# Patient Record
Sex: FEMALE | Race: WHITE | NOT HISPANIC OR LATINO | Employment: FULL TIME | URBAN - METROPOLITAN AREA
[De-identification: names, ages, dates, MRNs, and addresses within clinical notes are randomized per-mention and may not be internally consistent; named-entity substitution may affect disease eponyms.]

---

## 2017-10-04 ENCOUNTER — HOSPITAL ENCOUNTER (EMERGENCY)
Facility: CLINIC | Age: 42
Discharge: PSYCHIATRIC OR SUBSTANCE USE FACILITY | End: 2017-10-04
Attending: EMERGENCY MEDICINE | Admitting: EMERGENCY MEDICINE
Payer: COMMERCIAL

## 2017-10-04 ENCOUNTER — HOSPITAL ENCOUNTER (INPATIENT)
Facility: CLINIC | Age: 42
LOS: 10 days | Discharge: HOME | End: 2017-10-14
Attending: PSYCHIATRY & NEUROLOGY | Admitting: PSYCHIATRY & NEUROLOGY
Payer: COMMERCIAL

## 2017-10-04 VITALS
DIASTOLIC BLOOD PRESSURE: 85 MMHG | OXYGEN SATURATION: 95 % | TEMPERATURE: 98.6 F | HEIGHT: 66 IN | SYSTOLIC BLOOD PRESSURE: 125 MMHG | HEART RATE: 68 BPM | RESPIRATION RATE: 12 BRPM

## 2017-10-04 DIAGNOSIS — F51.05 INSOMNIA DUE TO OTHER MENTAL DISORDER: ICD-10-CM

## 2017-10-04 DIAGNOSIS — F33.2 MAJOR DEPRESSIVE DISORDER, RECURRENT SEVERE WITHOUT PSYCHOTIC FEATURES (H): ICD-10-CM

## 2017-10-04 DIAGNOSIS — F41.1 GAD (GENERALIZED ANXIETY DISORDER): ICD-10-CM

## 2017-10-04 DIAGNOSIS — R45.851 SUICIDAL IDEATION: ICD-10-CM

## 2017-10-04 DIAGNOSIS — F99 INSOMNIA DUE TO OTHER MENTAL DISORDER: ICD-10-CM

## 2017-10-04 DIAGNOSIS — G44.209 ACUTE NON INTRACTABLE TENSION-TYPE HEADACHE: ICD-10-CM

## 2017-10-04 DIAGNOSIS — Z72.0 TOBACCO USE: ICD-10-CM

## 2017-10-04 DIAGNOSIS — F31.9 BIPOLAR I DISORDER (H): Primary | ICD-10-CM

## 2017-10-04 PROCEDURE — 25000132 ZZH RX MED GY IP 250 OP 250 PS 637: Performed by: EMERGENCY MEDICINE

## 2017-10-04 PROCEDURE — 99285 EMERGENCY DEPT VISIT HI MDM: CPT

## 2017-10-04 PROCEDURE — 25000132 ZZH RX MED GY IP 250 OP 250 PS 637: Performed by: PSYCHIATRY & NEUROLOGY

## 2017-10-04 PROCEDURE — 12400007 ZZH R&B MH INTERMEDIATE UMMC

## 2017-10-04 RX ORDER — LURASIDONE HYDROCHLORIDE 20 MG/1
20 TABLET, FILM COATED ORAL
Status: ON HOLD | COMMUNITY
Start: 2014-12-01 | End: 2017-10-13

## 2017-10-04 RX ORDER — NALOXONE HYDROCHLORIDE 0.4 MG/ML
.1-.4 INJECTION, SOLUTION INTRAMUSCULAR; INTRAVENOUS; SUBCUTANEOUS
Status: DISCONTINUED | OUTPATIENT
Start: 2017-10-04 | End: 2017-10-14 | Stop reason: HOSPADM

## 2017-10-04 RX ORDER — NICOTINE 21 MG/24HR
1 PATCH, TRANSDERMAL 24 HOURS TRANSDERMAL DAILY
Status: DISCONTINUED | OUTPATIENT
Start: 2017-10-05 | End: 2017-10-14 | Stop reason: HOSPADM

## 2017-10-04 RX ORDER — HYDROXYZINE HYDROCHLORIDE 25 MG/1
25-50 TABLET, FILM COATED ORAL EVERY 4 HOURS PRN
Status: DISCONTINUED | OUTPATIENT
Start: 2017-10-04 | End: 2017-10-14 | Stop reason: HOSPADM

## 2017-10-04 RX ORDER — LORATADINE 10 MG/1
10 TABLET ORAL DAILY
Status: DISCONTINUED | OUTPATIENT
Start: 2017-10-05 | End: 2017-10-14 | Stop reason: HOSPADM

## 2017-10-04 RX ORDER — ALUMINA, MAGNESIA, AND SIMETHICONE 2400; 2400; 240 MG/30ML; MG/30ML; MG/30ML
30 SUSPENSION ORAL EVERY 4 HOURS PRN
Status: DISCONTINUED | OUTPATIENT
Start: 2017-10-04 | End: 2017-10-14 | Stop reason: HOSPADM

## 2017-10-04 RX ORDER — OLANZAPINE 10 MG/2ML
10 INJECTION, POWDER, FOR SOLUTION INTRAMUSCULAR
Status: DISCONTINUED | OUTPATIENT
Start: 2017-10-04 | End: 2017-10-14 | Stop reason: HOSPADM

## 2017-10-04 RX ORDER — QUETIAPINE FUMARATE 100 MG/1
100 TABLET, FILM COATED ORAL AT BEDTIME
Status: DISCONTINUED | OUTPATIENT
Start: 2017-10-04 | End: 2017-10-14 | Stop reason: HOSPADM

## 2017-10-04 RX ORDER — BISACODYL 10 MG
10 SUPPOSITORY, RECTAL RECTAL DAILY PRN
Status: DISCONTINUED | OUTPATIENT
Start: 2017-10-04 | End: 2017-10-14 | Stop reason: HOSPADM

## 2017-10-04 RX ORDER — OLANZAPINE 10 MG/1
10 TABLET ORAL
Status: DISCONTINUED | OUTPATIENT
Start: 2017-10-04 | End: 2017-10-14 | Stop reason: HOSPADM

## 2017-10-04 RX ORDER — ESCITALOPRAM OXALATE 5 MG/1
10 TABLET ORAL EVERY MORNING
Status: DISCONTINUED | OUTPATIENT
Start: 2017-10-05 | End: 2017-10-05

## 2017-10-04 RX ORDER — LORAZEPAM 1 MG/1
1 TABLET ORAL 3 TIMES DAILY
Status: DISCONTINUED | OUTPATIENT
Start: 2017-10-05 | End: 2017-10-12

## 2017-10-04 RX ORDER — HYDROCODONE BITARTRATE AND ACETAMINOPHEN 5; 325 MG/1; MG/1
2 TABLET ORAL ONCE
Status: COMPLETED | OUTPATIENT
Start: 2017-10-04 | End: 2017-10-04

## 2017-10-04 RX ORDER — TRAZODONE HYDROCHLORIDE 50 MG/1
50 TABLET, FILM COATED ORAL
Status: DISCONTINUED | OUTPATIENT
Start: 2017-10-04 | End: 2017-10-14 | Stop reason: HOSPADM

## 2017-10-04 RX ORDER — LURASIDONE HYDROCHLORIDE 20 MG/1
20 TABLET, FILM COATED ORAL EVERY MORNING
Status: DISCONTINUED | OUTPATIENT
Start: 2017-10-05 | End: 2017-10-05

## 2017-10-04 RX ORDER — NICOTINE 21 MG/24HR
1 PATCH, TRANSDERMAL 24 HOURS TRANSDERMAL DAILY
Status: DISCONTINUED | OUTPATIENT
Start: 2017-10-04 | End: 2017-10-04 | Stop reason: HOSPADM

## 2017-10-04 RX ORDER — HYDROCODONE BITARTRATE AND ACETAMINOPHEN 5; 325 MG/1; MG/1
1-2 TABLET ORAL EVERY 4 HOURS PRN
Status: DISCONTINUED | OUTPATIENT
Start: 2017-10-04 | End: 2017-10-14 | Stop reason: HOSPADM

## 2017-10-04 RX ORDER — ACETAMINOPHEN 325 MG/1
650 TABLET ORAL EVERY 4 HOURS PRN
Status: DISCONTINUED | OUTPATIENT
Start: 2017-10-04 | End: 2017-10-13

## 2017-10-04 RX ORDER — ZOLPIDEM TARTRATE 5 MG/1
10 TABLET ORAL AT BEDTIME
Status: DISCONTINUED | OUTPATIENT
Start: 2017-10-04 | End: 2017-10-14 | Stop reason: HOSPADM

## 2017-10-04 RX ORDER — IBUPROFEN 600 MG/1
600 TABLET, FILM COATED ORAL EVERY 6 HOURS PRN
Status: DISCONTINUED | OUTPATIENT
Start: 2017-10-04 | End: 2017-10-14 | Stop reason: HOSPADM

## 2017-10-04 RX ORDER — LORATADINE 10 MG/1
10 TABLET ORAL DAILY
COMMUNITY
End: 2019-06-16

## 2017-10-04 RX ADMIN — IBUPROFEN 600 MG: 600 TABLET ORAL at 23:03

## 2017-10-04 RX ADMIN — ZOLPIDEM TARTRATE 10 MG: 5 TABLET, FILM COATED ORAL at 23:03

## 2017-10-04 RX ADMIN — HYDROXYZINE HYDROCHLORIDE 50 MG: 25 TABLET ORAL at 23:03

## 2017-10-04 RX ADMIN — NICOTINE 1 PATCH: 21 PATCH, EXTENDED RELEASE TRANSDERMAL at 18:38

## 2017-10-04 RX ADMIN — QUETIAPINE 100 MG: 100 TABLET, FILM COATED ORAL at 23:03

## 2017-10-04 RX ADMIN — HYDROCODONE BITARTRATE AND ACETAMINOPHEN 2 TABLET: 5; 325 TABLET ORAL at 20:20

## 2017-10-04 NOTE — IP AVS SNAPSHOT
[address]    [address]    [place] [address]    Phone:  [phone]                                       After Visit Summary   10/4/2017    Mindi Eastman    MRN: [id number]           After Visit Summary Signature Page     I have received my discharge instructions, and my questions have been answered. I have discussed any challenges I see with this plan with the nurse or doctor.    ..........................................................................................................................................  Patient/Patient Representative Signature      ..........................................................................................................................................  Patient Representative Print Name and Relationship to Patient    ..................................................               ................................................  Date                                            Time    ..........................................................................................................................................  Reviewed by Signature/Title    ...................................................              ..............................................  Date                                                            Time

## 2017-10-04 NOTE — ED PROVIDER NOTES
"  History     Chief Complaint:  Suicidal Ideation     HPI   Mindi Eastman is a 41 year old female who presents with suicidal thoughts. Patient has been feeling more depressed and suicidal recently. No apparent trigger except for seroquel being decreased in dose to 50 mg from 100 mg though did increase it back to 100 mg and no improvement. Having suicidal thoughts. Did put knives in bed a few nights ago but was not harmed. Did punch table over the weekend in angry/stress and sustained fracture in right hand. Has been taking medications. No drugs/ETOH use. Feels that people would be better off without her. No ingestion or overdose.    Allergies:  Darvocet    Medications:    azithromycin (ZITHROMAX) 250 MG tablet  Albuterol Sulfate (PROVENTIL HFA) 108 (90 BASE) MCG/ACT AERS  guaiFENesin-codeine (ROBITUSSIN AC) 100-10 MG/5ML SOLN  Citalopram Hydrobromide (CELEXA PO)  Amphetamine-Dextroamphetamine (ADDERALL PO)  QUEtiapine Fumarate (SEROQUEL PO)  VITAMIN D, CHOLECALCIFEROL, PO  hydrocodone-acetaminophen 5-325 MG per tablet  ondansetron (ZOFRAN) 8 MG tablet  HYDROCODONE-ACETAMINOPHEN  MG OR TABS  METHYLPRED 40 40 MG/ML IJ SUSP  KENALOG 40 MG/ML IJ SUSP  ZOLOFT OR  LORAZEPAM OR  LYRICA 50 MG OR CAPS    Past Medical History:    Anxiety  Depression  Obstructive sleep apnea      Past Surgical History:    Appendectomy  Cholecystectomy      Family History:    History reviewed. No pertinent family history.     Social History:  Tobacco use:    Former smoker, quit 2010  Alcohol use:    Positive   PCP: Erica Coelho     Review of Systems  10 point review of systems was obtained and negative other than mentioned above.    Physical Exam     Patient Vitals for the past 24 hrs:   BP Temp Temp src Pulse Heart Rate Resp SpO2 Height   10/04/17 2123 125/85 - - 68 - - - -   10/04/17 2112 - - - - 68 - 95 % -   10/04/17 2111 125/85 - - - - - - -   10/04/17 1710 142/84 98.6  F (37  C) Oral - 66 12 98 % 1.676 m (5' 6\")    "     Physical Exam  General: Resting comfortably on the gurney  Eyes:  The pupils are equal and round    Conjunctivae and sclerae are normal  ENT:    Moist mucous membranes  Neck:  Normal range of motion  CV:  Regular rate and rhythm    Skin warm and well perfused   Resp:  Lungs are clear    Non-labored    No rales    No wheezing  MS:  Normal muscular tone    In cast on right arm/hand  Skin:  No rash or acute skin lesions noted  Neuro:   Awake, alert.      Speech is normal and fluent.    Face is symmetric.     Moves all extremities  Psych:  Tearful. Appropriate interactions.    Emergency Department Course     Interventions:  1838 Nicoderm CQ 1 patch transdermal     Emergency Department Course:  Past medical records, nursing notes, and vitals reviewed.  I performed an exam of the patient and obtained history, as documented above.    I rechecked the patient. Findings and plan explained to the Patient and significant other  1915: Patient accepted at [facility] by Dr. Wright    Findings and plan explained to the Patient. Patient will be transferred to [facility] via EMS. Discussed the case with Dr. Wright, who will admit the patient to a monitored bed for further monitoring, evaluation, and treatment.     Impression & Plan      Medical Decision Making:  Mindi Eastman is a 41 year old female who presented to the ED suicidal ideation. Vital signs wnl. Worsening thoughts, punched table, has suicidal plans/thought. Meets criteria for psych admission. Denies ingestion. No ETOH or drug use. Patient wanting to be admitted to psychiatry. No other medical condition causing symptoms at this time. Bed available at [facility] and transferred there for admission.    Diagnosis:    ICD-10-CM    1. Suicidal ideation R45.851       Disposition:   Transfer      10/4/2017   Sanjana Be MD Goertz, Maria Kristine, MD  10/05/17 0221

## 2017-10-04 NOTE — IP AVS SNAPSHOT
MRN:[id number]                      After Visit Summary   10/4/2017    Mindi Eastman    MRN: [id number]           Thank you!     Thank you for choosing [facility] for your care. Our goal is always to provide you with excellent care.        Patient Information     Date Of Birth          1975        Designated Caregiver       Most Recent Value    Caregiver    Will someone help with your care after discharge? no      About your hospital stay     You were admitted on:  October 4, 2017 You last received care in the:  [facility] 32NR    You were discharged on:  October 14, 2017       Who to Call     For medical emergencies, please call 911.  For non-urgent questions about your medical care, please call your primary care provider or clinic, [phone]          Attending Provider     Provider Specialty    Alvarez Solares MD Psychiatry    Dongre, Enrique Chow MD Psychiatry       Primary Care Provider Office Phone # Fax #    Erica Coelho PA-C [phone]      Your next 10 appointments already scheduled     Oct 16, 2017  7:30 AM CDT   Electroconvulsive Therapy with Alvarez Solares MD   [facility])    [address] [place] [address]   [phone]            Oct 24, 2017 10:15 AM CDT   Evaluation with Mami Li   [facility] ([facility])    [address] [address]   [phone]              Future tests that were ordered for you     Electroconvulsive therapy: Begin Outpatient       Series of up to 12 treatments. Begin Date: 10/16/2017  Treating Psychiatrist providing ECT: Dr. Alvarez Solares   Notified on:  10/12/2017            Behavior Outpatient Eval       Outpatient provider to assess and treat.                  Further instructions from your care team        Behavioral Discharge Planning and  Instructions      Summary:  You were admitted on 10/4/2017 due to Suicidal Ideations.  You were treated by Dr Enrique Wright MD and discharged on 10/14/2017 from [facility] Home      Principal Diagnosis:   Bipolar Disorder, current episode depressive   Generalized Anxiety Disorder     ECT  You received ECT during your hospitalization and therefore do NOT drive for at least 7 days after your last treatment.  Do not drive after ECT until you have been cleared by your doctor.     You will be receiving outpatient maintenance ECT. You will need someone to drive you to these appointments and back home. You will need someone with you for 6 hours following treatment. Do not drive after ECT until you have been cleared by your doctor.     ECT appointment Date/Time: Monday at 10/16 at 7:30 am  Address:   [place] [address] [address] [facility], [address]  Phone: [phone]    Health Care Follow-up Appointments:   Therapy Appointment Date/Time: Tuesday 10/17 at 3 pm    Therapist: Feli Medrano    Psychiatry Appointment Date/Time: Monday 11/6 at 1 pm   Psychiatrist: Brooklynn Almanza, MSN, RN  [facility] [place], [place] [address]  Phone: [phone]  Fax: [phone]    You have been referred to the partial hospitalization program at [facility].   Intake appointment date.time: Tuesday 10/24 at 10:15 am (If you are still in ECT please call and reschedule this)  [facility] Partial Hospitalization Program  Located in [facility] Floor [address] ; [address] [place] [address]   Phone:[phone]    Attend all scheduled appointments with your outpatient providers. Call at least 24 hours in advance if you need to reschedule an appointment to ensure continued access to your outpatient providers.   During movement therapy you discovered that Yoga Nidra was helpful. You also used a 'resource  "movement or posture' to call upon the relaxed state more quickly or efficiently.   Major Treatments, Procedures and Findings:  You were provided with: a psychiatric assessment, assessed for medical stability, medication evaluation and/or management, group therapy, milieu management and ECT    Symptoms to Report: feeling more aggressive, increased confusion, losing more sleep, mood getting worse or thoughts of suicide    Early warning signs can include: increased depression or anxiety sleep disturbances increased thoughts or behaviors of suicide or self-harm  increased unusual thinking, such as paranoia or hearing voices    Safety and Wellness:  Take all medicines as directed.  Make no changes unless your doctor suggests them.      Follow treatment recommendations.  Refrain from alcohol and non-prescribed drugs.  If there is a concern for safety, call 911.    Resources:   [facility] Crisis: 923.825.8164    National [place] on Mental Illness (www.[place].petra.org): 879.132.5490 or 446-713-7217.  Suicide Awareness Voices of Education (SAVE) (www.save.org): 826-687-HQUO (3744)  National Suicide Prevention Line (www.mentalhealthmn.org): 250-348-OXUP (3038)  Mental Health Consumer/Survivor Network of [place] (www.mhcsn.net): 773.615.2425 or 874-538-7215  Mental Health Association of [place] (www.mentalhealth.org): 377.915.7383 or 685-139-0490  Self- Management and Recovery Training., SMART-- Toll free: 935.259.8190  www.[employer].Up My Game  Text 4 Life: txt \"LIFE\" to 55342 for immediate support and crisis intervention  Crisis text line: Text \"START\" to 256-620. Free, confidential, 24/7.    The treatment team has appreciated the opportunity to work with you.     If you have any questions or concerns our unit number is [phone]  You may be receiving a follow-up phone call within the next three days from a representative from behavioral health.    You have identified the best phone number to reach you as " "[phone]        Pending Results     No orders found from 10/2/2017 to 10/5/2017.            Admission Information     Date & Time Department Dept. Phone    10/4/2017 UR 32NR [phone]      Your Vitals Were     Blood Pressure Pulse Temperature Respirations Height Weight    118/57 70 98.1  F (36.7  C) (Oral) 16 1.676 m (5' 6\") 86.7 kg (191 lb 1.6 oz)    Last Period Pulse Oximetry BMI (Body Mass Index)             2017 98% 30.84 kg/m2         [facility] Information     [employer] lets you send messages to your doctor, view your test results, renew your prescriptions, schedule appointments and more. To sign up, go to www.[facility]/[employer] . Click on \"Log in\" on the left side of the screen, which will take you to the Welcome page. Then click on \"Sign up Now\" on the right side of the page.     You will be asked to enter the access code listed below, as well as some personal information. Please follow the directions to create your username and password.     Your access code is: [email or web address]  Expires: 2018  9:10 PM     Your access code will  in 90 days. If you need help or a new code, please call your [place] clinic or [phone].        Care EveryWhere ID     This is your Care EveryWhere ID. This could be used by other organizations to access your [place] medical records  [id number]        Equal Access to Services     [facility] AH: Hadii tonia nelson hadasho Soakinali, waaxda luqadaha, qaybta kaalmada adeegyada, yamileth diehl . So [facility] [phone].    ATENCIÓN: Si habla español, tiene a martínez disposición servicios gratuitos de asistencia lingüística. Llame al [phone].    We comply with applicable federal civil rights laws and [place] laws. We do not discriminate on the basis of race, color, national origin, age, disability, sex, sexual orientation, or gender identity.               Review of your medicines      START taking        Dose / Directions    acetaminophen 325 MG " tablet   Commonly known as:  TYLENOL   Used for:  Acute non intractable tension-type headache        Dose:  975 mg   Take 3 tablets (975 mg) by mouth every 6 hours as needed for mild pain or other (Give before coming down for ECT)   Quantity:  100 tablet   Refills:  1       hydrOXYzine 25 MG tablet   Commonly known as:  ATARAX   Used for:  SHANICE (generalized anxiety disorder)        Dose:  50 mg   Take 2 tablets (50 mg) by mouth 3 times daily as needed for anxiety   Quantity:  90 tablet   Refills:  1       ibuprofen 600 MG tablet   Commonly known as:  ADVIL/MOTRIN   Used for:  Acute non intractable tension-type headache        Dose:  600 mg   Take 1 tablet (600 mg) by mouth every 6 hours as needed for moderate pain   Quantity:  120 tablet   Refills:  1       nicotine 21 MG/24HR 24 hr patch   Commonly known as:  NICODERM CQ   Used for:  Tobacco use        Dose:  1 patch   Place 1 patch onto the skin daily   Quantity:  30 patch   Refills:  1         CONTINUE these medicines which may have CHANGED, or have new prescriptions. If we are uncertain of the size of tablets/capsules you have at home, strength may be listed as something that might have changed.        Dose / Directions    escitalopram 20 MG tablet   Commonly known as:  LEXAPRO   This may have changed:    - medication strength  - how much to take  - when to take this   Used for:  Major depressive disorder, recurrent severe without psychotic features (H)        Dose:  20 mg   Take 1 tablet (20 mg) by mouth daily   Quantity:  30 tablet   Refills:  1       LORazepam 1 MG tablet   Commonly known as:  ATIVAN   This may have changed:    - medication strength  - when to take this   Used for:  SHANICE (generalized anxiety disorder)        Dose:  1 mg   Take 1 tablet (1 mg) by mouth 2 times daily   Quantity:  60 tablet   Refills:  0       lurasidone 40 MG Tabs tablet   Commonly known as:  LATUDA   This may have changed:    - medication strength  - how much to take  - when to  take this   Used for:  Bipolar I disorder (H), Major depressive disorder, recurrent severe without psychotic features (H)        Dose:  40 mg   Take 1 tablet (40 mg) by mouth At Bedtime   Quantity:  30 tablet   Refills:  1       QUEtiapine 100 MG tablet   Commonly known as:  SEROquel   This may have changed:  medication strength   Used for:  Bipolar I disorder (H), Major depressive disorder, recurrent severe without psychotic features (H)        Dose:  100 mg   Take 1 tablet (100 mg) by mouth At Bedtime   Quantity:  30 tablet   Refills:  1       zolpidem 10 MG tablet   Commonly known as:  AMBIEN   This may have changed:  medication strength   Used for:  Insomnia due to other mental disorder        Dose:  10 mg   Take 1 tablet (10 mg) by mouth At Bedtime   Quantity:  30 tablet   Refills:  0         CONTINUE these medicines which have NOT CHANGED        Dose / Directions    ADDERALL XR PO        Dose:  60 mg   Take 60 mg by mouth daily   Refills:  0       HYDROcodone-acetaminophen 5-325 MG per tablet   Commonly known as:  NORCO        Dose:  1-2 tablet   Take 1-2 tablets by mouth every 4 hours as needed for pain.   Quantity:  30 tablet   Refills:  0       loratadine 10 MG tablet   Commonly known as:  CLARITIN        Dose:  10 mg   Take 10 mg by mouth daily   Refills:  0            Where to get your medicines      These medications were sent to [facility]/pharmacy #[id number] - [place], [place] - [address]  [address] [facility] [place] [address]     Phone:  [phone]     acetaminophen 325 MG tablet    escitalopram 20 MG tablet    hydrOXYzine 25 MG tablet    ibuprofen 600 MG tablet    lurasidone 40 MG Tabs tablet    nicotine 21 MG/24HR 24 hr patch    QUEtiapine 100 MG tablet         Some of these will need a paper prescription and others can be bought over the counter. Ask your nurse if you have questions.     Bring a paper prescription for each of these medications     zolpidem 10 MG tablet                Protect others  around you: Learn how to safely use, store and throw away your medicines at www.disposemymeds.org.             Medication List: This is a list of all your medications and when to take them. Check marks below indicate your daily home schedule. Keep this list as a reference.      Medications           Morning Afternoon Evening Bedtime As Needed    acetaminophen 325 MG tablet   Commonly known as:  TYLENOL   Take 3 tablets (975 mg) by mouth every 6 hours as needed for mild pain or other (Give before coming down for ECT)   Last time this was given:  975 mg on 10/13/2017  9:02 AM                            Acetaminophen from all sources not to exceed 4 g (4000mg) per day       ADDERALL XR PO   Take 60 mg by mouth daily                                   escitalopram 20 MG tablet   Commonly known as:  LEXAPRO   Take 1 tablet (20 mg) by mouth daily   Last time this was given:  20 mg on 10/14/2017  8:33 AM                                   HYDROcodone-acetaminophen 5-325 MG per tablet   Commonly known as:  NORCO   Take 1-2 tablets by mouth every 4 hours as needed for pain.   Last time this was given:  2 tablets on 10/14/2017  8:37 AM                            Acetaminophen from all sources not to exceed 4 g (4000mg) per day       hydrOXYzine 25 MG tablet   Commonly known as:  ATARAX   Take 2 tablets (50 mg) by mouth 3 times daily as needed for anxiety   Last time this was given:  50 mg on 10/12/2017  6:12 PM                            Up to 3 times per day       ibuprofen 600 MG tablet   Commonly known as:  ADVIL/MOTRIN   Take 1 tablet (600 mg) by mouth every 6 hours as needed for moderate pain   Last time this was given:  600 mg on 10/12/2017  8:22 PM                            Up to every 6 hours       loratadine 10 MG tablet   Commonly known as:  CLARITIN   Take 10 mg by mouth daily   Last time this was given:  10 mg on 10/14/2017  8:33 AM                                LORazepam 1 MG tablet   Commonly known as:  ATIVAN    Take 1 tablet (1 mg) by mouth 2 times daily   Last time this was given:  1 mg on 10/14/2017  8:33 AM                                      lurasidone 40 MG Tabs tablet   Commonly known as:  LATUDA   Take 1 tablet (40 mg) by mouth At Bedtime   Last time this was given:  40 mg on 10/13/2017  9:11 PM                                   nicotine 21 MG/24HR 24 hr patch   Commonly known as:  NICODERM CQ   Place 1 patch onto the skin daily   Last time this was given:  1 patch on 10/14/2017  8:33 AM            Remove nicotine patch if you begin smoking                       QUEtiapine 100 MG tablet   Commonly known as:  SEROquel   Take 1 tablet (100 mg) by mouth At Bedtime   Last time this was given:  100 mg on 10/13/2017  9:11 PM                                   zolpidem 10 MG tablet   Commonly known as:  AMBIEN   Take 1 tablet (10 mg) by mouth At Bedtime   Last time this was given:  10 mg on 10/13/2017  9:12 PM                                             More Information        Succinylcholine Chloride Solution for injection  What is this medicine?  Succinylcholine (KETAN seh nil KOH liza) is a skeletal muscle relaxant. It is used to relax muscles during surgery or while on a breathing machine.  This medicine may be used for other purposes; ask your health care provider or pharmacist if you have questions.  What should I tell my health care provider before I take this medicine?  They need to know if you have any of these conditions:    glaucoma    high levels of calcium, magnesium, potassium in the blood    history of nerve or muscle disease    large areas of burned or damaged skin    low levels of calcium in the blood    low levels of potassium in the blood    lung or breathing disease, like asthma    pseudocholinesterase deficiency    severely injured    an unusual or allergic reaction to succinylcholine, other medicines, foods, dyes, or preservatives    pregnant or trying to get pregnant    breast-feeding  How should I  use this medicine?  This medicine is for injection into a vein or muscle or for infusion into a vein. It is given by a health care professional in a hospital or clinic setting.  Talk to your pediatrician regarding the use of this medicine in children. While this drug may be prescribed for selected conditions, precautions do apply.  Overdosage: If you think you have taken too much of this medicine contact a poison control center or emergency room at once.  NOTE: This medicine is only for you. Do not share this medicine with others.  What if I miss a dose?  This does not apply.  What may interact with this medicine?  This medicine may interact with the following medications:    birth control pills    certain medicines for blood pressure, heart disease, irregular heart beat    certain medicines for infection like amikacin, capreomycin, clindamycin, gentamicin, lincomycin, polymyxin B, tobramycin, vancomycin    certain medicines for stomach problems like metoclopramide    general anesthetics    magnesium    narcotic medicines for pain    phenelzine    some medicines for cancer  This list may not describe all possible interactions. Give your health care provider a list of all the medicines, herbs, non-prescription drugs, or dietary supplements you use. Also tell them if you smoke, drink alcohol, or use illegal drugs. Some items may interact with your medicine.  What should I watch for while using this medicine?  Your condition will be monitored carefully while you are receiving this medicine.  What side effects may I notice from receiving this medicine?  Side effects that you should report to your doctor or health care professional as soon as possible:    allergic reactions like skin rash, itching or hives, swelling of the face, lips, or tongue    signs and symptoms of a dangerous change in heartbeat or heart rhythm like chest pain; dizziness; fast or irregular heartbeat; palpitations; feeling faint or lightheaded,  falls; breathing problems    signs and symptoms of increased potassium like muscle weakness; chest pain; or fast, irregular heartbeat    signs and symptoms of muscle injury like dark urine; trouble passing urine or change in the amount of urine; unusually weak or tired; muscle pain or side or back pain  Side effects that usually do not require medical attention (report these to your doctor or health care professional if they continue or are bothersome):    excessive saliva    muscle cramps    muscle pain  This list may not describe all possible side effects. Call your doctor for medical advice about side effects. You may report side effects to FDA at 5-086-FDA-5225.  Where should I keep my medicine?  This drug is given in a hospital or clinic and will not be stored at home.  NOTE: This sheet is a summary. It may not cover all possible information. If you have questions about this medicine, talk to your doctor, pharmacist, or health care provider.  NOTE:This sheet is a summary. It may not cover all possible information. If you have questions about this medicine, talk to your doctor, pharmacist, or health care provider. Copyright  2016 Gold Standard

## 2017-10-05 PROBLEM — F31.30 BIPOLAR I DISORDER, MOST RECENT EPISODE DEPRESSED (H): Status: ACTIVE | Noted: 2017-10-05

## 2017-10-05 LAB
ALBUMIN SERPL-MCNC: 3.2 G/DL (ref 3.4–5)
ALP SERPL-CCNC: 68 U/L (ref 40–150)
ALT SERPL W P-5'-P-CCNC: 28 U/L (ref 0–50)
ANION GAP SERPL CALCULATED.3IONS-SCNC: 7 MMOL/L (ref 3–14)
AST SERPL W P-5'-P-CCNC: 31 U/L (ref 0–45)
BASOPHILS # BLD AUTO: 0 10E9/L (ref 0–0.2)
BASOPHILS NFR BLD AUTO: 0.6 %
BILIRUB SERPL-MCNC: 0.4 MG/DL (ref 0.2–1.3)
BUN SERPL-MCNC: 8 MG/DL (ref 7–30)
CALCIUM SERPL-MCNC: 8 MG/DL (ref 8.5–10.1)
CHLORIDE SERPL-SCNC: 108 MMOL/L (ref 94–109)
CHOLEST SERPL-MCNC: 172 MG/DL
CO2 SERPL-SCNC: 28 MMOL/L (ref 20–32)
CREAT SERPL-MCNC: 0.78 MG/DL (ref 0.52–1.04)
DIFFERENTIAL METHOD BLD: NORMAL
EOSINOPHIL # BLD AUTO: 0.4 10E9/L (ref 0–0.7)
EOSINOPHIL NFR BLD AUTO: 5.8 %
ERYTHROCYTE [DISTWIDTH] IN BLOOD BY AUTOMATED COUNT: 12.9 % (ref 10–15)
GFR SERPL CREATININE-BSD FRML MDRD: 82 ML/MIN/1.7M2
GLUCOSE SERPL-MCNC: 90 MG/DL (ref 70–99)
HCG UR QL: NEGATIVE
HCT VFR BLD AUTO: 35.8 % (ref 35–47)
HDLC SERPL-MCNC: 70 MG/DL
HGB BLD-MCNC: 11.7 G/DL (ref 11.7–15.7)
IMM GRANULOCYTES # BLD: 0 10E9/L (ref 0–0.4)
IMM GRANULOCYTES NFR BLD: 0.2 %
LDLC SERPL CALC-MCNC: 85 MG/DL
LYMPHOCYTES # BLD AUTO: 2.1 10E9/L (ref 0.8–5.3)
LYMPHOCYTES NFR BLD AUTO: 33.9 %
MCH RBC QN AUTO: 29.8 PG (ref 26.5–33)
MCHC RBC AUTO-ENTMCNC: 32.7 G/DL (ref 31.5–36.5)
MCV RBC AUTO: 91 FL (ref 78–100)
MONOCYTES # BLD AUTO: 0.6 10E9/L (ref 0–1.3)
MONOCYTES NFR BLD AUTO: 9.3 %
NEUTROPHILS # BLD AUTO: 3.1 10E9/L (ref 1.6–8.3)
NEUTROPHILS NFR BLD AUTO: 50.2 %
NONHDLC SERPL-MCNC: 102 MG/DL
NRBC # BLD AUTO: 0 10*3/UL
NRBC BLD AUTO-RTO: 0 /100
PLATELET # BLD AUTO: 356 10E9/L (ref 150–450)
POTASSIUM SERPL-SCNC: 4.2 MMOL/L (ref 3.4–5.3)
PROT SERPL-MCNC: 6.2 G/DL (ref 6.8–8.8)
RBC # BLD AUTO: 3.92 10E12/L (ref 3.8–5.2)
SODIUM SERPL-SCNC: 143 MMOL/L (ref 133–144)
TRIGL SERPL-MCNC: 85 MG/DL
TSH SERPL DL<=0.005 MIU/L-ACNC: 1.81 MU/L (ref 0.4–4)
WBC # BLD AUTO: 6.3 10E9/L (ref 4–11)

## 2017-10-05 PROCEDURE — 81025 URINE PREGNANCY TEST: CPT | Performed by: PSYCHIATRY & NEUROLOGY

## 2017-10-05 PROCEDURE — 80053 COMPREHEN METABOLIC PANEL: CPT | Performed by: PSYCHIATRY & NEUROLOGY

## 2017-10-05 PROCEDURE — 25000132 ZZH RX MED GY IP 250 OP 250 PS 637: Performed by: PSYCHIATRY & NEUROLOGY

## 2017-10-05 PROCEDURE — 99207 ZZC CDG-CODE INCORRECT PER BILLING BASED ON TIME: CPT | Performed by: PSYCHIATRY & NEUROLOGY

## 2017-10-05 PROCEDURE — 85025 COMPLETE CBC W/AUTO DIFF WBC: CPT | Performed by: PSYCHIATRY & NEUROLOGY

## 2017-10-05 PROCEDURE — 12400007 ZZH R&B MH INTERMEDIATE UMMC

## 2017-10-05 PROCEDURE — 36415 COLL VENOUS BLD VENIPUNCTURE: CPT | Performed by: PSYCHIATRY & NEUROLOGY

## 2017-10-05 PROCEDURE — 84443 ASSAY THYROID STIM HORMONE: CPT | Performed by: PSYCHIATRY & NEUROLOGY

## 2017-10-05 PROCEDURE — 80061 LIPID PANEL: CPT | Performed by: PSYCHIATRY & NEUROLOGY

## 2017-10-05 PROCEDURE — 99222 1ST HOSP IP/OBS MODERATE 55: CPT | Mod: AI | Performed by: PSYCHIATRY & NEUROLOGY

## 2017-10-05 PROCEDURE — 97150 GROUP THERAPEUTIC PROCEDURES: CPT | Mod: GO

## 2017-10-05 RX ORDER — LURASIDONE HYDROCHLORIDE 40 MG/1
40 TABLET, FILM COATED ORAL AT BEDTIME
Status: DISCONTINUED | OUTPATIENT
Start: 2017-10-06 | End: 2017-10-14 | Stop reason: HOSPADM

## 2017-10-05 RX ORDER — LURASIDONE HYDROCHLORIDE 20 MG/1
20 TABLET, FILM COATED ORAL AT BEDTIME
Status: COMPLETED | OUTPATIENT
Start: 2017-10-05 | End: 2017-10-05

## 2017-10-05 RX ORDER — ESCITALOPRAM OXALATE 20 MG/1
20 TABLET ORAL DAILY
Status: DISCONTINUED | OUTPATIENT
Start: 2017-10-06 | End: 2017-10-14 | Stop reason: HOSPADM

## 2017-10-05 RX ORDER — ESCITALOPRAM OXALATE 5 MG/1
10 TABLET ORAL DAILY
Status: COMPLETED | OUTPATIENT
Start: 2017-10-05 | End: 2017-10-05

## 2017-10-05 RX ADMIN — LORATADINE 10 MG: 10 TABLET ORAL at 08:04

## 2017-10-05 RX ADMIN — QUETIAPINE 100 MG: 100 TABLET, FILM COATED ORAL at 20:44

## 2017-10-05 RX ADMIN — HYDROCODONE BITARTRATE AND ACETAMINOPHEN 2 TABLET: 5; 325 TABLET ORAL at 16:23

## 2017-10-05 RX ADMIN — HYDROXYZINE HYDROCHLORIDE 50 MG: 25 TABLET ORAL at 21:32

## 2017-10-05 RX ADMIN — HYDROCODONE BITARTRATE AND ACETAMINOPHEN 2 TABLET: 5; 325 TABLET ORAL at 08:05

## 2017-10-05 RX ADMIN — LORAZEPAM 1 MG: 1 TABLET ORAL at 14:05

## 2017-10-05 RX ADMIN — ZOLPIDEM TARTRATE 10 MG: 5 TABLET, FILM COATED ORAL at 20:43

## 2017-10-05 RX ADMIN — LORAZEPAM 1 MG: 1 TABLET ORAL at 08:04

## 2017-10-05 RX ADMIN — LURASIDONE HYDROCHLORIDE 20 MG: 20 TABLET, FILM COATED ORAL at 08:04

## 2017-10-05 RX ADMIN — NICOTINE 1 PATCH: 21 PATCH, EXTENDED RELEASE TRANSDERMAL at 08:05

## 2017-10-05 RX ADMIN — ESCITALOPRAM 10 MG: 5 TABLET, FILM COATED ORAL at 08:04

## 2017-10-05 RX ADMIN — HYDROCODONE BITARTRATE AND ACETAMINOPHEN 2 TABLET: 5; 325 TABLET ORAL at 21:34

## 2017-10-05 RX ADMIN — HYDROCODONE BITARTRATE AND ACETAMINOPHEN 2 TABLET: 5; 325 TABLET ORAL at 12:25

## 2017-10-05 RX ADMIN — LURASIDONE HYDROCHLORIDE 20 MG: 20 TABLET, FILM COATED ORAL at 20:44

## 2017-10-05 RX ADMIN — LORAZEPAM 1 MG: 1 TABLET ORAL at 20:43

## 2017-10-05 RX ADMIN — ESCITALOPRAM 10 MG: 5 TABLET, FILM COATED ORAL at 12:26

## 2017-10-05 ASSESSMENT — ACTIVITIES OF DAILY LIVING (ADL)
ORAL_HYGIENE: INDEPENDENT
GROOMING: INDEPENDENT
ORAL_HYGIENE: INDEPENDENT
DRESS: INDEPENDENT
LAUNDRY: WITH SUPERVISION
DRESS: STREET CLOTHES
GROOMING: INDEPENDENT
ORAL_HYGIENE: INDEPENDENT
GROOMING: INDEPENDENT
DRESS: INDEPENDENT

## 2017-10-05 NOTE — PROGRESS NOTES
10/04/17 2223   Patient Belongings   Did you bring any home meds/supplements to the hospital?  No   Patient Belongings wallet;shoes;cell phone/electronics;clothing;jewelry;watch   Disposition of Belongings Pt Locker /Security    Belongings Search Yes   Clothing Search Yes   Second Staff Kim ROMERO (RN)     In Pt Locker: 1 pair of black boots, socks, pair of pants, belt with keys on belt loop, necklace, 3 markers, chopstick, watch, cell phone, 2 T-shirts, wallet(misc card, social security card, 's license)     With Pt: Pair of glasses.    On 10/5/17, family brought a grey hoodie and a pair of blue     Sent to security: $43.94 cash, capital one platinum master card (1254), [employer]  Visa debit (1554), Capital one master card (5844), [employer] Money Card( 7056).    A               Admission:  I am responsible for any personal items that are not sent to the safe or pharmacy.  [facility] is not responsible for loss, theft or damage of any property in my possession.    Signature:  _________________________________ Date: _______  Time: _____                                              Staff Signature:  ____________________________ Date: ________  Time: _____      2nd Staff person, if patient is unable/unwilling to sign:    Signature: ________________________________ Date: ________  Time: _____     Discharge:  [facility] has returned all of my personal belongings:    Signature: _________________________________ Date: ________  Time: _____                                          Staff Signature:  ____________________________ Date: ________  Time: _____

## 2017-10-05 NOTE — PLAN OF CARE
Problem: General Plan of Care (Inpatient Behavioral)  Goal: Individualization/Patient Specific Goal (IP Behavioral)  The patient and/or their representative will achieve their patient-specific goals related to the plan of care.    The patient-specific goals include: Will report decreased depression and absence of suicidal ideation.  Outcome: No Change  Illness Management Recovery model: Objectives     Patient will identify reason(s) for hospitalization from their perspective.  Patient will identify a minimum of three goals for discharge.  Patient will identify a minimum of three triggers that may increase their symptoms.  Patient will identify a minimum of three coping skills they can do to stay well.  Patient will identify their support system to demonstrate readiness for discharge.

## 2017-10-05 NOTE — PROGRESS NOTES
Pt visible & somewhat withdrawn in lounge. She attended groups, is minimally social, & reports she feels safe here but has occasional fleeting thoughts of self harm which she would not act on. Pt has a therapist out side that she likes.Pt says she is working on healthy ways to express frustration. Pt added that she was just so frustrated & overwhelmed, that she hit the picnic table & broke her hand. She has also had minor a med adjustments that she hopes will be helpful. She is pleasant & cooperative in her interactions.     10/05/17 1300   Behavioral Health   Hallucinations denies / not responding to hallucinations   Thinking intact   Orientation person: oriented   Memory baseline memory   Insight insight appropriate to situation   Judgement impaired   Eye Contact at examiner   Affect blunted, flat   Mood depressed;anxious   Physical Appearance/Attire attire appropriate to age and situation   Suicidality thoughts only  (says she'l remain safe)   Self Injury thoughts only  (safe here)   Activity other (see comment)  (visible in milieu, attends groups , selectively social)   Speech clear;coherent   Psychomotor / Gait balanced;steady   Coping/Psychosocial   Verbalized Emotional State acceptance   Plan Of Care Reviewed With patient   Occupational Therapy   Type of Intervention structured groups   Response Quiet but attentive   Hours 1.5   Psycho Education   Type of Intervention 1:1 intervention   Response participates with encouragement   Hours 0.5   Treatment Detail check in   Activities of Daily Living   Hygiene/Grooming independent   Oral Hygiene independent   Dress street clothes   Laundry with supervision   Room Organization independent   Activity   Activity Assistance Provided independent   Behavioral Health Interventions   Depression maintain safety precautions;maintain safe secure environment   Social and Therapeutic Interventions (Depression) encourage participation in therapeutic groups and milieu activities

## 2017-10-05 NOTE — PHARMACY-ADMISSION MEDICATION HISTORY
Admission medication history interview status for the 10/4/2017  admission is complete. See EPIC admission navigator for prior to admission medications     Medication history source reliability:Good    Actions taken by pharmacist (provider contacted, etc):None     Additional medication history information not noted on PTA med list :None    Medication reconciliation/reorder completed by provider prior to medication history? No    Time spent in this activity: 15 minutes    Prior to Admission medications    Medication Sig Last Dose Taking? Auth Provider   Amphetamine-Dextroamphetamine (ADDERALL XR PO) Take 60 mg by mouth daily 10/4/2017 at Unknown time Yes Unknown, Entered By History   LORAZEPAM PO Take 1 mg by mouth 3 times daily 10/4/2017 at x2 Yes Unknown, Entered By History   Zolpidem Tartrate (AMBIEN PO) Take 10 mg by mouth At Bedtime 10/3/2017 at Unknown time Yes Unknown, Entered By History   loratadine (CLARITIN) 10 MG tablet Take 10 mg by mouth daily 10/4/2017 at Unknown time Yes Unknown, Entered By History   Citalopram Hydrobromide (CELEXA PO) Take 10 mg by mouth daily  10/4/2017 at Unknown time Yes Reported, Patient   QUEtiapine Fumarate (SEROQUEL PO) Take 100 mg by mouth At Bedtime  10/3/2017 at Unknown time Yes Reported, Patient   hydrocodone-acetaminophen 5-325 MG per tablet Take 1-2 tablets by mouth every 4 hours as needed for pain. prn Yes Gerald Yin MD Tiffany M. Reinitz, GenoD

## 2017-10-05 NOTE — PLAN OF CARE
"Problem: Depressive Symptoms  Goal: Social and Therapeutic (Depression)  Signs and symptoms of listed problems will be absent or manageable.      INITIAL OT ATTENDANCE: Attended 1.50 of 2.0 possible O.T. groups. Flat affect, no socialization with peers, isolated into task. Patient was given and completed a written Self Assessment. OT staff reviewed with patient and explained the value of having them involved in their treatment plan and provided options to meet their current needs/self identified goals.Please see Progress Note section of paper chart for form. Identified reason for admission as \"Bipolar\". Priority goals include: identify express feelings, manage anger. Work skills on task good despite right hand in cast, able to do detail. Was called from group. Initially seen by OT on this date. More observation needed to complete initial evaluation at this time.        "

## 2017-10-05 NOTE — H&P
"[facility], [place]   Psychiatric History & Physical  Admission date: 10/4/2017        Chief Complaint:   \"I was getting really depressed and needed to come in the hospital.\"        HPI:     Mindi is a 41 year old female with history of Bipolar Disorder who was admitted on a 72 hour hold for depressive moods, suicidal ideation with recent attempt. Her only prior hospitalization being 10 years ago at [facility], she's started to have a reemergence of symptoms within the last 3 months. She's had worsening depressive moods, loss of prior interests, low energy, fatigue, apathy, fractured sleep, and suicidal ideation including attempts. Her attempt includes placing knifes between her arms and chest (blades facing upwards), and on her chest while she sleeps, which the idea that she will inadvertently stab/impale herself during sleep. She is tearful, and mentions that her depression feels \"heavy,\" and has affected her performance at work ( she works as a manager at [employer]), and had last gone to work yesterday. She has ruminative thoughts that it would be better if she wasn't around, and has some current passive SI, although no plans. SHe owns a gun, which is stephon up in her garage, and does not have ammo. She was recently at her therapist appointment when she divulged her depressive/suicidal thoughts, which prompted her to be sent to the ED directly from the clinic. She recalls having some elevated mood, impulsive behavior, with racing thoughts in the past, with defected sleep. Her sleep currently is fractured, having trouble with maintaining sleep at night. Jace any recent substance use. Denies prominent interpersonal disputes with her wife. Denies work related stressors. Denies any HI/AH/VH. She is willing to stay in the hospital. Mentions she has been compliant with medications at home everyday.         Past Psychiatric History:   Past inpatient treatment: Mentions she was " hospitalized 10 years ago at [facility] for similar conditions.   Current outpatient psychiatrist: Sees a provider at [facility]   Current outpatient therapist: Sees a therapist once a week at [facility].   Other (ACT team etc): None  Past suicide attempts: None   History of commitments: None   History of ECT: None         Substance Use and History:   Tobacco: Denies  Alcohol: Denies   Marijuana: Denies   Cocaine: Denies   Amphetamines: Denies   Opioids: Denies       CD treatment history: denies         Past Medical History:   PAST MEDICAL HISTORY:   Past Medical History:   Diagnosis Date     Anxiety      Depressive disorder     ADHD bipolar OCD     ROXANNE (obstructive sleep apnea)        PAST SURGICAL HISTORY:   Past Surgical History:   Procedure Laterality Date     APPENDECTOMY       CHOLECYSTECTOMY               Family History:   FAMILY HISTORY: No family history on file.        Social History:   SOCIAL HISTORY:   Social History   Substance Use Topics     Smoking status: Former Smoker     Types: Cigarettes     Quit date: 2/17/2010     Smokeless tobacco: Never Used      Comment: e cig      Alcohol use Yes      Comment: occ            Physical ROS:   The patient endorsed lethargy and fatigue. The remainder of 10-point review of systems was negative except as noted in HPI.         PTA Medications:     Prescriptions Prior to Admission   Medication Sig Dispense Refill Last Dose     Amphetamine-Dextroamphetamine (ADDERALL XR PO) Take 60 mg by mouth daily   10/4/2017 at am     LORAZEPAM PO Take 1 mg by mouth 3 times daily   10/4/2017 at 1400     Zolpidem Tartrate (AMBIEN PO) Take 10 mg by mouth At Bedtime   10/3/2017 at hs     loratadine (CLARITIN) 10 MG tablet Take 10 mg by mouth daily   10/4/2017 at am     Escitalopram Oxalate (LEXAPRO PO) Take 10 mg by mouth every morning        lurasidone (LATUDA) 20 MG TABS tablet Take 20 mg by mouth   10/4/17 AM     QUEtiapine Fumarate (SEROQUEL PO) Take 100  mg by mouth At Bedtime    10/3/2017 at hs     hydrocodone-acetaminophen 5-325 MG per tablet Take 1-2 tablets by mouth every 4 hours as needed for pain. 30 tablet 0 10/4/2017 at 2000          Allergies:     Allergies   Allergen Reactions     Darvocet [Propoxyphene N-Apap]           Labs:     Recent Results (from the past 48 hour(s))   CBC with platelets differential    Collection Time: 10/05/17  7:36 AM   Result Value Ref Range    WBC 6.3 4.0 - 11.0 10e9/L    RBC Count 3.92 3.8 - 5.2 10e12/L    Hemoglobin 11.7 11.7 - 15.7 g/dL    Hematocrit 35.8 35.0 - 47.0 %    MCV 91 78 - 100 fl    MCH 29.8 26.5 - 33.0 pg    MCHC 32.7 31.5 - 36.5 g/dL    RDW 12.9 10.0 - 15.0 %    Platelet Count 356 150 - 450 10e9/L    Diff Method Automated Method     % Neutrophils 50.2 %    % Lymphocytes 33.9 %    % Monocytes 9.3 %    % Eosinophils 5.8 %    % Basophils 0.6 %    % Immature Granulocytes 0.2 %    Nucleated RBCs 0 0 /100    Absolute Neutrophil 3.1 1.6 - 8.3 10e9/L    Absolute Lymphocytes 2.1 0.8 - 5.3 10e9/L    Absolute Monocytes 0.6 0.0 - 1.3 10e9/L    Absolute Eosinophils 0.4 0.0 - 0.7 10e9/L    Absolute Basophils 0.0 0.0 - 0.2 10e9/L    Abs Immature Granulocytes 0.0 0 - 0.4 10e9/L    Absolute Nucleated RBC 0.0    Comprehensive metabolic panel    Collection Time: 10/05/17  7:36 AM   Result Value Ref Range    Sodium 143 133 - 144 mmol/L    Potassium 4.2 3.4 - 5.3 mmol/L    Chloride 108 94 - 109 mmol/L    Carbon Dioxide 28 20 - 32 mmol/L    Anion Gap 7 3 - 14 mmol/L    Glucose 90 70 - 99 mg/dL    Urea Nitrogen 8 7 - 30 mg/dL    Creatinine 0.78 0.52 - 1.04 mg/dL    GFR Estimate 82 >60 mL/min/1.7m2    GFR Estimate If Black >90 >60 mL/min/1.7m2    Calcium 8.0 (L) 8.5 - 10.1 mg/dL    Bilirubin Total 0.4 0.2 - 1.3 mg/dL    Albumin 3.2 (L) 3.4 - 5.0 g/dL    Protein Total 6.2 (L) 6.8 - 8.8 g/dL    Alkaline Phosphatase 68 40 - 150 U/L    ALT 28 0 - 50 U/L    AST 31 0 - 45 U/L   Lipid panel    Collection Time: 10/05/17  7:36 AM   Result Value  "Ref Range    Cholesterol 172 <200 mg/dL    Triglycerides 85 <150 mg/dL    HDL Cholesterol 70 >49 mg/dL    LDL Cholesterol Calculated 85 <100 mg/dL    Non HDL Cholesterol 102 <130 mg/dL   TSH with free T4 reflex and/or T3 as indicated    Collection Time: 10/05/17  7:36 AM   Result Value Ref Range    TSH 1.81 0.40 - 4.00 mU/L   HCG qualitative urine    Collection Time: 10/05/17 12:15 PM   Result Value Ref Range    HCG Qual Urine Negative NEG^Negative          Physical and Psychiatric Examination:     /86  Pulse 61  Temp 98.2  F (36.8  C) (Oral)  Resp 16  Ht 1.676 m (5' 6\")  Wt 87.8 kg (193 lb 9.6 oz)  LMP 09/29/2017  BMI 31.25 kg/m2  Weight is 193 lbs 9.6 oz  Body mass index is 31.25 kg/(m^2).    Physical Exam:  I have reviewed the physical exam as documented by Sanjana Be MD on 10/4/2017 and agree with findings and assessment and have no additional findings to add at this time.    Mental Status Exam:  Appearance:  female, short hair, glasses, moderate grooming/hygiene. Mild distress.   Attitude:  Cooperative   Eye Contact:  Adequate   Mood:  \"Depressed\"   Affect:  Sober, subdued   Speech:  Soft in tone, regular in rhythm   Language: fluent and intact in English\"  Psychomotor, Gait, Musculoskeletal:  Unremarkable  Throught Process:  Linear, coherent  Associations:  No loosening   Thought Content:  Some passive suicidal ideation, no HI/AH/VH.   Insight:  Fair   Judgement:  Fair   Oriented to:  Person, place, time.   Attention Span and Concentration:  Adequate   Recent and Remote Memory:  Intact   Fund of Knowledge:  Appropriate         Admission Diagnoses:   Bipolar Disorder, current episode depressive   Generalized Anxiety Disorder            Assessment & Plan:     Assessment:  Mindi appears to be suffering a episode of depression, with components of suicidality, negative ruminations, anxious catatrphic thoughts and apathy/avolitional behavior. Based on her past behavior, there appears to " "be a component of amira, however at this time, she is very much in dyphoric, depressive symptoms. She is on a rather volitile combination of medications including Norco, Xanax, and Ambien, which all act as \"depressive\" agents, along with being problematic in their interaction. Her Xanax was reduced recently, and is agreeable to no being reliant on it in the future. At this time, I will optimize her Lexapro, and increase her Latuda. She had found benefit in Seroquel in her mood, however she was worried about weight gain, thus opimitizing Latuda will be the route taken, especially since its appropriate for Bipolar Depression. Patient uses CPAP machine at night. We will have her on individual observation while she uses CPAP at night tonight, and reassess tomorrow.     Plan:  - Lexapro 20 mg   - Latuda 40 mg  - Individual observation (placement outside room) during CPAP at night only. Will reassess tomorrow  - Will continue 72 hour hold for now, and reassess tomorrow.         Legal:  72 hour hold    Disposition:  Likely home next week      Enrique Wright MD  [facility] Psychiatry    Spent  55  minutes on encounter, >50% of which was spent in counseling and/or coordination of care, consisting of taking history, reviewing symptoms, and discussing medications. Also brief CBT.       "

## 2017-10-05 NOTE — PROGRESS NOTES
INITIAL PSYCHOSOCIAL ASSESSMENT AND NOTE  I have reviewed the chart met with the patient, and developed Care Plan.  Information for assessment was obtained from: pt and chart  PRESENTING PROBLEM: Pt admitted voluntarily for suicidal ideation. Increased depression X 3 months. Plans revolved around sleeping with knives propped between her arm and body and hoping she would roll over and impale self. 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    The following areas have been assessed:  History of Mental Health and Chemical Dependency: This is pt's second inpatient [facility]h admit, first was 10 years ago at [facility]. Hx of SI, no hx of attempts.   No chemical dependency issues  Living Situation: lives with wife  Significant Life Events (Illness, Abuse, Trauma, Death): none  Family Description (Constellation, Family Psychiatric History):  X 1, Together 5 years,  X 1.5 years. Wife has an adult daughter. Family all local, supportive. Dad never involved, mom re- at age 11; she considers him her father. Extended maternal family very close. No sibs. Grew up in [place].   Possible paternal family hx of bipolar and depression but not sure. Thinks that mom may have had depression.   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  Social Functioning (organizations, interests): likes to work on mechanical things. Good support system, close family.     Current Treatment providers:   Gets therapy and psychiatry at  [facility]  [place], [address] [address]  Phone: [phone]  Fax: [phone]  Therapy: Feli Medrano  sees her weekly or twice weekly  Psychiatry: Brooklynn Almanza, MSN, RN    Social Service Assessment/Plan:   Pt to stabilize on medication. Pt will be seen and assessed by provider and staff. Groups will be offered to assist pt with increasing knowledge, strategies and coping techniques to help manage mental health. CTC will meet with pt to provide individualized mental health resources and support. CTC will assist with developing a care plan and after hospital appointments. Discussed possible IOP but pt not sure, will continue to discuss.     Discharge Plan:   ECT  You received ECT during your hospitalization and therefore do NOT drive for at least 7 days after your last treatment.  Do not drive after ECT until you have been cleared by your doctor.     You will be receiving outpatient maintenance ECT. You will need someone to drive you to these appointments and back home. You will need someone with you for 6 hours following treatment. Do not drive after ECT until you have been cleared by your doctor.     ECT appointment Date/Time: Monday at 10/16 at 7:30 am  Address:   [place] [address]  Phone: [phone]    Health Care Follow-up Appointments:   Therapy Appointment Date/Time: Tuesday 10/17 at 3 pm    Therapist: Feli Medrano    Psychiatry Appointment Date/Time: Monday 11/6 at 1 pm   Psychiatrist: Brooklynn Almanza, MSN, RN  [facility], [address] [address]  Phone: [phone]  Fax: [phone]    You have been referred to the partial hospitalization program at [facility].   Intake appointment date.time: Tuesday 10/24 at 10:15 am (If you are still in ECT please call and reschedule this)  [facility] Partial Hospitalization Program  Located in [place]  [facility] ; [address] [place] [address]   Phone:[phone]    During movement therapy you discovered that Yoga Nidra was helpful. You also used a 'resource movement or posture' to call upon the relaxed state more quickly or efficiently.

## 2017-10-05 NOTE — PLAN OF CARE
Problem: General Plan of Care (Inpatient Behavioral)  Goal: Individualization/Patient Specific Goal (IP Behavioral)  The patient and/or their representative will achieve their patient-specific goals related to the plan of care.    The patient-specific goals include: Will report decreased depression and absence of suicidal ideation.   The patient completed the reasons for admit and goals for discharge in the personal plan of care. CTC wrote the plan for pt due to her broken hand  Reasons for admit:  1)  Suicide, thoughts and attempt  2)  Thoughts that others would be better off without her        Goals for discharge:  1)  Be able to manage

## 2017-10-06 PROCEDURE — 25000132 ZZH RX MED GY IP 250 OP 250 PS 637: Performed by: PSYCHIATRY & NEUROLOGY

## 2017-10-06 PROCEDURE — 12400007 ZZH R&B MH INTERMEDIATE UMMC

## 2017-10-06 PROCEDURE — 99232 SBSQ HOSP IP/OBS MODERATE 35: CPT | Performed by: PSYCHIATRY & NEUROLOGY

## 2017-10-06 PROCEDURE — 99207 ZZC CDG-MDM COMPONENT: MEETS MODERATE - UP CODED: CPT | Performed by: PSYCHIATRY & NEUROLOGY

## 2017-10-06 RX ORDER — DEXTROAMPHETAMINE SACCHARATE, AMPHETAMINE ASPARTATE, DEXTROAMPHETAMINE SULFATE AND AMPHETAMINE SULFATE 2.5; 2.5; 2.5; 2.5 MG/1; MG/1; MG/1; MG/1
20 TABLET ORAL EVERY MORNING
Status: DISCONTINUED | OUTPATIENT
Start: 2017-10-07 | End: 2017-10-14 | Stop reason: HOSPADM

## 2017-10-06 RX ADMIN — HYDROCODONE BITARTRATE AND ACETAMINOPHEN 2 TABLET: 5; 325 TABLET ORAL at 12:46

## 2017-10-06 RX ADMIN — ESCITALOPRAM OXALATE 20 MG: 20 TABLET ORAL at 08:32

## 2017-10-06 RX ADMIN — QUETIAPINE 100 MG: 100 TABLET, FILM COATED ORAL at 20:47

## 2017-10-06 RX ADMIN — NICOTINE 1 PATCH: 21 PATCH, EXTENDED RELEASE TRANSDERMAL at 08:32

## 2017-10-06 RX ADMIN — LURASIDONE HYDROCHLORIDE 40 MG: 40 TABLET, FILM COATED ORAL at 23:38

## 2017-10-06 RX ADMIN — LORAZEPAM 1 MG: 1 TABLET ORAL at 13:01

## 2017-10-06 RX ADMIN — LORAZEPAM 1 MG: 1 TABLET ORAL at 08:32

## 2017-10-06 RX ADMIN — LORAZEPAM 1 MG: 1 TABLET ORAL at 20:47

## 2017-10-06 RX ADMIN — ZOLPIDEM TARTRATE 10 MG: 5 TABLET, FILM COATED ORAL at 20:47

## 2017-10-06 RX ADMIN — LORATADINE 10 MG: 10 TABLET ORAL at 08:32

## 2017-10-06 RX ADMIN — HYDROCODONE BITARTRATE AND ACETAMINOPHEN 2 TABLET: 5; 325 TABLET ORAL at 16:57

## 2017-10-06 RX ADMIN — HYDROCODONE BITARTRATE AND ACETAMINOPHEN 2 TABLET: 5; 325 TABLET ORAL at 08:32

## 2017-10-06 RX ADMIN — HYDROCODONE BITARTRATE AND ACETAMINOPHEN 2 TABLET: 5; 325 TABLET ORAL at 20:47

## 2017-10-06 ASSESSMENT — ACTIVITIES OF DAILY LIVING (ADL)
GROOMING: INDEPENDENT
GROOMING: INDEPENDENT
DRESS: INDEPENDENT
LAUNDRY: WITH SUPERVISION
ORAL_HYGIENE: INDEPENDENT
DRESS: INDEPENDENT
ORAL_HYGIENE: INDEPENDENT

## 2017-10-06 NOTE — PLAN OF CARE
Problem: Depressive Symptoms  Goal: Depressive Symptoms  Signs and symptoms of listed problems will be absent or manageable.   Outcome: Improving  Patient received PRN Norco q 4 for right hand fracture pain.  She stated intrusive suicidal and denigrating thoughts are still present but less intense.  Affect is notably brighter.  Patient is on SIO at end of marcin and all of noc shift while on Cpap.  Patient was also given PRN Vistaril for sleep.

## 2017-10-06 NOTE — PLAN OF CARE
Problem: Depressive Symptoms  Goal: Depressive Symptoms  Signs and symptoms of listed problems will be absent or manageable.   Outcome: Improving  Pt has been out in the milieu all shift. Pt is quiet, flat and blunted. Pt rates depression 4/10. Pt denies all other psych sx. Pt's goal for the day was to attend more groups, pt attended community meeting. Pt is wanting to talk to the Dr about her medications. Her Dr said he will meet with her this afternoon. Will continue to monitor.

## 2017-10-07 PROCEDURE — 12400007 ZZH R&B MH INTERMEDIATE UMMC

## 2017-10-07 PROCEDURE — 25000132 ZZH RX MED GY IP 250 OP 250 PS 637: Performed by: PSYCHIATRY & NEUROLOGY

## 2017-10-07 RX ADMIN — NICOTINE 1 PATCH: 21 PATCH, EXTENDED RELEASE TRANSDERMAL at 07:58

## 2017-10-07 RX ADMIN — QUETIAPINE 100 MG: 100 TABLET, FILM COATED ORAL at 20:32

## 2017-10-07 RX ADMIN — ESCITALOPRAM OXALATE 20 MG: 20 TABLET ORAL at 07:59

## 2017-10-07 RX ADMIN — HYDROXYZINE HYDROCHLORIDE 50 MG: 25 TABLET ORAL at 16:18

## 2017-10-07 RX ADMIN — HYDROXYZINE HYDROCHLORIDE 50 MG: 25 TABLET ORAL at 20:31

## 2017-10-07 RX ADMIN — LORATADINE 10 MG: 10 TABLET ORAL at 07:59

## 2017-10-07 RX ADMIN — IBUPROFEN 600 MG: 600 TABLET ORAL at 18:30

## 2017-10-07 RX ADMIN — ZOLPIDEM TARTRATE 10 MG: 5 TABLET, FILM COATED ORAL at 20:32

## 2017-10-07 RX ADMIN — LORAZEPAM 1 MG: 1 TABLET ORAL at 14:06

## 2017-10-07 RX ADMIN — DEXTROAMPHETAMINE SACCHARATE, AMPHETAMINE ASPARTATE, DEXTROAMPHETAMINE SULFATE AND AMPHETAMINE SULFATE 20 MG: 2.5; 2.5; 2.5; 2.5 TABLET ORAL at 07:59

## 2017-10-07 RX ADMIN — LORAZEPAM 1 MG: 1 TABLET ORAL at 20:32

## 2017-10-07 RX ADMIN — LORAZEPAM 1 MG: 1 TABLET ORAL at 07:59

## 2017-10-07 RX ADMIN — HYDROCODONE BITARTRATE AND ACETAMINOPHEN 2 TABLET: 5; 325 TABLET ORAL at 08:03

## 2017-10-07 RX ADMIN — LURASIDONE HYDROCHLORIDE 40 MG: 40 TABLET, FILM COATED ORAL at 20:32

## 2017-10-07 RX ADMIN — HYDROCODONE BITARTRATE AND ACETAMINOPHEN 2 TABLET: 5; 325 TABLET ORAL at 12:13

## 2017-10-07 RX ADMIN — HYDROXYZINE HYDROCHLORIDE 50 MG: 25 TABLET ORAL at 11:32

## 2017-10-07 RX ADMIN — HYDROCODONE BITARTRATE AND ACETAMINOPHEN 2 TABLET: 5; 325 TABLET ORAL at 20:32

## 2017-10-07 RX ADMIN — HYDROCODONE BITARTRATE AND ACETAMINOPHEN 2 TABLET: 5; 325 TABLET ORAL at 16:18

## 2017-10-07 ASSESSMENT — ACTIVITIES OF DAILY LIVING (ADL)
ORAL_HYGIENE: INDEPENDENT
DRESS: INDEPENDENT
ORAL_HYGIENE: INDEPENDENT
LAUNDRY: UNABLE TO COMPLETE
GROOMING: INDEPENDENT
DRESS: INDEPENDENT
HYGIENE/GROOMING: INDEPENDENT

## 2017-10-07 NOTE — PROGRESS NOTES
"[facility], [place]   Psychiatric Progress Note        Interim History:       Subjective: \"Still depressed, must be the weather\"     As per today's interview: Went to one group today, mostly reclusive to room. Lesser suicidal ideation, but passive thoughts still present. No HI/AH/VH. Denies any medication side effects.          Medications:       [START ON 10/7/2017] amphetamine-dextroamphetamine  20 mg Oral QAM     influenza quadrivalent (PF) vacc age 3 yrs and older  0.5 mL Intramuscular Prior to discharge     escitalopram  20 mg Oral Daily     lurasidone  40 mg Oral At Bedtime     loratadine  10 mg Oral Daily     LORazepam (ATIVAN) tablet 1 mg  1 mg Oral TID     QUEtiapine (SEROquel) tablet 100 mg  100 mg Oral At Bedtime     zolpidem (AMBIEN) tablet 10 mg  10 mg Oral At Bedtime     nicotine   Transdermal Q8H     nicotine   Transdermal Daily     nicotine  1 patch Transdermal Daily          Allergies:     Allergies   Allergen Reactions     Darvocet [Propoxyphene N-Apap]           Labs:     Recent Results (from the past 48 hour(s))   CBC with platelets differential    Collection Time: 10/05/17  7:36 AM   Result Value Ref Range    WBC 6.3 4.0 - 11.0 10e9/L    RBC Count 3.92 3.8 - 5.2 10e12/L    Hemoglobin 11.7 11.7 - 15.7 g/dL    Hematocrit 35.8 35.0 - 47.0 %    MCV 91 78 - 100 fl    MCH 29.8 26.5 - 33.0 pg    MCHC 32.7 31.5 - 36.5 g/dL    RDW 12.9 10.0 - 15.0 %    Platelet Count 356 150 - 450 10e9/L    Diff Method Automated Method     % Neutrophils 50.2 %    % Lymphocytes 33.9 %    % Monocytes 9.3 %    % Eosinophils 5.8 %    % Basophils 0.6 %    % Immature Granulocytes 0.2 %    Nucleated RBCs 0 0 /100    Absolute Neutrophil 3.1 1.6 - 8.3 10e9/L    Absolute Lymphocytes 2.1 0.8 - 5.3 10e9/L    Absolute Monocytes 0.6 0.0 - 1.3 10e9/L    Absolute Eosinophils 0.4 0.0 - 0.7 10e9/L    Absolute Basophils 0.0 0.0 - 0.2 10e9/L    Abs Immature Granulocytes 0.0 0 - 0.4 10e9/L    Absolute Nucleated " "RBC 0.0    Comprehensive metabolic panel    Collection Time: 10/05/17  7:36 AM   Result Value Ref Range    Sodium 143 133 - 144 mmol/L    Potassium 4.2 3.4 - 5.3 mmol/L    Chloride 108 94 - 109 mmol/L    Carbon Dioxide 28 20 - 32 mmol/L    Anion Gap 7 3 - 14 mmol/L    Glucose 90 70 - 99 mg/dL    Urea Nitrogen 8 7 - 30 mg/dL    Creatinine 0.78 0.52 - 1.04 mg/dL    GFR Estimate 82 >60 mL/min/1.7m2    GFR Estimate If Black >90 >60 mL/min/1.7m2    Calcium 8.0 (L) 8.5 - 10.1 mg/dL    Bilirubin Total 0.4 0.2 - 1.3 mg/dL    Albumin 3.2 (L) 3.4 - 5.0 g/dL    Protein Total 6.2 (L) 6.8 - 8.8 g/dL    Alkaline Phosphatase 68 40 - 150 U/L    ALT 28 0 - 50 U/L    AST 31 0 - 45 U/L   Lipid panel    Collection Time: 10/05/17  7:36 AM   Result Value Ref Range    Cholesterol 172 <200 mg/dL    Triglycerides 85 <150 mg/dL    HDL Cholesterol 70 >49 mg/dL    LDL Cholesterol Calculated 85 <100 mg/dL    Non HDL Cholesterol 102 <130 mg/dL   TSH with free T4 reflex and/or T3 as indicated    Collection Time: 10/05/17  7:36 AM   Result Value Ref Range    TSH 1.81 0.40 - 4.00 mU/L   HCG qualitative urine    Collection Time: 10/05/17 12:15 PM   Result Value Ref Range    HCG Qual Urine Negative NEG^Negative          Psychiatric Examination:   /86  Pulse 61  Temp 98.7  F (37.1  C) (Oral)  Resp 16  Ht 1.676 m (5' 6\")  Wt 87.8 kg (193 lb 9.6 oz)  LMP 09/29/2017  BMI 31.25 kg/m2  Weight is 193 lbs 9.6 oz  Body mass index is 31.25 kg/(m^2).    Appearance:  female, short hair, glasses, moderate grooming/hygiene.  Attitude:  Cooperative   Eye Contact:  Adequate   Mood:  \"Depressed\"   Affect:  Tired, mood congruent, slightly more range. Less tearful   Speech:  Soft in tone, regular in rhythm   Language: fluent and intact in English\"  Psychomotor, Gait, Musculoskeletal:  Unremarkable  Throught Process:  Linear, coherent  Associations:  No loosening   Thought Content:  Some passive suicidal ideation, no HI/AH/VH.   Insight:  Fair " "  Judgement:  Fair   Oriented to:  Person, place, time.   Attention Span and Concentration:  Adequate   Recent and Remote Memory:  Intact   Fund of Knowledge:  Appropriate      Assessment & Plan       Principal Diagnosis:   Bipolar Disorder, current episode depressive   Generalized Anxiety Disorder     Assessment:       (10/5):Mindi appears to be suffering a episode of depression, with components of suicidality, negative ruminations, anxious catatrphic thoughts and apathy/avolitional behavior. Based on her past behavior, there appears to be a component of amira, however at this time, she is very much in dyphoric, depressive symptoms. She is on a rather volitile combination of medications including Norco, Xanax, and Ambien, which all act as \"depressive\" agents, along with being problematic in their interaction. Her Xanax was reduced recently, and is agreeable to no being reliant on it in the future. At this time, I will optimize her Lexapro, and increase her Latuda. She had found benefit in Seroquel in her mood, however she was worried about weight gain, thus opimitizing Latuda will be the route taken, especially since its appropriate for Bipolar Depression. Patient uses CPAP machine at night. We will have her on individual observation while she uses CPAP at night tonight, and reassess tomorrow.     (10/6): Slight reduction in SI, although some passive thoughts remain, but no intent or plan. Will add on Adderall (which she took PTA), although at a smaller dose. Stimulant will function in this context as a adjuctive means to help her avolitional behavior. Discontinued individual observation for CPAP. Discussed CBT and provided CBT thought record as homework assingment over the weekend.      Plan:  - Lexapro 20 mg   - Latuda 40 mg  - Adderall XR 20 mg   - Individual observation (placement outside room) during CPAP at night only. Will reassess tomorrow  - Will continue 72 hour hold for now, and reassess tomorrow. "            Legal:  Voluntary      Disposition:  Likely home next week       Enrique Wright MD  [facility]      Attestation:  Patient has been seen and evaluated by me,  Enrique Wright MD     Spent 20   minutes on encounter, >50% of which was spent in counseling and/or coordination of care, consisting of medication discussion and CBT.

## 2017-10-07 NOTE — PROGRESS NOTES
Pt is visible in the milieu and social with peers. Pt watched an informational video about ECT and stated it gave her some anxiety but is hopeful about the potential treatment. Pt denies SI/SIB. Reports her depression at a 5/10, and mood at a 6/10. Pt's family visited today and that went well. Pt reports not being able to sleep well at night.       10/07/17 1300   Behavioral Health   Hallucinations denies / not responding to hallucinations   Thinking poor concentration   Orientation person: oriented;place: oriented;date: oriented;time: oriented   Memory baseline memory   Insight admits / accepts   Judgement impaired   Eye Contact at examiner   Affect blunted, flat   Mood mood is calm   ADL Assessment (WDL) WDL   Suicidality (WDL) WDL   Self Injury (WDL) WDL   Elopement (WDL) WDL   Activity (WDL) WDL   Speech (WDL) WDL   Psychomotor Gait (WDL) WDL   Psycho Education   Type of Intervention 1:1 intervention   Response participates, initiates socially appropriate   Hours 0.5   Treatment Detail check in   Activities of Daily Living   Hygiene/Grooming independent   Oral Hygiene independent   Dress independent   Laundry unable to complete   Room Organization independent

## 2017-10-07 NOTE — PROGRESS NOTES
Pt calm, social in milieu. No SI, SIB stated/observed. Had a visitor and independent on all ADL's.     10/06/17 1445   Behavioral Health   Hallucinations denies / not responding to hallucinations   Thinking poor concentration   Orientation person: oriented;place: oriented   Memory baseline memory   Insight insight appropriate to situation   Judgement impaired   Eye Contact at examiner   Affect blunted, flat   Mood mood is calm   Physical Appearance/Attire attire appropriate to age and situation   Hygiene well groomed   Suicidality other (see comments)  (none stated/observed)   Self Injury other (see comment)  (none stated/observed)   Elopement (N/A)   Activity other (see comment)  (visible/social in milieu)   Speech coherent;clear   Medication Sensitivity no stated side effects;no observed side effects   Psychomotor / Gait balanced;steady   Psycho Education   Type of Intervention 1:1 intervention   Response participates, initiates socially appropriate   Hours 0.5   Treatment Detail check in   Activities of Daily Living   Hygiene/Grooming independent   Oral Hygiene independent   Dress independent   Laundry with supervision   Room Organization independent   Behavioral Health Interventions   Depression maintain safety precautions;monitor need to revise level of observation;maintain safe secure environment;encourage nutrition and hydration;encourage participation / independence with adls;provide emotional support;establish therapeutic relationship;build upon strengths;monitor need for prn medication;monitor confusion, memory loss, decision making ability and reorient / intervene as needed   Social and Therapeutic Interventions (Depression) encourage socialization with peers;encourage effective boundaries with peers;encourage participation in therapeutic groups and milieu activities

## 2017-10-08 PROCEDURE — 12400007 ZZH R&B MH INTERMEDIATE UMMC

## 2017-10-08 PROCEDURE — 25000132 ZZH RX MED GY IP 250 OP 250 PS 637: Performed by: PSYCHIATRY & NEUROLOGY

## 2017-10-08 PROCEDURE — 90853 GROUP PSYCHOTHERAPY: CPT

## 2017-10-08 RX ADMIN — DEXTROAMPHETAMINE SACCHARATE, AMPHETAMINE ASPARTATE, DEXTROAMPHETAMINE SULFATE AND AMPHETAMINE SULFATE 20 MG: 2.5; 2.5; 2.5; 2.5 TABLET ORAL at 09:03

## 2017-10-08 RX ADMIN — LORAZEPAM 1 MG: 1 TABLET ORAL at 09:03

## 2017-10-08 RX ADMIN — IBUPROFEN 600 MG: 600 TABLET ORAL at 16:54

## 2017-10-08 RX ADMIN — HYDROXYZINE HYDROCHLORIDE 50 MG: 25 TABLET ORAL at 16:54

## 2017-10-08 RX ADMIN — LORAZEPAM 1 MG: 1 TABLET ORAL at 20:23

## 2017-10-08 RX ADMIN — NICOTINE 1 PATCH: 21 PATCH, EXTENDED RELEASE TRANSDERMAL at 09:01

## 2017-10-08 RX ADMIN — QUETIAPINE 100 MG: 100 TABLET, FILM COATED ORAL at 20:24

## 2017-10-08 RX ADMIN — ESCITALOPRAM OXALATE 20 MG: 20 TABLET ORAL at 09:03

## 2017-10-08 RX ADMIN — ZOLPIDEM TARTRATE 10 MG: 5 TABLET, FILM COATED ORAL at 20:24

## 2017-10-08 RX ADMIN — LORATADINE 10 MG: 10 TABLET ORAL at 09:03

## 2017-10-08 RX ADMIN — HYDROCODONE BITARTRATE AND ACETAMINOPHEN 2 TABLET: 5; 325 TABLET ORAL at 13:42

## 2017-10-08 RX ADMIN — HYDROCODONE BITARTRATE AND ACETAMINOPHEN 2 TABLET: 5; 325 TABLET ORAL at 09:03

## 2017-10-08 RX ADMIN — LORAZEPAM 1 MG: 1 TABLET ORAL at 13:42

## 2017-10-08 RX ADMIN — HYDROCODONE BITARTRATE AND ACETAMINOPHEN 2 TABLET: 5; 325 TABLET ORAL at 18:36

## 2017-10-08 RX ADMIN — LURASIDONE HYDROCHLORIDE 40 MG: 40 TABLET, FILM COATED ORAL at 20:24

## 2017-10-08 RX ADMIN — HYDROXYZINE HYDROCHLORIDE 50 MG: 25 TABLET ORAL at 12:42

## 2017-10-08 ASSESSMENT — ACTIVITIES OF DAILY LIVING (ADL)
LAUNDRY: UNABLE TO COMPLETE
GROOMING: INDEPENDENT
ORAL_HYGIENE: INDEPENDENT
DRESS: SCRUBS (BEHAVIORAL HEALTH)
ORAL_HYGIENE: INDEPENDENT
HYGIENE/GROOMING: INDEPENDENT
DRESS: INDEPENDENT

## 2017-10-08 NOTE — PROGRESS NOTES
Pt is visible in the milieu and social with peers. Pt feels anxious (7/10), depressed, and irritable. Pt is interested in potential ECT treatment but is also concerned about possible side effects. Pt denies SI/SIB.        10/08/17 1407   Behavioral Health   Hallucinations denies / not responding to hallucinations   Thinking poor concentration   Orientation person: oriented;place: oriented;date: oriented;time: oriented   Memory baseline memory   Insight admits / accepts   Judgement impaired   Eye Contact at examiner   Affect blunted, flat   Mood anxious;depressed   ADL Assessment (WDL) WDL   Suicidality (WDL) WDL   Self Injury (WDL) WDL   Elopement (WDL) WDL   Activity (WDL) WDL   Speech (WDL) WDL   Psychomotor Gait (WDL) WDL   Psycho Education   Type of Intervention 1:1 intervention   Response participates, initiates socially appropriate   Hours 0.5   Treatment Detail check in    Activities of Daily Living   Hygiene/Grooming independent   Oral Hygiene independent   Dress scrubs (behavioral health)   Laundry unable to complete   Room Organization independent

## 2017-10-08 NOTE — PLAN OF CARE
Problem: Depressive Symptoms  Goal: Depressive Symptoms  Signs and symptoms of listed problems will be absent or manageable.   Outcome: Improving  Patient reports she had a sudden onset of anxiety tonight, origin unknown, and has received two doses of Vistaril.  She reports she did not sleep well last night because she opted not to use her cpap.  Patient also states that she is no longer having intrusive and denigrating suicidal thoughts.  Right hand pain was also increased tonight.  Patient received ibuprofen between her q 4 hour doses of PRN hydrocodone.  We will continue to alternate the two until hand pain decreases.

## 2017-10-09 ENCOUNTER — TELEPHONE (OUTPATIENT)
Dept: BEHAVIORAL HEALTH | Facility: CLINIC | Age: 42
End: 2017-10-09

## 2017-10-09 PROCEDURE — 99207 ZZC CDG-MDM COMPONENT: MEETS MODERATE - UP CODED: CPT | Performed by: PSYCHIATRY & NEUROLOGY

## 2017-10-09 PROCEDURE — 99232 SBSQ HOSP IP/OBS MODERATE 35: CPT | Performed by: PSYCHIATRY & NEUROLOGY

## 2017-10-09 PROCEDURE — 93005 ELECTROCARDIOGRAM TRACING: CPT

## 2017-10-09 PROCEDURE — 97150 GROUP THERAPEUTIC PROCEDURES: CPT | Mod: GO

## 2017-10-09 PROCEDURE — 25000132 ZZH RX MED GY IP 250 OP 250 PS 637: Performed by: PSYCHIATRY & NEUROLOGY

## 2017-10-09 PROCEDURE — 90853 GROUP PSYCHOTHERAPY: CPT

## 2017-10-09 PROCEDURE — 12400007 ZZH R&B MH INTERMEDIATE UMMC

## 2017-10-09 RX ADMIN — ZOLPIDEM TARTRATE 10 MG: 5 TABLET, FILM COATED ORAL at 21:00

## 2017-10-09 RX ADMIN — HYDROCODONE BITARTRATE AND ACETAMINOPHEN 2 TABLET: 5; 325 TABLET ORAL at 17:02

## 2017-10-09 RX ADMIN — DEXTROAMPHETAMINE SACCHARATE, AMPHETAMINE ASPARTATE, DEXTROAMPHETAMINE SULFATE AND AMPHETAMINE SULFATE 20 MG: 2.5; 2.5; 2.5; 2.5 TABLET ORAL at 08:11

## 2017-10-09 RX ADMIN — HYDROCODONE BITARTRATE AND ACETAMINOPHEN 2 TABLET: 5; 325 TABLET ORAL at 21:00

## 2017-10-09 RX ADMIN — LORAZEPAM 1 MG: 1 TABLET ORAL at 14:42

## 2017-10-09 RX ADMIN — LURASIDONE HYDROCHLORIDE 40 MG: 40 TABLET, FILM COATED ORAL at 21:00

## 2017-10-09 RX ADMIN — HYDROCODONE BITARTRATE AND ACETAMINOPHEN 2 TABLET: 5; 325 TABLET ORAL at 08:11

## 2017-10-09 RX ADMIN — NICOTINE 1 PATCH: 21 PATCH, EXTENDED RELEASE TRANSDERMAL at 08:12

## 2017-10-09 RX ADMIN — ESCITALOPRAM OXALATE 20 MG: 20 TABLET ORAL at 08:11

## 2017-10-09 RX ADMIN — LORATADINE 10 MG: 10 TABLET ORAL at 08:13

## 2017-10-09 RX ADMIN — LORAZEPAM 1 MG: 1 TABLET ORAL at 21:00

## 2017-10-09 RX ADMIN — LORAZEPAM 1 MG: 1 TABLET ORAL at 08:12

## 2017-10-09 RX ADMIN — HYDROXYZINE HYDROCHLORIDE 50 MG: 25 TABLET ORAL at 10:30

## 2017-10-09 RX ADMIN — QUETIAPINE 100 MG: 100 TABLET, FILM COATED ORAL at 21:00

## 2017-10-09 RX ADMIN — IBUPROFEN 600 MG: 600 TABLET ORAL at 14:42

## 2017-10-09 RX ADMIN — HYDROCODONE BITARTRATE AND ACETAMINOPHEN 2 TABLET: 5; 325 TABLET ORAL at 12:15

## 2017-10-09 RX ADMIN — HYDROXYZINE HYDROCHLORIDE 50 MG: 25 TABLET ORAL at 21:00

## 2017-10-09 RX ADMIN — HYDROXYZINE HYDROCHLORIDE 50 MG: 25 TABLET ORAL at 17:02

## 2017-10-09 ASSESSMENT — ACTIVITIES OF DAILY LIVING (ADL)
GROOMING: INDEPENDENT
ORAL_HYGIENE: INDEPENDENT

## 2017-10-09 NOTE — TELEPHONE ENCOUNTER
D: Mindi referred to PHP by [facility]. Hx of Bipolar D/O, however, currently presenting with depressive symptoms and S/I, anxiety and avolition.  Pt is taking multiple medications which can be sedating and may be adding to her depressive symptoms. The MD is currently making some medication changes.     I: Review of EMR.     A: Pending    P: Will schedule DA once order from MD is obtained.    LAUREN Quintana, [facility]  10/9/2017

## 2017-10-09 NOTE — PLAN OF CARE
"Problem: Depressive Symptoms  Goal: Depressive Symptoms  Signs and symptoms of listed problems will be absent or manageable.   Outcome: Improving  Patient reports that she does not have active suicidal urges, but intrusive thoughts urging suicide and making denigrating statements are still present, although less intense.  She reports that she is \"in a slump.  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  Patient expressed concern about memory loss and we discussed the contexts in which this typically occurs and the fact that they lay after ECT series is complete.  Patient has been receiving PRN  Vistaril for anxiety and PRN Norco and Ibuprofen for right hand pain.  Patient states she was told when cast was applied that it should remain on until 10/30/17.        "

## 2017-10-10 ENCOUNTER — APPOINTMENT (OUTPATIENT)
Dept: GENERAL RADIOLOGY | Facility: CLINIC | Age: 42
End: 2017-10-10
Attending: PHYSICIAN ASSISTANT
Payer: COMMERCIAL

## 2017-10-10 PROBLEM — F33.2 MAJOR DEPRESSIVE DISORDER, RECURRENT SEVERE WITHOUT PSYCHOTIC FEATURES (H): Status: ACTIVE | Noted: 2017-10-10

## 2017-10-10 LAB — INTERPRETATION ECG - MUSE: NORMAL

## 2017-10-10 PROCEDURE — 73130 X-RAY EXAM OF HAND: CPT | Mod: RT

## 2017-10-10 PROCEDURE — 99255 IP/OBS CONSLTJ NEW/EST HI 80: CPT | Mod: 25 | Performed by: PSYCHIATRY & NEUROLOGY

## 2017-10-10 PROCEDURE — 97150 GROUP THERAPEUTIC PROCEDURES: CPT | Mod: GO

## 2017-10-10 PROCEDURE — 25000132 ZZH RX MED GY IP 250 OP 250 PS 637: Performed by: PSYCHIATRY & NEUROLOGY

## 2017-10-10 PROCEDURE — 99207 ZZC CDG-CODE CATEGORY CHANGED: CPT | Performed by: PHYSICIAN ASSISTANT

## 2017-10-10 PROCEDURE — 99207 ZZC APP CREDIT; MD BILLING SHARED VISIT: CPT | Performed by: PSYCHIATRY & NEUROLOGY

## 2017-10-10 PROCEDURE — 90853 GROUP PSYCHOTHERAPY: CPT

## 2017-10-10 PROCEDURE — 12400007 ZZH R&B MH INTERMEDIATE UMMC

## 2017-10-10 PROCEDURE — 99221 1ST HOSP IP/OBS SF/LOW 40: CPT | Performed by: PHYSICIAN ASSISTANT

## 2017-10-10 RX ADMIN — LORAZEPAM 1 MG: 1 TABLET ORAL at 08:14

## 2017-10-10 RX ADMIN — LORAZEPAM 1 MG: 1 TABLET ORAL at 14:23

## 2017-10-10 RX ADMIN — HYDROCODONE BITARTRATE AND ACETAMINOPHEN 2 TABLET: 5; 325 TABLET ORAL at 18:43

## 2017-10-10 RX ADMIN — ZOLPIDEM TARTRATE 10 MG: 5 TABLET, FILM COATED ORAL at 20:36

## 2017-10-10 RX ADMIN — HYDROCODONE BITARTRATE AND ACETAMINOPHEN 2 TABLET: 5; 325 TABLET ORAL at 14:23

## 2017-10-10 RX ADMIN — LURASIDONE HYDROCHLORIDE 40 MG: 40 TABLET, FILM COATED ORAL at 20:36

## 2017-10-10 RX ADMIN — HYDROXYZINE HYDROCHLORIDE 50 MG: 25 TABLET ORAL at 10:20

## 2017-10-10 RX ADMIN — HYDROCODONE BITARTRATE AND ACETAMINOPHEN 2 TABLET: 5; 325 TABLET ORAL at 10:20

## 2017-10-10 RX ADMIN — NICOTINE 1 PATCH: 21 PATCH, EXTENDED RELEASE TRANSDERMAL at 08:14

## 2017-10-10 RX ADMIN — QUETIAPINE 100 MG: 100 TABLET, FILM COATED ORAL at 20:36

## 2017-10-10 RX ADMIN — LORATADINE 10 MG: 10 TABLET ORAL at 08:14

## 2017-10-10 RX ADMIN — ESCITALOPRAM OXALATE 20 MG: 20 TABLET ORAL at 08:14

## 2017-10-10 RX ADMIN — HYDROXYZINE HYDROCHLORIDE 50 MG: 25 TABLET ORAL at 18:43

## 2017-10-10 RX ADMIN — DEXTROAMPHETAMINE SACCHARATE, AMPHETAMINE ASPARTATE, DEXTROAMPHETAMINE SULFATE AND AMPHETAMINE SULFATE 20 MG: 2.5; 2.5; 2.5; 2.5 TABLET ORAL at 08:14

## 2017-10-10 ASSESSMENT — ACTIVITIES OF DAILY LIVING (ADL)
LAUNDRY: WITH SUPERVISION
DRESS: STREET CLOTHES
ORAL_HYGIENE: INDEPENDENT
GROOMING: INDEPENDENT
LAUNDRY: WITH SUPERVISION
DRESS: STREET CLOTHES
ORAL_HYGIENE: INDEPENDENT
GROOMING: SHOWER;INDEPENDENT

## 2017-10-10 NOTE — PROGRESS NOTES
Pt visible in milieu. She is social , pleasant & attending groups. Pt said she was a bit anxious regarding having ECT tomorrow but other than that she feels hopeful. Pt tends to be a bit reserved but has become more engaged with staff & peers the last several days.     10/10/17 1400   Behavioral Health   Hallucinations denies / not responding to hallucinations   Thinking intact   Orientation person: oriented;place: oriented;date: oriented   Memory baseline memory   Insight admits / accepts   Judgement intact   Eye Contact at examiner   Affect blunted, flat   Mood mood is calm   Physical Appearance/Attire attire appropriate to age and situation   Hygiene well groomed   Suicidality other (see comments)  (none)   Self Injury (none)   Activity other (see comment)  (attended groups, visible in milieu)   Speech clear;coherent   Psychomotor / Gait balanced;steady   Coping/Psychosocial   Verbalized Emotional State acceptance   Plan Of Care Reviewed With patient   Psycho Education   Type of Intervention 1:1 intervention   Response participates with encouragement   Hours 0.5   Treatment Detail check in   Activities of Daily Living   Hygiene/Grooming shower;independent   Oral Hygiene independent   Dress street clothes   Laundry with supervision   Room Organization independent   Behavioral Health Interventions   Depression maintain safety precautions;maintain safe secure environment;provide emotional support   Social and Therapeutic Interventions (Depression) encourage participation in therapeutic groups and milieu activities

## 2017-10-10 NOTE — CONSULTS
[facility] Orthopedic Consultation    Mindi Eastman MRN# [id number]   Age: 41 year old YOB: 1975   Date of Admission:  10/4/2017    Reason for consult: Assess ulnar gutter cast to determine need for modification   Requesting physician: Enrique Wright MD   Level of consult: One-time consult to assist in determining a diagnosis and to recommend an appropriate treatment plan          Impression and Recommendation:   Impression:  Mindi Eastman is a 41 year old female on inpatient mental health unit who presents with:  1. Right 5th metacarpal fracture, non-displaced     Recomendations:  Keep current cast intact.  Call Ortho if any changes needed after ECT.  Otherwise patient will f/u with outpatient Ortho on 10/30/17 for regularly scheduled appt.  All questions answered           Chief Complaint:   Right 5th metacarpal fx and loose cast         History of Present Illness:   This patient is a 41 year old female with a significant past medical history of mental illness who presents 10 days s/p 5th metacarpal fx after punching a table.  Pt was seen at an outside facility and placed in an ulnar gutter cast.  She states her pain has improved, but the cast feels it is getting loose.  Patient and staff were concerned because pt is scheduled for ECT treatment tomorrow and they didn't want pt's cast to come off or fingers to flex.  No other concerns.     History obtained from patient interview and chart review.        Past Medical History:     Past Medical History:   Diagnosis Date     ADHD      Anxiety      Bipolar disorder (H)      Depressive disorder      OCD (obsessive compulsive disorder)      ROXANNE (obstructive sleep apnea)     On CPAP     Seasonal allergies              Past Surgical History:     Past Surgical History:   Procedure Laterality Date     APPENDECTOMY       CARPAL TUNNEL RELEASE RT/LT Bilateral      CHOLECYSTECTOMY       EXTRACTION(S) DENTAL      [place] Teeth     HC KNEE  "SCOPE,MED/LAT MENISCUS REPAIR Right     X 2     TONSILLECTOMY                Allergies:     Allergies   Allergen Reactions     Darvocet [Propoxyphene N-Apap]              Medications:   Medication reviewed with patient and in chart.             Physical Exam:     /86  Pulse 61  Temp 98.5  F (36.9  C) (Oral)  Resp 16  Ht 1.676 m (5' 6\")  Wt 87 kg (191 lb 11.2 oz)  LMP 09/29/2017  BMI 30.94 kg/m2  General: awake, alert, cooperative, no apparent distress, appears stated age  HEENT: normocephalic, atraumatic, PERRL, EOMI, no scleral icterus, MMM  Respiratory: breathing non-labored, no wheezing  Cardiovascular: peripheral pulses 2+ and symmetric, capillary refill < 2sec, skin wwp  Neurological: A&Ox3, CN II-XII grossly intact  Musculoskeletal:  RUE: Fiberglass ulnar gutter cast with waterproof cast padding in place and fitting well.  There is some loosening at the upper forearm, but the fingers are held in appropriate flexion and I am unable to move them.  The cast is not able to slide up and down on the arm.  Pt is NV intact distally.          Imaging:   Review of right hand films from 10/10/2017 demonstrate minimally displaced mid-shaft 5th metacarpal fracture in good alignment in the cast.  No callus formation seen on xray 10 days s/p injury.      Madie Mejia PA-C  Department of Orthopedics  [phone]      "

## 2017-10-10 NOTE — PROGRESS NOTES
"[facility], [place]   Psychiatric Progress Note        Interim History:       Subjective: \"Still depressed, but a little better\"     As per today's interview: Went to groups today. Some subjective improvement in mood, but still low in energy, volitional drive, and admits to some passive suicidal ideation (without intent or plan). Denies any HI/AH/VH. Read through ECT information provided by nursing staff. She wanted to try ECT since she feels that nothing else has significantly helped her.          Medications:       amphetamine-dextroamphetamine  20 mg Oral QAM     influenza quadrivalent (PF) vacc age 3 yrs and older  0.5 mL Intramuscular Prior to discharge     escitalopram  20 mg Oral Daily     lurasidone  40 mg Oral At Bedtime     loratadine  10 mg Oral Daily     LORazepam (ATIVAN) tablet 1 mg  1 mg Oral TID     QUEtiapine (SEROquel) tablet 100 mg  100 mg Oral At Bedtime     zolpidem (AMBIEN) tablet 10 mg  10 mg Oral At Bedtime     nicotine   Transdermal Q8H     nicotine   Transdermal Daily     nicotine  1 patch Transdermal Daily          Allergies:     Allergies   Allergen Reactions     Darvocet [Propoxyphene N-Apap]           Labs:     Recent Results (from the past 48 hour(s))   EKG 12-lead, complete    Collection Time: 10/09/17  4:28 PM   Result Value Ref Range    Interpretation ECG Click View Image link to view waveform and result           Psychiatric Examination:   /86  Pulse 61  Temp 98.3  F (36.8  C) (Oral)  Resp 16  Ht 1.676 m (5' 6\")  Wt 87.1 kg (192 lb)  LMP 09/29/2017  BMI 30.99 kg/m2  Weight is 192 lbs 0 oz  Body mass index is 30.99 kg/(m^2).    Appearance:  female, short hair, glasses, moderate grooming/hygiene.  Attitude:  Cooperative   Eye Contact:  Adequate   Mood:  \"A little better, still depressed\"   Affect:  Tired, mood congruent, slightly more range. Less tearful   Speech:  Soft in tone, regular in rhythm   Language: fluent and intact in " "English\"  Psychomotor, Gait, Musculoskeletal:  Unremarkable  Throught Process:  Linear, coherent  Associations:  No loosening   Thought Content:  Some passive suicidal ideation, no intent of plan, no HI/AH/VH.   Insight:  Fair   Judgement:  Fair   Oriented to:  Person, place, time.   Attention Span and Concentration:  Adequate   Recent and Remote Memory:  Intact   Fund of Knowledge:  Appropriate      Assessment & Plan       Principal Diagnosis:   Bipolar Disorder, current episode depressive   Generalized Anxiety Disorder     Assessment:       (10/5):Mindi appears to be suffering a episode of depression, with components of suicidality, negative ruminations, anxious catatrphic thoughts and apathy/avolitional behavior. Based on her past behavior, there appears to be a component of amira, however at this time, she is very much in dyphoric, depressive symptoms. She is on a rather volitile combination of medications including Norco, Xanax, and Ambien, which all act as \"depressive\" agents, along with being problematic in their interaction. Her Xanax was reduced recently, and is agreeable to no being reliant on it in the future. At this time, I will optimize her Lexapro, and increase her Latuda. She had found benefit in Seroquel in her mood, however she was worried about weight gain, thus opimitizing Latuda will be the route taken, especially since its appropriate for Bipolar Depression. Patient uses CPAP machine at night. We will have her on individual observation while she uses CPAP at night tonight, and reassess tomorrow.     (10/6): Slight reduction in SI, although some passive thoughts remain, but no intent or plan. Will add on Adderall (which she took PTA), although at a smaller dose. Stimulant will function in this context as a adjuctive means to help her avolitional behavior. Discontinued individual observation for CPAP. Discussed CBT and provided CBT thought record as homework assingment over the weekend. "     (10/9): Did not complete CBT homework, yet we still discussed aspects of it, and its utility in her treatment. Discussed the pros and cons of ECT, along with practical treatment expectations. Patient seemed to want ECT, and given her history of treatment resistant depression with recurrent suicidal ideation (and recent attempt), she would warrant a trial of ECT. ECT consult, medicine consult and EKG will be placed. Of note, she is able to provide informed consent to procedure.      Plan:  - Lexapro 20 mg   - Latuda 40 mg  - Adderall XR 20 mg   - ECT consult  - Medicine consult (for ECT)  - EKG              Legal:  Voluntary      Disposition:  Likely home next week       Enrique Wright MD  [facility] Psychiat[facility]      Attestation:  Patient has been seen and evaluated by me,  Enrique Wright MD     Spent 20   minutes on encounter, >50% of which was spent in counseling and/or coordination of care, consisting of medication discussion and CBT. Discussed ECT, and pros and cons.

## 2017-10-10 NOTE — PROGRESS NOTES
10/10/17 0600   General Information   Date Initially Attended OT 10/05/17   Clinical Impression   Affect Other (see comments)  (10/5 flat, 10/9 appropriate)   Orientation Oriented to person, place and time   Appearance and ADLs General cleanliness observed in most areas   Attention to Internal Stimuli No observed signs   Interaction Skills Other (see comments)  (10/5 quiet, 10/9 social)   Ability to Communicate Needs Other (see comments)  (10/5 needed prompts, 10/9 more independent)   Verbal Content Clear;Appropriate to topic   Ability to Maintain Boundaries Maintains appropriate physical boundaries;Maintains appropriate verbal boundaries   Participation Independently participates   Concentration Concentrates 50 minutes   Ability to Concentrate With structure;Without difficulty   Follows and Comprehends Directions Independently follows 2 step verbal directions   Memory Other (see comments)  (appears intact)   Organization Independently organizes simple tasks   Decision Making Independent  (on simple task)   Planning and Problem Solving Independently plans ahead   Ability to Apply and Learn Concepts Applies within group structure   Frustrations / Stress Tolerance Needs further assessment   Level of Insight Identifies needs with structure/support   Self Esteem Needs further assessment   Social Supports Needs further assessment   General Observation/Plan   General Observations/Plan See Comments   I  NITIAL O.T. ASSESSMENT Details:  Attended 4.50 Pt has been seen by other OT staff 10/8 see  notes for details. This Assessment is only based upon observations  by writer 10/5 and 10/9.. Assessment delayed due to writer being off work 10/6 through 10/8.10/5 very flat, quiet, withdrawn. Poor eye contact, no peer interaction noted. Patient was given and completed a written Self Assessment. OT staff reviewed with patient and explained the value of having them involved in their treatment plan and provided options to meet  their current needs/self identified goals.Please see Progress Note section of paper chart for form.  10/9 affect more varied. Able to laugh and smile at times, social. Asked for help appropriately. Good work skills. Plan: Pt will be provided with structured activity/tasks, to maximize cognitive function. Will be encouraged to follow 1 step direction, plan ahead, organize approach to task. Redirect as needed.Pt will explore and practice coping skills to reduce and manage stressors in daily life. .

## 2017-10-10 NOTE — TELEPHONE ENCOUNTER
D: Pt will be starting ECT tomorrow.     I: Contacted CTC to discuss scheduled DA for PHP.     A: Will await ECT and assess when pt might be ready to d/c and/or transition to PHP.     P: CTC will contact program [facility] to reschedule DA when ready.     LAUREN Quintana, [facility]  10/10/2017

## 2017-10-10 NOTE — CONSULTS
ECT IP Consult  Consult performed by: GEORGE BARTLETT  Consult ordered by: SRI MILES  Reason for consult: Evaluate for ECT  Assessment/Recommendations: Initial consult completed. Full dictation to follow.    Patient may benefit from ECT given lack of response to antidepressants in the past. Her presentation is consistent with a depression with atypical features or a bipolar depression. We did discuss the risks and benefits of this treatment and alternative options such as lithium or lamotrigine, both of which she has never been on. She continues to feel as though she has no ability to keep herself safe in the short term and believes that her mood is too low for medications to work.    Will start ECT tomorrow.  -NPO after midnight.  -Hold evening Ativan dose prior to treatment

## 2017-10-10 NOTE — PLAN OF CARE
Problem: Depressive Symptoms  Goal: Social and Therapeutic (Depression)  Signs and symptoms of listed problems will be absent or manageable.      10/9/17 Attended 3.0   of 3.0 possible O.T. groups. See O.T.  Assessment for details.

## 2017-10-10 NOTE — CONSULTS
[facility]  Internal Medicine Consult    Mindi Eastman MRN# [id number]   Age: 41 year old YOB: 1975     Date of Admission: 10/4/2017  Date of Consult:  10/10/2017    Requesting Service: Behavioral Health - Enrique Wright MD  Reason for Consult: Pre ECT Medicine Evaluation   Indication for ECT: Bipolar Disorder, Current Episode Depressive    Chief Complaint: Suicidal Ideation     HPI:   Mindi Eastman is a 41 year old female with a history of bipolar disorder, ADHD, anxiety, OCD, ROXANNE (on CPAP), seasonal allergies, and recent right 5th metacarpal shaft fracture s/p casting who was admitted to inpatient psychiatry on 10/4/17 with suicidal ideation. Internal Medicine was consulted for pre ECT evaluation.     Review of Systems:   Cardiovascular: Negative for palpitations, tachycardia, irregular heart beat, chest pain, exertional chest pain or pressure, paroxysmal nocturnal dyspnea, orthopnea, lower extremity edema, syncope or near-syncope, cyanosis, and claudication.  Pulmonary: No shortness of breath, dyspnea on exertion, cough, or hemoptysis  Neurological: Negative for migraine headaches, headaches, stroke, seizures, paralysis, local weakness, numbness or tingling of hands or feet, involuntary movements, speech problems, incoordination, and tremor.    Past Medical History:   Prior Anesthesia: Yes  If yes, any complications: No    Prior ECT: No    Cardiovascular: CAD - No, MI - No, HTN - No, CHF - No, pacemaker or ICD - No  Pulmonary: Asthma/COPD - No, Prior respiratory failure or need for emergent intubation - No, On theophylline - No   Neurological: Brain tumor - No, TIA/CVA - No, Dementia - No, Neuromuscular Disease (including post polio syndrome) - No, Seizures and/or Epilepsy - No, Head Injury - Yes - related to hockey and resolved without intervention, Intracranial Hemorrhage - No    Past Surgical History:   Past Surgical History:   Procedure Laterality Date      "APPENDECTOMY       CARPAL TUNNEL RELEASE RT/LT Bilateral      CHOLECYSTECTOMY       EXTRACTION(S) DENTAL      [place] Teeth     HC KNEE SCOPE,MED/LAT MENISCUS REPAIR Right     X 2     TONSILLECTOMY         Allergies:   Allergies   Allergen Reactions     Darvocet [Propoxyphene N-Apap]        Medication list reviewed.    Physical Exam:  /86  Pulse 61  Temp 98.5  F (36.9  C) (Oral)  Resp 16  Ht 1.676 m (5' 6\")  Wt 87 kg (191 lb 11.2 oz)  LMP 09/29/2017  BMI 30.94 kg/m2   Cardiovascular: RRR. S1, S2. No appreciable murmurs.  Pulmonary: Normal effort on RA. Lungs CTAB without wheezing, rales, rhonchi.   Neurological: A&O x 3. EOMI. No focal deficits.     Data:  EKG (10/9/17): Normal sinus rhythm with ST-T wave abnormalities.    Head Imaging: CT Head in 6/2011 was without acute pathology.    Labs:   CBC:  Recent Labs   Lab Test  10/05/17   0736   WBC  6.3   RBC  3.92   HGB  11.7   HCT  35.8   MCV  91   MCH  29.8   MCHC  32.7   RDW  12.9   PLT  356     CMP:  Recent Labs   Lab Test  10/05/17   0736   NA  143   POTASSIUM  4.2   CHLORIDE  108   GALO  8.0*   CO2  28   BUN  8   CR  0.78   GLC  90   AST  31   ALT  28   BILITOTAL  0.4   ALBUMIN  3.2*   PROTTOTAL  6.2*   ALKPHOS  68     HCG (10/5/17):   Negative    Assessment and Plan: Mindi Eastman is a 41 year old female with a history of bipolar disorder, ADHD, anxiety, OCD, ROXANNE (on CPAP), seasonal allergies, and recent 5th right metacarpal fracture s/p casting who was admitted to inpatient psychiatry with SI.    Pre-ECT Medical Evaluation - Patient does not have absolute medical contraindications to proceeding with ECT at this time. Treatment plan per psychiatry. Oral hypoglycemics and diuretics should be held the morning of ECT.    Recent Right 5th Metacarpal Shaft Fracture - Sustained 9/30 after punching a picnic table. Evaluated by [facility] on 10/3 with recommendations for treatment with ulnar gutter cast. Cast is noted to be loose on exam today with " concern for dislodgement during ECT treatments.  - Discussed with Orthopedics team (Madie Mejia) who will order XR and see patient for possible re-casting this afternoon (consult placed).  - Continue Tylenol PRN, Ibuprofen PRN, and Norco (5-325) 1-2 tabs q 4h PRN for pain. Limit of 4 grams Tylenol from all sources per day.  - Follow up with primary Orthopedic surgeon as planned on 10/30    ROXANNE - Continue CPAP per home settings.    Seasonal Allergies - Continue Claritin 10 mg QD.    Medicine will sign off. Please page the on-call MANUELA for any intercurrent medical issues which arise.    Jaci Joyce PA-C  Hospitalist Service  Pager: [phone]

## 2017-10-10 NOTE — PLAN OF CARE
"Problem: Depressive Symptoms  Goal: Social and Therapeutic (Depression)  Signs and symptoms of listed problems will be absent or manageable.      Attended 3.0   of 3.0 possible O.T. groups. In clinic was a bit late (had been in x-ray). Bright, social. Appreciative of 2 clinics in honor of her birthday tomorrow.(no OT groups tomorrow).. Talked about never thinking she would live to see 42 \"because of physical and depressive symptoms. Expressed sweetie at reaching this landmark. Attended Life Skills Activity Group designed to facilitate interaction with others,spontaneity, enjoyment and maximize cognitive skills of developing strategy, problem solving, enhancing memory as well as provide resources for ongoing use. Actively participated appropriately in group. Able to laugh and enjoy self.       "

## 2017-10-11 ENCOUNTER — ANESTHESIA EVENT (OUTPATIENT)
Dept: BEHAVIORAL HEALTH | Facility: CLINIC | Age: 42
End: 2017-10-11

## 2017-10-11 ENCOUNTER — ANESTHESIA (OUTPATIENT)
Dept: BEHAVIORAL HEALTH | Facility: CLINIC | Age: 42
End: 2017-10-11

## 2017-10-11 PROCEDURE — 25000128 H RX IP 250 OP 636: Performed by: ANESTHESIOLOGY

## 2017-10-11 PROCEDURE — 25000132 ZZH RX MED GY IP 250 OP 250 PS 637: Performed by: PSYCHIATRY & NEUROLOGY

## 2017-10-11 PROCEDURE — 90870 ELECTROCONVULSIVE THERAPY: CPT

## 2017-10-11 PROCEDURE — 12400007 ZZH R&B MH INTERMEDIATE UMMC

## 2017-10-11 PROCEDURE — 25000128 H RX IP 250 OP 636: Performed by: NURSE PRACTITIONER

## 2017-10-11 PROCEDURE — 40000671 ZZH STATISTIC ANESTHESIA CASE

## 2017-10-11 PROCEDURE — 25000125 ZZHC RX 250: Performed by: ANESTHESIOLOGY

## 2017-10-11 PROCEDURE — 90870 ELECTROCONVULSIVE THERAPY: CPT | Performed by: PSYCHIATRY & NEUROLOGY

## 2017-10-11 RX ORDER — KETOROLAC TROMETHAMINE 15 MG/ML
30 INJECTION, SOLUTION INTRAMUSCULAR; INTRAVENOUS ONCE
Status: COMPLETED | OUTPATIENT
Start: 2017-10-11 | End: 2017-10-11

## 2017-10-11 RX ADMIN — Medication 70 MG: at 11:06

## 2017-10-11 RX ADMIN — LORAZEPAM 1 MG: 1 TABLET ORAL at 20:34

## 2017-10-11 RX ADMIN — ZOLPIDEM TARTRATE 10 MG: 5 TABLET, FILM COATED ORAL at 20:34

## 2017-10-11 RX ADMIN — LORAZEPAM 1 MG: 1 TABLET ORAL at 12:18

## 2017-10-11 RX ADMIN — HYDROCODONE BITARTRATE AND ACETAMINOPHEN 2 TABLET: 5; 325 TABLET ORAL at 12:19

## 2017-10-11 RX ADMIN — LURASIDONE HYDROCHLORIDE 40 MG: 40 TABLET, FILM COATED ORAL at 20:35

## 2017-10-11 RX ADMIN — NICOTINE 1 PATCH: 21 PATCH, EXTENDED RELEASE TRANSDERMAL at 12:18

## 2017-10-11 RX ADMIN — IBUPROFEN 600 MG: 600 TABLET ORAL at 20:34

## 2017-10-11 RX ADMIN — ESCITALOPRAM OXALATE 20 MG: 20 TABLET ORAL at 12:18

## 2017-10-11 RX ADMIN — DEXTROAMPHETAMINE SACCHARATE, AMPHETAMINE ASPARTATE, DEXTROAMPHETAMINE SULFATE AND AMPHETAMINE SULFATE 20 MG: 2.5; 2.5; 2.5; 2.5 TABLET ORAL at 12:17

## 2017-10-11 RX ADMIN — KETOROLAC TROMETHAMINE 30 MG: 15 INJECTION, SOLUTION INTRAMUSCULAR; INTRAVENOUS at 16:32

## 2017-10-11 RX ADMIN — QUETIAPINE 100 MG: 100 TABLET, FILM COATED ORAL at 20:34

## 2017-10-11 RX ADMIN — METHOHEXITAL SODIUM 80 MG: 500 INJECTION, POWDER, LYOPHILIZED, FOR SOLUTION INTRAMUSCULAR; INTRAVENOUS; RECTAL at 11:06

## 2017-10-11 RX ADMIN — HYDROCODONE BITARTRATE AND ACETAMINOPHEN 2 TABLET: 5; 325 TABLET ORAL at 17:55

## 2017-10-11 RX ADMIN — LORATADINE 10 MG: 10 TABLET ORAL at 12:18

## 2017-10-11 ASSESSMENT — ACTIVITIES OF DAILY LIVING (ADL)
DRESS: INDEPENDENT
ORAL_HYGIENE: INDEPENDENT
GROOMING: INDEPENDENT
ORAL_HYGIENE: INDEPENDENT
GROOMING: INDEPENDENT
DRESS: INDEPENDENT

## 2017-10-11 NOTE — PROCEDURES
Procedure/Surgery Information   [facility], [place]    Bedside Procedure Note  Date of Service (when I performed the procedure): 10/11/2017    Mindi Eastman is a 42 year old female patient.  1. Major depressive disorder, recurrent severe without psychotic features (H)      Past Medical History:   Diagnosis Date     ADHD      Anxiety      Bipolar disorder (H)      Depressive disorder      OCD (obsessive compulsive disorder)      ROXANNE (obstructive sleep apnea)     On CPAP     Seasonal allergies      Temp: 98.3  F (36.8  C)         Resp: 16 SpO2: 98 %        Procedures     [facility], [place]   ECT Procedure Note     Mindi Eastman [id number]   42 year old 1975     Patient Status: Inpatient    Is this the first in a series of 12 treatments?  Yes    History and Physical: Reviewed in medical record    Consent: Informed consent was signed by: patient    Date Consent Signed: 10/10/2017      Allergies   Allergen Reactions     Darvocet [Propoxyphene N-Apap]        Weight:  191 lbs 11.2 oz    Patient Preparations: Glasses/Contacts removed         Indications for ECT:   Medications ineffective, Psychotherapies ineffective and treatment resistant depression.         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 She has had numerous medication trials with minimal benefit.     #1 10/11:  The patient is alert and oriented x3. She has been NPO since midnight. Patient remains depressed. She notes some relief with starting this treatment as she is hopeful about it working.         Diagnosis:      Major depressive disorder, recurrent severe without psychotic features (F33.2)  Consider bipolar depression           Pause for the Cause:     Right patient Yes   Right procedure/laterality settings: Yes          Intra-Procedure Documentation:          ECT #: 1   Treatment number this series: 1   Total treatment number: 1     Type of ECT:  Right, unilateral ultrabrief    ECT Medications:    Brevital: 80mg  Succinyl Choline: 70mg    ECT Strip Summary:   Energy Level: 20 percent  Motor Seizure Duration: 48 seconds  EEG Seizure Duration: 118 seconds    Complications: No    Plan:   Continue with MWF ECT until mood reaches plateau.    Alvarez Solares MD

## 2017-10-11 NOTE — PLAN OF CARE
"Problem: Depressive Symptoms  Goal: Depressive Symptoms  Signs and symptoms of listed problems will be absent or manageable.   Outcome: Improving  \"I'm better, I have a different view on things, it's calm\". Depression and anxiety have improved. Thoughts are more clear and is able to focus and concentrate better. States normally attends all the groups but did not today because mother was here visiting and went to ECT and is resting this afternoon. After return from ECT patient started c/o a headache. Two Norco were administered. After almost an hour patient said the headache was worse. This writer told patient we would wait an hour and then administer Ibuprofen for headache. Patient said \"they told me downstairs I could get Toradol, I want that, not Ibuprofen. Patient was told twice that Toradol was a shot and patient insisted that was what she wanted instead of the Ibuprofen. Also c/o painful calves after ECT. Dr Solares is aware of painful calves and headache following ECT. This writer left patient's room so patient could rest.      "

## 2017-10-11 NOTE — PROGRESS NOTES
Patient has ECT scheduled for this morning. She is NPO through the night. No anxiety regarding ECT observed.

## 2017-10-11 NOTE — CONSULTS
DATE OF ADMISSION:  10/04/2017      DATE OF SERVICE:  10/10/2017      REASON FOR CONSULTATION:  Evaluate patient for ECT.       HISTORY OF PRESENT ILLNESS:  Mindi Eastman is a 41-year-old woman with a past diagnosis of depression and bipolar disorder that was admitted due to worsening depression and suicide gestures.  Patient's depression had got so severe that she became suicidal.  Her plan involved sleeping with several knives on her chest, in between her arms and body such that, if she rolled over in the middle of the night, she would impale herself and die.  She indicates to me that she truly believed that this would be a successful attempt.  She was quite disappointed when it did not work.  Family is quite concerned about her.  She reports that she has had increase in depression since July, with no known social stressors.  Her sleep is okay with Ambien.  She has poor appetite.  She has poor concentration, poor focus.  She feels hopeless, helpless.  She has increased feelings of guilt and worthlessness.  She denies any hallucinations.  She endorses that she is irritable and has a bad temper.  She indicates she would prefer to lay around in bed all day; however, she does force herself to go to work.  This does not appear to be improving her mood.  She became quite angry and struck a picnic table.  Not too long prior to admission, she fractured her hand and it is currently in a cast.  She says that she has been in a very dark place and it has been hard to get out of.  Past medication trials include Wellbutrin, Seroquel, Latuda, Lexapro, Zoloft, and Prozac without any noted benefit.  She has never been on lithium or Lamictal.  The patient was unable to identify any clear manic periods in her history.      PAST PSYCHIATRIC HISTORY:  The patient reports 1 past inpatient hospitalization 10 years ago at [facility].  She currently has a psychiatrist and a therapist.  She only sees her psychiatrist every 3 months or  sometimes longer between, even when there is medication changes.      SUICIDE ATTEMPTS:  None past, she has only had suicidal thoughts until this most recent gesture.      PAST MEDICAL HISTORY:  The patient reports sleep apnea.  She has CPAP.  She has a fracture in her hand from punching a picnic table.      SUBSTANCE HISTORY:  The patient uses an E-cigarette.  She drinks socially.  She denies marijuana or other illicit drugs.      PHYSICAL REVIEW OF SYSTEMS:  The patient denies any problems on 10-point review of systems except as noted in HPI.      FAMILY HISTORY:  The patient denies any family history of mental illness.      SOCIAL HISTORY:  The patient has been with her current wife for 5 years.  She said her wife has a 20-year-old child that does not live with them.  She has worked at her current job for 3 years.  She is a manager in an auto parts store.  She has good local family support.      ALLERGIES:  Darvocet.      CURRENT MEDICATIONS:   1.  Adderall 20 mg every day.   2.  Lexapro 20 mg daily.   3.  Claritin 10 mg daily.   4.  Ativan 1 mg 3 times daily.   5.  Latuda 40 mg at bedtime.   6.  Seroquel 100 mg at bedtime.   7.  Ambien 10 mg at bedtime.   8.  Norco 5/325 one to 2 tablets every 4 hours as needed for pain related to her hand fracture.      LABORATORY DATA:  Comprehensive metabolic panel notable for calcium of 8.0, albumin of 3.2, protein of 6.2.  Urine pregnancy is negative.  Lipid panel within normal limits.  TSH is 1.81, glucose is 90.  CBC with differential within normal limits.      VITAL SIGNS:  Temp 98.5, respiratory rate is 16, blood pressure is 112/78, pulse is 79.      PHYSICAL EXAMINATION:  I reviewed physical exam as documented by Internal Medicine consult on 10/10/2017.  I have no additional findings at this time.      MENTAL STATUS EXAMINATION:  The patient is alert and oriented to person, place and date.  She is wearing hospital scrubs.  She is cooperative throughout the interview.   She has good eye contact.  Speech is normal in rate and volume.  Language is intact for word usage and sentence structure.  She has no psychomotor agitation or retardation.  Muscle strength and tone and gait and station within normal limits with the exception of the right arm, which was not tested due to her fracture.  Speech is normal in rate and volume.  Language is intact.  Mood is depressed.  Affect is congruent to mood.  Thought process is linear and goal oriented.  Associations are intact.  Thought content is notable for suicidal thoughts, no signs of psychosis.  Recent and remote memory are intact.  Fund of knowledge is adequate.  Attention and concentration are intact.  Insight is fair, judgment is fair.      DIAGNOSES:   1.  Major depressive disorder, recurrent, severe without psychosis versus bipolar depression.  I do not see evidence for bipolar I in current depressive state based on her history.   2.  Recent hand fracture.      ASSESSMENT AND PLAN:  The patient is a 41-year-old woman with severely depressed mood that has been largely treatment refractory.  She has been tried on numerous neuroleptic medications, as well as antidepressants without response.  Notably, she has not been tried on Lamictal or lithium, which can sometimes be effective in treatment of bipolar depression.  Currently however, patient is very depressed, has ongoing suicidal thoughts.  We discussed the risks and benefits of ECT and also discussed alternative options, such as trials of lithium or Lamictal or re-engagement in therapy, and she felt quite hopeless with any further medication trials.  She also felt quite scared that she would end up signing herself out and actually hurting herself if she does not get the suicidal ideation and depression under control in a more expedient manner.  As such, after discussing about the risks and benefits associated with treatment, she did agree and request to start treatment with  electroconvulsive therapy as soon as possible.      Primary team should hold patient's Ativan evening dose the evenings prior to treatment.  Also encouraged the primary team to consider cleaning up her medications somewhat as she is currently on a scheduled stimulant, a benzodiazepine, an additional sleeping medication, 2 neuroleptics, and an antidepressant.  May consider further discussion with the patient by the primary team on starting lithium or Lamictal possibly after ECT, as she will need something for long-term stability when she has completed her acute series.         GEORGE BARTLETT MD             D: 10/10/2017 15:50   T: 10/10/2017 18:37   MT: CC      Name:     VON [place]   MRN:      [id number]        Account:       [id number]   :      1975           Consult Date:  10/10/2017      Document: [id number]

## 2017-10-11 NOTE — PROGRESS NOTES
"[facility], [place]   Psychiatric Progress Note        Interim History:       Subjective: \"Still depressed, but a little better\"     As per today's interview: Went to groups today. Some subjective improvement in mood, but still low in energy, volitional drive, and admits to some passive suicidal ideation (without intent or plan). Denies any HI/AH/VH. Looking forward to start ECT.          Medications:       amphetamine-dextroamphetamine  20 mg Oral QAM     influenza quadrivalent (PF) vacc age 3 yrs and older  0.5 mL Intramuscular Prior to discharge     escitalopram  20 mg Oral Daily     lurasidone  40 mg Oral At Bedtime     loratadine  10 mg Oral Daily     LORazepam (ATIVAN) tablet 1 mg  1 mg Oral TID     QUEtiapine (SEROquel) tablet 100 mg  100 mg Oral At Bedtime     zolpidem (AMBIEN) tablet 10 mg  10 mg Oral At Bedtime     nicotine   Transdermal Q8H     nicotine   Transdermal Daily     nicotine  1 patch Transdermal Daily          Allergies:     Allergies   Allergen Reactions     Darvocet [Propoxyphene N-Apap]           Labs:     Recent Results (from the past 48 hour(s))   EKG 12-lead, complete    Collection Time: 10/09/17  4:28 PM   Result Value Ref Range    Interpretation ECG Click View Image link to view waveform and result           Psychiatric Examination:   /86  Pulse 61  Temp 98.5  F (36.9  C) (Oral)  Resp 16  Ht 1.676 m (5' 6\")  Wt 87 kg (191 lb 11.2 oz)  LMP 09/29/2017  BMI 30.94 kg/m2  Weight is 191 lbs 11.2 oz  Body mass index is 30.94 kg/(m^2).    Appearance:  female, short hair, glasses, moderate grooming/hygiene.  Attitude:  Cooperative   Eye Contact:  Adequate   Mood:  \"A little better, still depressed\"   Affect:  Tired, mood congruent, slightly more range. Less tearful   Speech:  Soft in tone, regular in rhythm   Language: fluent and intact in English\"  Psychomotor, Gait, Musculoskeletal:  Unremarkable  Throught Process:  Linear, " "coherent  Associations:  No loosening   Thought Content:  Some passive suicidal ideation, no intent of plan, no HI/AH/VH.   Insight:  Fair   Judgement:  Fair   Oriented to:  Person, place, time.   Attention Span and Concentration:  Adequate   Recent and Remote Memory:  Intact   Fund of Knowledge:  Appropriate      Assessment & Plan       Principal Diagnosis:   Bipolar Disorder, current episode depressive   Generalized Anxiety Disorder     Assessment:       (10/5):Mindi appears to be suffering a episode of depression, with components of suicidality, negative ruminations, anxious catatrphic thoughts and apathy/avolitional behavior. Based on her past behavior, there appears to be a component of amira, however at this time, she is very much in dyphoric, depressive symptoms. She is on a rather volitile combination of medications including Norco, Xanax, and Ambien, which all act as \"depressive\" agents, along with being problematic in their interaction. Her Xanax was reduced recently, and is agreeable to no being reliant on it in the future. At this time, I will optimize her Lexapro, and increase her Latuda. She had found benefit in Seroquel in her mood, however she was worried about weight gain, thus opimitizing Latuda will be the route taken, especially since its appropriate for Bipolar Depression. Patient uses CPAP machine at night. We will have her on individual observation while she uses CPAP at night tonight, and reassess tomorrow.     (10/6): Slight reduction in SI, although some passive thoughts remain, but no intent or plan. Will add on Adderall (which she took PTA), although at a smaller dose. Stimulant will function in this context as a adjuctive means to help her avolitional behavior. Discontinued individual observation for CPAP. Discussed CBT and provided CBT thought record as homework assingment over the weekend.     (10/9): Did not complete CBT homework, yet we still discussed aspects of it, and its " utility in her treatment. Discussed the pros and cons of ECT, along with practical treatment expectations. Patient seemed to want ECT, and given her history of treatment resistant depression with recurrent suicidal ideation (and recent attempt), she would warrant a trial of ECT. ECT consult, medicine consult and EKG will be placed. Of note, she is able to provide informed consent to procedure.     (10/10): Some subjective improvement, but still depressed. Is making a good effort in interacting with peers/staff and goes to groups. MMSE was 30/30. Will start ECT tomorrow.      Plan:  - Lexapro 20 mg   - Latuda 40 mg  - Adderall XR 20 mg   - ECT tomorrow (10/11)             Legal:  Voluntary      Disposition:  Likely home next week       Enrique Wright MD  [facility]      Attestation:  Patient has been seen and evaluated by me,  Enrique Wright MD     Spent 20   minutes on encounter, >50% of which was spent in counseling and/or coordination of care, consisting of medication discussion and CBT. Discussed ECT, and answered any questions/concerns.

## 2017-10-11 NOTE — PROGRESS NOTES
Patient requesting Toradol for headache at 1430. Brielle Salas NP called and left order for one time dose of Toradol.

## 2017-10-11 NOTE — PLAN OF CARE
Problem: Depressive Symptoms  Goal: Depressive Symptoms  Signs and symptoms of listed problems will be absent or manageable.   Outcome: No Change  48 Hour Assessment:  Angely was up ad sylvie, spent the majority of the shift out in the milieu, social with peers and staff. Pt was visited by her wife this evening which she said went well. Pt indicates she is ready for ECT #1 tomorrow, feels hopeful she will benefit from the treatment. Pt requested Norco PRN for right hand fracture pain (decreases pain to 4/10). Angely continues to report anxiety 5/10, depression 6/10 though denies having suicidal ideation or thoughts of self harm. Angely's affect was flat mostly, did smile on occasion. Pt was med compliant, pleasant, and cooperative.

## 2017-10-11 NOTE — ANESTHESIA PREPROCEDURE EVALUATION
Anesthesia Evaluation     . Pt has had prior anesthetic. Type: General    No history of anesthetic complications          ROS/MED HX    ENT/Pulmonary:     (+)sleep apnea, uses CPAP , . .    Neurologic:  - neg neurologic ROS     Cardiovascular:  - neg cardiovascular ROS       METS/Exercise Tolerance:     Hematologic:  - neg hematologic  ROS       Musculoskeletal:  - neg musculoskeletal ROS       GI/Hepatic:  - neg GI/hepatic ROS       Renal/Genitourinary:  - ROS Renal section negative       Endo:  - neg endo ROS       Psychiatric:     (+) psychiatric history depression      Infectious Disease:  - neg infectious disease ROS       Malignancy:         Other:                     Physical Exam  Normal systems: cardiovascular, pulmonary and dental    Airway   Mallampati: II  TM distance: >3 FB  Neck ROM: full    Dental     Cardiovascular       Pulmonary                     Anesthesia Plan      History & Physical Review  History and physical reviewed and following examination; no interval change.    ASA Status:  2 .        Plan for General with Intravenous induction.          Postoperative Care      Consents  Anesthetic plan, risks, benefits and alternatives discussed with:  Patient..

## 2017-10-12 PROCEDURE — 90853 GROUP PSYCHOTHERAPY: CPT

## 2017-10-12 PROCEDURE — 25000132 ZZH RX MED GY IP 250 OP 250 PS 637: Performed by: PSYCHIATRY & NEUROLOGY

## 2017-10-12 PROCEDURE — 99232 SBSQ HOSP IP/OBS MODERATE 35: CPT | Performed by: PSYCHIATRY & NEUROLOGY

## 2017-10-12 PROCEDURE — 25000128 H RX IP 250 OP 636: Performed by: PSYCHIATRY & NEUROLOGY

## 2017-10-12 PROCEDURE — 99207 ZZC CDG-MDM COMPONENT: MEETS MODERATE - UP CODED: CPT | Performed by: PSYCHIATRY & NEUROLOGY

## 2017-10-12 PROCEDURE — 12400007 ZZH R&B MH INTERMEDIATE UMMC

## 2017-10-12 PROCEDURE — 90686 IIV4 VACC NO PRSV 0.5 ML IM: CPT | Performed by: PSYCHIATRY & NEUROLOGY

## 2017-10-12 RX ORDER — LORAZEPAM 1 MG/1
1 TABLET ORAL 2 TIMES DAILY
Status: DISCONTINUED | OUTPATIENT
Start: 2017-10-13 | End: 2017-10-14 | Stop reason: HOSPADM

## 2017-10-12 RX ORDER — LORAZEPAM 1 MG/1
1 TABLET ORAL 3 TIMES DAILY
Status: COMPLETED | OUTPATIENT
Start: 2017-10-12 | End: 2017-10-12

## 2017-10-12 RX ADMIN — HYDROCODONE BITARTRATE AND ACETAMINOPHEN 2 TABLET: 5; 325 TABLET ORAL at 08:43

## 2017-10-12 RX ADMIN — HYDROCODONE BITARTRATE AND ACETAMINOPHEN 2 TABLET: 5; 325 TABLET ORAL at 13:45

## 2017-10-12 RX ADMIN — LORATADINE 10 MG: 10 TABLET ORAL at 08:43

## 2017-10-12 RX ADMIN — INFLUENZA A VIRUS A/MICHIGAN/45/2015 X-275 (H1N1) ANTIGEN (FORMALDEHYDE INACTIVATED), INFLUENZA A VIRUS A/HONG KONG/4801/2014 X-263B (H3N2) ANTIGEN (FORMALDEHYDE INACTIVATED), INFLUENZA B VIRUS B/PHUKET/3073/2013 ANTIGEN (FORMALDEHYDE INACTIVATED), AND INFLUENZA B VIRUS B/BRISBANE/60/2008 ANTIGEN (FORMALDEHYDE INACTIVATED) 0.5 ML: 15; 15; 15; 15 INJECTION, SUSPENSION INTRAMUSCULAR at 13:45

## 2017-10-12 RX ADMIN — ZOLPIDEM TARTRATE 10 MG: 5 TABLET, FILM COATED ORAL at 20:22

## 2017-10-12 RX ADMIN — HYDROXYZINE HYDROCHLORIDE 50 MG: 25 TABLET ORAL at 18:12

## 2017-10-12 RX ADMIN — IBUPROFEN 600 MG: 600 TABLET ORAL at 20:22

## 2017-10-12 RX ADMIN — QUETIAPINE 100 MG: 100 TABLET, FILM COATED ORAL at 20:22

## 2017-10-12 RX ADMIN — HYDROCODONE BITARTRATE AND ACETAMINOPHEN 2 TABLET: 5; 325 TABLET ORAL at 18:12

## 2017-10-12 RX ADMIN — ESCITALOPRAM OXALATE 20 MG: 20 TABLET ORAL at 08:43

## 2017-10-12 RX ADMIN — NICOTINE 1 PATCH: 21 PATCH, EXTENDED RELEASE TRANSDERMAL at 08:43

## 2017-10-12 RX ADMIN — LORAZEPAM 1 MG: 1 TABLET ORAL at 08:43

## 2017-10-12 RX ADMIN — LORAZEPAM 1 MG: 1 TABLET ORAL at 13:45

## 2017-10-12 RX ADMIN — LURASIDONE HYDROCHLORIDE 40 MG: 40 TABLET, FILM COATED ORAL at 20:22

## 2017-10-12 RX ADMIN — DEXTROAMPHETAMINE SACCHARATE, AMPHETAMINE ASPARTATE, DEXTROAMPHETAMINE SULFATE AND AMPHETAMINE SULFATE 20 MG: 2.5; 2.5; 2.5; 2.5 TABLET ORAL at 08:43

## 2017-10-12 ASSESSMENT — ACTIVITIES OF DAILY LIVING (ADL)
DRESS: SCRUBS (BEHAVIORAL HEALTH)
HYGIENE/GROOMING: INDEPENDENT
DRESS: SCRUBS (BEHAVIORAL HEALTH)
ORAL_HYGIENE: INDEPENDENT
GROOMING: INDEPENDENT
ORAL_HYGIENE: INDEPENDENT
LAUNDRY: UNABLE TO COMPLETE
LAUNDRY: UNABLE TO COMPLETE

## 2017-10-12 NOTE — PROGRESS NOTES
Pt reports feeling 'gloomy' and 'blah'. Pt attended most groups today and is social with peers. Pt feels less anxious about ECT tomorrow, and feels ready for discharge on Saturday.        10/12/17 1355   Behavioral Health   Hallucinations denies / not responding to hallucinations   Thinking intact   Orientation person: oriented;place: oriented;date: oriented;time: oriented   Memory baseline memory   Insight admits / accepts   Judgement intact   Eye Contact at examiner   Affect blunted, flat   Mood mood is calm   ADL Assessment (WDL) WDL   Suicidality (WDL) WDL   Self Injury (WDL) WDL   Elopement (WDL) WDL   Activity (WDL) WDL   Speech (WDL) WDL   Psychomotor Gait (WDL) WDL   Psycho Education   Type of Intervention 1:1 intervention   Response participates, initiates socially appropriate   Hours 0.5   Treatment Detail check in   Activities of Daily Living   Hygiene/Grooming independent   Oral Hygiene independent   Dress scrubs (behavioral health)   Laundry unable to complete   Room Organization independent

## 2017-10-12 NOTE — PROGRESS NOTES
"[facility], [place]   Psychiatric Progress Note        Interim History:       Subjective: \"Feeling better\"     As per today's interview: Feels better, improved mood, energy and sleep. Denies any SI/HI/AH/VH. Felt ECT went well, and (besides some initial muscle pain/headache), he denied any continued complications. Feels safe to discharge back home this weekend and continue ECT next week outpatient.          Medications:       [START ON 10/13/2017] LORazepam  1 mg Oral BID     amphetamine-dextroamphetamine  20 mg Oral QAM     escitalopram  20 mg Oral Daily     lurasidone  40 mg Oral At Bedtime     loratadine  10 mg Oral Daily     QUEtiapine (SEROquel) tablet 100 mg  100 mg Oral At Bedtime     zolpidem (AMBIEN) tablet 10 mg  10 mg Oral At Bedtime     nicotine   Transdermal Q8H     nicotine   Transdermal Daily     nicotine  1 patch Transdermal Daily          Allergies:     Allergies   Allergen Reactions     Darvocet [Propoxyphene N-Apap]           Labs:     No results found for this or any previous visit (from the past 48 hour(s)).       Psychiatric Examination:   /56  Pulse 61  Temp 98.6  F (37  C) (Oral)  Resp 16  Ht 1.676 m (5' 6\")  Wt 86.7 kg (191 lb 1.6 oz)  LMP 09/29/2017  SpO2 96%  BMI 30.84 kg/m2  Weight is 191 lbs 1.6 oz  Body mass index is 30.84 kg/(m^2).    Appearance:  female, short hair, glasses, moderate grooming/hygiene.  Attitude:  Cooperative   Eye Contact:  Adequate   Mood:  \"Feeling better\"   Affect:  Mood congruent, appropriate   Speech:  Soft in tone, regular in rhythm   Language: fluent and intact in English\"  Psychomotor, Gait, Musculoskeletal:  Unremarkable  Throught Process:  Linear, coherent  Associations:  No loosening   Thought Content:  No SI/HI/AH/VH.   Insight:  Fair   Judgement:  Fair   Oriented to:  Person, place, time.   Attention Span and Concentration:  Adequate   Recent and Remote Memory:  Intact   Fund of Knowledge:  " "Appropriate      Assessment & Plan       Principal Diagnosis:   Bipolar Disorder, current episode depressive   Generalized Anxiety Disorder     Assessment:       (10/5):Mindi appears to be suffering a episode of depression, with components of suicidality, negative ruminations, anxious catatrphic thoughts and apathy/avolitional behavior. Based on her past behavior, there appears to be a component of amira, however at this time, she is very much in dyphoric, depressive symptoms. She is on a rather volitile combination of medications including Norco, Xanax, and Ambien, which all act as \"depressive\" agents, along with being problematic in their interaction. Her Xanax was reduced recently, and is agreeable to no being reliant on it in the future. At this time, I will optimize her Lexapro, and increase her Latuda. She had found benefit in Seroquel in her mood, however she was worried about weight gain, thus opimitizing Latuda will be the route taken, especially since its appropriate for Bipolar Depression. Patient uses CPAP machine at night. We will have her on individual observation while she uses CPAP at night tonight, and reassess tomorrow.     (10/6): Slight reduction in SI, although some passive thoughts remain, but no intent or plan. Will add on Adderall (which she took PTA), although at a smaller dose. Stimulant will function in this context as a adjuctive means to help her avolitional behavior. Discontinued individual observation for CPAP. Discussed CBT and provided CBT thought record as homework assingment over the weekend.     (10/9): Did not complete CBT homework, yet we still discussed aspects of it, and its utility in her treatment. Discussed the pros and cons of ECT, along with practical treatment expectations. Patient seemed to want ECT, and given her history of treatment resistant depression with recurrent suicidal ideation (and recent attempt), she would warrant a trial of ECT. ECT consult, medicine " "consult and EKG will be placed. Of note, she is able to provide informed consent to procedure.     (10/10): Some subjective improvement, but still depressed. Is making a good effort in interacting with peers/staff and goes to groups. MMSE was 30/30. Will start ECT tomorrow.     (10/12): Much subjective improvement in mood, and irritability threshold and anxiety. ECT seems to have benefited her well. We plan to discharge her on Saturday, and will continue ECT on an outpatient basis. Intake approved outpatient ECT, order set for \"outpatient ECT\" has been placed,and ECT provider has been notified. We discussed tapering down on her HS Ativan dose.      Plan:  - Lexapro 20 mg   - Latuda 40 mg  - Seroquel 100 mg HS   - Adderall XR 20 mg   - ECT tomorrow (10/13)  - Ativan 1 mg BID (8 AM and 2 PM)              Legal:  Voluntary      Disposition:  Likely home next week       Enrique Wright MD  [facility]      Attestation:  Patient has been seen and evaluated by me,  Enrique Wright MD     Spent 20   minutes on encounter, >50% of which was spent in counseling and/or coordination of care, consisting of medication discussion and CBT. Discussed ECT, and answered any questions/concerns.     "

## 2017-10-12 NOTE — PROGRESS NOTES
10/11/17 2200   Behavioral Health   Hallucinations denies / not responding to hallucinations   Thinking intact   Orientation time: oriented;date: oriented;place: oriented;person: oriented   Memory baseline memory   Insight admits / accepts   Judgement intact   Eye Contact at examiner   Affect full range affect   Mood anxious   Physical Appearance/Attire attire appropriate to age and situation   Hygiene other (see comment)   Suicidality other (see comments)  (Pt denies.)   Self Injury other (see comment)   Activity other (see comment)  (Pt visible in milieu, social with peers.)   Speech clear;coherent   Medication Sensitivity no stated side effects;no observed side effects   Psychomotor / Gait steady;balanced   Psycho Education   Type of Intervention 1:1 intervention   Response participates with encouragement   Hours 0.5   Treatment Detail (check in.)   Activities of Daily Living   Hygiene/Grooming independent   Oral Hygiene independent   Dress independent   Room Organization independent   Activity   Activity Assistance Provided independent   Behavioral Health Interventions   Depression maintain safety precautions   Social and Therapeutic Interventions (Depression) encourage socialization with peers;encourage participation in therapeutic groups and milieu activities     Pt celebrated her 42th  birthday today. Her family visited and brought over a cake to celebrate with her. Visible in milieu bright affect, social with peers. Rated her anxiety at a 5-6 due to her excitement of celebrating with her family, denies SI/SIB. Pt goal is to complete ECT and work on discharge.

## 2017-10-12 NOTE — PROGRESS NOTES
"Pt appears brighter post ECT. Pt stated she was feeling much better after ECT yesterday. Pt feels it was \"helpful.\"   Pt c/o neck pain and headache.   "

## 2017-10-13 ENCOUNTER — ANESTHESIA (OUTPATIENT)
Dept: BEHAVIORAL HEALTH | Facility: CLINIC | Age: 42
End: 2017-10-13

## 2017-10-13 ENCOUNTER — ANESTHESIA EVENT (OUTPATIENT)
Dept: BEHAVIORAL HEALTH | Facility: CLINIC | Age: 42
End: 2017-10-13

## 2017-10-13 PROBLEM — F51.05 INSOMNIA DUE TO OTHER MENTAL DISORDER: Status: ACTIVE | Noted: 2017-10-13

## 2017-10-13 PROBLEM — F99 INSOMNIA DUE TO OTHER MENTAL DISORDER: Status: ACTIVE | Noted: 2017-10-13

## 2017-10-13 PROBLEM — Z72.0 TOBACCO USE: Status: ACTIVE | Noted: 2017-10-13

## 2017-10-13 PROBLEM — F41.1 GAD (GENERALIZED ANXIETY DISORDER): Status: ACTIVE | Noted: 2017-10-13

## 2017-10-13 PROBLEM — G44.209 ACUTE TENSION-TYPE HEADACHE: Status: ACTIVE | Noted: 2017-10-13

## 2017-10-13 PROCEDURE — 12400007 ZZH R&B MH INTERMEDIATE UMMC

## 2017-10-13 PROCEDURE — 25000125 ZZHC RX 250: Performed by: STUDENT IN AN ORGANIZED HEALTH CARE EDUCATION/TRAINING PROGRAM

## 2017-10-13 PROCEDURE — 25000128 H RX IP 250 OP 636: Performed by: STUDENT IN AN ORGANIZED HEALTH CARE EDUCATION/TRAINING PROGRAM

## 2017-10-13 PROCEDURE — 90870 ELECTROCONVULSIVE THERAPY: CPT

## 2017-10-13 PROCEDURE — GZB0ZZZ ELECTROCONVULSIVE THERAPY, UNILATERAL-SINGLE SEIZURE: ICD-10-PCS | Performed by: PSYCHIATRY & NEUROLOGY

## 2017-10-13 PROCEDURE — 40000671 ZZH STATISTIC ANESTHESIA CASE

## 2017-10-13 PROCEDURE — 25000128 H RX IP 250 OP 636: Performed by: PSYCHIATRY & NEUROLOGY

## 2017-10-13 PROCEDURE — 25000132 ZZH RX MED GY IP 250 OP 250 PS 637: Performed by: PSYCHIATRY & NEUROLOGY

## 2017-10-13 PROCEDURE — 99232 SBSQ HOSP IP/OBS MODERATE 35: CPT | Mod: 25 | Performed by: PSYCHIATRY & NEUROLOGY

## 2017-10-13 PROCEDURE — H2032 ACTIVITY THERAPY, PER 15 MIN: HCPCS

## 2017-10-13 PROCEDURE — 99207 ZZC CDG-CODE CATEGORY CHANGED: CPT | Performed by: PSYCHIATRY & NEUROLOGY

## 2017-10-13 PROCEDURE — 90870 ELECTROCONVULSIVE THERAPY: CPT | Performed by: PSYCHIATRY & NEUROLOGY

## 2017-10-13 RX ORDER — HYDROXYZINE HYDROCHLORIDE 25 MG/1
50 TABLET, FILM COATED ORAL 3 TIMES DAILY PRN
Qty: 90 TABLET | Refills: 1 | Status: SHIPPED | OUTPATIENT
Start: 2017-10-13 | End: 2017-10-13

## 2017-10-13 RX ORDER — NICOTINE 21 MG/24HR
1 PATCH, TRANSDERMAL 24 HOURS TRANSDERMAL DAILY
Qty: 30 PATCH | Refills: 1 | Status: SHIPPED | OUTPATIENT
Start: 2017-10-13 | End: 2017-10-13

## 2017-10-13 RX ORDER — IBUPROFEN 600 MG/1
600 TABLET, FILM COATED ORAL EVERY 6 HOURS PRN
Qty: 120 TABLET | Refills: 1 | Status: SHIPPED | OUTPATIENT
Start: 2017-10-13 | End: 2017-10-13

## 2017-10-13 RX ORDER — LURASIDONE HYDROCHLORIDE 40 MG/1
40 TABLET, FILM COATED ORAL AT BEDTIME
Qty: 30 TABLET | Refills: 1 | Status: SHIPPED | OUTPATIENT
Start: 2017-10-13 | End: 2019-06-16

## 2017-10-13 RX ORDER — KETOROLAC TROMETHAMINE 15 MG/ML
30 INJECTION, SOLUTION INTRAMUSCULAR; INTRAVENOUS ONCE
Status: CANCELLED
Start: 2017-10-13 | End: 2017-10-13

## 2017-10-13 RX ORDER — LORAZEPAM 1 MG/1
1 TABLET ORAL 2 TIMES DAILY PRN
Qty: 60 TABLET | Refills: 0 | COMMUNITY
Start: 2017-10-13 | End: 2022-11-03

## 2017-10-13 RX ORDER — IBUPROFEN 600 MG/1
600 TABLET, FILM COATED ORAL EVERY 6 HOURS PRN
Qty: 120 TABLET | Refills: 1 | Status: SHIPPED | OUTPATIENT
Start: 2017-10-13 | End: 2019-06-16

## 2017-10-13 RX ORDER — LORAZEPAM 1 MG/1
1 TABLET ORAL 2 TIMES DAILY
Qty: 60 TABLET | Refills: 0 | COMMUNITY
Start: 2017-10-13 | End: 2017-10-13

## 2017-10-13 RX ORDER — QUETIAPINE FUMARATE 100 MG/1
100 TABLET, FILM COATED ORAL AT BEDTIME
Qty: 30 TABLET | Refills: 1 | Status: SHIPPED | OUTPATIENT
Start: 2017-10-13 | End: 2022-11-03

## 2017-10-13 RX ORDER — ACETAMINOPHEN 325 MG/1
975 TABLET ORAL EVERY 6 HOURS PRN
Qty: 100 TABLET | Refills: 1 | Status: SHIPPED | OUTPATIENT
Start: 2017-10-13 | End: 2019-06-16

## 2017-10-13 RX ORDER — HYDROXYZINE HYDROCHLORIDE 25 MG/1
50 TABLET, FILM COATED ORAL 3 TIMES DAILY PRN
Qty: 90 TABLET | Refills: 1 | Status: SHIPPED | OUTPATIENT
Start: 2017-10-13 | End: 2018-12-14

## 2017-10-13 RX ORDER — QUETIAPINE FUMARATE 100 MG/1
100 TABLET, FILM COATED ORAL AT BEDTIME
Qty: 30 TABLET | Refills: 1 | Status: SHIPPED | OUTPATIENT
Start: 2017-10-13 | End: 2017-10-13

## 2017-10-13 RX ORDER — ESCITALOPRAM OXALATE 20 MG/1
20 TABLET ORAL DAILY
Qty: 30 TABLET | Refills: 1 | Status: SHIPPED | OUTPATIENT
Start: 2017-10-13 | End: 2018-11-02

## 2017-10-13 RX ORDER — NICOTINE 21 MG/24HR
1 PATCH, TRANSDERMAL 24 HOURS TRANSDERMAL DAILY
Qty: 30 PATCH | Refills: 1 | Status: SHIPPED | OUTPATIENT
Start: 2017-10-13 | End: 2019-10-11

## 2017-10-13 RX ORDER — ACETAMINOPHEN 325 MG/1
975 TABLET ORAL EVERY 6 HOURS PRN
Status: DISCONTINUED | OUTPATIENT
Start: 2017-10-13 | End: 2017-10-14 | Stop reason: HOSPADM

## 2017-10-13 RX ORDER — ESCITALOPRAM OXALATE 20 MG/1
20 TABLET ORAL DAILY
Qty: 30 TABLET | Refills: 1 | Status: SHIPPED | OUTPATIENT
Start: 2017-10-13 | End: 2017-10-13

## 2017-10-13 RX ORDER — ZOLPIDEM TARTRATE 10 MG/1
10 TABLET ORAL AT BEDTIME
Qty: 30 TABLET | Refills: 0 | Status: SHIPPED | OUTPATIENT
Start: 2017-10-13 | End: 2017-10-13

## 2017-10-13 RX ORDER — LURASIDONE HYDROCHLORIDE 40 MG/1
40 TABLET, FILM COATED ORAL AT BEDTIME
Qty: 30 TABLET | Refills: 1 | Status: SHIPPED | OUTPATIENT
Start: 2017-10-13 | End: 2017-10-13

## 2017-10-13 RX ORDER — ACETAMINOPHEN 325 MG/1
975 TABLET ORAL EVERY 6 HOURS PRN
Qty: 100 TABLET | Refills: 1 | Status: SHIPPED | OUTPATIENT
Start: 2017-10-13 | End: 2017-10-13

## 2017-10-13 RX ORDER — KETOROLAC TROMETHAMINE 15 MG/ML
30 INJECTION, SOLUTION INTRAMUSCULAR; INTRAVENOUS ONCE
Status: COMPLETED | OUTPATIENT
Start: 2017-10-13 | End: 2017-10-13

## 2017-10-13 RX ORDER — ZOLPIDEM TARTRATE 10 MG/1
10 TABLET ORAL AT BEDTIME
Qty: 30 TABLET | Refills: 0 | Status: SHIPPED | OUTPATIENT
Start: 2017-10-13 | End: 2019-10-11

## 2017-10-13 RX ADMIN — Medication 70 MG: at 07:54

## 2017-10-13 RX ADMIN — HYDROCODONE BITARTRATE AND ACETAMINOPHEN 2 TABLET: 5; 325 TABLET ORAL at 17:19

## 2017-10-13 RX ADMIN — NICOTINE 1 PATCH: 21 PATCH, EXTENDED RELEASE TRANSDERMAL at 09:02

## 2017-10-13 RX ADMIN — LURASIDONE HYDROCHLORIDE 40 MG: 40 TABLET, FILM COATED ORAL at 21:11

## 2017-10-13 RX ADMIN — LORATADINE 10 MG: 10 TABLET ORAL at 09:03

## 2017-10-13 RX ADMIN — ZOLPIDEM TARTRATE 10 MG: 5 TABLET, FILM COATED ORAL at 21:12

## 2017-10-13 RX ADMIN — ESCITALOPRAM OXALATE 20 MG: 20 TABLET ORAL at 09:02

## 2017-10-13 RX ADMIN — METHOHEXITAL SODIUM 80 MG: 500 INJECTION, POWDER, LYOPHILIZED, FOR SOLUTION INTRAMUSCULAR; INTRAVENOUS; RECTAL at 07:54

## 2017-10-13 RX ADMIN — DEXTROAMPHETAMINE SACCHARATE, AMPHETAMINE ASPARTATE, DEXTROAMPHETAMINE SULFATE AND AMPHETAMINE SULFATE 20 MG: 2.5; 2.5; 2.5; 2.5 TABLET ORAL at 09:02

## 2017-10-13 RX ADMIN — LORAZEPAM 1 MG: 1 TABLET ORAL at 09:02

## 2017-10-13 RX ADMIN — QUETIAPINE 100 MG: 100 TABLET, FILM COATED ORAL at 21:11

## 2017-10-13 RX ADMIN — ACETAMINOPHEN 975 MG: 325 TABLET, FILM COATED ORAL at 09:02

## 2017-10-13 RX ADMIN — HYDROCODONE BITARTRATE AND ACETAMINOPHEN 2 TABLET: 5; 325 TABLET ORAL at 21:12

## 2017-10-13 RX ADMIN — LORAZEPAM 1 MG: 1 TABLET ORAL at 14:28

## 2017-10-13 RX ADMIN — KETOROLAC TROMETHAMINE 30 MG: 15 INJECTION, SOLUTION INTRAMUSCULAR; INTRAVENOUS at 07:48

## 2017-10-13 RX ADMIN — HYDROCODONE BITARTRATE AND ACETAMINOPHEN 2 TABLET: 5; 325 TABLET ORAL at 13:04

## 2017-10-13 ASSESSMENT — ACTIVITIES OF DAILY LIVING (ADL)
LAUNDRY: WITH SUPERVISION
DRESS: SCRUBS (BEHAVIORAL HEALTH)
DRESS: SCRUBS (BEHAVIORAL HEALTH)
ORAL_HYGIENE: INDEPENDENT
GROOMING: INDEPENDENT
ORAL_HYGIENE: INDEPENDENT
LAUNDRY: WITH SUPERVISION
GROOMING: INDEPENDENT

## 2017-10-13 NOTE — DISCHARGE SUMMARY
"[facility], [place]   Psychiatric Discharge Summary      Mindi Eastman MRN# [id number]   Age: 42 year old YOB: 1975     Date of Admission:  10/4/2017  Date of Discharge:  10/14/17  Admitting Physician:  Enrique Wright MD  Discharge Physician:  Enrique Wright MD         Summary/Hospital Course/Disposition:   Reason for Admission: Mindi is a 41 year old female with history of Bipolar Disorder who was admitted on a 72 hour hold on 10/5 for depressive moods, suicidal ideation with recent attempt. Her only prior hospitalization being 10 years ago at [facility], she's started to have a reemergence of symptoms within the last 3 months. She's had worsening depressive moods, loss of prior interests, low energy, fatigue, apathy, fractured sleep, and suicidal ideation including attempts. Her attempt includes placing knifes between her arms and chest (blades facing upwards), and on her chest while she sleeps, which the idea that she will inadvertently stab/impale herself during sleep.       (10/5):Mindi appears to be suffering a episode of depression, with components of suicidality, negative ruminations, anxious catatrphic thoughts and apathy/avolitional behavior. Based on her past behavior, there appears to be a component of amira, however at this time, she is very much in dyphoric, depressive symptoms. She is on a rather volitile combination of medications including Norco, Xanax, and Ambien, which all act as \"depressive\" agents, along with being problematic in their interaction. Her Xanax was reduced recently, and is agreeable to no being reliant on it in the future. At this time, I will optimize her Lexapro, and increase her Latuda. She had found benefit in Seroquel in her mood, however she was worried about weight gain, thus opimitizing Latuda will be the route taken, especially since its appropriate for Bipolar Depression. Patient uses CPAP machine at " "night. We will have her on individual observation while she uses CPAP at night tonight, and reassess tomorrow.      (10/6): Slight reduction in SI, although some passive thoughts remain, but no intent or plan. Will add on Adderall (which she took PTA), although at a smaller dose. Stimulant will function in this context as a adjuctive means to help her avolitional behavior. Discontinued individual observation for CPAP. Discussed CBT and provided CBT thought record as homework assingment over the weekend.      (10/9): Did not complete CBT homework, yet we still discussed aspects of it, and its utility in her treatment. Discussed the pros and cons of ECT, along with practical treatment expectations. Patient seemed to want ECT, and given her history of treatment resistant depression with recurrent suicidal ideation (and recent attempt), she would warrant a trial of ECT. ECT consult, medicine consult and EKG will be placed. Of note, she is able to provide informed consent to procedure.      (10/10): Some subjective improvement, but still depressed. Is making a good effort in interacting with peers/staff and goes to groups. MMSE was 30/30. Will start ECT tomorrow.      (10/12): Much subjective improvement in mood, and irritability threshold and anxiety. ECT seems to have benefited her well. We plan to discharge her on Saturday, and will continue ECT on an outpatient basis. Intake approved outpatient ECT, order set for \"outpatient ECT\" has been placed,and ECT provider has been notified. We discussed tapering down on her HS Ativan dose.      (10/13): Continued improvement. Some pain/headache post-ECT, but responsive to medications. Otherwise, no SI/HI/AH/VH. Felt medications and ECT were helpful for her symptoms. Is future oriented and safe to discharge tomorrow and follow up with ECT on an outpatient basis.     (10/14) (DAY OF DISCHARGE): Patient had continued improvement in mood, affect, energy, sleep, and outlook on " life. She was compliant with her medications and denied any problems. She was open to continuing ECT on an outpatient basis, following up with her outpatient provider, along with eventually starting partial hospitalization. Upon discharged she denied any SI/HI/AH/VH.            DIagnoses:   Bipolar Disorder, current episode depressive   Generalized Anxiety Disorder          Labs:   No results found for this or any previous visit (from the past 24 hour(s)).         Consults:   1.) Medicine Consult prior to ECT (10/10 as per Feli WILSON):   Pre-ECT Medical Evaluation - Patient does not have absolute medical contraindications to proceeding with ECT at this time. Treatment plan per psychiatry. Oral hypoglycemics and diuretics should be held the morning of ECT.     Recent Right 5th Metacarpal Shaft Fracture - Sustained 9/30 after punching a picnic table. Evaluated by [facility] on 10/3 with recommendations for treatment with ulnar gutter cast. Cast is noted to be loose on exam today with concern for dislodgement during ECT treatments.  - Discussed with Orthopedics team (Madie Mejia) who will order XR and see patient for possible re-casting this afternoon (consult placed).  - Continue Tylenol PRN, Ibuprofen PRN, and Norco (5-325) 1-2 tabs q 4h PRN for pain. Limit of 4 grams Tylenol from all sources per day.  - Follow up with primary Orthopedic surgeon as planned on 10/30     [place] - Continue CPAP per home settings.     Seasonal Allergies - Continue Claritin 10 mg QD.    2.) Orthopedic Consult (10/10 as per Madie Mejia PA-C):   Impression:  Mindi Eastman is a 41 year old female on inpatient mental health unit who presents with:  1. Right 5th metacarpal fracture, non-displaced      Recomendations:  Keep current cast intact.  Call Ortho if any changes needed after ECT.  Otherwise patient will f/u with outpatient Ortho on 10/30/17 for regularly scheduled appt.  All questions answered        3.) ECT  Consult (10/10 as per Dr. Solares): ASSESSMENT AND PLAN:  The patient is a 41-year-old woman with severely depressed mood that has been largely treatment refractory.  She has been tried on numerous neuroleptic medications, as well as antidepressants without response.  Notably, she has not been tried on Lamictal or lithium, which can sometimes be effective in treatment of bipolar depression.  Currently however, patient is very depressed, has ongoing suicidal thoughts.  We discussed the risks and benefits of ECT and also discussed alternative options, such as trials of lithium or Lamictal or re-engagement in therapy, and she felt quite hopeless with any further medication trials.  She also felt quite scared that she would end up signing herself out and actually hurting herself if she does not get the suicidal ideation and depression under control in a more expedient manner.  As such, after discussing about the risks and benefits associated with treatment, she did agree and request to start treatment with electroconvulsive therapy as soon as possible.            Discharge Medications:        Review of your medicines      START taking       Dose / Directions    acetaminophen 325 MG tablet   Commonly known as:  TYLENOL        Dose:  975 mg   Take 3 tablets (975 mg) by mouth every 6 hours as needed for mild pain or other (Give before coming down for ECT)   Quantity:  100 tablet   Refills:  1       hydrOXYzine 25 MG tablet   Commonly known as:  ATARAX        Dose:  50 mg   Take 2 tablets (50 mg) by mouth 3 times daily as needed for anxiety   Quantity:  90 tablet   Refills:  1       ibuprofen 600 MG tablet   Commonly known as:  ADVIL/MOTRIN        Dose:  600 mg   Take 1 tablet (600 mg) by mouth every 6 hours as needed for moderate pain   Quantity:  120 tablet   Refills:  1       nicotine 21 MG/24HR 24 hr patch   Commonly known as:  NICODERM CQ        Dose:  1 patch   Place 1 patch onto the skin daily   Quantity:  30 patch    Refills:  1         CONTINUE these medicines which may have CHANGED, or have new prescriptions. If we are uncertain of the size of tablets/capsules you have at home, strength may be listed as something that might have changed.       Dose / Directions    escitalopram 20 MG tablet   Commonly known as:  LEXAPRO   This may have changed:    - medication strength  - how much to take  - when to take this        Dose:  20 mg   Take 1 tablet (20 mg) by mouth daily   Quantity:  30 tablet   Refills:  1       LORazepam 1 MG tablet   Commonly known as:  ATIVAN   This may have changed:    - medication strength  - when to take this        Dose:  1 mg   Take 1 tablet (1 mg) by mouth 2 times daily   Quantity:  60 tablet   Refills:  0       lurasidone 40 MG Tabs tablet   Commonly known as:  LATUDA   This may have changed:    - medication strength  - how much to take  - when to take this   Used for:  Bipolar I disorder (H)        Dose:  40 mg   Take 1 tablet (40 mg) by mouth At Bedtime   Quantity:  30 tablet   Refills:  1       QUEtiapine 100 MG tablet   Commonly known as:  SEROquel   This may have changed:  medication strength   Used for:  Bipolar I disorder (H)        Dose:  100 mg   Take 1 tablet (100 mg) by mouth At Bedtime   Quantity:  30 tablet   Refills:  1       zolpidem 10 MG tablet   Commonly known as:  AMBIEN   This may have changed:  medication strength        Dose:  10 mg   Take 1 tablet (10 mg) by mouth At Bedtime   Quantity:  30 tablet   Refills:  0         CONTINUE these medicines which have NOT CHANGED       Dose / Directions    ADDERALL XR PO        Dose:  60 mg   Take 60 mg by mouth daily   Refills:  0       HYDROcodone-acetaminophen 5-325 MG per tablet   Commonly known as:  NORCO        Dose:  1-2 tablet   Take 1-2 tablets by mouth every 4 hours as needed for pain.   Quantity:  30 tablet   Refills:  0       loratadine 10 MG tablet   Commonly known as:  CLARITIN        Dose:  10 mg   Take 10 mg by mouth daily  "  Refills:  0            Where to get your medicines      These medications were sent to [facility]/pharmacy #[id number] - [place], [place] - [address]  [address] [facility] [address]     Phone:  [phone]      acetaminophen 325 MG tablet     escitalopram 20 MG tablet     hydrOXYzine 25 MG tablet     ibuprofen 600 MG tablet     lurasidone 40 MG Tabs tablet     nicotine 21 MG/24HR 24 hr patch     QUEtiapine 100 MG tablet         Some of these will need a paper prescription and others can be bought over the counter. Ask your nurse if you have questions.     Bring a paper prescription for each of these medications      zolpidem 10 MG tablet                  Mental Status Examination:   Appearance:  female, short hair, glasses, moderate grooming/hygiene.  Attitude:  Cooperative   Eye Contact:  Adequate   Mood:  \"Feeling better\"   Affect:  Mood congruent, appropriate   Speech:  Soft in tone, regular in rhythm   Language: fluent and intact in English\"  Psychomotor, Gait, Musculoskeletal:  Unremarkable  Throught Process:  Linear, coherent  Associations:  No loosening   Thought Content:  No SI/HI/AH/VH.   Insight:  Fair   Judgement:  Fair   Oriented to:  Person, place, time.   Attention Span and Concentration:  Adequate   Recent and Remote Memory:  Intact   Fund of Knowledge:  Appropriate         Discharge Plan:     Behavior Outpatient Eval   Outpatient provider to assess and treat.     Electroconvulsive therapy: Begin Outpatient   Series of up to 12 treatments. Begin Date: 10/16/2017  Treating Psychiatrist providing ECT: Dr. Alvarez Solares   Notified on:  10/12/2017     - patient discharged on medications mentioned above  -medications sent to [facility] in [place]  - Patient will continue ECT on an outpatient basis.   - Patient will  Eventually start Partial hospitalization         Enrique Wright MD  [facility]    Spent  20  minutes on encounter, >50% of which was spent in counseling and/or " coordination of care, consisting of discussing progess, medications, discharge plans.

## 2017-10-13 NOTE — PROGRESS NOTES
"Pt participated in dance/movement therapy (DMT) session with a focus on the body-based meditation practice of Minh See.  Pt stated she has past experience with body meditation and demonstrated the skills of a self-directed body scan.  Pt said she realizes this is a useful practice but has a busy schedule and feels her only \"down time\" is in her car while she's driving.      We practiced a 25-minute meditation this session, however, we also integrated movements/postures that can act as \"useful triggers\" to help redirect thoughts or moods more quickly when time is limited.  Pt expressed a desire to explore these techniques further after discharge, but is concerned she might forget what it's called after having an ECT treatment.  This therapist assure her I would share this information with her treatment team and include a note in her chart.      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    "

## 2017-10-13 NOTE — ANESTHESIA POSTPROCEDURE EVALUATION
Patient: Mindi Eastman    * No procedures listed *    Diagnosis:Severe recurrent major depression without psychotic features (H) [F33.2]  Diagnosis Additional Information: No value filed.    Anesthesia Type:  General    Note:  Anesthesia Post Evaluation    Patient location during evaluation: ECT PACU  Patient participation: Able to fully participate in evaluation  Level of consciousness: awake and alert  Pain management: adequate  Airway patency: patent  Cardiovascular status: acceptable  Respiratory status: acceptable  Hydration status: acceptable  PONV: none     Anesthetic complications: None          Last vitals:  Vitals:    10/11/17 1146 10/12/17 0700 10/13/17 0724   BP: 124/56     Pulse:      Resp: 16 16 16   Temp:  37  C (98.6  F) 37  C (98.6  F)   SpO2: 96%  98%         Electronically Signed By: Leonel Vergara MD  October 13, 2017  8:21 AM

## 2017-10-13 NOTE — PROGRESS NOTES
"[facility], [place]   Psychiatric Progress Note        Interim History:       Subjective: \"Feeling better\"     As per today's interview: Feels better, improved mood, energy and sleep. Denies any SI/HI/AH/VH. Felt ECT went well, and (besides some initial muscle pain/headache), he denied any continued complications. Feels safe to discharge back home this weekend and continue ECT next week outpatient.          Medications:       LORazepam  1 mg Oral BID     amphetamine-dextroamphetamine  20 mg Oral QAM     escitalopram  20 mg Oral Daily     lurasidone  40 mg Oral At Bedtime     loratadine  10 mg Oral Daily     QUEtiapine (SEROquel) tablet 100 mg  100 mg Oral At Bedtime     zolpidem (AMBIEN) tablet 10 mg  10 mg Oral At Bedtime     nicotine   Transdermal Q8H     nicotine   Transdermal Daily     nicotine  1 patch Transdermal Daily          Allergies:     Allergies   Allergen Reactions     Darvocet [Propoxyphene N-Apap]           Labs:     No results found for this or any previous visit (from the past 48 hour(s)).       Psychiatric Examination:   /57  Pulse 70  Temp 98.2  F (36.8  C) (Tympanic)  Resp 16  Ht 1.676 m (5' 6\")  Wt 86.7 kg (191 lb 1.6 oz)  LMP 09/29/2017  SpO2 98%  BMI 30.84 kg/m2  Weight is 191 lbs 1.6 oz  Body mass index is 30.84 kg/(m^2).    Appearance:  female, short hair, glasses, moderate grooming/hygiene.  Attitude:  Cooperative   Eye Contact:  Adequate   Mood:  \"Feeling better\"   Affect:  Mood congruent, appropriate   Speech:  Soft in tone, regular in rhythm   Language: fluent and intact in English\"  Psychomotor, Gait, Musculoskeletal:  Unremarkable  Throught Process:  Linear, coherent  Associations:  No loosening   Thought Content:  No SI/HI/AH/VH.   Insight:  Fair   Judgement:  Fair   Oriented to:  Person, place, time.   Attention Span and Concentration:  Adequate   Recent and Remote Memory:  Intact   Fund of Knowledge:  Appropriate      Assessment & " "Plan       Principal Diagnosis:   Bipolar Disorder, current episode depressive   Generalized Anxiety Disorder     Assessment:       (10/5):Mindi appears to be suffering a episode of depression, with components of suicidality, negative ruminations, anxious catatrphic thoughts and apathy/avolitional behavior. Based on her past behavior, there appears to be a component of amira, however at this time, she is very much in dyphoric, depressive symptoms. She is on a rather volitile combination of medications including Norco, Xanax, and Ambien, which all act as \"depressive\" agents, along with being problematic in their interaction. Her Xanax was reduced recently, and is agreeable to no being reliant on it in the future. At this time, I will optimize her Lexapro, and increase her Latuda. She had found benefit in Seroquel in her mood, however she was worried about weight gain, thus opimitizing Latuda will be the route taken, especially since its appropriate for Bipolar Depression. Patient uses CPAP machine at night. We will have her on individual observation while she uses CPAP at night tonight, and reassess tomorrow.     (10/6): Slight reduction in SI, although some passive thoughts remain, but no intent or plan. Will add on Adderall (which she took PTA), although at a smaller dose. Stimulant will function in this context as a adjuctive means to help her avolitional behavior. Discontinued individual observation for CPAP. Discussed CBT and provided CBT thought record as homework assingment over the weekend.     (10/9): Did not complete CBT homework, yet we still discussed aspects of it, and its utility in her treatment. Discussed the pros and cons of ECT, along with practical treatment expectations. Patient seemed to want ECT, and given her history of treatment resistant depression with recurrent suicidal ideation (and recent attempt), she would warrant a trial of ECT. ECT consult, medicine consult and EKG will be placed. " "Of note, she is able to provide informed consent to procedure.     (10/10): Some subjective improvement, but still depressed. Is making a good effort in interacting with peers/staff and goes to groups. MMSE was 30/30. Will start ECT tomorrow.     (10/12): Much subjective improvement in mood, and irritability threshold and anxiety. ECT seems to have benefited her well. We plan to discharge her on Saturday, and will continue ECT on an outpatient basis. Intake approved outpatient ECT, order set for \"outpatient ECT\" has been placed,and ECT provider has been notified. We discussed tapering down on her HS Ativan dose.     (10/13): Continued improvement. Some pain/headache post-ECT, but responsive to medications. Otherwise, no SI/HI/AH/VH. Felt mediations and ECT were helpful for her symptoms. Is future oriented and safe to discharge tomorrow and follow up with ECT on an outpatient basis.      Plan:  - Lexapro 20 mg   - Latuda 40 mg  - Seroquel 100 mg HS   - Adderall XR 20 mg   - ECT continued on an outpatient basis   - Ativan 1 mg BID (8 AM and 2 PM)              Legal:  Voluntary      Disposition:  Home, tomorrow       Enrique Wright MD  [facility]      Attestation:  Patient has been seen and evaluated by me,  Enrique Wright MD     Spent 20   minutes on encounter, >50% of which was spent in counseling and/or coordination of care, consisting of medication discussion and CBT. Discussed discharge plans, and continuing ECT on and outpatient basis.     "

## 2017-10-13 NOTE — PROGRESS NOTES
10/12/17 2141   Behavioral Health   Hallucinations denies / not responding to hallucinations   Thinking intact   Orientation date: oriented;time: oriented;place: oriented;person: oriented   Memory baseline memory   Insight admits / accepts   Judgement intact   Eye Contact at examiner   Affect blunted, flat   Mood mood is calm   ADL Assessment (WDL) WDL   Psycho Education   Type of Intervention 1:1 intervention   Response participates, initiates socially appropriate   Hours 0.5   Treatment Detail (check in)   Activities of Daily Living   Hygiene/Grooming independent   Oral Hygiene independent   Dress scrubs (behavioral health)   Laundry unable to complete   Room Organization independent   Behavioral Health Interventions   Depression maintain safety precautions;monitor need to revise level of observation;maintain safe secure environment;assist patient in developing safety plan;assist patient in following safety plan;encourage nutrition and hydration;provide emotional support;encourage participation / independence with adls;establish therapeutic relationship;assist with developing and utilizing healthy coping strategies;build upon strengths;assess patient response to medication;assess medication adherance;monitor need for prn medication;monitor confusion, memory loss, decision making ability and reorient / intervene as needed;assess & implement appropriate substance withdrawal protocol   Social and Therapeutic Interventions (Depression) encourage socialization with peers;encourage effective boundaries with peers;encourage participation in therapeutic groups and milieu activities   Patient denies si/sib/. Patient needed no redirection. Patients goal was to figure out her ect schedule and a discharge date. Patient stated she believes she accomplished her goal. Patient is attending groups. Patient stated she is agitated, but this is due to behavior of other patients. Patients adls are independent. Her wife came to  visit her tonight.

## 2017-10-13 NOTE — PLAN OF CARE
Problem: Depressive Symptoms  Goal: Depressive Symptoms  Signs and symptoms of listed problems will be absent or manageable.      RN 48 hour assessment:  Patient had ECT #2 today. She complained of a headache post-ect and received Tylenol as ordered. Patient stated she couldn't tell whether ECT had brought relief to her symptoms or not yet. She is glad to be discharging tomorrow. She denies thoughts of self-harm at this time. Patient will continue outpatient ECT.

## 2017-10-13 NOTE — PROCEDURES
Procedure/Surgery Information   [facility], [place]    Bedside Procedure Note  Date of Service (when I performed the procedure): 10/13/2017    Mindi Eastman is a 42 year old female patient.  1. Major depressive disorder, recurrent severe without psychotic features (H)      Past Medical History:   Diagnosis Date     ADHD      Anxiety      Bipolar disorder (H)      Depressive disorder      OCD (obsessive compulsive disorder)      ROXANNE (obstructive sleep apnea)     On CPAP     Seasonal allergies      Temp: 98.6  F (37  C)         Resp: 16 SpO2: 98 %        Procedures     [facility], [place]   ECT Procedure Note     Mindi Eastman [id number]   42 year old 1975     Patient Status: Inpatient    Is this the first in a series of 12 treatments?  No    History and Physical: Reviewed in medical record    Consent: Informed consent was signed by: patient    Date Consent Signed: 10/10/2017      Allergies   Allergen Reactions     Darvocet [Propoxyphene N-Apap]        Weight:  191 lbs 1.6 oz    Patient Preparations: Glasses/Contacts removed         Indications for ECT:   Medications ineffective, Psychotherapies ineffective and treatment resistant depression.         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 She has had numerous medication trials with minimal benefit.     #1 10/11:  The patient is alert and oriented x3. She has been NPO since midnight. Patient remains depressed. She notes some relief with starting this treatment as she is hopeful about it working.  #2 10/13:  The patient is alert and oriented x3. She has been NPO since midnight. Mood remains depressed. Other than headache and calf cramps she had no problems with the first treatment.         Diagnosis:      Major depressive disorder, recurrent severe without psychotic  features (F33.2)  Consider bipolar depression           Pause for the Cause:     Right patient Yes   Right procedure/laterality settings: Yes          Intra-Procedure Documentation:         ECT #: 2   Treatment number this series: 2   Total treatment number: 2     Type of ECT:  Right, unilateral ultrabrief    ECT Medications:    Toradol: 30mg  Brevital: 80mg  Succinyl Choline: 70mg    ECT Strip Summary:   Energy Level: 20 percent  Motor Seizure Duration: 24 seconds  EEG Seizure Duration: 114 seconds    Complications: No    Plan:   Continue with MWF ECT until mood reaches plateau. Patient may be discharging tomorrow. If so, will continue with outpatient treatment.  Patient to take Tylenol 975 mg (or 1000 mg) 1 hour before treatment.    Alvarez Solares MD

## 2017-10-13 NOTE — DISCHARGE INSTRUCTIONS
Behavioral Discharge Planning and Instructions      Summary:  You were admitted on 10/4/2017 due to Suicidal Ideations.  You were treated by Dr Enrique Wright MD and discharged on 10/14/2017 from [facility]      Principal Diagnosis:   Bipolar Disorder, current episode depressive   Generalized Anxiety Disorder     ECT  You received ECT during your hospitalization and therefore do NOT drive for at least 7 days after your last treatment.  Do not drive after ECT until you have been cleared by your doctor.     You will be receiving outpatient maintenance ECT. You will need someone to drive you to these appointments and back home. You will need someone with you for 6 hours following treatment. Do not drive after ECT until you have been cleared by your doctor.     ECT appointment Date/Time: Monday at 10/16 at 7:30 am  Address:   [address] [facility], [facility]  Phone: [phone]    Health Care Follow-up Appointments:   Therapy Appointment Date/Time: Tuesday 10/17 at 3 pm    Therapist: Feli Medrano    Psychiatry Appointment Date/Time: Monday 11/6 at 1 pm   Psychiatrist: Brooklynn Almanza, MSN, RN  [facility] [place], [place] [address]  Phone: [phone]  Fax: [phone]    You have been referred to the partial hospitalization program at [facility].   Intake appointment date.time: Tuesday 10/24 at 10:15 am (If you are still in ECT please call and reschedule this)  [facility] Partial Hospitalization Program  Located in [facility] Floor [address] ; [facility] [address] [place] [address]   Phone:[phone]    Attend all scheduled appointments with your outpatient providers. Call at least 24 hours in advance if you need to reschedule an appointment to ensure continued access to your outpatient providers.   During movement therapy you discovered that Yoga Nidra was  "helpful. You also used a 'resource movement or posture' to call upon the relaxed state more quickly or efficiently.   Major Treatments, Procedures and Findings:  You were provided with: a psychiatric assessment, assessed for medical stability, medication evaluation and/or management, group therapy, milieu management and ECT    Symptoms to Report: feeling more aggressive, increased confusion, losing more sleep, mood getting worse or thoughts of suicide    Early warning signs can include: increased depression or anxiety sleep disturbances increased thoughts or behaviors of suicide or self-harm  increased unusual thinking, such as paranoia or hearing voices    Safety and Wellness:  Take all medicines as directed.  Make no changes unless your doctor suggests them.      Follow treatment recommendations.  Refrain from alcohol and non-prescribed drugs.  If there is a concern for safety, call 911.    Resources:   [place] [facility] Crisis: 688.272.6890    National [place] on Mental Illness (www.[place].petra.org): 277.498.6816 or 924-359-9681.  Suicide Awareness Voices of Education (SAVE) (www.save.org): 607-591-VEGQ (1157)  National Suicide Prevention Line (www.mentalhealthmn.org): 574-096-VKIX (1611)  Mental Health Consumer/Survivor Network of [place] (www.mhcsn.net): 645.816.5204 or 584-457-3389  Mental Health Association of [place] (www.mentalhealth.org): 840.370.2843 or 518-287-4237  Self- Management and Recovery Training., SMART-- Toll free: 695.328.1690  www.[employer].thesweetlink  Text 4 Life: txt \"LIFE\" to 08855 for immediate support and crisis intervention  Crisis text line: Text \"START\" to 719-892. Free, confidential, 24/7.    The treatment team has appreciated the opportunity to work with you.     If you have any questions or concerns our unit number is [phone]  You may be receiving a follow-up phone call within the next three days from a representative from behavioral health.    You have identified the best phone number to " reach you as [phone]

## 2017-10-14 VITALS
WEIGHT: 191.1 LBS | RESPIRATION RATE: 16 BRPM | TEMPERATURE: 98.1 F | BODY MASS INDEX: 30.71 KG/M2 | HEART RATE: 70 BPM | OXYGEN SATURATION: 98 % | SYSTOLIC BLOOD PRESSURE: 118 MMHG | DIASTOLIC BLOOD PRESSURE: 57 MMHG | HEIGHT: 66 IN

## 2017-10-14 PROCEDURE — 99238 HOSP IP/OBS DSCHRG MGMT 30/<: CPT | Performed by: PSYCHIATRY & NEUROLOGY

## 2017-10-14 PROCEDURE — 25000132 ZZH RX MED GY IP 250 OP 250 PS 637: Performed by: PSYCHIATRY & NEUROLOGY

## 2017-10-14 RX ADMIN — LORATADINE 10 MG: 10 TABLET ORAL at 08:33

## 2017-10-14 RX ADMIN — DEXTROAMPHETAMINE SACCHARATE, AMPHETAMINE ASPARTATE, DEXTROAMPHETAMINE SULFATE AND AMPHETAMINE SULFATE 20 MG: 2.5; 2.5; 2.5; 2.5 TABLET ORAL at 08:33

## 2017-10-14 RX ADMIN — LORAZEPAM 1 MG: 1 TABLET ORAL at 08:33

## 2017-10-14 RX ADMIN — HYDROCODONE BITARTRATE AND ACETAMINOPHEN 2 TABLET: 5; 325 TABLET ORAL at 08:37

## 2017-10-14 RX ADMIN — ESCITALOPRAM OXALATE 20 MG: 20 TABLET ORAL at 08:33

## 2017-10-14 RX ADMIN — NICOTINE 1 PATCH: 21 PATCH, EXTENDED RELEASE TRANSDERMAL at 08:33

## 2017-10-14 ASSESSMENT — ACTIVITIES OF DAILY LIVING (ADL)
LAUNDRY: WITH SUPERVISION
GROOMING: INDEPENDENT
DRESS: INDEPENDENT
ORAL_HYGIENE: INDEPENDENT

## 2017-10-14 NOTE — PLAN OF CARE
Problem: Depressive Symptoms  Goal: Depressive Symptoms  Signs and symptoms of listed problems will be absent or manageable.   Outcome: Improving  Patient pleasant and cooperative, attended group meeting.  Bright affect, denies SI/SIB, hallucination this shift.  Socialized and watched T.V with peers.  Looking forward to discharge tomorrow.  Will continue to monitor closely.

## 2017-10-14 NOTE — PLAN OF CARE
Problem: Depressive Symptoms  Goal: Depressive Symptoms  Signs and symptoms of listed problems will be absent or manageable.   Outcome: Adequate for Discharge Date Met:  10/14/17  All discharge instructions and medications reviewed with patient and patient received a copy. Outpatient ECT instructions reviewed, patient expresses understanding. Patient reports no further questions regarding discharge plans. Patient will follow up with outpatient psychiatrist and with primary doctor for right arm cast removal. Patient denies suicidal ideation, self injurious thoughts, homicidal thoughts at this time and was provided with crisis resources. All belongings including valuables returned to patient. Patient discharged at this time with a ride home.

## 2017-10-16 ENCOUNTER — ANESTHESIA EVENT (OUTPATIENT)
Dept: BEHAVIORAL HEALTH | Facility: CLINIC | Age: 42
End: 2017-10-16

## 2017-10-16 ENCOUNTER — ANESTHESIA (OUTPATIENT)
Dept: BEHAVIORAL HEALTH | Facility: CLINIC | Age: 42
End: 2017-10-16

## 2017-10-16 ENCOUNTER — HOSPITAL ENCOUNTER (OUTPATIENT)
Dept: BEHAVIORAL HEALTH | Facility: CLINIC | Age: 42
End: 2017-10-16
Attending: PSYCHIATRY & NEUROLOGY
Payer: COMMERCIAL

## 2017-10-16 VITALS
WEIGHT: 190 LBS | RESPIRATION RATE: 16 BRPM | HEART RATE: 61 BPM | BODY MASS INDEX: 30.53 KG/M2 | TEMPERATURE: 98.3 F | OXYGEN SATURATION: 96 % | SYSTOLIC BLOOD PRESSURE: 128 MMHG | HEIGHT: 66 IN | DIASTOLIC BLOOD PRESSURE: 84 MMHG

## 2017-10-16 DIAGNOSIS — F33.2 MAJOR DEPRESSIVE DISORDER, RECURRENT SEVERE WITHOUT PSYCHOTIC FEATURES (H): ICD-10-CM

## 2017-10-16 PROCEDURE — 25000128 H RX IP 250 OP 636: Performed by: PSYCHIATRY & NEUROLOGY

## 2017-10-16 PROCEDURE — 90870 ELECTROCONVULSIVE THERAPY: CPT | Performed by: PSYCHIATRY & NEUROLOGY

## 2017-10-16 PROCEDURE — 25000125 ZZHC RX 250: Performed by: ANESTHESIOLOGY

## 2017-10-16 PROCEDURE — 25000128 H RX IP 250 OP 636: Performed by: ANESTHESIOLOGY

## 2017-10-16 PROCEDURE — 40000671 ZZH STATISTIC ANESTHESIA CASE

## 2017-10-16 PROCEDURE — 90870 ELECTROCONVULSIVE THERAPY: CPT

## 2017-10-16 RX ORDER — KETOROLAC TROMETHAMINE 30 MG/ML
30 INJECTION, SOLUTION INTRAMUSCULAR; INTRAVENOUS ONCE
Status: COMPLETED | OUTPATIENT
Start: 2017-10-16 | End: 2017-10-16

## 2017-10-16 RX ORDER — KETOROLAC TROMETHAMINE 30 MG/ML
30 INJECTION, SOLUTION INTRAMUSCULAR; INTRAVENOUS ONCE
Status: CANCELLED
Start: 2017-10-16 | End: 2017-10-16

## 2017-10-16 RX ADMIN — METHOHEXITAL SODIUM 80 MG: 500 INJECTION, POWDER, LYOPHILIZED, FOR SOLUTION INTRAMUSCULAR; INTRAVENOUS; RECTAL at 08:05

## 2017-10-16 RX ADMIN — Medication 70 MG: at 08:05

## 2017-10-16 RX ADMIN — KETOROLAC TROMETHAMINE 30 MG: 30 INJECTION, SOLUTION INTRAMUSCULAR at 08:01

## 2017-10-16 NOTE — DISCHARGE INSTRUCTIONS
ECT Discharge Instructions      During your ECT series:      Do not drive or work heavy equipment until 7 days after your last treatment.    Do not drink alcohol or use street drugs (illicit drugs) while you are having treatments.    Do not make important decisions, including legal decisions.    After each treatment:      Get plenty of rest. A responsible adult must stay with your for at least 6 hours.    Do not drive for 24 hours after your treatment. If you have more than one treatment within a week, no driving until 7 days after your last ECT treatment.    Avoid heavy or risky activities for 24 hours.    If you feel light-headed, sit for a few minutes before standing. Have someone help you get up.    If you have nausea (feel sick to your stomach): Drink only clear liquids such as apple juice, ginger ale, broth or 7UP, Be sure to drink plenty of liquids. Move to a normal diet as you feel able.     If you received Toradol, wait 6 hours before taking ibuprofen.    Call your doctor if:     You have a fever over 100F (37.7 C) (taken under the tongue), or a fever that last more than 24 hours.    Your IV site is red, swollen, very painful or is getting more tender.    You have nausea that gets very bad or does not improve.      If you have any symptoms after ECT, tell our staff before your next treatment.    The ECT Department can be reached at [phone].  The ECT Department is open Mondays, Wednesdays and Fridays from 7:00 AM to 2:00 PM.     To speak to a doctor, call: your primary care provider.    New prescriptions: Patient to take Tylenol 975 mg (or 1000 mg) 1 hour before treatment.    Other: You have received Toradol today at 8:01 AM . Toradol is an NSAID.  Do not take any NSAID's including: Ibuprofen, Naproxen Sodium, Asprin , Advil, Motrin, Aleve, Josué, etc., until six hours after the above time which would be 2:01 PM. If you have any questions check back with your Physician, or pharmacist.     Transported  by: Belle      _________________________________________________  ________________________  Patient/Responsible party Signature      Date          ________________________________________________   ________________________  Nurse Signature        Date

## 2017-10-16 NOTE — PROCEDURES
Procedure/Surgery Information   [facility], [place]    Bedside Procedure Note  Date of Service (when I performed the procedure): 10/16/2017    Mindi Eastman is a 42 year old female patient.  1. Major depressive disorder, recurrent severe without psychotic features (H)      Past Medical History:   Diagnosis Date     ADHD      Anxiety      Bipolar disorder (H)      Depressive disorder      OCD (obsessive compulsive disorder)      ROXANNE (obstructive sleep apnea)     On CPAP     Seasonal allergies      Temp: 98.3  F (36.8  C)   BP: 128/83 Pulse: 71   Resp: 16 SpO2: 99 %        Procedures     [facility], [place]   ECT Procedure Note     Mindi Eastman [id number]   42 year old 1975     Patient Status: Outpatient    Is this the first in a series of 12 treatments?  No    History and Physical: Reviewed in medical record    Consent: Informed consent was signed by: patient    Date Consent Signed: 10/10/2017      Allergies   Allergen Reactions     Darvocet [Propoxyphene N-Apap]        Weight:  190 lbs 0 oz    Patient Preparations: Glasses/Contacts removed         Indications for ECT:   Medications ineffective, Psychotherapies ineffective and treatment resistant depression.         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 She has had numerous medication trials with minimal benefit.     #1 10/11:  The patient is alert and oriented x3. She has been NPO since midnight. Patient remains depressed. She notes some relief with starting this treatment as she is hopeful about it working.  #2 10/13:  The patient is alert and oriented x3. She has been NPO since midnight. Mood remains depressed. Other than headache and calf cramps she had no problems with the first treatment.  #3 10/16:  The patient is A&O x3 and NPO since midnight. She still  has some leg cramps after treatment. No noted change in mood yet. Headache was well controlled with Toradol.         Diagnosis:      Major depressive disorder, recurrent severe without psychotic features (F33.2)  Consider bipolar depression           Pause for the Cause:     Right patient Yes   Right procedure/laterality settings: Yes          Intra-Procedure Documentation:         ECT #: 3   Treatment number this series: 3   Total treatment number: 3     Type of ECT:  Right, unilateral ultrabrief    ECT Medications:    Toradol: 30mg  Brevital: 80mg  Succinyl Choline: 70mg    ECT Strip Summary:   Energy Level: 15 percent  Motor Seizure Duration: 12 seconds  EEG Seizure Duration: 72 seconds    Complications: No    Plan:   Continue with MWF ECT until mood reaches plateau. Patient may be discharging tomorrow. If so, will continue with outpatient treatment.  Patient to take Tylenol 975 mg (or 1000 mg) 1 hour before treatment.    Alvarez Solares MD

## 2017-10-16 NOTE — ANESTHESIA POSTPROCEDURE EVALUATION
Patient: Mindi Eastman    * No procedures listed *    Diagnosis:Severe recurrent major depression without psychotic features (H) [F33.2]  Diagnosis Additional Information: No value filed.    Anesthesia Type:  General    Note:  Anesthesia Post Evaluation    Patient location during evaluation: ECT PACU  Patient participation: Able to fully participate in evaluation  Level of consciousness: awake  Pain management: adequate  Airway patency: patent  Cardiovascular status: acceptable  Respiratory status: acceptable  Hydration status: acceptable  PONV: none     Anesthetic complications: None          Last vitals:  Vitals:    10/16/17 0818 10/16/17 0828 10/16/17 0838   BP: 128/77 125/84 128/84   Pulse: 75 61    Resp: 16 16 16   Temp:      SpO2: 98% 97% 96%         Electronically Signed By: Karoline Allen MD  October 16, 2017  8:44 AM

## 2017-10-16 NOTE — IP AVS SNAPSHOT
[facility]    [facility] [address] [address]    Phone:  [phone]                                       After Visit Summary   10/16/2017    Mindi Eastman    MRN: [id number]           After Visit Summary Signature Page     I have received my discharge instructions, and my questions have been answered. I have discussed any challenges I see with this plan with the nurse or doctor.    ..........................................................................................................................................  Patient/Patient Representative Signature      ..........................................................................................................................................  Patient Representative Print Name and Relationship to Patient    ..................................................               ................................................  Date                                            Time    ..........................................................................................................................................  Reviewed by Signature/Title    ...................................................              ..............................................  Date                                                            Time

## 2017-10-16 NOTE — IP AVS SNAPSHOT
MRN:[id number]                      After Visit Summary   10/16/2017    Mindi Eastman    MRN: [id number]           Visit Information        Provider Department      10/16/2017  7:30 AM Alvarez Solares MD [facility]           Review of your medicines      CONTINUE these medicines which have NOT CHANGED        Dose / Directions    acetaminophen 325 MG tablet   Commonly known as:  TYLENOL   Used for:  Acute non intractable tension-type headache        Dose:  975 mg   Take 3 tablets (975 mg) by mouth every 6 hours as needed for mild pain or other (Give before coming down for ECT)   Quantity:  100 tablet   Refills:  1       ADDERALL XR PO        Dose:  60 mg   Take 60 mg by mouth daily   Refills:  0       escitalopram 20 MG tablet   Commonly known as:  LEXAPRO   Used for:  Major depressive disorder, recurrent severe without psychotic features (H)        Dose:  20 mg   Take 1 tablet (20 mg) by mouth daily   Quantity:  30 tablet   Refills:  1       hydrOXYzine 25 MG tablet   Commonly known as:  ATARAX   Used for:  SHANICE (generalized anxiety disorder)        Dose:  50 mg   Take 2 tablets (50 mg) by mouth 3 times daily as needed for anxiety   Quantity:  90 tablet   Refills:  1       ibuprofen 600 MG tablet   Commonly known as:  ADVIL/MOTRIN   Used for:  Acute non intractable tension-type headache        Dose:  600 mg   Take 1 tablet (600 mg) by mouth every 6 hours as needed for moderate pain   Quantity:  120 tablet   Refills:  1       loratadine 10 MG tablet   Commonly known as:  CLARITIN        Dose:  10 mg   Take 10 mg by mouth daily   Refills:  0       LORazepam 1 MG tablet   Commonly known as:  ATIVAN   Used for:  SHANICE (generalized anxiety disorder)        Dose:  1 mg   Take 1 tablet (1 mg) by mouth 2 times daily   Quantity:  60 tablet   Refills:  0       lurasidone 40 MG Tabs tablet   Commonly known as:  LATUDA   Used for:  Bipolar I disorder (H), Major depressive  disorder, recurrent severe without psychotic features (H)        Dose:  40 mg   Take 1 tablet (40 mg) by mouth At Bedtime   Quantity:  30 tablet   Refills:  1       nicotine 21 MG/24HR 24 hr patch   Commonly known as:  NICODERM CQ   Used for:  Tobacco use        Dose:  1 patch   Place 1 patch onto the skin daily   Quantity:  30 patch   Refills:  1       QUEtiapine 100 MG tablet   Commonly known as:  SEROquel   Used for:  Bipolar I disorder (H), Major depressive disorder, recurrent severe without psychotic features (H)        Dose:  100 mg   Take 1 tablet (100 mg) by mouth At Bedtime   Quantity:  30 tablet   Refills:  1       zolpidem 10 MG tablet   Commonly known as:  AMBIEN   Used for:  Insomnia due to other mental disorder        Dose:  10 mg   Take 1 tablet (10 mg) by mouth At Bedtime   Quantity:  30 tablet   Refills:  0                Protect others around you: Learn how to safely use, store and throw away your medicines at www.disposemymeds.org.         Follow-ups after your visit        Your next 10 appointments already scheduled     Oct 18, 2017 10:15 AM CDT   Electroconvulsive Therapy with Alvarez Solares MD   [facility] ([facility])    [address] [place] [id number]   [phone]            Oct 24, 2017 10:15 AM CDT   Evaluation with Mami Li   [facility] ([facility])    [address] [address]   [phone]               Care Instructions        Further instructions from your care team       ECT Discharge Instructions      During your ECT series:      Do not drive or work heavy equipment until 7 days after your last treatment.    Do not drink alcohol or use street drugs (illicit drugs) while you are having treatments.    Do not make important decisions, including legal decisions.    After each treatment:      Get plenty of rest. A  responsible adult must stay with your for at least 6 hours.    Do not drive for 24 hours after your treatment. If you have more than one treatment within a week, no driving until 7 days after your last ECT treatment.    Avoid heavy or risky activities for 24 hours.    If you feel light-headed, sit for a few minutes before standing. Have someone help you get up.    If you have nausea (feel sick to your stomach): Drink only clear liquids such as apple juice, ginger ale, broth or 7UP, Be sure to drink plenty of liquids. Move to a normal diet as you feel able.     If you received Toradol, wait 6 hours before taking ibuprofen.    Call your doctor if:     You have a fever over 100F (37.7 C) (taken under the tongue), or a fever that last more than 24 hours.    Your IV site is red, swollen, very painful or is getting more tender.    You have nausea that gets very bad or does not improve.      If you have any symptoms after ECT, tell our staff before your next treatment.    The ECT Department can be reached at [phone].  The ECT Department is open Mondays, Wednesdays and Fridays from 7:00 AM to 2:00 PM.     To speak to a doctor, call: your primary care provider.    New prescriptions: Patient to take Tylenol 975 mg (or 1000 mg) 1 hour before treatment.    Other: You have received Toradol today at 8:01 AM . Toradol is an NSAID.  Do not take any NSAID's including: Ibuprofen, Naproxen Sodium, Asprin , Advil, Motrin, Aleve, Josué, etc., until six hours after the above time which would be 2:01 PM. If you have any questions check back with your Physician, or pharmacist.     Transported by: Belle      _________________________________________________  ________________________  Patient/Responsible party Signature      Date          ________________________________________________   ________________________  Nurse Signature        Date     Additional Information About Your Visit        MyChart Information     MyChart lets you  "send messages to your doctor, view your test results, renew your prescriptions, schedule appointments and more. To sign up, go to [email or web address] . Click on \"Log in\" on the left side of the screen, which will take you to the Welcome page. Then click on \"Sign up Now\" on the right side of the page.     You will be asked to enter the access code listed below, as well as some personal information. Please follow the directions to create your username and password.     Your access code is: [email or web address]  Expires: 2018  9:10 PM     Your access code will  in 90 days. If you need help or a new code, please call your [facility] clinic or [phone].        Care EveryWhere ID     This is your Care EveryWhere ID. This could be used by other organizations to access your [facility] medical records  [id number]        Your Vitals Were     Blood Pressure Pulse Temperature Respirations Height Weight    128/84 61 98.3  F (36.8  C) (Tympanic) 16 1.676 m (5' 6\") 86.2 kg (190 lb)    Last Period Pulse Oximetry BMI (Body Mass Index)             2017 96% 30.67 kg/m2          Primary Care Provider Office Phone # Fax #    Erica Coelho PA-C [phone]      Equal Access to Services     [facility]: Hadii aad ku hadasho Soomaali, waaxda luqadaha, qaybta kaalmada adeegyada, yamileth diehl . So [facility] [phone].    ATENCIÓN: Si habla español, tiene a martínez disposición servicios gratuitos de asistencia lingüística. Arun al [phone].    We comply with applicable federal civil rights laws and [place] laws. We do not discriminate on the basis of race, color, national origin, age, disability, sex, sexual orientation, or gender identity.            Thank you!     Thank you for choosing [facility] for your care. Our goal is always to provide you with excellent care. Hearing back from our patients is one way we can continue to improve our services. Please take a few minutes to " complete the written survey that you may receive in the mail after you visit with us. Thank you!             Medication List: This is a list of all your medications and when to take them. Check marks below indicate your daily home schedule. Keep this list as a reference.      Medications           Morning Afternoon Evening Bedtime As Needed    acetaminophen 325 MG tablet   Commonly known as:  TYLENOL   Take 3 tablets (975 mg) by mouth every 6 hours as needed for mild pain or other (Give before coming down for ECT)                                ADDERALL XR PO   Take 60 mg by mouth daily                                escitalopram 20 MG tablet   Commonly known as:  LEXAPRO   Take 1 tablet (20 mg) by mouth daily                                hydrOXYzine 25 MG tablet   Commonly known as:  ATARAX   Take 2 tablets (50 mg) by mouth 3 times daily as needed for anxiety                                ibuprofen 600 MG tablet   Commonly known as:  ADVIL/MOTRIN   Take 1 tablet (600 mg) by mouth every 6 hours as needed for moderate pain                                loratadine 10 MG tablet   Commonly known as:  CLARITIN   Take 10 mg by mouth daily                                LORazepam 1 MG tablet   Commonly known as:  ATIVAN   Take 1 tablet (1 mg) by mouth 2 times daily                                lurasidone 40 MG Tabs tablet   Commonly known as:  LATUDA   Take 1 tablet (40 mg) by mouth At Bedtime                                nicotine 21 MG/24HR 24 hr patch   Commonly known as:  NICODERM CQ   Place 1 patch onto the skin daily                                QUEtiapine 100 MG tablet   Commonly known as:  SEROquel   Take 1 tablet (100 mg) by mouth At Bedtime                                zolpidem 10 MG tablet   Commonly known as:  AMBIEN   Take 1 tablet (10 mg) by mouth At Bedtime

## 2017-10-18 ENCOUNTER — HOSPITAL ENCOUNTER (OUTPATIENT)
Dept: BEHAVIORAL HEALTH | Facility: CLINIC | Age: 42
End: 2017-10-18
Attending: PSYCHIATRY & NEUROLOGY
Payer: COMMERCIAL

## 2017-10-18 ENCOUNTER — ANESTHESIA (OUTPATIENT)
Dept: BEHAVIORAL HEALTH | Facility: CLINIC | Age: 42
End: 2017-10-18

## 2017-10-18 ENCOUNTER — ANESTHESIA EVENT (OUTPATIENT)
Dept: BEHAVIORAL HEALTH | Facility: CLINIC | Age: 42
End: 2017-10-18

## 2017-10-18 VITALS
SYSTOLIC BLOOD PRESSURE: 128 MMHG | OXYGEN SATURATION: 96 % | TEMPERATURE: 98.4 F | RESPIRATION RATE: 16 BRPM | HEART RATE: 58 BPM | DIASTOLIC BLOOD PRESSURE: 82 MMHG

## 2017-10-18 DIAGNOSIS — F33.2 MAJOR DEPRESSIVE DISORDER, RECURRENT SEVERE WITHOUT PSYCHOTIC FEATURES (H): ICD-10-CM

## 2017-10-18 PROCEDURE — 90870 ELECTROCONVULSIVE THERAPY: CPT | Performed by: PSYCHIATRY & NEUROLOGY

## 2017-10-18 PROCEDURE — 25000128 H RX IP 250 OP 636: Performed by: ANESTHESIOLOGY

## 2017-10-18 PROCEDURE — 40000671 ZZH STATISTIC ANESTHESIA CASE

## 2017-10-18 PROCEDURE — 25000125 ZZHC RX 250: Performed by: ANESTHESIOLOGY

## 2017-10-18 PROCEDURE — 25000128 H RX IP 250 OP 636: Performed by: PSYCHIATRY & NEUROLOGY

## 2017-10-18 PROCEDURE — 90870 ELECTROCONVULSIVE THERAPY: CPT

## 2017-10-18 RX ORDER — MORPHINE SULFATE 10 MG/ML
5 INJECTION, SOLUTION INTRAMUSCULAR; INTRAVENOUS DAILY PRN
Status: DISCONTINUED | OUTPATIENT
Start: 2017-10-18 | End: 2017-10-19 | Stop reason: HOSPADM

## 2017-10-18 RX ORDER — MORPHINE SULFATE 10 MG/ML
5 INJECTION, SOLUTION INTRAMUSCULAR; INTRAVENOUS DAILY PRN
Status: CANCELLED
Start: 2017-10-18

## 2017-10-18 RX ORDER — KETOROLAC TROMETHAMINE 30 MG/ML
30 INJECTION, SOLUTION INTRAMUSCULAR; INTRAVENOUS ONCE
Status: COMPLETED | OUTPATIENT
Start: 2017-10-18 | End: 2017-10-18

## 2017-10-18 RX ORDER — KETOROLAC TROMETHAMINE 30 MG/ML
30 INJECTION, SOLUTION INTRAMUSCULAR; INTRAVENOUS ONCE
Status: CANCELLED
Start: 2017-10-18 | End: 2017-10-18

## 2017-10-18 RX ADMIN — METHOHEXITAL SODIUM 80 MG: 500 INJECTION, POWDER, LYOPHILIZED, FOR SOLUTION INTRAMUSCULAR; INTRAVENOUS; RECTAL at 10:43

## 2017-10-18 RX ADMIN — MORPHINE SULFATE 5 MG: 10 INJECTION, SOLUTION INTRAMUSCULAR; INTRAVENOUS at 11:03

## 2017-10-18 RX ADMIN — Medication 70 MG: at 10:43

## 2017-10-18 RX ADMIN — KETOROLAC TROMETHAMINE 30 MG: 30 INJECTION, SOLUTION INTRAMUSCULAR at 10:37

## 2017-10-18 NOTE — IP AVS SNAPSHOT
MRN:[id number]                      After Visit Summary   10/18/2017    Mindi Eastman    MRN: [id number]           Visit Information        Provider Department      10/18/2017 10:15 AM Alvarez Solares MD [facility]           Review of your medicines      CONTINUE these medicines which have NOT CHANGED        Dose / Directions    acetaminophen 325 MG tablet   Commonly known as:  TYLENOL   Used for:  Acute non intractable tension-type headache        Dose:  975 mg   Take 3 tablets (975 mg) by mouth every 6 hours as needed for mild pain or other (Give before coming down for ECT)   Quantity:  100 tablet   Refills:  1       ADDERALL XR PO        Dose:  60 mg   Take 60 mg by mouth daily   Refills:  0       escitalopram 20 MG tablet   Commonly known as:  LEXAPRO   Used for:  Major depressive disorder, recurrent severe without psychotic features (H)        Dose:  20 mg   Take 1 tablet (20 mg) by mouth daily   Quantity:  30 tablet   Refills:  1       hydrOXYzine 25 MG tablet   Commonly known as:  ATARAX   Used for:  SHANICE (generalized anxiety disorder)        Dose:  50 mg   Take 2 tablets (50 mg) by mouth 3 times daily as needed for anxiety   Quantity:  90 tablet   Refills:  1       ibuprofen 600 MG tablet   Commonly known as:  ADVIL/MOTRIN   Used for:  Acute non intractable tension-type headache        Dose:  600 mg   Take 1 tablet (600 mg) by mouth every 6 hours as needed for moderate pain   Quantity:  120 tablet   Refills:  1       loratadine 10 MG tablet   Commonly known as:  CLARITIN        Dose:  10 mg   Take 10 mg by mouth daily   Refills:  0       LORazepam 1 MG tablet   Commonly known as:  ATIVAN   Used for:  SHANICE (generalized anxiety disorder)        Dose:  1 mg   Take 1 tablet (1 mg) by mouth 2 times daily   Quantity:  60 tablet   Refills:  0       lurasidone 40 MG Tabs tablet   Commonly known as:  LATUDA   Used for:  Bipolar I disorder (H), Major depressive  disorder, recurrent severe without psychotic features (H)        Dose:  40 mg   Take 1 tablet (40 mg) by mouth At Bedtime   Quantity:  30 tablet   Refills:  1       nicotine 21 MG/24HR 24 hr patch   Commonly known as:  NICODERM CQ   Used for:  Tobacco use        Dose:  1 patch   Place 1 patch onto the skin daily   Quantity:  30 patch   Refills:  1       QUEtiapine 100 MG tablet   Commonly known as:  SEROquel   Used for:  Bipolar I disorder (H), Major depressive disorder, recurrent severe without psychotic features (H)        Dose:  100 mg   Take 1 tablet (100 mg) by mouth At Bedtime   Quantity:  30 tablet   Refills:  1       zolpidem 10 MG tablet   Commonly known as:  AMBIEN   Used for:  Insomnia due to other mental disorder        Dose:  10 mg   Take 1 tablet (10 mg) by mouth At Bedtime   Quantity:  30 tablet   Refills:  0                Protect others around you: Learn how to safely use, store and throw away your medicines at www.disposemymeds.org.         Follow-ups after your visit        Your next 10 appointments already scheduled     Oct 20, 2017  7:30 AM CDT   Electroconvulsive Therapy with Alvarez Solares MD   [facility] ([facility])    [address] [facility] [address]   [phone]               Care Instructions        Further instructions from your care team       ECT Discharge Instructions      During your ECT series:      Do not drive or work heavy equipment until 7 days after your last treatment.    Do not drink alcohol or use street drugs (illicit drugs) while you are having treatments.    Do not make important decisions, including legal decisions.    After each treatment:      Get plenty of rest. A responsible adult must stay with your for at least 6 hours.    Avoid heavy or risky activities for 24 hours.     Do not drive for at least 24 hours after your treatment.     If you have more than one treatment within one week, do  "not drive for 7 days after your last treatment.     If you feel light-headed, sit for a few minutes before standing. Have someone help you get up.    If you have nausea (feel sick to your stomach): Drink only clear liquids such as apple juice, ginger ale, broth or 7UP, Be sure to drink plenty of liquids. Move to a normal diet as you feel able.     If you received Toradol, wait 6 hours before taking ibuprofen.    Call your doctor if:     You have a fever over 100F (37.7 C) (taken under the tongue), or a fever that last more than 24 hours.    Your IV site is red, swollen, very painful or is getting more tender.    You have nausea that gets very bad or does not improve.      If you have any symptoms after ECT, tell our staff before your next treatment.    The ECT Department can be reached at [phone].  The ECT Department is open Mondays, Wednesdays and Fridays from 7:00 AM to 2:00 PM.    To speak to a doctor, call:  Your primary care provider    Instructions:  Patient to take Tylenol 975 mg (or 1000 mg) 1 hour before treatment.     Other: You have received Toradol today at 10:37 am. Toradol is an NSAID.  Do not take any NSAID's including: Ibuprofen, Naproxen Sodium, Asprin , Advil, Motrin, Aleve, Josué, etc., until six hours after the above time which would be 4:37 pm. If you have any questions check back with your Physician, or pharmacist.     You got morphine 5 mg IV for pain at 1103 am.     Transported by:       _________________________________________________  ________________________  Patient/Responsible party Signature      Date          ________________________________________________   ________________________  Nurse Signature        Date       Additional Information About Your Visit        MyChart Information     MyChart lets you send messages to your doctor, view your test results, renew your prescriptions, schedule appointments and more. To sign up, go to [email or web address] . Click on \"Log in\" on " "the left side of the screen, which will take you to the Welcome page. Then click on \"Sign up Now\" on the right side of the page.     You will be asked to enter the access code listed below, as well as some personal information. Please follow the directions to create your username and password.     Your access code is: [email or web address]  Expires: 2018  9:10 PM     Your access code will  in 90 days. If you need help or a new code, please call your [facility] clinic or [phone].        Care EveryWhere ID     This is your Care EveryWhere ID. This could be used by other organizations to access your [facility] medical records  [id number]        Your Vitals Were     Blood Pressure Pulse Temperature Respirations Last Period Pulse Oximetry    139/86 58 98.4  F (36.9  C) 16 2017 95%       Primary Care Provider Office Phone # Fax #    Erica Coelho PA-C [phone]      Equal Access to Services     [facility]: Hadii aad ku hadasho Soomaali, waaxda luqadaha, qaybta kaalmada adeegyada, yamileth diehl . So [facility] [phone].    ATENCIÓN: Si habla español, tiene a martínez disposición servicios gratuitos de asistencia lingüística. [facility] [phone].    We comply with applicable federal civil rights laws and [place] laws. We do not discriminate on the basis of race, color, national origin, age, disability, sex, sexual orientation, or gender identity.            Thank you!     Thank you for choosing [facility] for your care. Our goal is always to provide you with excellent care. Hearing back from our patients is one way we can continue to improve our services. Please take a few minutes to complete the written survey that you may receive in the mail after you visit with us. Thank you!             Medication List: This is a list of all your medications and when to take them. Check marks below indicate your daily home schedule. Keep this list as a reference.      Medications     "       Morning Afternoon Evening Bedtime As Needed    acetaminophen 325 MG tablet   Commonly known as:  TYLENOL   Take 3 tablets (975 mg) by mouth every 6 hours as needed for mild pain or other (Give before coming down for ECT)                                ADDERALL XR PO   Take 60 mg by mouth daily                                escitalopram 20 MG tablet   Commonly known as:  LEXAPRO   Take 1 tablet (20 mg) by mouth daily                                hydrOXYzine 25 MG tablet   Commonly known as:  ATARAX   Take 2 tablets (50 mg) by mouth 3 times daily as needed for anxiety                                ibuprofen 600 MG tablet   Commonly known as:  ADVIL/MOTRIN   Take 1 tablet (600 mg) by mouth every 6 hours as needed for moderate pain                                loratadine 10 MG tablet   Commonly known as:  CLARITIN   Take 10 mg by mouth daily                                LORazepam 1 MG tablet   Commonly known as:  ATIVAN   Take 1 tablet (1 mg) by mouth 2 times daily                                lurasidone 40 MG Tabs tablet   Commonly known as:  LATUDA   Take 1 tablet (40 mg) by mouth At Bedtime                                nicotine 21 MG/24HR 24 hr patch   Commonly known as:  NICODERM CQ   Place 1 patch onto the skin daily                                QUEtiapine 100 MG tablet   Commonly known as:  SEROquel   Take 1 tablet (100 mg) by mouth At Bedtime                                zolpidem 10 MG tablet   Commonly known as:  AMBIEN   Take 1 tablet (10 mg) by mouth At Bedtime

## 2017-10-18 NOTE — DISCHARGE INSTRUCTIONS
ECT Discharge Instructions      During your ECT series:      Do not drive or work heavy equipment until 7 days after your last treatment.    Do not drink alcohol or use street drugs (illicit drugs) while you are having treatments.    Do not make important decisions, including legal decisions.    After each treatment:      Get plenty of rest. A responsible adult must stay with your for at least 6 hours.    Avoid heavy or risky activities for 24 hours.     Do not drive for at least 24 hours after your treatment.     If you have more than one treatment within one week, do not drive for 7 days after your last treatment.     If you feel light-headed, sit for a few minutes before standing. Have someone help you get up.    If you have nausea (feel sick to your stomach): Drink only clear liquids such as apple juice, ginger ale, broth or 7UP, Be sure to drink plenty of liquids. Move to a normal diet as you feel able.     If you received Toradol, wait 6 hours before taking ibuprofen.    Call your doctor if:     You have a fever over 100F (37.7 C) (taken under the tongue), or a fever that last more than 24 hours.    Your IV site is red, swollen, very painful or is getting more tender.    You have nausea that gets very bad or does not improve.      If you have any symptoms after ECT, tell our staff before your next treatment.    The ECT Department can be reached at [phone].  The ECT Department is open Mondays, Wednesdays and Fridays from 7:00 AM to 2:00 PM.    To speak to a doctor, call:  Your primary care provider    Instructions:  Patient to take Tylenol 975 mg (or 1000 mg) 1 hour before treatment.     Other: You have received Toradol today at 10:37 am. Toradol is an NSAID.  Do not take any NSAID's including: Ibuprofen, Naproxen Sodium, Asprin , Advil, Motrin, Aleve, Josué, etc., until six hours after the above time which would be 4:37 pm. If you have any questions check back with your Physician, or pharmacist.     You  got morphine 5 mg IV for pain at 1103 am.     Transported by: Belle, wife      _________________________________________________  ________________________  Patient/Responsible party Signature      Date          ________________________________________________   ________________________  Nurse Signature        Date

## 2017-10-18 NOTE — PROGRESS NOTES
Dr. Blakely ok'd pt to go home, pt does have a local itching but nothing systemic, no sob. Instructed to go to ER or call 911 if itching becomes worse or she becomes SOB.

## 2017-10-18 NOTE — IP AVS SNAPSHOT
[facility]    [facility] [address] [address]    Phone:  [phone]                                       After Visit Summary   10/18/2017    Mindi Eastman    MRN: [id number]           After Visit Summary Signature Page     I have received my discharge instructions, and my questions have been answered. I have discussed any challenges I see with this plan with the nurse or doctor.    ..........................................................................................................................................  Patient/Patient Representative Signature      ..........................................................................................................................................  Patient Representative Print Name and Relationship to Patient    ..................................................               ................................................  Date                                            Time    ..........................................................................................................................................  Reviewed by Signature/Title    ...................................................              ..............................................  Date                                                            Time

## 2017-10-18 NOTE — PROCEDURES
Procedure/Surgery Information   [facility], [place]    Bedside Procedure Note  Date of Service (when I performed the procedure): 10/18/2017    Mindi Eastman is a 42 year old female patient.  1. Major depressive disorder, recurrent severe without psychotic features (H)      Past Medical History:   Diagnosis Date     ADHD      Anxiety      Bipolar disorder (H)      Depressive disorder      OCD (obsessive compulsive disorder)      ROXANNE (obstructive sleep apnea)     On CPAP     Seasonal allergies      Temp: 98.7  F (37.1  C)   BP: 133/83 Pulse: 72   Resp: 16 SpO2: 98 %        Procedures     [facility], [place]   ECT Procedure Note     Mindi Eastman [id number]   42 year old 1975     Patient Status: Outpatient    Is this the first in a series of 12 treatments?  No    History and Physical: Reviewed in medical record    Consent: Informed consent was signed by: patient    Date Consent Signed: 10/10/2017      Allergies   Allergen Reactions     Darvocet [Propoxyphene N-Apap]        Weight:  0 lbs 0 oz    Patient Preparations: Glasses/Contacts removed         Indications for ECT:   Medications ineffective, Psychotherapies ineffective and treatment resistant depression.         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 She has had numerous medication trials with minimal benefit.     #1 10/11:  The patient is alert and oriented x3. She has been NPO since midnight. Patient remains depressed. She notes some relief with starting this treatment as she is hopeful about it working.  #2 10/13:  The patient is alert and oriented x3. She has been NPO since midnight. Mood remains depressed. Other than headache and calf cramps she had no problems with the first treatment.  #3 10/16:  The patient is A&O x3 and NPO since midnight. She still  has some leg cramps after treatment. No noted change in mood yet. Headache was well controlled with Toradol.  #4 10/18:  The patient is A&O x3 and NPO since midnight. Here for outpatient treatment. Patient notes headache for two days post treatment. She has stopped the oxy she was getting for her broken hand, so may be having rebound headaches.         Diagnosis:      Major depressive disorder, recurrent severe without psychotic features (F33.2)  Consider bipolar depression           Pause for the Cause:     Right patient Yes   Right procedure/laterality settings: Yes          Intra-Procedure Documentation:         ECT #: 4   Treatment number this series: 4   Total treatment number: 4     Type of ECT:  Right, unilateral ultrabrief    ECT Medications:    Toradol: 30mg  Brevital: 80mg  Succinyl Choline: 70mg  Morphine 5 mg post treatment for headache    ECT Strip Summary:   Energy Level: 15 percent  Motor Seizure Duration: 15 seconds  EEG Seizure Duration: 92 seconds    Complications: No    Plan:   Continue with MWF ECT until mood reaches plateau.  Patient to take Tylenol 975 mg (or 1000 mg) 1 hour before treatment.    Alvarez Solares MD

## 2017-10-18 NOTE — ANESTHESIA POSTPROCEDURE EVALUATION
Patient: Mindi Eastman    * No procedures listed *    Diagnosis:Severe recurrent major depression without psychotic features (H) [F33.2]  Diagnosis Additional Information: No value filed.    Anesthesia Type:  General    Note:  Anesthesia Post Evaluation    Patient location during evaluation: ECT PACU  Patient participation: Able to fully participate in evaluation  Level of consciousness: awake  Pain management: adequate  Airway patency: patent  Cardiovascular status: acceptable  Respiratory status: acceptable  Hydration status: acceptable  PONV: none     Anesthetic complications: None          Last vitals:  Vitals:    10/18/17 1007 10/18/17 1045 10/18/17 1050   BP: 133/83 133/80    Pulse: 72 75 76   Resp: 16 20 20   Temp: 37.1  C (98.7  F) 36.9  C (98.4  F)    SpO2: 98% 93% 94%         Electronically Signed By: Yonatan Evans MD  October 18, 2017  11:02 AM

## 2017-10-20 ENCOUNTER — HOSPITAL ENCOUNTER (OUTPATIENT)
Dept: BEHAVIORAL HEALTH | Facility: CLINIC | Age: 42
End: 2017-10-20
Attending: PSYCHIATRY & NEUROLOGY
Payer: COMMERCIAL

## 2017-10-20 ENCOUNTER — ANESTHESIA (OUTPATIENT)
Dept: BEHAVIORAL HEALTH | Facility: CLINIC | Age: 42
End: 2017-10-20

## 2017-10-20 ENCOUNTER — ANESTHESIA EVENT (OUTPATIENT)
Dept: BEHAVIORAL HEALTH | Facility: CLINIC | Age: 42
End: 2017-10-20

## 2017-10-20 VITALS
TEMPERATURE: 98.7 F | OXYGEN SATURATION: 96 % | RESPIRATION RATE: 16 BRPM | SYSTOLIC BLOOD PRESSURE: 122 MMHG | DIASTOLIC BLOOD PRESSURE: 73 MMHG | HEART RATE: 74 BPM

## 2017-10-20 DIAGNOSIS — F33.2 MAJOR DEPRESSIVE DISORDER, RECURRENT SEVERE WITHOUT PSYCHOTIC FEATURES (H): ICD-10-CM

## 2017-10-20 PROCEDURE — 25000128 H RX IP 250 OP 636: Performed by: ANESTHESIOLOGY

## 2017-10-20 PROCEDURE — 25000128 H RX IP 250 OP 636: Performed by: PSYCHIATRY & NEUROLOGY

## 2017-10-20 PROCEDURE — 25000125 ZZHC RX 250: Performed by: ANESTHESIOLOGY

## 2017-10-20 PROCEDURE — 40000671 ZZH STATISTIC ANESTHESIA CASE

## 2017-10-20 PROCEDURE — 90870 ELECTROCONVULSIVE THERAPY: CPT | Performed by: PSYCHIATRY & NEUROLOGY

## 2017-10-20 PROCEDURE — 90870 ELECTROCONVULSIVE THERAPY: CPT

## 2017-10-20 RX ORDER — ONDANSETRON 2 MG/ML
4 INJECTION INTRAMUSCULAR; INTRAVENOUS ONCE
Status: CANCELLED
Start: 2017-10-20 | End: 2017-10-20

## 2017-10-20 RX ORDER — ONDANSETRON 2 MG/ML
4 INJECTION INTRAMUSCULAR; INTRAVENOUS ONCE
Status: COMPLETED | OUTPATIENT
Start: 2017-10-20 | End: 2017-10-20

## 2017-10-20 RX ORDER — KETOROLAC TROMETHAMINE 30 MG/ML
30 INJECTION, SOLUTION INTRAMUSCULAR; INTRAVENOUS ONCE
Status: CANCELLED
Start: 2017-10-20 | End: 2017-10-20

## 2017-10-20 RX ORDER — KETOROLAC TROMETHAMINE 30 MG/ML
30 INJECTION, SOLUTION INTRAMUSCULAR; INTRAVENOUS ONCE
Status: COMPLETED | OUTPATIENT
Start: 2017-10-20 | End: 2017-10-20

## 2017-10-20 RX ORDER — MORPHINE SULFATE 10 MG/ML
5 INJECTION, SOLUTION INTRAMUSCULAR; INTRAVENOUS DAILY PRN
Status: DISCONTINUED | OUTPATIENT
Start: 2017-10-20 | End: 2017-10-21 | Stop reason: HOSPADM

## 2017-10-20 RX ORDER — MORPHINE SULFATE 10 MG/ML
5 INJECTION, SOLUTION INTRAMUSCULAR; INTRAVENOUS DAILY PRN
Status: CANCELLED
Start: 2017-10-20

## 2017-10-20 RX ORDER — MORPHINE SULFATE 10 MG/ML
5 INJECTION, SOLUTION INTRAMUSCULAR; INTRAVENOUS ONCE
Status: CANCELLED
Start: 2017-10-20 | End: 2017-10-20

## 2017-10-20 RX ADMIN — METHOHEXITAL SODIUM 80 MG: 500 INJECTION, POWDER, LYOPHILIZED, FOR SOLUTION INTRAMUSCULAR; INTRAVENOUS; RECTAL at 08:13

## 2017-10-20 RX ADMIN — Medication 70 MG: at 08:13

## 2017-10-20 RX ADMIN — KETOROLAC TROMETHAMINE 30 MG: 30 INJECTION, SOLUTION INTRAMUSCULAR at 08:04

## 2017-10-20 RX ADMIN — ONDANSETRON 4 MG: 2 INJECTION INTRAMUSCULAR; INTRAVENOUS at 08:06

## 2017-10-20 RX ADMIN — MORPHINE SULFATE 5 MG: 10 INJECTION, SOLUTION INTRAMUSCULAR; INTRAVENOUS at 08:29

## 2017-10-20 NOTE — DISCHARGE INSTRUCTIONS
ECT Discharge Instructions      During your ECT series:      Do not drive or work heavy equipment until 7 days after your last treatment.    Do not drink alcohol or use street drugs (illicit drugs) while you are having treatments.    Do not make important decisions, including legal decisions.    After each treatment:      Get plenty of rest. A responsible adult must stay with your for at least 6 hours.    Do not drive for 24 hours after ECT treatment. If you have more than one treatment within a week, no driving until 7 days after your last ECT treatment.    Avoid heavy or risky activities for 24 hours.    If you feel light-headed, sit for a few minutes before standing. Have someone help you get up.    If you have nausea (feel sick to your stomach): Drink only clear liquids such as apple juice, ginger ale, broth or 7UP, Be sure to drink plenty of liquids. Move to a normal diet as you feel able.     If you received Toradol, wait 6 hours before taking ibuprofen.    Call your doctor if:     You have a fever over 100F (37.7 C) (taken under the tongue), or a fever that last more than 24 hours.    Your IV site is red, swollen, very painful or is getting more tender.    You have nausea that gets very bad or does not improve.      If you have any symptoms after ECT, tell our staff before your next treatment.    The ECT Department can be reached at [phone].  The ECT Department is open Mondays, Wednesdays and Fridays from 7:00 AM to 2:00 PM.     To speak to a doctor, call: your primary care provider    Instructions: Patient to take Tylenol 975 mg (or 1000 mg) 1 hour before treatment.    Other: You have received Toradol today at 8:04 AM. Toradol is an NSAID.  Do not take any NSAID's including: Ibuprofen, Naproxen Sodium, Asprin , Advil, Motrin, Aleve, Josué, etc., until six hours after the above time which would be 2:04 PM. If you have any questions check back with your Physician, or pharmacist.     You were given  morphine 5 mg at 8:29 AM for pain and zofran 4 mg at 8:06 for nausea.    Transported by: patients wife, Belle      _________________________________________________  ________________________  Patient/Responsible party Signature      Date          ________________________________________________   ________________________  Nurse Signature        Date

## 2017-10-20 NOTE — ANESTHESIA POSTPROCEDURE EVALUATION
Patient: Mindi Eastman    * No procedures listed *    Diagnosis:Severe recurrent major depression without psychotic features (H) [F33.2]  Diagnosis Additional Information: No value filed.    Anesthesia Type:  General    Note:  Anesthesia Post Evaluation    Patient location during evaluation: ECT PACU  Patient participation: Able to fully participate in evaluation  Level of consciousness: awake  Pain management: adequate  Airway patency: patent  Cardiovascular status: acceptable  Respiratory status: acceptable  Hydration status: acceptable  PONV: none     Anesthetic complications: None          Last vitals:  Vitals:    10/20/17 0826 10/20/17 0836 10/20/17 0846   BP: 128/73 116/63 122/73   Pulse:      Resp: 16 16 16   Temp:      SpO2: 97% 96% 96%         Electronically Signed By: Karoline Allen MD  October 20, 2017  8:51 AM

## 2017-10-20 NOTE — IP AVS SNAPSHOT
MRN:[id number]                      After Visit Summary   10/20/2017    Mindi Eastman    MRN: [id number]           Visit Information        Provider Department      10/20/2017  7:30 AM Alvarez Solares MD [facility]           Review of your medicines      CONTINUE these medicines which have NOT CHANGED        Dose / Directions    acetaminophen 325 MG tablet   Commonly known as:  TYLENOL   Used for:  Acute non intractable tension-type headache        Dose:  975 mg   Take 3 tablets (975 mg) by mouth every 6 hours as needed for mild pain or other (Give before coming down for ECT)   Quantity:  100 tablet   Refills:  1       ADDERALL XR PO        Dose:  60 mg   Take 60 mg by mouth daily   Refills:  0       escitalopram 20 MG tablet   Commonly known as:  LEXAPRO   Used for:  Major depressive disorder, recurrent severe without psychotic features (H)        Dose:  20 mg   Take 1 tablet (20 mg) by mouth daily   Quantity:  30 tablet   Refills:  1       hydrOXYzine 25 MG tablet   Commonly known as:  ATARAX   Used for:  SHANICE (generalized anxiety disorder)        Dose:  50 mg   Take 2 tablets (50 mg) by mouth 3 times daily as needed for anxiety   Quantity:  90 tablet   Refills:  1       ibuprofen 600 MG tablet   Commonly known as:  ADVIL/MOTRIN   Used for:  Acute non intractable tension-type headache        Dose:  600 mg   Take 1 tablet (600 mg) by mouth every 6 hours as needed for moderate pain   Quantity:  120 tablet   Refills:  1       loratadine 10 MG tablet   Commonly known as:  CLARITIN        Dose:  10 mg   Take 10 mg by mouth daily   Refills:  0       LORazepam 1 MG tablet   Commonly known as:  ATIVAN   Used for:  SHANICE (generalized anxiety disorder)        Dose:  1 mg   Take 1 tablet (1 mg) by mouth 2 times daily   Quantity:  60 tablet   Refills:  0       lurasidone 40 MG Tabs tablet   Commonly known as:  LATUDA   Used for:  Bipolar I disorder (H), Major depressive  disorder, recurrent severe without psychotic features (H)        Dose:  40 mg   Take 1 tablet (40 mg) by mouth At Bedtime   Quantity:  30 tablet   Refills:  1       nicotine 21 MG/24HR 24 hr patch   Commonly known as:  NICODERM CQ   Used for:  Tobacco use        Dose:  1 patch   Place 1 patch onto the skin daily   Quantity:  30 patch   Refills:  1       QUEtiapine 100 MG tablet   Commonly known as:  SEROquel   Used for:  Bipolar I disorder (H), Major depressive disorder, recurrent severe without psychotic features (H)        Dose:  100 mg   Take 1 tablet (100 mg) by mouth At Bedtime   Quantity:  30 tablet   Refills:  1       zolpidem 10 MG tablet   Commonly known as:  AMBIEN   Used for:  Insomnia due to other mental disorder        Dose:  10 mg   Take 1 tablet (10 mg) by mouth At Bedtime   Quantity:  30 tablet   Refills:  0                Protect others around you: Learn how to safely use, store and throw away your medicines at www.disposemymeds.org.         Follow-ups after your visit        Your next 10 appointments already scheduled     Oct 23, 2017  7:30 AM CDT   Electroconvulsive Therapy with Alvarez Solares MD   [facility] ([facility])    [address]  [place] [address]   [phone]               Care Instructions        Further instructions from your care team       ECT Discharge Instructions      During your ECT series:      Do not drive or work heavy equipment until 7 days after your last treatment.    Do not drink alcohol or use street drugs (illicit drugs) while you are having treatments.    Do not make important decisions, including legal decisions.    After each treatment:      Get plenty of rest. A responsible adult must stay with your for at least 6 hours.    Do not drive for 24 hours after ECT treatment. If you have more than one treatment within a week, no driving until 7 days after your last ECT treatment.    Avoid heavy  or risky activities for 24 hours.    If you feel light-headed, sit for a few minutes before standing. Have someone help you get up.    If you have nausea (feel sick to your stomach): Drink only clear liquids such as apple juice, ginger ale, broth or 7UP, Be sure to drink plenty of liquids. Move to a normal diet as you feel able.     If you received Toradol, wait 6 hours before taking ibuprofen.    Call your doctor if:     You have a fever over 100F (37.7 C) (taken under the tongue), or a fever that last more than 24 hours.    Your IV site is red, swollen, very painful or is getting more tender.    You have nausea that gets very bad or does not improve.      If you have any symptoms after ECT, tell our staff before your next treatment.    The ECT Department can be reached at [phone].  The ECT Department is open Mondays, Wednesdays and Fridays from 7:00 AM to 2:00 PM.     To speak to a doctor, call: your primary care provider    Instructions: Patient to take Tylenol 975 mg (or 1000 mg) 1 hour before treatment.    Other: You have received Toradol today at 8:04 AM. Toradol is an NSAID.  Do not take any NSAID's including: Ibuprofen, Naproxen Sodium, Asprin , Advil, Motrin, Aleve, Josué, etc., until six hours after the above time which would be 2:04 PM. If you have any questions check back with your Physician, or pharmacist.     You were given morphine 5 mg at 8:29 AM for pain and zofran 4 mg at 8:06 for nausea.    Transported by: patients Belle ma      _________________________________________________  ________________________  Patient/Responsible party Signature      Date          ________________________________________________   ________________________  Nurse Signature        Date     Additional Information About Your Visit        [employer] Information     [employer] lets you send messages to your doctor, view your test results, renew your prescriptions, schedule appointments and more. To sign up, go to  [email or web address] . Click on \"Log in\" on the left side of the screen, which will take you to the Welcome page. Then click on \"Sign up Now\" on the right side of the page.     You will be asked to enter the access code listed below, as well as some personal information. Please follow the directions to create your username and password.     Your access code is: [email or web address]  Expires: 2018  9:10 PM     Your access code will  in 90 days. If you need help or a new code, please call your [facility] clinic or [phone].        Care EveryWhere ID     This is your Care EveryWhere ID. This could be used by other organizations to access your [facility] medical records  [id number]        Your Vitals Were     Blood Pressure Pulse Temperature Respirations Last Period Pulse Oximetry    122/73 74 98.7  F (37.1  C) (Tympanic) 16 2017 96%       Primary Care Provider Office Phone # Fax #    Erica Coelho PA-C [phone]      Equal Access to Services     [facility]: Hadii tonia nelson hadvikki Sosara, waaxda luafshinadaha, qaybta kaalanand covington, yamileth diehl . So [facility] [phone].    ATENCIÓN: Si habla español, tiene a martínez disposición servicios gratuitos de asistencia lingüística. [facility] [phone].    We comply with applicable federal civil rights laws and [place] laws. We do not discriminate on the basis of race, color, national origin, age, disability, sex, sexual orientation, or gender identity.            Thank you!     Thank you for choosing [facility] for your care. Our goal is always to provide you with excellent care. Hearing back from our patients is one way we can continue to improve our services. Please take a few minutes to complete the written survey that you may receive in the mail after you visit with us. Thank you!             Medication List: This is a list of all your medications and when to take them. Check marks below indicate your daily home " schedule. Keep this list as a reference.      Medications           Morning Afternoon Evening Bedtime As Needed    acetaminophen 325 MG tablet   Commonly known as:  TYLENOL   Take 3 tablets (975 mg) by mouth every 6 hours as needed for mild pain or other (Give before coming down for ECT)                                ADDERALL XR PO   Take 60 mg by mouth daily                                escitalopram 20 MG tablet   Commonly known as:  LEXAPRO   Take 1 tablet (20 mg) by mouth daily                                hydrOXYzine 25 MG tablet   Commonly known as:  ATARAX   Take 2 tablets (50 mg) by mouth 3 times daily as needed for anxiety                                ibuprofen 600 MG tablet   Commonly known as:  ADVIL/MOTRIN   Take 1 tablet (600 mg) by mouth every 6 hours as needed for moderate pain                                loratadine 10 MG tablet   Commonly known as:  CLARITIN   Take 10 mg by mouth daily                                LORazepam 1 MG tablet   Commonly known as:  ATIVAN   Take 1 tablet (1 mg) by mouth 2 times daily                                lurasidone 40 MG Tabs tablet   Commonly known as:  LATUDA   Take 1 tablet (40 mg) by mouth At Bedtime                                nicotine 21 MG/24HR 24 hr patch   Commonly known as:  NICODERM CQ   Place 1 patch onto the skin daily                                QUEtiapine 100 MG tablet   Commonly known as:  SEROquel   Take 1 tablet (100 mg) by mouth At Bedtime                                zolpidem 10 MG tablet   Commonly known as:  AMBIEN   Take 1 tablet (10 mg) by mouth At Bedtime

## 2017-10-20 NOTE — PROCEDURES
Procedure/Surgery Information   [facility], [place]    Bedside Procedure Note  Date of Service (when I performed the procedure): 10/20/2017    Mindi Eastman is a 42 year old female patient.  1. Major depressive disorder, recurrent severe without psychotic features (H)      Past Medical History:   Diagnosis Date     ADHD      Anxiety      Bipolar disorder (H)      Depressive disorder      OCD (obsessive compulsive disorder)      ROXANNE (obstructive sleep apnea)     On CPAP     Seasonal allergies      Temp: 98.3  F (36.8  C)   BP: 109/76 Pulse: 74   Resp: 16 SpO2: 98 %        Procedures     [facility], [place]   ECT Procedure Note     Mindi Eastman [id number]   42 year old 1975     Patient Status: Outpatient    Is this the first in a series of 12 treatments?  No    History and Physical: Reviewed in medical record    Consent: Informed consent was signed by: patient    Date Consent Signed: 10/10/2017      Allergies   Allergen Reactions     Darvocet [Propoxyphene N-Apap]        Weight:  0 lbs 0 oz    Patient Preparations: Glasses/Contacts removed         Indications for ECT:   Medications ineffective, Psychotherapies ineffective and treatment resistant depression.         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 She has had numerous medication trials with minimal benefit.     #1 10/11:  The patient is alert and oriented x3. She has been NPO since midnight. Patient remains depressed. She notes some relief with starting this treatment as she is hopeful about it working.  #2 10/13:  The patient is alert and oriented x3. She has been NPO since midnight. Mood remains depressed. Other than headache and calf cramps she had no problems with the first treatment.  #3 10/16:  The patient is A&O x3 and NPO since midnight. She still  has some leg cramps after treatment. No noted change in mood yet. Headache was well controlled with Toradol.  #4 10/18:  The patient is A&O x3 and NPO since midnight. Here for outpatient treatment. Patient notes headache for two days post treatment. She has stopped the oxy she was getting for her broken hand, so may be having rebound headaches.  #5 10/20:  The patient is A&O x3 and NPO since midnight. Patient reports improvements noted in her mood. Morphine worked well for her headache. She'd like something for nausea.         Diagnosis:      Major depressive disorder, recurrent severe without psychotic features (F33.2)  Consider bipolar depression           Pause for the Cause:     Right patient Yes   Right procedure/laterality settings: Yes          Intra-Procedure Documentation:         ECT #: 5   Treatment number this series: 5   Total treatment number: 5     Type of ECT:  Right, unilateral ultrabrief    ECT Medications:    Toradol: 30mg  Zofran: 4mg  Brevital: 80mg  Succinyl Choline: 70mg  Morphine 5 mg post treatment for headache    ECT Strip Summary:   Energy Level: 15 percent  Motor Seizure Duration: 22 seconds  EEG Seizure Duration: 84 seconds    Complications: No    Plan:   Continue with MWF ECT until mood reaches plateau.  Patient to take Tylenol 975 mg (or 1000 mg) 1 hour before treatment.    Alvarez Solares MD

## 2017-10-20 NOTE — IP AVS SNAPSHOT
[facility]    [facility] [address] [address]    Phone:  [phone]                                       After Visit Summary   10/20/2017    Mindi Eastman    MRN: [id number]           After Visit Summary Signature Page     I have received my discharge instructions, and my questions have been answered. I have discussed any challenges I see with this plan with the nurse or doctor.    ..........................................................................................................................................  Patient/Patient Representative Signature      ..........................................................................................................................................  Patient Representative Print Name and Relationship to Patient    ..................................................               ................................................  Date                                            Time    ..........................................................................................................................................  Reviewed by Signature/Title    ...................................................              ..............................................  Date                                                            Time

## 2017-10-23 ENCOUNTER — HOSPITAL ENCOUNTER (OUTPATIENT)
Dept: BEHAVIORAL HEALTH | Facility: CLINIC | Age: 42
End: 2017-10-23
Attending: PSYCHIATRY & NEUROLOGY
Payer: COMMERCIAL

## 2017-10-23 ENCOUNTER — ANESTHESIA EVENT (OUTPATIENT)
Dept: BEHAVIORAL HEALTH | Facility: CLINIC | Age: 42
End: 2017-10-23

## 2017-10-23 ENCOUNTER — ANESTHESIA (OUTPATIENT)
Dept: BEHAVIORAL HEALTH | Facility: CLINIC | Age: 42
End: 2017-10-23

## 2017-10-23 VITALS
TEMPERATURE: 98.1 F | DIASTOLIC BLOOD PRESSURE: 78 MMHG | SYSTOLIC BLOOD PRESSURE: 125 MMHG | HEART RATE: 59 BPM | OXYGEN SATURATION: 99 % | RESPIRATION RATE: 16 BRPM

## 2017-10-23 DIAGNOSIS — F33.2 MAJOR DEPRESSIVE DISORDER, RECURRENT SEVERE WITHOUT PSYCHOTIC FEATURES (H): ICD-10-CM

## 2017-10-23 PROCEDURE — 90870 ELECTROCONVULSIVE THERAPY: CPT

## 2017-10-23 PROCEDURE — 25000128 H RX IP 250 OP 636: Performed by: PSYCHIATRY & NEUROLOGY

## 2017-10-23 PROCEDURE — 90870 ELECTROCONVULSIVE THERAPY: CPT | Performed by: PSYCHIATRY & NEUROLOGY

## 2017-10-23 PROCEDURE — 40000671 ZZH STATISTIC ANESTHESIA CASE

## 2017-10-23 PROCEDURE — 25000125 ZZHC RX 250: Performed by: ANESTHESIOLOGY

## 2017-10-23 PROCEDURE — 25000128 H RX IP 250 OP 636: Performed by: ANESTHESIOLOGY

## 2017-10-23 RX ORDER — MORPHINE SULFATE 10 MG/ML
5 INJECTION, SOLUTION INTRAMUSCULAR; INTRAVENOUS ONCE
Status: COMPLETED | OUTPATIENT
Start: 2017-10-23 | End: 2017-10-23

## 2017-10-23 RX ORDER — MORPHINE SULFATE 10 MG/ML
5 INJECTION, SOLUTION INTRAMUSCULAR; INTRAVENOUS ONCE
Status: CANCELLED
Start: 2017-10-23 | End: 2017-10-23

## 2017-10-23 RX ORDER — ONDANSETRON 2 MG/ML
4 INJECTION INTRAMUSCULAR; INTRAVENOUS ONCE
Status: COMPLETED | OUTPATIENT
Start: 2017-10-23 | End: 2017-10-23

## 2017-10-23 RX ORDER — KETOROLAC TROMETHAMINE 30 MG/ML
30 INJECTION, SOLUTION INTRAMUSCULAR; INTRAVENOUS ONCE
Status: COMPLETED | OUTPATIENT
Start: 2017-10-23 | End: 2017-10-23

## 2017-10-23 RX ORDER — ONDANSETRON 2 MG/ML
4 INJECTION INTRAMUSCULAR; INTRAVENOUS ONCE
Status: CANCELLED
Start: 2017-10-23 | End: 2017-10-23

## 2017-10-23 RX ORDER — KETOROLAC TROMETHAMINE 30 MG/ML
30 INJECTION, SOLUTION INTRAMUSCULAR; INTRAVENOUS ONCE
Status: CANCELLED
Start: 2017-10-23 | End: 2017-10-23

## 2017-10-23 RX ADMIN — MORPHINE SULFATE 5 MG: 10 INJECTION, SOLUTION INTRAMUSCULAR; INTRAVENOUS at 08:25

## 2017-10-23 RX ADMIN — KETOROLAC TROMETHAMINE 30 MG: 30 INJECTION, SOLUTION INTRAMUSCULAR at 08:04

## 2017-10-23 RX ADMIN — METHOHEXITAL SODIUM 80 MG: 500 INJECTION, POWDER, LYOPHILIZED, FOR SOLUTION INTRAMUSCULAR; INTRAVENOUS; RECTAL at 08:10

## 2017-10-23 RX ADMIN — ONDANSETRON 4 MG: 2 INJECTION INTRAMUSCULAR; INTRAVENOUS at 08:03

## 2017-10-23 RX ADMIN — Medication 70 MG: at 08:10

## 2017-10-23 NOTE — IP AVS SNAPSHOT
[facility]    [facility] [address] [address]    Phone:  [phone]                                       After Visit Summary   10/23/2017    Mindi Eastman    MRN: [id number]           After Visit Summary Signature Page     I have received my discharge instructions, and my questions have been answered. I have discussed any challenges I see with this plan with the nurse or doctor.    ..........................................................................................................................................  Patient/Patient Representative Signature      ..........................................................................................................................................  Patient Representative Print Name and Relationship to Patient    ..................................................               ................................................  Date                                            Time    ..........................................................................................................................................  Reviewed by Signature/Title    ...................................................              ..............................................  Date                                                            Time

## 2017-10-23 NOTE — IP AVS SNAPSHOT
MRN:[id number]                      After Visit Summary   10/23/2017    Mindi Eastman    MRN: [id number]           Visit Information        Provider Department      10/23/2017  7:30 AM Alvarez Solares MD [facility]           Review of your medicines      CONTINUE these medicines which have NOT CHANGED        Dose / Directions    acetaminophen 325 MG tablet   Commonly known as:  TYLENOL   Used for:  Acute non intractable tension-type headache        Dose:  975 mg   Take 3 tablets (975 mg) by mouth every 6 hours as needed for mild pain or other (Give before coming down for ECT)   Quantity:  100 tablet   Refills:  1       ADDERALL XR PO        Dose:  60 mg   Take 60 mg by mouth daily   Refills:  0       escitalopram 20 MG tablet   Commonly known as:  LEXAPRO   Used for:  Major depressive disorder, recurrent severe without psychotic features (H)        Dose:  20 mg   Take 1 tablet (20 mg) by mouth daily   Quantity:  30 tablet   Refills:  1       hydrOXYzine 25 MG tablet   Commonly known as:  ATARAX   Used for:  SHANICE (generalized anxiety disorder)        Dose:  50 mg   Take 2 tablets (50 mg) by mouth 3 times daily as needed for anxiety   Quantity:  90 tablet   Refills:  1       ibuprofen 600 MG tablet   Commonly known as:  ADVIL/MOTRIN   Used for:  Acute non intractable tension-type headache        Dose:  600 mg   Take 1 tablet (600 mg) by mouth every 6 hours as needed for moderate pain   Quantity:  120 tablet   Refills:  1       loratadine 10 MG tablet   Commonly known as:  CLARITIN        Dose:  10 mg   Take 10 mg by mouth daily   Refills:  0       LORazepam 1 MG tablet   Commonly known as:  ATIVAN   Used for:  SHANICE (generalized anxiety disorder)        Dose:  1 mg   Take 1 tablet (1 mg) by mouth 2 times daily   Quantity:  60 tablet   Refills:  0       lurasidone 40 MG Tabs tablet   Commonly known as:  LATUDA   Used for:  Bipolar I disorder (H), Major depressive  disorder, recurrent severe without psychotic features (H)        Dose:  40 mg   Take 1 tablet (40 mg) by mouth At Bedtime   Quantity:  30 tablet   Refills:  1       nicotine 21 MG/24HR 24 hr patch   Commonly known as:  NICODERM CQ   Used for:  Tobacco use        Dose:  1 patch   Place 1 patch onto the skin daily   Quantity:  30 patch   Refills:  1       QUEtiapine 100 MG tablet   Commonly known as:  SEROquel   Used for:  Bipolar I disorder (H), Major depressive disorder, recurrent severe without psychotic features (H)        Dose:  100 mg   Take 1 tablet (100 mg) by mouth At Bedtime   Quantity:  30 tablet   Refills:  1       zolpidem 10 MG tablet   Commonly known as:  AMBIEN   Used for:  Insomnia due to other mental disorder        Dose:  10 mg   Take 1 tablet (10 mg) by mouth At Bedtime   Quantity:  30 tablet   Refills:  0                Protect others around you: Learn how to safely use, store and throw away your medicines at www.disposemymeds.org.         Follow-ups after your visit         Care Instructions        Further instructions from your care team       ECT Discharge Instructions      During your ECT series:      Do not drive for 24 hours after treatment. If you will have more than one treatment within a week, no driving until 7 days after your last ECT treatment.     Do not drink alcohol or use street drugs (illicit drugs) while you are having treatments.    Do not make important decisions, including legal decisions.    After each treatment:      Get plenty of rest. A responsible adult must stay with your for at least 6 hours.    Avoid heavy or risky activities for 24 hours.    If you feel light-headed, sit for a few minutes before standing. Have someone help you get up.    If you have nausea (feel sick to your stomach): Drink only clear liquids such as apple juice, ginger ale, broth or 7UP, Be sure to drink plenty of liquids. Move to a normal diet as you feel able.     If you received Toradol, wait 6  "hours before taking ibuprofen.    Call your doctor if:     You have a fever over 100F (37.7 C) (taken under the tongue), or a fever that last more than 24 hours.    Your IV site is red, swollen, very painful or is getting more tender.    You have nausea that gets very bad or does not improve.      If you have any symptoms after ECT, tell our staff before your next treatment.    The ECT Department can be reached at [phone].  The ECT Department is open ,  and  from 7:00 AM to 2:00 PM.      To speak to a doctor, call: Your Primary Care Provider     Other: You had Toradol at 08:04 AM Which is an NSAID medication. Do not take any Ibuprofen, Excedrin, Motrin, Aleve, Advil, Asprin, or any other NSAID medication until after 2:04 PM. Questions, check with your physician or pharmacist                       Additional Information About Your Visit        [employer] Information     [employer] lets you send messages to your doctor, view your test results, renew your prescriptions, schedule appointments and more. To sign up, go to [email or web address] . Click on \"Log in\" on the left side of the screen, which will take you to the Welcome page. Then click on \"Sign up Now\" on the right side of the page.     You will be asked to enter the access code listed below, as well as some personal information. Please follow the directions to create your username and password.     Your access code is: [email or web address]  Expires: 2018  9:10 PM     Your access code will  in 90 days. If you need help or a new code, please call your [facility] clinic or [phone].        Care EveryWhere ID     This is your Care EveryWhere ID. This could be used by other organizations to access your [facility] medical records  [id number]        Your Vitals Were     Blood Pressure Pulse Temperature Respirations Last Period Pulse Oximetry    125/78 59 98.1  F (36.7  C) (Temporal) 16 2017 99%       Primary Care Provider Office " Phone # Fax #    Erica Coelho PA-C [phone]      Equal Access to Services     A[place] EDMONDSON : Hadii aad ku hadserenenaa Irby, wiliselvin fergusonzacharyha, dimitri tinykanchan tiannettie, yamileth darlynin hayaatram overtonshiraz charlton laStewedgar peters. So [facility] [phone].    ATENCIÓN: Si habla español, tiene a martínez disposición servicios gratuitos de asistencia lingüística. Romaname al [phone].    We comply with applicable federal civil rights laws and [place] laws. We do not discriminate on the basis of race, color, national origin, age, disability, sex, sexual orientation, or gender identity.            Thank you!     Thank you for choosing [facility] for your care. Our goal is always to provide you with excellent care. Hearing back from our patients is one way we can continue to improve our services. Please take a few minutes to complete the written survey that you may receive in the mail after you visit with us. Thank you!             Medication List: This is a list of all your medications and when to take them. Check marks below indicate your daily home schedule. Keep this list as a reference.      Medications           Morning Afternoon Evening Bedtime As Needed    acetaminophen 325 MG tablet   Commonly known as:  TYLENOL   Take 3 tablets (975 mg) by mouth every 6 hours as needed for mild pain or other (Give before coming down for ECT)                                ADDERALL XR PO   Take 60 mg by mouth daily                                escitalopram 20 MG tablet   Commonly known as:  LEXAPRO   Take 1 tablet (20 mg) by mouth daily                                hydrOXYzine 25 MG tablet   Commonly known as:  ATARAX   Take 2 tablets (50 mg) by mouth 3 times daily as needed for anxiety                                ibuprofen 600 MG tablet   Commonly known as:  ADVIL/MOTRIN   Take 1 tablet (600 mg) by mouth every 6 hours as needed for moderate pain                                loratadine 10 MG tablet   Commonly known as:   CLARITIN   Take 10 mg by mouth daily                                LORazepam 1 MG tablet   Commonly known as:  ATIVAN   Take 1 tablet (1 mg) by mouth 2 times daily                                lurasidone 40 MG Tabs tablet   Commonly known as:  LATUDA   Take 1 tablet (40 mg) by mouth At Bedtime                                nicotine 21 MG/24HR 24 hr patch   Commonly known as:  NICODERM CQ   Place 1 patch onto the skin daily                                QUEtiapine 100 MG tablet   Commonly known as:  SEROquel   Take 1 tablet (100 mg) by mouth At Bedtime                                zolpidem 10 MG tablet   Commonly known as:  AMBIEN   Take 1 tablet (10 mg) by mouth At Bedtime

## 2017-10-23 NOTE — ANESTHESIA POSTPROCEDURE EVALUATION
Patient: Mindi Eastman    * No procedures listed *    Diagnosis:Severe recurrent major depression without psychotic features (H) [F33.2]  Diagnosis Additional Information: No value filed.    Anesthesia Type:  General    Note:  Anesthesia Post Evaluation    Patient location during evaluation: ECT PACU  Patient participation: Able to fully participate in evaluation  Level of consciousness: awake  Pain management: adequate  Airway patency: patent  Cardiovascular status: acceptable  Respiratory status: acceptable  Hydration status: acceptable  PONV: none     Anesthetic complications: None          Last vitals:  Vitals:    10/23/17 0730 10/23/17 0820   BP: 127/60 (!) 113/93   Pulse:  71   Resp: 16 16   Temp: 36.8  C (98.2  F) 36.7  C (98.1  F)   SpO2: 99% 98%         Electronically Signed By: Yonatan Evans MD  October 23, 2017  8:31 AM

## 2017-10-23 NOTE — DISCHARGE INSTRUCTIONS
ECT Discharge Instructions      During your ECT series:      Do not drive for 24 hours after treatment. If you will have more than one treatment within a week, no driving until 7 days after your last ECT treatment.     Do not drink alcohol or use street drugs (illicit drugs) while you are having treatments.    Do not make important decisions, including legal decisions.    After each treatment:      Get plenty of rest. A responsible adult must stay with your for at least 6 hours.    Avoid heavy or risky activities for 24 hours.    If you feel light-headed, sit for a few minutes before standing. Have someone help you get up.    If you have nausea (feel sick to your stomach): Drink only clear liquids such as apple juice, ginger ale, broth or 7UP, Be sure to drink plenty of liquids. Move to a normal diet as you feel able.     If you received Toradol, wait 6 hours before taking ibuprofen.    Call your doctor if:     You have a fever over 100F (37.7 C) (taken under the tongue), or a fever that last more than 24 hours.    Your IV site is red, swollen, very painful or is getting more tender.    You have nausea that gets very bad or does not improve.      If you have any symptoms after ECT, tell our staff before your next treatment.    The ECT Department can be reached at [phone].  The ECT Department is open Mondays, Wednesdays and Fridays from 7:00 AM to 2:00 PM.      To speak to a doctor, call: Your Primary Care Provider     Other: You had Toradol at 08:04 AM Which is an NSAID medication. Do not take any Ibuprofen, Excedrin, Motrin, Aleve, Advil, Asprin, or any other NSAID medication until after 2:04 PM. Questions, check with your physician or pharmacist

## 2017-10-23 NOTE — PROCEDURES
Procedure/Surgery Information   [facility], [place]    Bedside Procedure Note  Date of Service (when I performed the procedure): 10/23/2017    Mindi Eastman is a 42 year old female patient.  1. Major depressive disorder, recurrent severe without psychotic features (H)      Past Medical History:   Diagnosis Date     ADHD      Anxiety      Bipolar disorder (H)      Depressive disorder      OCD (obsessive compulsive disorder)      ROXANNE (obstructive sleep apnea)     On CPAP     Seasonal allergies      Temp: 98.2  F (36.8  C) Temp src: Tympanic BP: 127/60   Heart Rate: 65 Resp: 16 SpO2: 99 %        Procedures     [facility], [place]   ECT Procedure Note     Mindi Eastman [id number]   42 year old 1975     Patient Status: Outpatient    Is this the first in a series of 12 treatments?  No    History and Physical: Reviewed in medical record    Consent: Informed consent was signed by: patient    Date Consent Signed: 10/10/2017      Allergies   Allergen Reactions     Darvocet [Propoxyphene N-Apap]        Weight:  0 lbs 0 oz    Patient Preparations: Glasses/Contacts removed         Indications for ECT:   Medications ineffective, Psychotherapies ineffective and treatment resistant depression.         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 She has had numerous medication trials with minimal benefit.     #1 10/11:  The patient is alert and oriented x3. She has been NPO since midnight. Patient remains depressed. She notes some relief with starting this treatment as she is hopeful about it working.  #2 10/13:  The patient is alert and oriented x3. She has been NPO since midnight. Mood remains depressed. Other than headache and calf cramps she had no problems with the first treatment.  #3 10/16:  The patient is A&O x3 and NPO since  "midnight. She still has some leg cramps after treatment. No noted change in mood yet. Headache was well controlled with Toradol.  #4 10/18:  The patient is A&O x3 and NPO since midnight. Here for outpatient treatment. Patient notes headache for two days post treatment. She has stopped the oxy she was getting for her broken hand, so may be having rebound headaches.  #5 10/20:  The patient is A&O x3 and NPO since midnight. Patient reports improvements noted in her mood. Morphine worked well for her headache. She'd like something for nausea.  #6 10/23:  The patient is A&O x3 and NPO since midnight. Patient reports ongoing headache that starts up a few hours after treatment. Her primary provider doesn't want to prescribe anything. She feels that her mood is back to her \"best\" and was hoping this will be her last treatment.         Diagnosis:      Major depressive disorder, recurrent severe without psychotic features (F33.2)  Consider bipolar depression           Pause for the Cause:     Right patient Yes   Right procedure/laterality settings: Yes          Intra-Procedure Documentation:         ECT #: 6   Treatment number this series: 6   Total treatment number: 6     Type of ECT:  Right, unilateral ultrabrief    ECT Medications:    Toradol: 30mg  Zofran: 4mg  Brevital: 80mg  Succinyl Choline: 70mg  Morphine 5 mg post treatment for headache    ECT Strip Summary:   Energy Level: 15 percent  Motor Seizure Duration: 5 seconds  EEG Seizure Duration: indeterminant seconds (poor reading due to lead connection)    Complications: No    Plan:   Patient done with ECT. May call in if mood slips.    Alvarez Solares MD      "

## 2017-10-30 ENCOUNTER — HOSPITAL ENCOUNTER (OUTPATIENT)
Dept: BEHAVIORAL HEALTH | Facility: CLINIC | Age: 42
End: 2017-10-30
Attending: PSYCHIATRY & NEUROLOGY
Payer: COMMERCIAL

## 2017-10-30 ENCOUNTER — ANESTHESIA (OUTPATIENT)
Dept: BEHAVIORAL HEALTH | Facility: CLINIC | Age: 42
End: 2017-10-30

## 2017-10-30 ENCOUNTER — ANESTHESIA EVENT (OUTPATIENT)
Dept: BEHAVIORAL HEALTH | Facility: CLINIC | Age: 42
End: 2017-10-30

## 2017-10-30 VITALS
DIASTOLIC BLOOD PRESSURE: 72 MMHG | OXYGEN SATURATION: 95 % | TEMPERATURE: 97.8 F | SYSTOLIC BLOOD PRESSURE: 130 MMHG | HEART RATE: 64 BPM | RESPIRATION RATE: 24 BRPM

## 2017-10-30 DIAGNOSIS — F33.2 MAJOR DEPRESSIVE DISORDER, RECURRENT SEVERE WITHOUT PSYCHOTIC FEATURES (H): ICD-10-CM

## 2017-10-30 DIAGNOSIS — R51.9 ACUTE NONINTRACTABLE HEADACHE, UNSPECIFIED HEADACHE TYPE: Primary | ICD-10-CM

## 2017-10-30 PROCEDURE — 90870 ELECTROCONVULSIVE THERAPY: CPT

## 2017-10-30 PROCEDURE — 25000128 H RX IP 250 OP 636: Performed by: PSYCHIATRY & NEUROLOGY

## 2017-10-30 PROCEDURE — 25000128 H RX IP 250 OP 636: Performed by: ANESTHESIOLOGY

## 2017-10-30 PROCEDURE — 90870 ELECTROCONVULSIVE THERAPY: CPT | Performed by: PSYCHIATRY & NEUROLOGY

## 2017-10-30 PROCEDURE — 40000671 ZZH STATISTIC ANESTHESIA CASE

## 2017-10-30 PROCEDURE — 25000125 ZZHC RX 250: Performed by: ANESTHESIOLOGY

## 2017-10-30 RX ORDER — OXYCODONE HYDROCHLORIDE 5 MG/1
5 TABLET ORAL DAILY PRN
Qty: 10 TABLET | Refills: 0 | Status: SHIPPED | OUTPATIENT
Start: 2017-10-30 | End: 2017-11-22

## 2017-10-30 RX ORDER — MORPHINE SULFATE 10 MG/ML
5 INJECTION, SOLUTION INTRAMUSCULAR; INTRAVENOUS ONCE
Status: CANCELLED
Start: 2017-10-30 | End: 2017-10-30

## 2017-10-30 RX ORDER — KETOROLAC TROMETHAMINE 30 MG/ML
30 INJECTION, SOLUTION INTRAMUSCULAR; INTRAVENOUS ONCE
Status: COMPLETED | OUTPATIENT
Start: 2017-10-30 | End: 2017-10-30

## 2017-10-30 RX ORDER — ONDANSETRON 2 MG/ML
4 INJECTION INTRAMUSCULAR; INTRAVENOUS ONCE
Status: COMPLETED | OUTPATIENT
Start: 2017-10-30 | End: 2017-10-30

## 2017-10-30 RX ORDER — KETOROLAC TROMETHAMINE 30 MG/ML
30 INJECTION, SOLUTION INTRAMUSCULAR; INTRAVENOUS ONCE
Status: CANCELLED
Start: 2017-10-30 | End: 2017-10-30

## 2017-10-30 RX ORDER — MORPHINE SULFATE 10 MG/ML
5 INJECTION, SOLUTION INTRAMUSCULAR; INTRAVENOUS ONCE
Status: COMPLETED | OUTPATIENT
Start: 2017-10-30 | End: 2017-10-30

## 2017-10-30 RX ORDER — ONDANSETRON 2 MG/ML
4 INJECTION INTRAMUSCULAR; INTRAVENOUS ONCE
Status: CANCELLED
Start: 2017-10-30 | End: 2017-10-30

## 2017-10-30 RX ADMIN — METHOHEXITAL SODIUM 80 MG: 500 INJECTION, POWDER, LYOPHILIZED, FOR SOLUTION INTRAMUSCULAR; INTRAVENOUS; RECTAL at 09:03

## 2017-10-30 RX ADMIN — KETOROLAC TROMETHAMINE 30 MG: 30 INJECTION, SOLUTION INTRAMUSCULAR at 08:53

## 2017-10-30 RX ADMIN — MORPHINE SULFATE 5 MG: 10 INJECTION, SOLUTION INTRAMUSCULAR; INTRAVENOUS at 09:25

## 2017-10-30 RX ADMIN — ONDANSETRON 4 MG: 2 INJECTION INTRAMUSCULAR; INTRAVENOUS at 08:53

## 2017-10-30 RX ADMIN — Medication 70 MG: at 09:03

## 2017-10-30 NOTE — ANESTHESIA POSTPROCEDURE EVALUATION
Patient: Mindi Eastman    * No procedures listed *    Diagnosis:Severe recurrent major depression without psychotic features (H) [F33.2]  Diagnosis Additional Information: No value filed.    Anesthesia Type:  General    Note:  Anesthesia Post Evaluation    Patient location during evaluation: PACU  Patient participation: Able to fully participate in evaluation  Level of consciousness: awake and alert  Pain management: adequate  Airway patency: patent  Respiratory status: acceptable  Hydration status: acceptable  PONV: none     Anesthetic complications: None          Last vitals:  Vitals:    10/30/17 0755   BP: 136/83   Pulse: 63   Resp: 16   Temp: 36.7  C (98  F)   SpO2: 100%         Electronically Signed By: Micah Thomas MD, MD  October 30, 2017  8:22 AM

## 2017-10-30 NOTE — DISCHARGE INSTRUCTIONS
ECT Discharge Instructions      During your ECT series:      Do not drive or work heavy equipment until 7 days after your last treatment.    Do not drink alcohol or use street drugs (illicit drugs) while you are having treatments.    Do not make important decisions, including legal decisions.    After your maintenance ECT treatment:    - Do not drive for 24 hours after treatment.  If you will be having more than one treatment witihin a week, no driving until 7 days after your last ECT treatment.         After each treatment:      Get plenty of rest. A responsible adult must stay with your for at least 6 hours.    Avoid heavy or risky activities for 24 hours.    If you feel light-headed, sit for a few minutes before standing. Have someone help you get up.    If you have nausea (feel sick to your stomach): Drink only clear liquids such as apple juice, ginger ale, broth or 7UP, Be sure to drink plenty of liquids. Move to a normal diet as you feel able.     If you received Toradol, wait 6 hours before taking ibuprofen.    Call your doctor if:     You have a fever over 100F (37.7 C) (taken under the tongue), or a fever that last more than 24 hours.    Your IV site is red, swollen, very painful or is getting more tender.    You have nausea that gets very bad or does not improve.      If you have any symptoms after ECT, tell our staff before your next treatment.    The ECT Department can be reached at [phone].  The ECT Department is open Mondays, Wednesdays and Fridays from 7:00 AM to 2:00 PM.    To speak to a doctor, call: Dr. Kennedy's office # [phone] Fax # [phone]    New prescriptions: Oxycodone 5mg IR tablets, take 1 tablet (5mg) by mouth daily as needed for post ECT headaches.      Other: Reminder, please do not eat anything after midnight. This includes food, milk, chewing tobacco, chewing gum, mints, or lozenges of any kind.  You can drink clear liquids until 2 hours before treatment.  Clear liquids  include water, Gatorade, clear juice (apple), clear soda, black coffee (no creamer or milk).      You received toradol 30mg at 9a for pain. Toradol is an NSAID.  Do not take any NSAID's including: Ibuprofen, Naproxen, Aspirin, Advil, Motrin, Aleve, Josué, etc., until 6 hours after the above time.  If you have any questions, contact your physician or pharmacist.  You also received Morphine 5mg for pain at.  You received Zofran 4mg for nausea.        Transported by: Ana, mother      _________________________________________________  ________________________  Patient/Responsible party Signature      Date          ________________________________________________   ________________________  Nurse Signature        Date

## 2017-10-30 NOTE — IP AVS SNAPSHOT
MRN:[id number]                      After Visit Summary   10/30/2017    Mindi Eastman    MRN: [id number]           Visit Information        Provider Department      10/30/2017  8:15 AM Alvarez Solares MD [facility]           Review of your medicines      START taking        Dose / Directions    oxyCODONE 5 MG IR tablet   Commonly known as:  ROXICODONE   Used for:  Acute nonintractable headache, unspecified headache type        Dose:  5 mg   Take 1 tablet (5 mg) by mouth daily as needed (Post ECT headache)   Quantity:  10 tablet   Refills:  0         CONTINUE these medicines which have NOT CHANGED        Dose / Directions    acetaminophen 325 MG tablet   Commonly known as:  TYLENOL   Used for:  Acute non intractable tension-type headache        Dose:  975 mg   Take 3 tablets (975 mg) by mouth every 6 hours as needed for mild pain or other (Give before coming down for ECT)   Quantity:  100 tablet   Refills:  1       ADDERALL XR PO        Dose:  60 mg   Take 60 mg by mouth daily   Refills:  0       escitalopram 20 MG tablet   Commonly known as:  LEXAPRO   Used for:  Major depressive disorder, recurrent severe without psychotic features (H)        Dose:  20 mg   Take 1 tablet (20 mg) by mouth daily   Quantity:  30 tablet   Refills:  1       hydrOXYzine 25 MG tablet   Commonly known as:  ATARAX   Used for:  SHANICE (generalized anxiety disorder)        Dose:  50 mg   Take 2 tablets (50 mg) by mouth 3 times daily as needed for anxiety   Quantity:  90 tablet   Refills:  1       ibuprofen 600 MG tablet   Commonly known as:  ADVIL/MOTRIN   Used for:  Acute non intractable tension-type headache        Dose:  600 mg   Take 1 tablet (600 mg) by mouth every 6 hours as needed for moderate pain   Quantity:  120 tablet   Refills:  1       loratadine 10 MG tablet   Commonly known as:  CLARITIN        Dose:  10 mg   Take 10 mg by mouth daily   Refills:  0       LORazepam 1 MG tablet    Commonly known as:  ATIVAN   Used for:  SHANICE (generalized anxiety disorder)        Dose:  1 mg   Take 1 tablet (1 mg) by mouth 2 times daily   Quantity:  60 tablet   Refills:  0       lurasidone 40 MG Tabs tablet   Commonly known as:  LATUDA   Used for:  Bipolar I disorder (H), Major depressive disorder, recurrent severe without psychotic features (H)        Dose:  40 mg   Take 1 tablet (40 mg) by mouth At Bedtime   Quantity:  30 tablet   Refills:  1       nicotine 21 MG/24HR 24 hr patch   Commonly known as:  NICODERM CQ   Used for:  Tobacco use        Dose:  1 patch   Place 1 patch onto the skin daily   Quantity:  30 patch   Refills:  1       QUEtiapine 100 MG tablet   Commonly known as:  SEROquel   Used for:  Bipolar I disorder (H), Major depressive disorder, recurrent severe without psychotic features (H)        Dose:  100 mg   Take 1 tablet (100 mg) by mouth At Bedtime   Quantity:  30 tablet   Refills:  1       zolpidem 10 MG tablet   Commonly known as:  AMBIEN   Used for:  Insomnia due to other mental disorder        Dose:  10 mg   Take 1 tablet (10 mg) by mouth At Bedtime   Quantity:  30 tablet   Refills:  0            Where to get your medicines      Some of these will need a paper prescription and others can be bought over the counter. Ask your nurse if you have questions.     Bring a paper prescription for each of these medications     oxyCODONE 5 MG IR tablet               Prescriptions were sent or printed at these locations (1 Prescription)                   Other Prescriptions                Printed at Department/Unit printer (1 of 1)         oxyCODONE (ROXICODONE) 5 MG IR tablet                 Protect others around you: Learn how to safely use, store and throw away your medicines at www.disposemymeds.org.         Follow-ups after your visit        Your next 10 appointments already scheduled     Nov 03, 2017  7:45 AM CDT   Electroconvulsive Therapy with Umang Kennedy MD   [facility] ([facility])    [address]  [facility] [place] [address] [phone]               Care Instructions        Further instructions from your care team       ECT Discharge Instructions      During your ECT series:      Do not drive or work heavy equipment until 7 days after your last treatment.    Do not drink alcohol or use street drugs (illicit drugs) while you are having treatments.    Do not make important decisions, including legal decisions.    After your maintenance ECT treatment:    - Do not drive for 24 hours after treatment.  If you will be having more than one treatment witihin a week, no driving until 7 days after your last ECT treatment.         After each treatment:      Get plenty of rest. A responsible adult must stay with your for at least 6 hours.    Avoid heavy or risky activities for 24 hours.    If you feel light-headed, sit for a few minutes before standing. Have someone help you get up.    If you have nausea (feel sick to your stomach): Drink only clear liquids such as apple juice, ginger ale, broth or 7UP, Be sure to drink plenty of liquids. Move to a normal diet as you feel able.     If you received Toradol, wait 6 hours before taking ibuprofen.    Call your doctor if:     You have a fever over 100F (37.7 C) (taken under the tongue), or a fever that last more than 24 hours.    Your IV site is red, swollen, very painful or is getting more tender.    You have nausea that gets very bad or does not improve.      If you have any symptoms after ECT, tell our staff before your next treatment.    The ECT Department can be reached at [phone].  The ECT Department is open Mondays, Wednesdays and Fridays from 7:00 AM to 2:00 PM.    To speak to a doctor, call: Dr. Kennedy's office # [phone] Fax # [phone]    New prescriptions: Oxycodone 5mg IR tablets, take 1 tablet (5mg) by mouth daily as needed for post ECT headaches.      Other: Reminder,  "please do not eat anything after midnight. This includes food, milk, chewing tobacco, chewing gum, mints, or lozenges of any kind.  You can drink clear liquids until 2 hours before treatment.  Clear liquids include water, Gatorade, clear juice (apple), clear soda, black coffee (no creamer or milk).      You received toradol 30mg at 9a for pain. Toradol is an NSAID.  Do not take any NSAID's including: Ibuprofen, Naproxen, Aspirin, Advil, Motrin, Aleve, Josué, etc., until 6 hours after the above time.  If you have any questions, contact your physician or pharmacist.  You also received Morphine 5mg for pain at.  You received Zofran 4mg for nausea.        Transported by: Ana, mother      _________________________________________________  ________________________  Patient/Responsible party Signature      Date          ________________________________________________   ________________________  Nurse Signature        Date     Additional Information About Your Visit        MyChart Information     [facility] lets you send messages to your doctor, view your test results, renew your prescriptions, schedule appointments and more. To sign up, go to [email or web address] . Click on \"Log in\" on the left side of the screen, which will take you to the Welcome page. Then click on \"Sign up Now\" on the right side of the page.     You will be asked to enter the access code listed below, as well as some personal information. Please follow the directions to create your username and password.     Your access code is: [email or web address]  Expires: 2018  9:10 PM     Your access code will  in 90 days. If you need help or a new code, please call your [facility] clinic or [phone].        Care EveryWhere ID     This is your Care EveryWhere ID. This could be used by other organizations to access your [facility] medical records  [id number]        Your Vitals Were     Blood Pressure Pulse Temperature Respirations Last Period Pulse Oximetry    " 130/72 64 97.8  F (36.6  C) (Tympanic) 24 09/29/2017 95%       Primary Care Provider Office Phone # Fax #    Erica Coelho PA-C [phone]      Equal Access to Services     JAMESROXANNA DEBO : Hadii aad ku hadsereneo Soomaali, waaxda luqadaha, qaybta kaalmada adeegyada, yamileth coylen tianshiraz charlton layeisontram . So [facility] [phone].    ATENCIÓN: Si habla español, tiene a martínez disposición servicios gratuitos de asistencia lingüística. Llame al [phone].    We comply with applicable federal civil rights laws and [place] laws. We do not discriminate on the basis of race, color, national origin, age, disability, sex, sexual orientation, or gender identity.            Thank you!     Thank you for choosing [facility] for your care. Our goal is always to provide you with excellent care. Hearing back from our patients is one way we can continue to improve our services. Please take a few minutes to complete the written survey that you may receive in the mail after you visit with us. Thank you!             Medication List: This is a list of all your medications and when to take them. Check marks below indicate your daily home schedule. Keep this list as a reference.      Medications           Morning Afternoon Evening Bedtime As Needed    acetaminophen 325 MG tablet   Commonly known as:  TYLENOL   Take 3 tablets (975 mg) by mouth every 6 hours as needed for mild pain or other (Give before coming down for ECT)                                ADDERALL XR PO   Take 60 mg by mouth daily                                escitalopram 20 MG tablet   Commonly known as:  LEXAPRO   Take 1 tablet (20 mg) by mouth daily                                hydrOXYzine 25 MG tablet   Commonly known as:  ATARAX   Take 2 tablets (50 mg) by mouth 3 times daily as needed for anxiety                                ibuprofen 600 MG tablet   Commonly known as:  ADVIL/MOTRIN   Take 1 tablet (600 mg) by mouth every 6 hours as needed for  moderate pain                                loratadine 10 MG tablet   Commonly known as:  CLARITIN   Take 10 mg by mouth daily                                LORazepam 1 MG tablet   Commonly known as:  ATIVAN   Take 1 tablet (1 mg) by mouth 2 times daily                                lurasidone 40 MG Tabs tablet   Commonly known as:  LATUDA   Take 1 tablet (40 mg) by mouth At Bedtime                                nicotine 21 MG/24HR 24 hr patch   Commonly known as:  NICODERM CQ   Place 1 patch onto the skin daily                                oxyCODONE 5 MG IR tablet   Commonly known as:  ROXICODONE   Take 1 tablet (5 mg) by mouth daily as needed (Post ECT headache)                                QUEtiapine 100 MG tablet   Commonly known as:  SEROquel   Take 1 tablet (100 mg) by mouth At Bedtime                                zolpidem 10 MG tablet   Commonly known as:  AMBIEN   Take 1 tablet (10 mg) by mouth At Bedtime

## 2017-10-30 NOTE — PROCEDURES
Procedure/Surgery Information   [facility]    Bedside Procedure Note  Date of Service (when I performed the procedure): 10/30/2017    Mindi Eastman is a 42 year old female patient.  1. Major depressive disorder, recurrent severe without psychotic features (H)      Past Medical History:   Diagnosis Date     ADHD      Anxiety      Bipolar disorder (H)      Depressive disorder      OCD (obsessive compulsive disorder)      ROXANNE (obstructive sleep apnea)     On CPAP     Seasonal allergies      Temp: 98  F (36.7  C)   BP: 136/83 Pulse: 63   Resp: 16 SpO2: 100 %        Procedures     [facility], [place]   ECT Procedure Note     Mindi Eastman [id number]   42 year old 1975     Patient Status: Outpatient    Is this the first in a series of 12 treatments?  No    History and Physical: Reviewed in medical record    Consent: Informed consent was signed by: patient    Date Consent Signed: 10/10/2017      Allergies   Allergen Reactions     Darvocet [Propoxyphene N-Apap]        Weight:  0 lbs 0 oz    Patient Preparations: Glasses/Contacts removed         Indications for ECT:   Medications ineffective, Psychotherapies ineffective and treatment resistant depression.         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 She has had numerous medication trials with minimal benefit.     #1 10/11:  The patient is alert and oriented x3. She has been NPO since midnight. Patient remains depressed. She notes some relief with starting this treatment as she is hopeful about it working.  #2 10/13:  The patient is alert and oriented x3. She has been NPO since midnight. Mood remains depressed. Other than headache and calf cramps she had no problems with the first treatment.  #3 10/16:  The patient is A&O x3 and NPO since midnight. She still  "has some leg cramps after treatment. No noted change in mood yet. Headache was well controlled with Toradol.  #4 10/18:  The patient is A&O x3 and NPO since midnight. Here for outpatient treatment. Patient notes headache for two days post treatment. She has stopped the oxy she was getting for her broken hand, so may be having rebound headaches.  #5 10/20:  The patient is A&O x3 and NPO since midnight. Patient reports improvements noted in her mood. Morphine worked well for her headache. She'd like something for nausea.  #6 10/23:  The patient is A&O x3 and NPO since midnight. Patient reports ongoing headache that starts up a few hours after treatment. Her primary provider doesn't want to prescribe anything. She feels that her mood is back to her \"best\" and was hoping this will be her last treatment.  #7 10/30:  The patient is A&O x3 and NPO since midnight. Patient indicates that Thursday after her last Monday treatment she started to have some slipping of her mood. She's had some passive SI. She'd like to try twice weekly treatment for a little while then taper off.         Diagnosis:      Major depressive disorder, recurrent severe without psychotic features (F33.2)  Consider bipolar depression           Pause for the Cause:     Right patient Yes   Right procedure/laterality settings: Yes          Intra-Procedure Documentation:         ECT #: 7   Treatment number this series: 7   Total treatment number: 7     Type of ECT:  Right, unilateral ultrabrief    ECT Medications:    Toradol: 30mg  Zofran: 4mg  Brevital: 80mg  Succinyl Choline: 70mg  Morphine 5 mg post treatment for headache    ECT Strip Summary:   Energy Level: 20 percent  Motor Seizure Duration: 9 seconds  EEG Seizure Duration: 75 seconds    Complications: No    Plan:   Plan to return on Friday 11/3  I ordered 10 tabs of 5 mg oxycodone to take post treatment at home. 5 mg once daily as needed.    Alvarez Solares MD  "

## 2017-10-30 NOTE — IP AVS SNAPSHOT
[facility]    [facility] [address] [address]    Phone:  [phone]                                       After Visit Summary   10/30/2017    Mindi Eastman    MRN: [id number]           After Visit Summary Signature Page     I have received my discharge instructions, and my questions have been answered. I have discussed any challenges I see with this plan with the nurse or doctor.    ..........................................................................................................................................  Patient/Patient Representative Signature      ..........................................................................................................................................  Patient Representative Print Name and Relationship to Patient    ..................................................               ................................................  Date                                            Time    ..........................................................................................................................................  Reviewed by Signature/Title    ...................................................              ..............................................  Date                                                            Time

## 2017-11-03 ENCOUNTER — HOSPITAL ENCOUNTER (OUTPATIENT)
Dept: BEHAVIORAL HEALTH | Facility: CLINIC | Age: 42
End: 2017-11-03
Attending: PSYCHIATRY & NEUROLOGY
Payer: COMMERCIAL

## 2017-11-03 ENCOUNTER — ANESTHESIA EVENT (OUTPATIENT)
Dept: BEHAVIORAL HEALTH | Facility: CLINIC | Age: 42
End: 2017-11-03

## 2017-11-03 ENCOUNTER — ANESTHESIA (OUTPATIENT)
Dept: BEHAVIORAL HEALTH | Facility: CLINIC | Age: 42
End: 2017-11-03

## 2017-11-03 VITALS
SYSTOLIC BLOOD PRESSURE: 129 MMHG | DIASTOLIC BLOOD PRESSURE: 76 MMHG | TEMPERATURE: 98.3 F | HEART RATE: 65 BPM | RESPIRATION RATE: 20 BRPM | OXYGEN SATURATION: 97 %

## 2017-11-03 DIAGNOSIS — F33.2 MAJOR DEPRESSIVE DISORDER, RECURRENT SEVERE WITHOUT PSYCHOTIC FEATURES (H): ICD-10-CM

## 2017-11-03 PROCEDURE — 40000671 ZZH STATISTIC ANESTHESIA CASE

## 2017-11-03 PROCEDURE — 25000128 H RX IP 250 OP 636: Performed by: PSYCHIATRY & NEUROLOGY

## 2017-11-03 PROCEDURE — 25000128 H RX IP 250 OP 636: Performed by: ANESTHESIOLOGY

## 2017-11-03 PROCEDURE — 90870 ELECTROCONVULSIVE THERAPY: CPT

## 2017-11-03 PROCEDURE — 25000125 ZZHC RX 250: Performed by: ANESTHESIOLOGY

## 2017-11-03 RX ORDER — ONDANSETRON 2 MG/ML
4 INJECTION INTRAMUSCULAR; INTRAVENOUS ONCE
Status: CANCELLED
Start: 2017-11-03 | End: 2017-11-03

## 2017-11-03 RX ORDER — MORPHINE SULFATE 10 MG/ML
5 INJECTION, SOLUTION INTRAMUSCULAR; INTRAVENOUS ONCE
Status: COMPLETED | OUTPATIENT
Start: 2017-11-03 | End: 2017-11-03

## 2017-11-03 RX ORDER — KETOROLAC TROMETHAMINE 30 MG/ML
30 INJECTION, SOLUTION INTRAMUSCULAR; INTRAVENOUS ONCE
Status: COMPLETED | OUTPATIENT
Start: 2017-11-03 | End: 2017-11-03

## 2017-11-03 RX ORDER — ACETAMINOPHEN 500 MG
500 TABLET ORAL 3 TIMES DAILY PRN
Qty: 100 TABLET | Refills: 1 | Status: SHIPPED | OUTPATIENT
Start: 2017-11-03 | End: 2019-06-16

## 2017-11-03 RX ORDER — MORPHINE SULFATE 10 MG/ML
5 INJECTION, SOLUTION INTRAMUSCULAR; INTRAVENOUS ONCE
Status: CANCELLED
Start: 2017-11-03 | End: 2017-11-03

## 2017-11-03 RX ORDER — ONDANSETRON 2 MG/ML
4 INJECTION INTRAMUSCULAR; INTRAVENOUS ONCE
Status: COMPLETED | OUTPATIENT
Start: 2017-11-03 | End: 2017-11-03

## 2017-11-03 RX ORDER — KETOROLAC TROMETHAMINE 30 MG/ML
30 INJECTION, SOLUTION INTRAMUSCULAR; INTRAVENOUS ONCE
Status: CANCELLED
Start: 2017-11-03 | End: 2017-11-03

## 2017-11-03 RX ADMIN — Medication 70 MG: at 08:26

## 2017-11-03 RX ADMIN — METHOHEXITAL SODIUM 80 MG: 500 INJECTION, POWDER, LYOPHILIZED, FOR SOLUTION INTRAMUSCULAR; INTRAVENOUS; RECTAL at 08:26

## 2017-11-03 RX ADMIN — MORPHINE SULFATE 5 MG: 10 INJECTION, SOLUTION INTRAMUSCULAR; INTRAVENOUS at 09:10

## 2017-11-03 RX ADMIN — ONDANSETRON 4 MG: 2 INJECTION INTRAMUSCULAR; INTRAVENOUS at 08:16

## 2017-11-03 RX ADMIN — KETOROLAC TROMETHAMINE 30 MG: 30 INJECTION, SOLUTION INTRAMUSCULAR at 08:16

## 2017-11-03 NOTE — IP AVS SNAPSHOT
[facility]    [facility] [address] [address]    Phone:  [phone]                                       After Visit Summary   11/3/2017    Mindi Eastman    MRN: [id number]           After Visit Summary Signature Page     I have received my discharge instructions, and my questions have been answered. I have discussed any challenges I see with this plan with the nurse or doctor.    ..........................................................................................................................................  Patient/Patient Representative Signature      ..........................................................................................................................................  Patient Representative Print Name and Relationship to Patient    ..................................................               ................................................  Date                                            Time    ..........................................................................................................................................  Reviewed by Signature/Title    ...................................................              ..............................................  Date                                                            Time

## 2017-11-03 NOTE — IP AVS SNAPSHOT
MRN:[id number]                      After Visit Summary   11/3/2017    Mindi Eastman    MRN: [id number]           Visit Information        Provider Department      11/3/2017  7:45 AM Umang Kennedy MD [facility]           Review of your medicines      CONTINUE these medicines which may have CHANGED, or have new prescriptions. If we are uncertain of the size of tablets/capsules you have at home, strength may be listed as something that might have changed.        Dose / Directions    * acetaminophen 325 MG tablet   Commonly known as:  TYLENOL   This may have changed:  Another medication with the same name was added. Make sure you understand how and when to take each.   Used for:  Acute non intractable tension-type headache        Dose:  975 mg   Take 3 tablets (975 mg) by mouth every 6 hours as needed for mild pain or other (Give before coming down for ECT)   Quantity:  100 tablet   Refills:  1       * acetaminophen 500 MG tablet   Commonly known as:  TYLENOL   This may have changed:  You were already taking a medication with the same name, and this prescription was added. Make sure you understand how and when to take each.   Used for:  Major depressive disorder, recurrent severe without psychotic features (H)        Dose:  500 mg   Take 1 tablet (500 mg) by mouth 3 times daily as needed for mild pain   Quantity:  100 tablet   Refills:  1       * Notice:  This list has 2 medication(s) that are the same as other medications prescribed for you. Read the directions carefully, and ask your doctor or other care provider to review them with you.      CONTINUE these medicines which have NOT CHANGED        Dose / Directions    ADDERALL XR PO        Dose:  60 mg   Take 60 mg by mouth daily   Refills:  0       escitalopram 20 MG tablet   Commonly known as:  LEXAPRO   Used for:  Major depressive disorder, recurrent severe without psychotic features (H)        Dose:  20 mg   Take 1  tablet (20 mg) by mouth daily   Quantity:  30 tablet   Refills:  1       hydrOXYzine 25 MG tablet   Commonly known as:  ATARAX   Used for:  SHANICE (generalized anxiety disorder)        Dose:  50 mg   Take 2 tablets (50 mg) by mouth 3 times daily as needed for anxiety   Quantity:  90 tablet   Refills:  1       ibuprofen 600 MG tablet   Commonly known as:  ADVIL/MOTRIN   Used for:  Acute non intractable tension-type headache        Dose:  600 mg   Take 1 tablet (600 mg) by mouth every 6 hours as needed for moderate pain   Quantity:  120 tablet   Refills:  1       loratadine 10 MG tablet   Commonly known as:  CLARITIN        Dose:  10 mg   Take 10 mg by mouth daily   Refills:  0       LORazepam 1 MG tablet   Commonly known as:  ATIVAN   Used for:  SHANICE (generalized anxiety disorder)        Dose:  1 mg   Take 1 tablet (1 mg) by mouth 2 times daily   Quantity:  60 tablet   Refills:  0       lurasidone 40 MG Tabs tablet   Commonly known as:  LATUDA   Used for:  Bipolar I disorder (H), Major depressive disorder, recurrent severe without psychotic features (H)        Dose:  40 mg   Take 1 tablet (40 mg) by mouth At Bedtime   Quantity:  30 tablet   Refills:  1       nicotine 21 MG/24HR 24 hr patch   Commonly known as:  NICODERM CQ   Used for:  Tobacco use        Dose:  1 patch   Place 1 patch onto the skin daily   Quantity:  30 patch   Refills:  1       oxyCODONE IR 5 MG tablet   Commonly known as:  ROXICODONE   Used for:  Acute nonintractable headache, unspecified headache type        Dose:  5 mg   Take 1 tablet (5 mg) by mouth daily as needed (Post ECT headache)   Quantity:  10 tablet   Refills:  0       QUEtiapine 100 MG tablet   Commonly known as:  SEROquel   Used for:  Bipolar I disorder (H), Major depressive disorder, recurrent severe without psychotic features (H)        Dose:  100 mg   Take 1 tablet (100 mg) by mouth At Bedtime   Quantity:  30 tablet   Refills:  1       zolpidem 10 MG tablet   Commonly known as:  AMBIEN    Used for:  Insomnia due to other mental disorder        Dose:  10 mg   Take 1 tablet (10 mg) by mouth At Bedtime   Quantity:  30 tablet   Refills:  0            Where to get your medicines      These medications were sent to [facility]/pharmacy #[id number] - [place] - [address]  [address] [address]     Phone:  [phone]     acetaminophen 500 MG tablet               Prescriptions were sent or printed at these locations (1 Prescription)                   [facility]/pharmacy #[id number] - [place] - [address]   [address] [address]    Telephone:  [phone]   Fax:  [phone]   Hours:                  E-Prescribed (1 of 1)         acetaminophen (TYLENOL) 500 MG tablet                 Protect others around you: Learn how to safely use, store and throw away your medicines at www.disposemymeds.org.         Follow-ups after your visit        Your next 10 appointments already scheduled     Nov 10, 2017  7:15 AM CST   Electroconvulsive Therapy with Umang Kennedy MD   [facility] ([facility])    [address]  [facility] [address] [phone]               Care Instructions        Further instructions from your care team       ECT Discharge Instructions      During your ECT series:      Do not drive or work heavy equipment until 7 days after your last treatment.    Do not drink alcohol or use street drugs (illicit drugs) while you are having treatments.    Do not make important decisions, including legal decisions.    After your maintenance ECT treatment:    - Do not drive for 24 hours after treatment.  If you will be having more than one treatment witihin a week, no driving until 7 days after your last ECT treatment.       After each treatment:      Get plenty of rest. A responsible adult must stay with your for at least 6 hours.    Avoid heavy or risky activities for 24 hours.    If you feel light-headed,  "sit for a few minutes before standing. Have someone help you get up.    If you have nausea (feel sick to your stomach): Drink only clear liquids such as apple juice, ginger ale, broth or 7UP, Be sure to drink plenty of liquids. Move to a normal diet as you feel able.     If you received Toradol, wait 6 hours before taking ibuprofen.    Call your doctor if:     You have a fever over 100F (37.7 C) (taken under the tongue), or a fever that last more than 24 hours.    Your IV site is red, swollen, very painful or is getting more tender.    You have nausea that gets very bad or does not improve.      If you have any symptoms after ECT, tell our staff before your next treatment.    The ECT Department can be reached at [phone].  The ECT Department is open Mondays, Wednesdays and Fridays from 7:00 AM to 2:00 PM.    To speak to a doctor, call: Dr. Kennedy's office # [phone] Fax # [phone]    New prescriptions: Tylenol 500mg tabs, take 1-2 tabs as needed up to 3 times a day.      Other: You will be decreasing your ECT from twice a week to once a week.  Gave Rx for Tylenol 500mg tabs, take 1 to 2 tabs tid prn pain.      Transported by: Belle, wife      _________________________________________________  ________________________  Patient/Responsible party Signature      Date          ________________________________________________   ________________________  Nurse Signature        Date     Additional Information About Your Visit        MyChart Information     [employer] lets you send messages to your doctor, view your test results, renew your prescriptions, schedule appointments and more. To sign up, go to [email or web address] . Click on \"Log in\" on the left side of the screen, which will take you to the Welcome page. Then click on \"Sign up Now\" on the right side of the page.     You will be asked to enter the access code listed below, as well as some personal information. Please follow the directions to create " your username and password.     Your access code is: [email or web address]  Expires: 2018  9:10 PM     Your access code will  in 90 days. If you need help or a new code, please call your [place] clinic or [phone].        Care EveryWhere ID     This is your Care EveryWhere ID. This could be used by other organizations to access your [place] medical records  [id number]        Your Vitals Were     Blood Pressure Pulse Temperature Respirations Last Period Pulse Oximetry    143/70 65 98.3  F (36.8  C) (Tympanic) 22 2017 96%       Primary Care Provider Office Phone # Fax #    Erica Coelho PA-C [phone]      Equal Access to Services     ROXANNA EDMONDSON : Hadii tonia nelson hadasho Soomaali, waaxda luqadaha, qaybta kaalmada adeegyada, yamileth diehl . So [facility] [phone].    ATENCIÓN: Si habla español, tiene a martínez disposición servicios gratuitos de asistencia lingüística. Llame al [phone].    We comply with applicable federal civil rights laws and [place] laws. We do not discriminate on the basis of race, color, national origin, age, disability, sex, sexual orientation, or gender identity.            Thank you!     Thank you for choosing [facility] [place] for your care. Our goal is always to provide you with excellent care. Hearing back from our patients is one way we can continue to improve our services. Please take a few minutes to complete the written survey that you may receive in the mail after you visit with us. Thank you!             Medication List: This is a list of all your medications and when to take them. Check marks below indicate your daily home schedule. Keep this list as a reference.      Medications           Morning Afternoon Evening Bedtime As Needed    * acetaminophen 325 MG tablet   Commonly known as:  TYLENOL   Take 3 tablets (975 mg) by mouth every 6 hours as needed for mild pain or other (Give before coming down for ECT)                                 * acetaminophen 500 MG tablet   Commonly known as:  TYLENOL   Take 1 tablet (500 mg) by mouth 3 times daily as needed for mild pain                                ADDERALL XR PO   Take 60 mg by mouth daily                                escitalopram 20 MG tablet   Commonly known as:  LEXAPRO   Take 1 tablet (20 mg) by mouth daily                                hydrOXYzine 25 MG tablet   Commonly known as:  ATARAX   Take 2 tablets (50 mg) by mouth 3 times daily as needed for anxiety                                ibuprofen 600 MG tablet   Commonly known as:  ADVIL/MOTRIN   Take 1 tablet (600 mg) by mouth every 6 hours as needed for moderate pain                                loratadine 10 MG tablet   Commonly known as:  CLARITIN   Take 10 mg by mouth daily                                LORazepam 1 MG tablet   Commonly known as:  ATIVAN   Take 1 tablet (1 mg) by mouth 2 times daily                                lurasidone 40 MG Tabs tablet   Commonly known as:  LATUDA   Take 1 tablet (40 mg) by mouth At Bedtime                                nicotine 21 MG/24HR 24 hr patch   Commonly known as:  NICODERM CQ   Place 1 patch onto the skin daily                                oxyCODONE IR 5 MG tablet   Commonly known as:  ROXICODONE   Take 1 tablet (5 mg) by mouth daily as needed (Post ECT headache)                                QUEtiapine 100 MG tablet   Commonly known as:  SEROquel   Take 1 tablet (100 mg) by mouth At Bedtime                                zolpidem 10 MG tablet   Commonly known as:  AMBIEN   Take 1 tablet (10 mg) by mouth At Bedtime                                * Notice:  This list has 2 medication(s) that are the same as other medications prescribed for you. Read the directions carefully, and ask your doctor or other care provider to review them with you.

## 2017-11-03 NOTE — DISCHARGE INSTRUCTIONS
ECT Discharge Instructions      During your ECT series:      Do not drive or work heavy equipment until 7 days after your last treatment.    Do not drink alcohol or use street drugs (illicit drugs) while you are having treatments.    Do not make important decisions, including legal decisions.    After your maintenance ECT treatment:    - Do not drive for 24 hours after treatment.  If you will be having more than one treatment witihin a week, no driving until 7 days after your last ECT treatment.       After each treatment:      Get plenty of rest. A responsible adult must stay with your for at least 6 hours.    Avoid heavy or risky activities for 24 hours.    If you feel light-headed, sit for a few minutes before standing. Have someone help you get up.    If you have nausea (feel sick to your stomach): Drink only clear liquids such as apple juice, ginger ale, broth or 7UP, Be sure to drink plenty of liquids. Move to a normal diet as you feel able.     If you received Toradol, wait 6 hours before taking ibuprofen.    Call your doctor if:     You have a fever over 100F (37.7 C) (taken under the tongue), or a fever that last more than 24 hours.    Your IV site is red, swollen, very painful or is getting more tender.    You have nausea that gets very bad or does not improve.      If you have any symptoms after ECT, tell our staff before your next treatment.    The ECT Department can be reached at [phone].  The ECT Department is open Mondays, Wednesdays and Fridays from 7:00 AM to 2:00 PM.    To speak to a doctor, call: Dr. Kennedy's office # [phone] Fax # [phone]    New prescriptions: Tylenol 500mg tabs, take 1-2 tabs as needed up to 3 times a day.      Other: You will be decreasing your ECT from twice a week to once a week.  Gave Rx for Tylenol 500mg tabs, take 1 to 2 tabs tid prn pain.      Transported by: Belle  wife      _________________________________________________  ________________________  Patient/Responsible party Signature      Date          ________________________________________________   ________________________  Nurse Signature        Date

## 2017-11-03 NOTE — PROCEDURES
Procedure/Surgery Information   [facility], [place]    Bedside Procedure Note  Date of Service (when I performed the procedure): 11/03/2017    Mindi Eastman is a 42 year old female patient.  1. Major depressive disorder, recurrent severe without psychotic features (H)      Past Medical History:   Diagnosis Date     ADHD      Anxiety      Bipolar disorder (H)      Depressive disorder      OCD (obsessive compulsive disorder)      ROXANNE (obstructive sleep apnea)     On CPAP     Seasonal allergies                           Procedures     [facility] [place] [facility], [place]   ECT Procedure Note   11/03/2017       Mindi Eastman [id number]   42 year old 1975     Patient Status: Outpatient    Is this the first in a series of 12 treatments?  No    History and Physical: Reviewed in medical record    Consent: Informed consent was signed by: patient    Date Consent Signed: 10/10/2017      Allergies   Allergen Reactions     Darvocet [Propoxyphene N-Apap]        Weight:  0 lbs 0 oz    Patient Preparations: Glasses/Contacts removed         Indications for ECT:   Medications ineffective, Psychotherapies ineffective and treatment resistant depression.         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 She has had numerous medication trials with minimal benefit.     #1 10/11:  The patient is alert and oriented x3. She has been NPO since midnight. Patient remains depressed. She notes some relief with starting this treatment as she is hopeful about it working.  #2 10/13:  The patient is alert and oriented x3. She has been NPO since midnight. Mood remains depressed. Other than headache and calf cramps she had no problems with the first treatment.  #3 10/16:  The patient is A&O x3 and NPO since midnight. She still has some leg cramps after treatment. No  "noted change in mood yet. Headache was well controlled with Toradol.  #4 10/18:  The patient is A&O x3 and NPO since midnight. Here for outpatient treatment. Patient notes headache for two days post treatment. She has stopped the oxy she was getting for her broken hand, so may be having rebound headaches.  #5 10/20:  The patient is A&O x3 and NPO since midnight. Patient reports improvements noted in her mood. Morphine worked well for her headache. She'd like something for nausea.  #6 10/23:  The patient is A&O x3 and NPO since midnight. Patient reports ongoing headache that starts up a few hours after treatment. Her primary provider doesn't want to prescribe anything. She feels that her mood is back to her \"best\" and was hoping this will be her last treatment.  #7 10/30:  The patient is A&O x3 and NPO since midnight. Patient indicates that Thursday after her last Monday treatment she started to have some slipping of her mood. She's had some passive SI. She'd like to try twice weekly treatment for a little while then taper off.  #8:  11/3/17  Patient returns for ECT for depression.  NPO after 2400.  Alert and Oriented x 3.  Mood is good.  She feels baseline and ready to go down to one treatment per week.           Diagnosis:      Major depressive disorder, recurrent severe without psychotic features (F33.2)  Consider bipolar depression         Pause for the Cause:     Right patient Yes   Right procedure/laterality settings: Yes          Intra-Procedure Documentation:         ECT #: 8   Treatment number this series: 8   Total treatment number: 8     Type of ECT:  Right, unilateral ultrabrief    ECT Medications:    Toradol: 30mg  Zofran: 4mg  Brevital: 80mg  Succinyl Choline: 70mg  Morphine 5 mg post treatment for headache    ECT Strip Summary:   Energy Level: 30 percent (decrease to 20 percent next tx)  Motor Seizure Duration:  97  seconds  EEG Seizure Duration:  104 seconds    Complications: No    Plan:  Cut ECT from " twice weekly to weekly.  Next ECT on 11/10/17  Pt sees Feli Medrano for psychiatry outpatient treatment.    Gave Rx for Tylenol 500mg tabs, take 1 to 2 tabs tid prn pain.      Umang Kennedy MD

## 2017-11-03 NOTE — ANESTHESIA POSTPROCEDURE EVALUATION
Patient: Mindi Eastman    * No procedures listed *    Diagnosis:Severe recurrent major depression without psychotic features (H) [F33.2]  Diagnosis Additional Information: No value filed.    Anesthesia Type:  General    Note:  Anesthesia Post Evaluation    Patient location during evaluation: PACU  Patient participation: Able to fully participate in evaluation  Level of consciousness: awake and alert  Pain management: adequate  Airway patency: patent  Cardiovascular status: acceptable  Respiratory status: acceptable  Hydration status: acceptable  PONV: none     Anesthetic complications: None          Last vitals:  Vitals:    11/03/17 0735 11/03/17 0835 11/03/17 0840   BP: 141/69 160/86 157/88   Pulse: 64 82 87   Resp: 16 24 24   Temp: 36.9  C (98.5  F)  36.8  C (98.3  F)   SpO2: 99% 94% 95%         Electronically Signed By: Lupe Chaney MD  November 3, 2017  8:52 AM

## 2017-11-10 ENCOUNTER — ANESTHESIA (OUTPATIENT)
Dept: BEHAVIORAL HEALTH | Facility: CLINIC | Age: 42
End: 2017-11-10

## 2017-11-10 ENCOUNTER — ANESTHESIA EVENT (OUTPATIENT)
Dept: BEHAVIORAL HEALTH | Facility: CLINIC | Age: 42
End: 2017-11-10

## 2017-11-10 ENCOUNTER — HOSPITAL ENCOUNTER (OUTPATIENT)
Dept: BEHAVIORAL HEALTH | Facility: CLINIC | Age: 42
End: 2017-11-10
Attending: PSYCHIATRY & NEUROLOGY
Payer: COMMERCIAL

## 2017-11-10 VITALS
HEART RATE: 76 BPM | OXYGEN SATURATION: 95 % | DIASTOLIC BLOOD PRESSURE: 62 MMHG | SYSTOLIC BLOOD PRESSURE: 121 MMHG | RESPIRATION RATE: 16 BRPM | TEMPERATURE: 97.2 F

## 2017-11-10 DIAGNOSIS — F33.2 MAJOR DEPRESSIVE DISORDER, RECURRENT SEVERE WITHOUT PSYCHOTIC FEATURES (H): ICD-10-CM

## 2017-11-10 PROCEDURE — 25000128 H RX IP 250 OP 636: Performed by: ANESTHESIOLOGY

## 2017-11-10 PROCEDURE — 25000125 ZZHC RX 250: Performed by: ANESTHESIOLOGY

## 2017-11-10 PROCEDURE — 40000671 ZZH STATISTIC ANESTHESIA CASE

## 2017-11-10 PROCEDURE — 90870 ELECTROCONVULSIVE THERAPY: CPT

## 2017-11-10 PROCEDURE — 25000128 H RX IP 250 OP 636: Performed by: PSYCHIATRY & NEUROLOGY

## 2017-11-10 RX ORDER — KETOROLAC TROMETHAMINE 30 MG/ML
30 INJECTION, SOLUTION INTRAMUSCULAR; INTRAVENOUS ONCE
Status: COMPLETED | OUTPATIENT
Start: 2017-11-10 | End: 2017-11-10

## 2017-11-10 RX ORDER — ONDANSETRON 2 MG/ML
4 INJECTION INTRAMUSCULAR; INTRAVENOUS ONCE
Status: CANCELLED
Start: 2017-11-10 | End: 2017-11-10

## 2017-11-10 RX ORDER — ACETAMINOPHEN AND CODEINE PHOSPHATE 300; 30 MG/1; MG/1
TABLET ORAL
Qty: 15 TABLET | Refills: 0 | Status: SHIPPED | OUTPATIENT
Start: 2017-11-10 | End: 2017-11-22

## 2017-11-10 RX ORDER — MORPHINE SULFATE 10 MG/ML
5 INJECTION, SOLUTION INTRAMUSCULAR; INTRAVENOUS ONCE
Status: CANCELLED
Start: 2017-11-10 | End: 2017-11-10

## 2017-11-10 RX ORDER — MORPHINE SULFATE 10 MG/ML
5 INJECTION, SOLUTION INTRAMUSCULAR; INTRAVENOUS ONCE
Status: COMPLETED | OUTPATIENT
Start: 2017-11-10 | End: 2017-11-10

## 2017-11-10 RX ORDER — ONDANSETRON 2 MG/ML
4 INJECTION INTRAMUSCULAR; INTRAVENOUS ONCE
Status: COMPLETED | OUTPATIENT
Start: 2017-11-10 | End: 2017-11-10

## 2017-11-10 RX ORDER — KETOROLAC TROMETHAMINE 30 MG/ML
30 INJECTION, SOLUTION INTRAMUSCULAR; INTRAVENOUS ONCE
Status: CANCELLED
Start: 2017-11-10 | End: 2017-11-10

## 2017-11-10 RX ADMIN — Medication 70 MG: at 08:05

## 2017-11-10 RX ADMIN — METHOHEXITAL SODIUM 80 MG: 500 INJECTION, POWDER, LYOPHILIZED, FOR SOLUTION INTRAMUSCULAR; INTRAVENOUS; RECTAL at 08:05

## 2017-11-10 RX ADMIN — MORPHINE SULFATE 5 MG: 10 INJECTION, SOLUTION INTRAMUSCULAR; INTRAVENOUS at 08:31

## 2017-11-10 RX ADMIN — ONDANSETRON 4 MG: 2 INJECTION INTRAMUSCULAR; INTRAVENOUS at 07:48

## 2017-11-10 RX ADMIN — KETOROLAC TROMETHAMINE 30 MG: 30 INJECTION, SOLUTION INTRAMUSCULAR at 07:45

## 2017-11-10 NOTE — PROCEDURES
Procedure/Surgery Information   [facility], [place]    Bedside Procedure Note  Date of Service (when I performed the procedure): 11/10/2017    Mindi Eastman is a 42 year old female patient.  1. Major depressive disorder, recurrent severe without psychotic features (H)      Past Medical History:   Diagnosis Date     ADHD      Anxiety      Bipolar disorder (H)      Depressive disorder      OCD (obsessive compulsive disorder)      ROXANNE (obstructive sleep apnea)     On CPAP     Seasonal allergies      Temp: 98.8  F (37.1  C) Temp src: Tympanic BP: 136/58   Heart Rate: 79 Resp: 16 SpO2: 98 %        Procedures     [facility], [place]   ECT Procedure Note   11/10/2017       Mindi Eastman [id number]   42 year old 1975     Patient Status: Outpatient    Is this the first in a series of 12 treatments?  No    History and Physical: Reviewed in medical record    Consent: Informed consent was signed by: patient    Date Consent Signed: 10/10/2017      Allergies   Allergen Reactions     Darvocet [Propoxyphene N-Apap]        Weight:  0 lbs 0 oz    Patient Preparations: Glasses/Contacts removed         Indications for ECT:   Medications ineffective, Psychotherapies ineffective and treatment resistant depression.         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 She has had numerous medication trials with minimal benefit.     #1 10/11:  The patient is alert and oriented x3. She has been NPO since midnight. Patient remains depressed. She notes some relief with starting this treatment as she is hopeful about it working.  #2 10/13:  The patient is alert and oriented x3. She has been NPO since midnight. Mood remains depressed. Other than headache and calf cramps she had no problems with the first treatment.  #3 10/16:  The patient is A&O  "x3 and NPO since midnight. She still has some leg cramps after treatment. No noted change in mood yet. Headache was well controlled with Toradol.  #4 10/18:  The patient is A&O x3 and NPO since midnight. Here for outpatient treatment. Patient notes headache for two days post treatment. She has stopped the oxy she was getting for her broken hand, so may be having rebound headaches.  #5 10/20:  The patient is A&O x3 and NPO since midnight. Patient reports improvements noted in her mood. Morphine worked well for her headache. She'd like something for nausea.  #6 10/23:  The patient is A&O x3 and NPO since midnight. Patient reports ongoing headache that starts up a few hours after treatment. Her primary provider doesn't want to prescribe anything. She feels that her mood is back to her \"best\" and was hoping this will be her last treatment.  #7 10/30:  The patient is A&O x3 and NPO since midnight. Patient indicates that Thursday after her last Monday treatment she started to have some slipping of her mood. She's had some passive SI. She'd like to try twice weekly treatment for a little while then taper off.  #8:  11/3/17  Patient returns for ECT for depression.  NPO after 2400.  Alert and Oriented x 3.  Mood is good.  She feels baseline and ready to go down to one treatment per week.    #9:  11/10/17  Pt returns for maintenance ECT 1 week after last treatment.  NPO after 2400.   Alert and Oriented x 3.  Pt gets bad LO's throughout the week.  Says Dr. Solares had given her a Rx for oxycodone, and she's out.  She's allertic to darvocet.            Diagnosis:      Major depressive disorder, recurrent severe without psychotic features (F33.2)  Consider bipolar depression         Pause for the Cause:     Right patient Yes   Right procedure/laterality settings: Yes          Intra-Procedure Documentation:         ECT #: 9   Treatment number this series: 9   Total treatment number: 9     Type of ECT:  Right, unilateral " ultrabrief    ECT Medications:    Toradol: 30mg  Zofran: 4mg  Brevital: 80mg  Succinyl Choline: 70mg  Morphine 5 mg post treatment for headache    ECT Strip Summary:   Energy Level: 20 percent   Motor Seizure Duration:   28 seconds  EEG Seizure Duration:    43 seconds    Complications: No    Plan:  Next ECT in 1 week on 11/17/17 (pt is on FMLA and STD and is  at [employer]).  Pt sees Feli Medrano for psychiatry outpatient treatment.    Gave Rx for Tylenol #3:  1-2 bid prn pain on ECT days only:  #15 tabs.          Umang Kennedy MD

## 2017-11-10 NOTE — IP AVS SNAPSHOT
MRN:[id number]                      After Visit Summary   11/10/2017    Mindi Eastman    MRN: [id number]           Visit Information        Provider Department      11/10/2017  7:15 AM Umang Kennedy MD [facility]           Review of your medicines      START taking        Dose / Directions    acetaminophen-codeine 300-30 MG per tablet   Commonly known as:  TYLENOL w/CODEINE No. 3   Used for:  Major depressive disorder, recurrent severe without psychotic features (H)        Take 1 to 2 tabs po bid prn on ECT days for headache   Quantity:  15 tablet   Refills:  0         CONTINUE these medicines which have NOT CHANGED        Dose / Directions    * acetaminophen 325 MG tablet   Commonly known as:  TYLENOL   Used for:  Acute non intractable tension-type headache        Dose:  975 mg   Take 3 tablets (975 mg) by mouth every 6 hours as needed for mild pain or other (Give before coming down for ECT)   Quantity:  100 tablet   Refills:  1       * acetaminophen 500 MG tablet   Commonly known as:  TYLENOL   Used for:  Major depressive disorder, recurrent severe without psychotic features (H)        Dose:  500 mg   Take 1 tablet (500 mg) by mouth 3 times daily as needed for mild pain   Quantity:  100 tablet   Refills:  1       ADDERALL XR PO        Dose:  60 mg   Take 60 mg by mouth daily   Refills:  0       escitalopram 20 MG tablet   Commonly known as:  LEXAPRO   Used for:  Major depressive disorder, recurrent severe without psychotic features (H)        Dose:  20 mg   Take 1 tablet (20 mg) by mouth daily   Quantity:  30 tablet   Refills:  1       hydrOXYzine 25 MG tablet   Commonly known as:  ATARAX   Used for:  SHANICE (generalized anxiety disorder)        Dose:  50 mg   Take 2 tablets (50 mg) by mouth 3 times daily as needed for anxiety   Quantity:  90 tablet   Refills:  1       ibuprofen 600 MG tablet   Commonly known as:  ADVIL/MOTRIN   Used for:  Acute non intractable  tension-type headache        Dose:  600 mg   Take 1 tablet (600 mg) by mouth every 6 hours as needed for moderate pain   Quantity:  120 tablet   Refills:  1       loratadine 10 MG tablet   Commonly known as:  CLARITIN        Dose:  10 mg   Take 10 mg by mouth daily   Refills:  0       LORazepam 1 MG tablet   Commonly known as:  ATIVAN   Used for:  SHANICE (generalized anxiety disorder)        Dose:  1 mg   Take 1 tablet (1 mg) by mouth 2 times daily   Quantity:  60 tablet   Refills:  0       lurasidone 40 MG Tabs tablet   Commonly known as:  LATUDA   Used for:  Bipolar I disorder (H), Major depressive disorder, recurrent severe without psychotic features (H)        Dose:  40 mg   Take 1 tablet (40 mg) by mouth At Bedtime   Quantity:  30 tablet   Refills:  1       nicotine 21 MG/24HR 24 hr patch   Commonly known as:  NICODERM CQ   Used for:  Tobacco use        Dose:  1 patch   Place 1 patch onto the skin daily   Quantity:  30 patch   Refills:  1       oxyCODONE IR 5 MG tablet   Commonly known as:  ROXICODONE   Used for:  Acute nonintractable headache, unspecified headache type        Dose:  5 mg   Take 1 tablet (5 mg) by mouth daily as needed (Post ECT headache)   Quantity:  10 tablet   Refills:  0       QUEtiapine 100 MG tablet   Commonly known as:  SEROquel   Used for:  Bipolar I disorder (H), Major depressive disorder, recurrent severe without psychotic features (H)        Dose:  100 mg   Take 1 tablet (100 mg) by mouth At Bedtime   Quantity:  30 tablet   Refills:  1       zolpidem 10 MG tablet   Commonly known as:  AMBIEN   Used for:  Insomnia due to other mental disorder        Dose:  10 mg   Take 1 tablet (10 mg) by mouth At Bedtime   Quantity:  30 tablet   Refills:  0       * Notice:  This list has 2 medication(s) that are the same as other medications prescribed for you. Read the directions carefully, and ask your doctor or other care provider to review them with you.         Where to get your medicines      Some  of these will need a paper prescription and others can be bought over the counter. Ask your nurse if you have questions.     Bring a paper prescription for each of these medications     acetaminophen-codeine 300-30 MG per tablet               Prescriptions were sent or printed at these locations (1 Prescription)                   [id number] - [place], [place] - [address]   [address] [address]    Telephone:  [phone]   Fax:  [phone]   Hours:                  Printed at Department/Unit printer (1 of 1)         acetaminophen-codeine (TYLENOL W/CODEINE NO. 3) 300-30 MG per tablet                 Protect others around you: Learn how to safely use, store and throw away your medicines at www.disposemymeds.org.         Follow-ups after your visit        Your next 10 appointments already scheduled     Nov 17, 2017  7:00 AM CST   Electroconvulsive Therapy with Umang Kennedy MD   [facility] ([facility])    [facility] [address] [facility] [id number] [phone]               Care Instructions        Further instructions from your care team       ECT Discharge Instructions      During your ECT series:      Do not drive for 24 hours after treatment.  If you will have more than one treatment within a week, no driving until 7 days after your last ECT treatment.    Do not drink alcohol or use street drugs (illicit drugs) while you are having treatments.    Do not make important decisions, including legal decisions.    After each treatment:      Get plenty of rest. A responsible adult must stay with your for at least 6 hours.    Avoid heavy or risky activities for 24 hours.    If you feel light-headed, sit for a few minutes before standing. Have someone help you get up.    If you have nausea (feel sick to your stomach): Drink only clear liquids such as apple juice, ginger ale, broth or 7UP, Be sure to drink plenty of liquids.  "Move to a normal diet as you feel able.     If you received Toradol, wait 6 hours before taking ibuprofen.    Call your doctor if:     You have a fever over 100F (37.7 C) (taken under the tongue), or a fever that last more than 24 hours.    Your IV site is red, swollen, very painful or is getting more tender.    You have nausea that gets very bad or does not improve.      If you have any symptoms after ECT, tell our staff before your next treatment.    The ECT Department can be reached at [phone].  The ECT Department is open ,  and  from 7:00 AM to 2:00 PM.      To speak to a doctor, call: Dr. Kennedy  Office #  [phone] and Fax # [phone]    Other: You had Toradol at 7:45 AM which is a NSAID medication.  Do not take any Ibuprofen, Motrin, Aspirin, Advil, Aleve, Excedrin, or any other NSAID medication until after 1:45 PM.   Questions,  check with your physician or pharmacist.    Dr. Kennedy gave you a prescription for Tylenol #3:  take 1-2 tabs twice a day as needed for pain on ECT days only.         Additional Information About Your Visit        [employer] Information     [employer] lets you send messages to your doctor, view your test results, renew your prescriptions, schedule appointments and more. To sign up, go to [email or web address] . Click on \"Log in\" on the left side of the screen, which will take you to the Welcome page. Then click on \"Sign up Now\" on the right side of the page.     You will be asked to enter the access code listed below, as well as some personal information. Please follow the directions to create your username and password.     Your access code is: [email or web address]  Expires: 2018  8:10 PM     Your access code will  in 90 days. If you need help or a new code, please call your [facility] clinic or [phone].        Care EveryWhere ID     This is your Care EveryWhere ID. This could be used by other organizations to access your [facility] medical " records  [id number]        Your Vitals Were     Blood Pressure Temperature Respirations Pulse Oximetry          136/58 98.8  F (37.1  C) (Tympanic) 16 98%         Primary Care Provider Office Phone # Fax #    Erica Coelho PA-C [phone]      Equal Access to Services     [facility] : Hadii aad ku hadasho Soomaali, waaxda luqadaha, qaybta kaalmada adeegyada, waxriccardo darlynin hayebonin adeshiraz charlton layeisontram . So [facility] [phone].    ATENCIÓN: Si habla español, tiene a martínez disposición servicios gratuitos de asistencia lingüística. [facility] [phone].    We comply with applicable federal civil rights laws and [place] laws. We do not discriminate on the basis of race, color, national origin, age, disability, sex, sexual orientation, or gender identity.            Thank you!     Thank you for choosing [facility] for your care. Our goal is always to provide you with excellent care. Hearing back from our patients is one way we can continue to improve our services. Please take a few minutes to complete the written survey that you may receive in the mail after you visit with us. Thank you!             Medication List: This is a list of all your medications and when to take them. Check marks below indicate your daily home schedule. Keep this list as a reference.      Medications           Morning Afternoon Evening Bedtime As Needed    * acetaminophen 325 MG tablet   Commonly known as:  TYLENOL   Take 3 tablets (975 mg) by mouth every 6 hours as needed for mild pain or other (Give before coming down for ECT)                                * acetaminophen 500 MG tablet   Commonly known as:  TYLENOL   Take 1 tablet (500 mg) by mouth 3 times daily as needed for mild pain                                acetaminophen-codeine 300-30 MG per tablet   Commonly known as:  TYLENOL w/CODEINE No. 3   Take 1 to 2 tabs po bid prn on ECT days for headache                                ADDERALL XR PO   Take 60 mg by mouth  daily                                escitalopram 20 MG tablet   Commonly known as:  LEXAPRO   Take 1 tablet (20 mg) by mouth daily                                hydrOXYzine 25 MG tablet   Commonly known as:  ATARAX   Take 2 tablets (50 mg) by mouth 3 times daily as needed for anxiety                                ibuprofen 600 MG tablet   Commonly known as:  ADVIL/MOTRIN   Take 1 tablet (600 mg) by mouth every 6 hours as needed for moderate pain                                loratadine 10 MG tablet   Commonly known as:  CLARITIN   Take 10 mg by mouth daily                                LORazepam 1 MG tablet   Commonly known as:  ATIVAN   Take 1 tablet (1 mg) by mouth 2 times daily                                lurasidone 40 MG Tabs tablet   Commonly known as:  LATUDA   Take 1 tablet (40 mg) by mouth At Bedtime                                nicotine 21 MG/24HR 24 hr patch   Commonly known as:  NICODERM CQ   Place 1 patch onto the skin daily                                oxyCODONE IR 5 MG tablet   Commonly known as:  ROXICODONE   Take 1 tablet (5 mg) by mouth daily as needed (Post ECT headache)                                QUEtiapine 100 MG tablet   Commonly known as:  SEROquel   Take 1 tablet (100 mg) by mouth At Bedtime                                zolpidem 10 MG tablet   Commonly known as:  AMBIEN   Take 1 tablet (10 mg) by mouth At Bedtime                                * Notice:  This list has 2 medication(s) that are the same as other medications prescribed for you. Read the directions carefully, and ask your doctor or other care provider to review them with you.

## 2017-11-10 NOTE — IP AVS SNAPSHOT
[facility]    [facility] [address] [facility] [address]    Phone:  [phone]                                       After Visit Summary   11/10/2017    Mindi Eastman    MRN: [id number]           After Visit Summary Signature Page     I have received my discharge instructions, and my questions have been answered. I have discussed any challenges I see with this plan with the nurse or doctor.    ..........................................................................................................................................  Patient/Patient Representative Signature      ..........................................................................................................................................  Patient Representative Print Name and Relationship to Patient    ..................................................               ................................................  Date                                            Time    ..........................................................................................................................................  Reviewed by Signature/Title    ...................................................              ..............................................  Date                                                            Time

## 2017-11-10 NOTE — ANESTHESIA POSTPROCEDURE EVALUATION
Patient: Mindi Eastman    * No procedures listed *    Diagnosis:Severe recurrent major depression without psychotic features (H) [F33.2]  Diagnosis Additional Information: No value filed.    Anesthesia Type:  General    Note:  Anesthesia Post Evaluation    Patient location during evaluation: PACU  Level of consciousness: awake  Airway patency: patent  Cardiovascular status: acceptable and stable  Hydration status: acceptable     Anesthetic complications: None          Last vitals:  Vitals:    11/10/17 0705   BP: 136/58   Resp: 16   Temp: 37.1  C (98.8  F)   SpO2: 98%         Electronically Signed By: Thomas Clark MD  November 10, 2017  8:22 AM

## 2017-11-10 NOTE — DISCHARGE INSTRUCTIONS
ECT Discharge Instructions      During your ECT series:      Do not drive for 24 hours after treatment.  If you will have more than one treatment within a week, no driving until 7 days after your last ECT treatment.    Do not drink alcohol or use street drugs (illicit drugs) while you are having treatments.    Do not make important decisions, including legal decisions.    After each treatment:      Get plenty of rest. A responsible adult must stay with your for at least 6 hours.    Avoid heavy or risky activities for 24 hours.    If you feel light-headed, sit for a few minutes before standing. Have someone help you get up.    If you have nausea (feel sick to your stomach): Drink only clear liquids such as apple juice, ginger ale, broth or 7UP, Be sure to drink plenty of liquids. Move to a normal diet as you feel able.     If you received Toradol, wait 6 hours before taking ibuprofen.    Call your doctor if:     You have a fever over 100F (37.7 C) (taken under the tongue), or a fever that last more than 24 hours.    Your IV site is red, swollen, very painful or is getting more tender.    You have nausea that gets very bad or does not improve.      If you have any symptoms after ECT, tell our staff before your next treatment.    The ECT Department can be reached at [phone].  The ECT Department is open Mondays, Wednesdays and Fridays from 7:00 AM to 2:00 PM.      To speak to a doctor, call: Dr. Kennedy  Office #  [phone] and Fax # [phone]    Other: You had Toradol at 7:45 AM which is a NSAID medication.  Do not take any Ibuprofen, Motrin, Aspirin, Advil, Aleve, Excedrin, or any other NSAID medication until after 1:45 PM.   Questions,  check with your physician or pharmacist.    Dr. Kennedy gave you a prescription for Tylenol #3:  take 1-2 tabs twice a day as needed for pain on ECT days only.

## 2017-11-17 ENCOUNTER — ANESTHESIA EVENT (OUTPATIENT)
Dept: BEHAVIORAL HEALTH | Facility: CLINIC | Age: 42
End: 2017-11-17

## 2017-11-17 ENCOUNTER — HOSPITAL ENCOUNTER (OUTPATIENT)
Dept: BEHAVIORAL HEALTH | Facility: CLINIC | Age: 42
End: 2017-11-17
Attending: PSYCHIATRY & NEUROLOGY
Payer: COMMERCIAL

## 2017-11-17 ENCOUNTER — ANESTHESIA (OUTPATIENT)
Dept: BEHAVIORAL HEALTH | Facility: CLINIC | Age: 42
End: 2017-11-17

## 2017-11-17 VITALS
OXYGEN SATURATION: 97 % | RESPIRATION RATE: 20 BRPM | SYSTOLIC BLOOD PRESSURE: 139 MMHG | DIASTOLIC BLOOD PRESSURE: 82 MMHG | HEART RATE: 65 BPM | TEMPERATURE: 98.6 F

## 2017-11-17 DIAGNOSIS — F33.2 MAJOR DEPRESSIVE DISORDER, RECURRENT SEVERE WITHOUT PSYCHOTIC FEATURES (H): ICD-10-CM

## 2017-11-17 PROCEDURE — 25000128 H RX IP 250 OP 636: Performed by: PSYCHIATRY & NEUROLOGY

## 2017-11-17 PROCEDURE — 25000128 H RX IP 250 OP 636: Performed by: ANESTHESIOLOGY

## 2017-11-17 PROCEDURE — 90870 ELECTROCONVULSIVE THERAPY: CPT

## 2017-11-17 PROCEDURE — 40000671 ZZH STATISTIC ANESTHESIA CASE

## 2017-11-17 PROCEDURE — 25000125 ZZHC RX 250: Performed by: ANESTHESIOLOGY

## 2017-11-17 RX ORDER — MORPHINE SULFATE 10 MG/ML
5 INJECTION, SOLUTION INTRAMUSCULAR; INTRAVENOUS ONCE
Status: CANCELLED
Start: 2017-11-17 | End: 2017-11-17

## 2017-11-17 RX ORDER — KETOROLAC TROMETHAMINE 30 MG/ML
30 INJECTION, SOLUTION INTRAMUSCULAR; INTRAVENOUS ONCE
Status: COMPLETED | OUTPATIENT
Start: 2017-11-17 | End: 2017-11-17

## 2017-11-17 RX ORDER — ONDANSETRON 2 MG/ML
4 INJECTION INTRAMUSCULAR; INTRAVENOUS ONCE
Status: COMPLETED | OUTPATIENT
Start: 2017-11-17 | End: 2017-11-17

## 2017-11-17 RX ORDER — ONDANSETRON 2 MG/ML
4 INJECTION INTRAMUSCULAR; INTRAVENOUS ONCE
Status: CANCELLED
Start: 2017-11-17 | End: 2017-11-17

## 2017-11-17 RX ORDER — MORPHINE SULFATE 10 MG/ML
5 INJECTION, SOLUTION INTRAMUSCULAR; INTRAVENOUS ONCE
Status: COMPLETED | OUTPATIENT
Start: 2017-11-17 | End: 2017-11-17

## 2017-11-17 RX ORDER — KETOROLAC TROMETHAMINE 30 MG/ML
30 INJECTION, SOLUTION INTRAMUSCULAR; INTRAVENOUS ONCE
Status: CANCELLED
Start: 2017-11-17 | End: 2017-11-17

## 2017-11-17 RX ADMIN — KETOROLAC TROMETHAMINE 30 MG: 30 INJECTION, SOLUTION INTRAMUSCULAR at 07:44

## 2017-11-17 RX ADMIN — MORPHINE SULFATE 5 MG: 10 INJECTION, SOLUTION INTRAMUSCULAR; INTRAVENOUS at 09:15

## 2017-11-17 RX ADMIN — METHOHEXITAL SODIUM 80 MG: 500 INJECTION, POWDER, LYOPHILIZED, FOR SOLUTION INTRAMUSCULAR; INTRAVENOUS; RECTAL at 07:54

## 2017-11-17 RX ADMIN — MORPHINE SULFATE 5 MG: 10 INJECTION, SOLUTION INTRAMUSCULAR; INTRAVENOUS at 08:21

## 2017-11-17 RX ADMIN — SODIUM CHLORIDE, POTASSIUM CHLORIDE, SODIUM LACTATE AND CALCIUM CHLORIDE 1000 ML: 600; 310; 30; 20 INJECTION, SOLUTION INTRAVENOUS at 08:41

## 2017-11-17 RX ADMIN — Medication 70 MG: at 07:54

## 2017-11-17 RX ADMIN — ONDANSETRON 4 MG: 2 INJECTION INTRAMUSCULAR; INTRAVENOUS at 07:43

## 2017-11-17 NOTE — PROCEDURES
Procedure/Surgery Information   [facility], [place]    Bedside Procedure Note  Date of Service (when I performed the procedure): 11/17/2017    Mindi Eastman is a 42 year old female patient.  1. Major depressive disorder, recurrent severe without psychotic features (H)      Past Medical History:   Diagnosis Date     ADHD      Anxiety      Bipolar disorder (H)      Depressive disorder      OCD (obsessive compulsive disorder)      ROXANNE (obstructive sleep apnea)     On CPAP     Seasonal allergies      Temp: 99.2  F (37.3  C)   BP: 123/71 Pulse: 77   Resp: 16 SpO2: 99 %        Procedures     [facility] [place], [place]   ECT Procedure Note   11/17/2017       Mindi Eastman [id number]   42 year old 1975     Patient Status: Outpatient    Is this the first in a series of 12 treatments?  No    History and Physical: Reviewed in medical record    Consent: Informed consent was signed by: patient    Date Consent Signed: 10/10/2017      Allergies   Allergen Reactions     Darvocet [Propoxyphene N-Apap]        Weight:  0 lbs 0 oz    Patient Preparations: Glasses/Contacts removed         Indications for ECT:   Medications ineffective, Psychotherapies ineffective and treatment resistant depression.         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 She has had numerous medication trials with minimal benefit.  She's returning for ECT for depression.  NPO after 2400.  Alert and Oriented x 3.  Pt's been more depressed.  Has SI without plan.  She asks re: admission but says she can keep self safe over the weekend.  She plans to come back on Monday for ECT.  Wants migraine prevention med.  She was referred to her primary care MD.           Diagnosis:      Major depressive disorder, recurrent severe without psychotic features (F33.2)  Consider  bipolar depression         Pause for the Cause:     Right patient Yes   Right procedure/laterality settings: Yes          Intra-Procedure Documentation:         ECT #: 10   Treatment number this series: 10   Total treatment number: 10     Type of ECT:  Right, unilateral ultrabrief    ECT Medications:    Toradol: 30mg  Zofran: 4mg  Brevital: 80mg  Succinyl Choline: 70mg  Morphine 5 mg post treatment for headache    ECT Strip Summary:   Energy Level: 23 percent   Motor Seizure Duration:   58 seconds  EEG Seizure Duration:    71 seconds    Complications: No    Plan:  Will do a few ECT in a row.  Mood was good when getting a series and slipped now with maintenance ECT.   (pt is on FMLA and STD and is  at [employer]).  Increase Latuda from 40 to 60 mg/d (pt has both 20 and 40mg tabs at home).   Pt sees Feli Medrano for psychiatry outpatient treatment.    Pt should see her primary MD for migraine prevention med.  She's not getting a Rx for more narcotics today.      Umang Kennedy MD

## 2017-11-17 NOTE — IP AVS SNAPSHOT
MRN:[id number]                      After Visit Summary   11/17/2017    Mindi Eastman    MRN: [id number]           Visit Information        Provider Department      11/17/2017  7:00 AM Umang Kennedy MD [facility]           Review of your medicines      CONTINUE these medicines which have NOT CHANGED        Dose / Directions    * acetaminophen 325 MG tablet   Commonly known as:  TYLENOL   Used for:  Acute non intractable tension-type headache        Dose:  975 mg   Take 3 tablets (975 mg) by mouth every 6 hours as needed for mild pain or other (Give before coming down for ECT)   Quantity:  100 tablet   Refills:  1       * acetaminophen 500 MG tablet   Commonly known as:  TYLENOL   Used for:  Major depressive disorder, recurrent severe without psychotic features (H)        Dose:  500 mg   Take 1 tablet (500 mg) by mouth 3 times daily as needed for mild pain   Quantity:  100 tablet   Refills:  1       acetaminophen-codeine 300-30 MG per tablet   Commonly known as:  TYLENOL w/CODEINE No. 3   Used for:  Major depressive disorder, recurrent severe without psychotic features (H)        Take 1 to 2 tabs po bid prn on ECT days for headache   Quantity:  15 tablet   Refills:  0       ADDERALL XR PO        Dose:  60 mg   Take 60 mg by mouth daily   Refills:  0       escitalopram 20 MG tablet   Commonly known as:  LEXAPRO   Used for:  Major depressive disorder, recurrent severe without psychotic features (H)        Dose:  20 mg   Take 1 tablet (20 mg) by mouth daily   Quantity:  30 tablet   Refills:  1       hydrOXYzine 25 MG tablet   Commonly known as:  ATARAX   Used for:  SHANICE (generalized anxiety disorder)        Dose:  50 mg   Take 2 tablets (50 mg) by mouth 3 times daily as needed for anxiety   Quantity:  90 tablet   Refills:  1       ibuprofen 600 MG tablet   Commonly known as:  ADVIL/MOTRIN   Used for:  Acute non intractable tension-type headache        Dose:  600 mg   Take 1  tablet (600 mg) by mouth every 6 hours as needed for moderate pain   Quantity:  120 tablet   Refills:  1       loratadine 10 MG tablet   Commonly known as:  CLARITIN        Dose:  10 mg   Take 10 mg by mouth daily   Refills:  0       LORazepam 1 MG tablet   Commonly known as:  ATIVAN   Used for:  SHANICE (generalized anxiety disorder)        Dose:  1 mg   Take 1 tablet (1 mg) by mouth 2 times daily   Quantity:  60 tablet   Refills:  0       lurasidone 40 MG Tabs tablet   Commonly known as:  LATUDA   Used for:  Bipolar I disorder (H), Major depressive disorder, recurrent severe without psychotic features (H)        Dose:  40 mg   Take 1 tablet (40 mg) by mouth At Bedtime   Quantity:  30 tablet   Refills:  1       nicotine 21 MG/24HR 24 hr patch   Commonly known as:  NICODERM CQ   Used for:  Tobacco use        Dose:  1 patch   Place 1 patch onto the skin daily   Quantity:  30 patch   Refills:  1       oxyCODONE IR 5 MG tablet   Commonly known as:  ROXICODONE   Used for:  Acute nonintractable headache, unspecified headache type        Dose:  5 mg   Take 1 tablet (5 mg) by mouth daily as needed (Post ECT headache)   Quantity:  10 tablet   Refills:  0       QUEtiapine 100 MG tablet   Commonly known as:  SEROquel   Used for:  Bipolar I disorder (H), Major depressive disorder, recurrent severe without psychotic features (H)        Dose:  100 mg   Take 1 tablet (100 mg) by mouth At Bedtime   Quantity:  30 tablet   Refills:  1       zolpidem 10 MG tablet   Commonly known as:  AMBIEN   Used for:  Insomnia due to other mental disorder        Dose:  10 mg   Take 1 tablet (10 mg) by mouth At Bedtime   Quantity:  30 tablet   Refills:  0       * Notice:  This list has 2 medication(s) that are the same as other medications prescribed for you. Read the directions carefully, and ask your doctor or other care provider to review them with you.             Protect others around you: Learn how to safely use, store and throw away your  medicines at www.disposemymeds.org.         Follow-ups after your visit        Your next 10 appointments already scheduled     Nov 20, 2017  7:00 AM CST   Electroconvulsive Therapy with Umang Kennedy MD   [facility] ([facility])    [address]  [facility] [address]   [phone]            Nov 22, 2017  8:15 AM CST   Electroconvulsive Therapy with Umang Kennedy MD   [facility] ([facility])    [address]  [facility] [address]   [phone]               Care Instructions        Further instructions from your care team       ECT Discharge Instructions      During your ECT series:      Do not drive for 24 hours after treatment.  If you will have more than one treatment within a week, no driving until 7 days after your last ECT treatment.    Do not drink alcohol or use street drugs (illicit drugs) while you are having treatments.    Do not make important decisions, including legal decisions.    After each treatment:      Get plenty of rest. A responsible adult must stay with your for at least 6 hours.    Avoid heavy or risky activities for 24 hours.    If you feel light-headed, sit for a few minutes before standing. Have someone help you get up.    If you have nausea (feel sick to your stomach): Drink only clear liquids such as apple juice, ginger ale, broth or 7UP, Be sure to drink plenty of liquids. Move to a normal diet as you feel able.     If you received Toradol, wait 6 hours before taking ibuprofen.    Call your doctor if:     You have a fever over 100F (37.7 C) (taken under the tongue), or a fever that last more than 24 hours.    Your IV site is red, swollen, very painful or is getting more tender.    You have nausea that gets very bad or does not improve.      If you have any symptoms after ECT, tell our staff before your next treatment.    The ECT Department can be  "reached at [phone].  The ECT Department is open ,  and  from 7:00 AM to 2:00 PM.      To speak to a doctor, call: Dr. Kennedy  Office #  [phone] and Fax # [phone]    Other: You had Toradol at 7:44 AM which is a NSAID medication.  Do not take any Ibuprofen, Motrin, Aspirin, Advil, Aleve, Excedrin, or any other NSAID medication until after 1:44 PM. Questions,  check with your physician or pharmacist.    Increase your Latuda from 40 to 60 mg/d (you have both 20 and 40mg tabs at home).     See your primary MD for migraine prevention medication.     Additional Information About Your Visit        [employer] Information     [employer] lets you send messages to your doctor, view your test results, renew your prescriptions, schedule appointments and more. To sign up, go to [email or web address] . Click on \"Log in\" on the left side of the screen, which will take you to the Welcome page. Then click on \"Sign up Now\" on the right side of the page.     You will be asked to enter the access code listed below, as well as some personal information. Please follow the directions to create your username and password.     Your access code is: [email or web address]  Expires: 2018  8:10 PM     Your access code will  in 90 days. If you need help or a new code, please call your [facility] clinic or [phone].        Care EveryWhere ID     This is your Care EveryWhere ID. This could be used by other organizations to access your [facility] medical records  [id number]        Your Vitals Were     Blood Pressure Pulse Temperature Respirations Pulse Oximetry       135/81 75 98.6  F (37  C) (Tympanic) 24 97%        Primary Care Provider Office Phone # Fax #    Erica Coelho PA-C [phone]      Equal Access to Services     AFRICA EDMONDSON : Evelia Irby, debbie henley, yamileth ortiz. [facility] [phone].    ATENCIÓN: Si " ann marie radford, tiene a martínez disposición servicios gratuitos de asistencia lingüística. Arun parker [phone].    We comply with applicable federal civil rights laws and [place] laws. We do not discriminate on the basis of race, color, national origin, age, disability, sex, sexual orientation, or gender identity.            Thank you!     Thank you for choosing [facility] for your care. Our goal is always to provide you with excellent care. Hearing back from our patients is one way we can continue to improve our services. Please take a few minutes to complete the written survey that you may receive in the mail after you visit with us. Thank you!             Medication List: This is a list of all your medications and when to take them. Check marks below indicate your daily home schedule. Keep this list as a reference.      Medications           Morning Afternoon Evening Bedtime As Needed    * acetaminophen 325 MG tablet   Commonly known as:  TYLENOL   Take 3 tablets (975 mg) by mouth every 6 hours as needed for mild pain or other (Give before coming down for ECT)                                * acetaminophen 500 MG tablet   Commonly known as:  TYLENOL   Take 1 tablet (500 mg) by mouth 3 times daily as needed for mild pain                                acetaminophen-codeine 300-30 MG per tablet   Commonly known as:  TYLENOL w/CODEINE No. 3   Take 1 to 2 tabs po bid prn on ECT days for headache                                ADDERALL XR PO   Take 60 mg by mouth daily                                escitalopram 20 MG tablet   Commonly known as:  LEXAPRO   Take 1 tablet (20 mg) by mouth daily                                hydrOXYzine 25 MG tablet   Commonly known as:  ATARAX   Take 2 tablets (50 mg) by mouth 3 times daily as needed for anxiety                                ibuprofen 600 MG tablet   Commonly known as:  ADVIL/MOTRIN   Take 1 tablet (600 mg) by mouth every 6 hours as needed for moderate pain                                 loratadine 10 MG tablet   Commonly known as:  CLARITIN   Take 10 mg by mouth daily                                LORazepam 1 MG tablet   Commonly known as:  ATIVAN   Take 1 tablet (1 mg) by mouth 2 times daily                                lurasidone 40 MG Tabs tablet   Commonly known as:  LATUDA   Take 1 tablet (40 mg) by mouth At Bedtime                                nicotine 21 MG/24HR 24 hr patch   Commonly known as:  NICODERM CQ   Place 1 patch onto the skin daily                                oxyCODONE IR 5 MG tablet   Commonly known as:  ROXICODONE   Take 1 tablet (5 mg) by mouth daily as needed (Post ECT headache)                                QUEtiapine 100 MG tablet   Commonly known as:  SEROquel   Take 1 tablet (100 mg) by mouth At Bedtime                                zolpidem 10 MG tablet   Commonly known as:  AMBIEN   Take 1 tablet (10 mg) by mouth At Bedtime                                * Notice:  This list has 2 medication(s) that are the same as other medications prescribed for you. Read the directions carefully, and ask your doctor or other care provider to review them with you.

## 2017-11-17 NOTE — ANESTHESIA POSTPROCEDURE EVALUATION
Patient: Mindi Eastman    * No procedures listed *    Diagnosis:Severe recurrent major depression without psychotic features (H) [F33.2]  Diagnosis Additional Information: No value filed.    Anesthesia Type:  General    Note:  Anesthesia Post Evaluation    Patient location during evaluation: PACU  Patient participation: Able to fully participate in evaluation  Level of consciousness: awake and alert  Pain management: adequate  Airway patency: patent  Cardiovascular status: acceptable  Hydration status: acceptable  PONV: none     Anesthetic complications: None          Last vitals:  Vitals:    11/17/17 0700 11/17/17 0804 11/17/17 0809   BP: 123/71 136/81 141/80   Pulse: 77 89 89   Resp: 16 24 26   Temp: 37.3  C (99.2  F)  37  C (98.6  F)   SpO2: 99% 96% 94%         Electronically Signed By: Lupe Chaney MD  November 17, 2017  8:15 AM

## 2017-11-17 NOTE — DISCHARGE INSTRUCTIONS
ECT Discharge Instructions      During your ECT series:      Do not drive for 24 hours after treatment.  If you will have more than one treatment within a week, no driving until 7 days after your last ECT treatment.    Do not drink alcohol or use street drugs (illicit drugs) while you are having treatments.    Do not make important decisions, including legal decisions.    After each treatment:      Get plenty of rest. A responsible adult must stay with your for at least 6 hours.    Avoid heavy or risky activities for 24 hours.    If you feel light-headed, sit for a few minutes before standing. Have someone help you get up.    If you have nausea (feel sick to your stomach): Drink only clear liquids such as apple juice, ginger ale, broth or 7UP, Be sure to drink plenty of liquids. Move to a normal diet as you feel able.     If you received Toradol, wait 6 hours before taking ibuprofen.    Call your doctor if:     You have a fever over 100F (37.7 C) (taken under the tongue), or a fever that last more than 24 hours.    Your IV site is red, swollen, very painful or is getting more tender.    You have nausea that gets very bad or does not improve.      If you have any symptoms after ECT, tell our staff before your next treatment.    The ECT Department can be reached at [phone].  The ECT Department is open Mondays, Wednesdays and Fridays from 7:00 AM to 2:00 PM.      To speak to a doctor, call: Dr. Kennedy  Office #  [phone] and Fax # [phone]    Other: You had Toradol at 7:44 AM which is a NSAID medication.  Do not take any Ibuprofen, Motrin, Aspirin, Advil, Aleve, Excedrin, or any other NSAID medication until after 1:44 PM. Questions,  check with your physician or pharmacist.    Increase your Latuda from 40 to 60 mg/d (you have both 20 and 40mg tabs at home).     See your primary MD for migraine prevention medication.

## 2017-11-17 NOTE — IP AVS SNAPSHOT
[facility]    [facility] [address] [address]    Phone:  [phone]                                       After Visit Summary   11/17/2017    Mindi Eastman    MRN: [id number]           After Visit Summary Signature Page     I have received my discharge instructions, and my questions have been answered. I have discussed any challenges I see with this plan with the nurse or doctor.    ..........................................................................................................................................  Patient/Patient Representative Signature      ..........................................................................................................................................  Patient Representative Print Name and Relationship to Patient    ..................................................               ................................................  Date                                            Time    ..........................................................................................................................................  Reviewed by Signature/Title    ...................................................              ..............................................  Date                                                            Time

## 2017-11-17 NOTE — PROCEDURES
Procedure/Surgery Information   [facility], [place]    Bedside Procedure Note  Date of Service (when I performed the procedure): 11/17/2017    Mindi Eastman is a 42 year old female patient.  1. Major depressive disorder, recurrent severe without psychotic features (H)      Past Medical History:   Diagnosis Date     ADHD      Anxiety      Bipolar disorder (H)      Depressive disorder      OCD (obsessive compulsive disorder)      ROXANNE (obstructive sleep apnea)     On CPAP     Seasonal allergies      Temp: 98.6  F (37  C) Temp src: Tympanic BP: 135/81 Pulse: 75   Resp: 24 SpO2: 97 % O2 Device: None (Room air) Oxygen Delivery: 10 LPM    Procedures     [facility], [place]   ECT Procedure Note   11/17/2017       Mindi Eastman [id number]   42 year old 1975     Patient Status: Outpatient    Is this the first in a series of 12 treatments?  No    History and Physical: Reviewed in medical record    Consent: Informed consent was signed by: patient    Date Consent Signed: 10/10/2017      Allergies   Allergen Reactions     Darvocet [Propoxyphene N-Apap]        Weight:  0 lbs 0 oz    Patient Preparations: Glasses/Contacts removed         Indications for ECT:   Medications ineffective, Psychotherapies ineffective and treatment resistant depression.         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 She has had numerous medication trials with minimal benefit.  She's returning for ECT for depression.  NPO after 2400.  Alert and Oriented x 3.  Pt's been more depressed.  Has SI without plan.  She asks re: admission but says she can keep self safe over the weekend.  She plans to come back on Monday for ECT.  Wants migraine prevention med.  She was referred to her primary care MD.           Diagnosis:      Major depressive disorder,  recurrent severe without psychotic features (F33.2)  Consider bipolar depression         Pause for the Cause:     Right patient Yes   Right procedure/laterality settings: Yes          Intra-Procedure Documentation:         ECT #: 10   Treatment number this series: 10   Total treatment number: 10     Type of ECT:  Right, unilateral ultrabrief    ECT Medications:    Toradol: 30mg  Zofran: 4mg  Brevital: 80mg  Succinyl Choline: 70mg  Morphine 5 mg post treatment for headache + 5mg repeat   Today:  Got lactated ringers after ECT for HA.      ECT Strip Summary:   Energy Level: 23 percent   Motor Seizure Duration:   58 seconds  EEG Seizure Duration:    71 seconds    Complications: No    Plan:  Will do a few ECT in a row.  Mood was good when getting a series and slipped now with maintenance ECT.   (pt is on FMLA and STD and is  at [employer]).  Increase Latuda from 40 to 60 mg/d (pt has both 20 and 40mg tabs at home).   Pt sees Feli Medrano for psychiatry outpatient treatment.    Pt should see her primary MD for migraine prevention med.  She's not getting a Rx for more narcotics today.      Umang Kennedy MD

## 2017-11-20 ENCOUNTER — ANESTHESIA EVENT (OUTPATIENT)
Dept: BEHAVIORAL HEALTH | Facility: CLINIC | Age: 42
End: 2017-11-20

## 2017-11-20 ENCOUNTER — HOSPITAL ENCOUNTER (OUTPATIENT)
Dept: BEHAVIORAL HEALTH | Facility: CLINIC | Age: 42
End: 2017-11-20
Attending: PSYCHIATRY & NEUROLOGY
Payer: COMMERCIAL

## 2017-11-20 ENCOUNTER — ANESTHESIA (OUTPATIENT)
Dept: BEHAVIORAL HEALTH | Facility: CLINIC | Age: 42
End: 2017-11-20

## 2017-11-20 VITALS
TEMPERATURE: 98.1 F | DIASTOLIC BLOOD PRESSURE: 58 MMHG | OXYGEN SATURATION: 95 % | RESPIRATION RATE: 16 BRPM | HEART RATE: 80 BPM | SYSTOLIC BLOOD PRESSURE: 120 MMHG

## 2017-11-20 DIAGNOSIS — F33.2 MAJOR DEPRESSIVE DISORDER, RECURRENT SEVERE WITHOUT PSYCHOTIC FEATURES (H): ICD-10-CM

## 2017-11-20 PROCEDURE — 90870 ELECTROCONVULSIVE THERAPY: CPT

## 2017-11-20 PROCEDURE — 25000128 H RX IP 250 OP 636: Performed by: ANESTHESIOLOGY

## 2017-11-20 PROCEDURE — 25000128 H RX IP 250 OP 636: Performed by: PSYCHIATRY & NEUROLOGY

## 2017-11-20 PROCEDURE — 40000671 ZZH STATISTIC ANESTHESIA CASE

## 2017-11-20 PROCEDURE — 25000125 ZZHC RX 250: Performed by: ANESTHESIOLOGY

## 2017-11-20 RX ORDER — MORPHINE SULFATE 10 MG/ML
5 INJECTION, SOLUTION INTRAMUSCULAR; INTRAVENOUS ONCE
Status: COMPLETED | OUTPATIENT
Start: 2017-11-20 | End: 2017-11-20

## 2017-11-20 RX ORDER — ONDANSETRON 2 MG/ML
4 INJECTION INTRAMUSCULAR; INTRAVENOUS ONCE
Status: CANCELLED
Start: 2017-11-20 | End: 2017-11-20

## 2017-11-20 RX ORDER — ONDANSETRON 2 MG/ML
4 INJECTION INTRAMUSCULAR; INTRAVENOUS ONCE
Status: COMPLETED | OUTPATIENT
Start: 2017-11-20 | End: 2017-11-20

## 2017-11-20 RX ORDER — MORPHINE SULFATE 10 MG/ML
5 INJECTION, SOLUTION INTRAMUSCULAR; INTRAVENOUS ONCE
Status: CANCELLED
Start: 2017-11-20 | End: 2017-11-20

## 2017-11-20 RX ORDER — KETOROLAC TROMETHAMINE 30 MG/ML
30 INJECTION, SOLUTION INTRAMUSCULAR; INTRAVENOUS ONCE
Status: CANCELLED
Start: 2017-11-20 | End: 2017-11-20

## 2017-11-20 RX ORDER — ACETAMINOPHEN AND CODEINE PHOSPHATE 300; 30 MG/1; MG/1
TABLET ORAL
Qty: 4 TABLET | Refills: 0 | Status: SHIPPED | OUTPATIENT
Start: 2017-11-20 | End: 2018-05-18

## 2017-11-20 RX ORDER — KETOROLAC TROMETHAMINE 30 MG/ML
30 INJECTION, SOLUTION INTRAMUSCULAR; INTRAVENOUS ONCE
Status: COMPLETED | OUTPATIENT
Start: 2017-11-20 | End: 2017-11-20

## 2017-11-20 RX ADMIN — ONDANSETRON 4 MG: 2 INJECTION INTRAMUSCULAR; INTRAVENOUS at 07:34

## 2017-11-20 RX ADMIN — KETOROLAC TROMETHAMINE 30 MG: 30 INJECTION, SOLUTION INTRAMUSCULAR at 07:34

## 2017-11-20 RX ADMIN — MORPHINE SULFATE 10 MG: 10 INJECTION, SOLUTION INTRAMUSCULAR; INTRAVENOUS at 08:54

## 2017-11-20 RX ADMIN — Medication 70 MG: at 08:27

## 2017-11-20 RX ADMIN — METHOHEXITAL SODIUM 80 MG: 500 INJECTION, POWDER, LYOPHILIZED, FOR SOLUTION INTRAMUSCULAR; INTRAVENOUS; RECTAL at 08:26

## 2017-11-20 RX ADMIN — SODIUM CHLORIDE, POTASSIUM CHLORIDE, SODIUM LACTATE AND CALCIUM CHLORIDE 1000 ML: 600; 310; 30; 20 INJECTION, SOLUTION INTRAVENOUS at 07:47

## 2017-11-20 NOTE — IP AVS SNAPSHOT
[facility]    [facility] [address] [address]    Phone:  [phone]                                       After Visit Summary   11/20/2017    Mindi Eastman    MRN: [id number]           After Visit Summary Signature Page     I have received my discharge instructions, and my questions have been answered. I have discussed any challenges I see with this plan with the nurse or doctor.    ..........................................................................................................................................  Patient/Patient Representative Signature      ..........................................................................................................................................  Patient Representative Print Name and Relationship to Patient    ..................................................               ................................................  Date                                            Time    ..........................................................................................................................................  Reviewed by Signature/Title    ...................................................              ..............................................  Date                                                            Time

## 2017-11-20 NOTE — IP AVS SNAPSHOT
MRN:[id number]                      After Visit Summary   11/20/2017    Mindi Eastman    MRN: [id number]           Visit Information        Provider Department      11/20/2017  7:00 AM Umang Kennedy MD [facility]           Review of your medicines      CONTINUE these medicines which may have CHANGED, or have new prescriptions. If we are uncertain of the size of tablets/capsules you have at home, strength may be listed as something that might have changed.        Dose / Directions    * acetaminophen-codeine 300-30 MG per tablet   Commonly known as:  TYLENOL w/CODEINE No. 3   This may have changed:  Another medication with the same name was added. Make sure you understand how and when to take each.   Used for:  Major depressive disorder, recurrent severe without psychotic features (H)        Take 1 to 2 tabs po bid prn on ECT days for headache   Quantity:  15 tablet   Refills:  0       * acetaminophen-codeine 300-30 MG per tablet   Commonly known as:  TYLENOL WITH CODEINE #3   This may have changed:  You were already taking a medication with the same name, and this prescription was added. Make sure you understand how and when to take each.   Used for:  Major depressive disorder, recurrent severe without psychotic features (H)        Take 1 to 2 po bid prn on ECT days only   Quantity:  4 tablet   Refills:  0       * Notice:  This list has 2 medication(s) that are the same as other medications prescribed for you. Read the directions carefully, and ask your doctor or other care provider to review them with you.      CONTINUE these medicines which have NOT CHANGED        Dose / Directions    * acetaminophen 325 MG tablet   Commonly known as:  TYLENOL   Used for:  Acute non intractable tension-type headache        Dose:  975 mg   Take 3 tablets (975 mg) by mouth every 6 hours as needed for mild pain or other (Give before coming down for ECT)   Quantity:  100 tablet   Refills:   1       * acetaminophen 500 MG tablet   Commonly known as:  TYLENOL   Used for:  Major depressive disorder, recurrent severe without psychotic features (H)        Dose:  500 mg   Take 1 tablet (500 mg) by mouth 3 times daily as needed for mild pain   Quantity:  100 tablet   Refills:  1       ADDERALL XR PO        Dose:  60 mg   Take 60 mg by mouth daily   Refills:  0       escitalopram 20 MG tablet   Commonly known as:  LEXAPRO   Used for:  Major depressive disorder, recurrent severe without psychotic features (H)        Dose:  20 mg   Take 1 tablet (20 mg) by mouth daily   Quantity:  30 tablet   Refills:  1       hydrOXYzine 25 MG tablet   Commonly known as:  ATARAX   Used for:  SHANICE (generalized anxiety disorder)        Dose:  50 mg   Take 2 tablets (50 mg) by mouth 3 times daily as needed for anxiety   Quantity:  90 tablet   Refills:  1       ibuprofen 600 MG tablet   Commonly known as:  ADVIL/MOTRIN   Used for:  Acute non intractable tension-type headache        Dose:  600 mg   Take 1 tablet (600 mg) by mouth every 6 hours as needed for moderate pain   Quantity:  120 tablet   Refills:  1       loratadine 10 MG tablet   Commonly known as:  CLARITIN        Dose:  10 mg   Take 10 mg by mouth daily   Refills:  0       LORazepam 1 MG tablet   Commonly known as:  ATIVAN   Used for:  SHANICE (generalized anxiety disorder)        Dose:  1 mg   Take 1 tablet (1 mg) by mouth 2 times daily   Quantity:  60 tablet   Refills:  0       lurasidone 40 MG Tabs tablet   Commonly known as:  LATUDA   Used for:  Bipolar I disorder (H), Major depressive disorder, recurrent severe without psychotic features (H)        Dose:  40 mg   Take 1 tablet (40 mg) by mouth At Bedtime   Quantity:  30 tablet   Refills:  1       nicotine 21 MG/24HR 24 hr patch   Commonly known as:  NICODERM CQ   Used for:  Tobacco use        Dose:  1 patch   Place 1 patch onto the skin daily   Quantity:  30 patch   Refills:  1       oxyCODONE IR 5 MG tablet   Commonly  known as:  ROXICODONE   Used for:  Acute nonintractable headache, unspecified headache type        Dose:  5 mg   Take 1 tablet (5 mg) by mouth daily as needed (Post ECT headache)   Quantity:  10 tablet   Refills:  0       QUEtiapine 100 MG tablet   Commonly known as:  SEROquel   Used for:  Bipolar I disorder (H), Major depressive disorder, recurrent severe without psychotic features (H)        Dose:  100 mg   Take 1 tablet (100 mg) by mouth At Bedtime   Quantity:  30 tablet   Refills:  1       zolpidem 10 MG tablet   Commonly known as:  AMBIEN   Used for:  Insomnia due to other mental disorder        Dose:  10 mg   Take 1 tablet (10 mg) by mouth At Bedtime   Quantity:  30 tablet   Refills:  0       * Notice:  This list has 2 medication(s) that are the same as other medications prescribed for you. Read the directions carefully, and ask your doctor or other care provider to review them with you.         Where to get your medicines      Some of these will need a paper prescription and others can be bought over the counter. Ask your nurse if you have questions.     Bring a paper prescription for each of these medications     acetaminophen-codeine 300-30 MG per tablet               Prescriptions were sent or printed at these locations (1 Prescription)                   [facility]/pharmacy #[id number] - [place] - [address]   [address] [facility] [address] [place] [address]    Telephone:  [phone]   Fax:  [phone]   Hours:                  Printed at Department/Unit printer (1 of 1)         acetaminophen-codeine (TYLENOL WITH CODEINE #3) 300-30 MG per tablet                 Protect others around you: Learn how to safely use, store and throw away your medicines at www.disposemymeds.org.         Follow-ups after your visit        Your next 10 appointments already scheduled     Nov 22, 2017  8:00 AM CST   Electroconvulsive Therapy with Umang Kennedy MD   [facility]  [facility]    [address]  [facility] [id number] [phone]               Care Instructions        Further instructions from your care team       ECT Discharge Instructions      During your ECT series:      Do not drive for 24 hours after treatment.  If you will have more than one treatment within a week, no driving until 7 days after your last ECT treatment.    Do not drink alcohol or use street drugs (illicit drugs) while you are having treatments.    Do not make important decisions, including legal decisions.    After each treatment:      Get plenty of rest. A responsible adult must stay with your for at least 6 hours.    Avoid heavy or risky activities for 24 hours.    If you feel light-headed, sit for a few minutes before standing. Have someone help you get up.    If you have nausea (feel sick to your stomach): Drink only clear liquids such as apple juice, ginger ale, broth or 7UP, Be sure to drink plenty of liquids. Move to a normal diet as you feel able.     If you received Toradol, wait 6 hours before taking ibuprofen.    Call your doctor if:     You have a fever over 100F (37.7 C) (taken under the tongue), or a fever that last more than 24 hours.    Your IV site is red, swollen, very painful or is getting more tender.    You have nausea that gets very bad or does not improve.      If you have any symptoms after ECT, tell our staff before your next treatment.    The ECT Department can be reached at [phone].  The ECT Department is open Mondays, Wednesdays and Fridays from 7:00 AM to 2:00 PM.      To speak to a doctor, call: Dr. Kennedy  Office #  [phone] and Fax # [phone]    Other: You had Toradol at 7:34 AM which is a NSAID medication.  Do not take any Ibuprofen, Motrin, Aspirin, Advil, Aleve, Excedrin, or any other NSAID medication until after 1:34 PM.  Questions,  check with your physician or pharmacist.    You had IV morphine 5mg at 8:55 AM for headache.    Please schedule  "an appointment with your primary MD or Urgent Care for migraine prophylaxis medication for your daily headaches.    Dr. Kennedy gave you a Rx for Tylenol #3:  1-2 tabs twice a day as needed for pain on ECT days only:  #4 tabs.        Additional Information About Your Visit        MyChart Information     [employer] lets you send messages to your doctor, view your test results, renew your prescriptions, schedule appointments and more. To sign up, go to [email or web address] ([email or web address] mi) . Click on \"Log in\" on the left side of the screen, which will take you to the Welcome page. Then click on \"Sign up Now\" on the right side of the page.     You will be asked to enter the access code listed below, as well as some personal information. Please follow the directions to create your username and password.     Your access code is: [email or web address]  Expires: 2018  8:10 PM     Your access code will  in 90 days. If you need help or a new code, please call your [facility] clinic or [phone].        Care EveryWhere ID     This is your Care EveryWhere ID. This could be used by other organizations to access your [facility] medical records  [id number]        Your Vitals Were     Blood Pressure Pulse Temperature Respirations Pulse Oximetry       134/64 92 98.1  F (36.7  C) (Temporal) 16 98%        Primary Care Provider Office Phone # Fax #    Erica Coelho PA-C [phone]      Equal Access to Services     ROXANNA EDMONDSON : Hadii aad ku hadasho Soomaali, waaxda luqadaha, qaybta kaalmada adeegyada, yamileth diehl . So [facility] [phone].    ATENCIÓN: Si habla español, tiene a martínez disposición servicios gratuitos de asistencia lingüística. Arun al [phone].    We comply with applicable federal civil rights laws and [place] laws. We do not discriminate on the basis of race, color, national origin, age, disability, sex, sexual orientation, or gender identity.            Thank you!     Thank you for " choosing [facility] for your care. Our goal is always to provide you with excellent care. Hearing back from our patients is one way we can continue to improve our services. Please take a few minutes to complete the written survey that you may receive in the mail after you visit with us. Thank you!             Medication List: This is a list of all your medications and when to take them. Check marks below indicate your daily home schedule. Keep this list as a reference.      Medications           Morning Afternoon Evening Bedtime As Needed    * acetaminophen 325 MG tablet   Commonly known as:  TYLENOL   Take 3 tablets (975 mg) by mouth every 6 hours as needed for mild pain or other (Give before coming down for ECT)                                * acetaminophen 500 MG tablet   Commonly known as:  TYLENOL   Take 1 tablet (500 mg) by mouth 3 times daily as needed for mild pain                                * acetaminophen-codeine 300-30 MG per tablet   Commonly known as:  TYLENOL w/CODEINE No. 3   Take 1 to 2 tabs po bid prn on ECT days for headache                                * acetaminophen-codeine 300-30 MG per tablet   Commonly known as:  TYLENOL WITH CODEINE #3   Take 1 to 2 po bid prn on ECT days only                                ADDERALL XR PO   Take 60 mg by mouth daily                                escitalopram 20 MG tablet   Commonly known as:  LEXAPRO   Take 1 tablet (20 mg) by mouth daily                                hydrOXYzine 25 MG tablet   Commonly known as:  ATARAX   Take 2 tablets (50 mg) by mouth 3 times daily as needed for anxiety                                ibuprofen 600 MG tablet   Commonly known as:  ADVIL/MOTRIN   Take 1 tablet (600 mg) by mouth every 6 hours as needed for moderate pain                                loratadine 10 MG tablet   Commonly known as:  CLARITIN   Take 10 mg by mouth daily                                LORazepam 1 MG tablet   Commonly known as:  ATIVAN   Take  1 tablet (1 mg) by mouth 2 times daily                                lurasidone 40 MG Tabs tablet   Commonly known as:  LATUDA   Take 1 tablet (40 mg) by mouth At Bedtime                                nicotine 21 MG/24HR 24 hr patch   Commonly known as:  NICODERM CQ   Place 1 patch onto the skin daily                                oxyCODONE IR 5 MG tablet   Commonly known as:  ROXICODONE   Take 1 tablet (5 mg) by mouth daily as needed (Post ECT headache)                                QUEtiapine 100 MG tablet   Commonly known as:  SEROquel   Take 1 tablet (100 mg) by mouth At Bedtime                                zolpidem 10 MG tablet   Commonly known as:  AMBIEN   Take 1 tablet (10 mg) by mouth At Bedtime                                * Notice:  This list has 4 medication(s) that are the same as other medications prescribed for you. Read the directions carefully, and ask your doctor or other care provider to review them with you.

## 2017-11-20 NOTE — ANESTHESIA POSTPROCEDURE EVALUATION
Patient: Mindi Eastman    * No procedures listed *    Diagnosis:Severe recurrent major depression without psychotic features (H) [F33.2]  Diagnosis Additional Information: No value filed.    Anesthesia Type:  General    Note:  Anesthesia Post Evaluation    Patient location during evaluation: PACU and Bedside  Patient participation: Able to fully participate in evaluation  Level of consciousness: awake  Pain management: adequate  Airway patency: patent  Cardiovascular status: acceptable  Respiratory status: acceptable  Hydration status: acceptable  PONV: none     Anesthetic complications: None          Last vitals:  Vitals:    11/20/17 0839 11/20/17 0849 11/20/17 0859   BP:  134/64 120/58   Pulse: 73 92 80   Resp: 16 16 16   Temp:      SpO2: 97% 98% 95%         Electronically Signed By: Perla Parada MD  November 20, 2017  9:13 AM

## 2017-11-20 NOTE — PROCEDURES
"Procedure/Surgery Information   [facility], [place]    Bedside Procedure Note  Date of Service (when I performed the procedure): 11/20/2017    Mindi Eastman is a 42 year old female patient.  1. Major depressive disorder, recurrent severe without psychotic features (H)      Past Medical History:   Diagnosis Date     ADHD      Anxiety      Bipolar disorder (H)      Depressive disorder      OCD (obsessive compulsive disorder)      ROXANNE (obstructive sleep apnea)     On CPAP     Seasonal allergies      Temp: 98  F (36.7  C)   BP: 117/74 Pulse: 66   Resp: 16 SpO2: 98 %        Procedures     [facility], [place]   ECT Procedure Note   11/20/2017       Mindi Eastman [id number]   42 year old 1975     Patient Status: Outpatient    Is this the first in a series of 12 treatments?  No    History and Physical: Reviewed in medical record    Consent: Informed consent was signed by: patient    Date Consent Signed: 10/10/2017      Allergies   Allergen Reactions     Darvocet [Propoxyphene N-Apap]        Weight:  0 lbs 0 oz    Patient Preparations: Glasses/Contacts removed         Indications for ECT:   Medications ineffective, Psychotherapies ineffective and treatment resistant depression.         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 She has had numerous medication trials with minimal benefit.  She's returning for ECT for depression.  NPO after 2400.  Alert and Oriented x 3.  Mood is better today.  She feels more \"stabilized.\"  Feels good today.  She got out of the house a few times on Saturday.  No appetite.  Still anhedonic.  No SI.  Sleep is ok.  Still had HA's this weekend.  She did not follow through with going to her primary care MD.  She's living with mom now and not her wife.  Mom had a CVA and can not drive pt to her " primary care MD.  Pt could also go to an Urgent Care to see a primary care MD for HA prophylaxis.  Pt is not asking for admission today.             Diagnosis:      Major depressive disorder, recurrent severe without psychotic features (F33.2)  Consider bipolar depression         Pause for the Cause:     Right patient Yes   Right procedure/laterality settings: Yes          Intra-Procedure Documentation:         ECT #: 10   Treatment number this series: 10   Total treatment number: 10     Type of ECT:  Right, unilateral ultrabrief    ECT Medications:    Toradol: 30mg  Zofran: 4mg  Brevital: 80mg  Succinyl Choline: 70mg  Morphine 5 mg post treatment for headache + 5mg repeat   Today:  Got lactated ringers after ECT for HA.      ECT Strip Summary:   Energy Level: 23 percent   Motor Seizure Duration:   55 seconds  EEG Seizure Duration:    77 seconds    Complications: No    Plan:    Next ECT is 11/22/17.  She's doing a few ECT in a row.    (Tt is on FMLA and STD and is  at [employer]).  Increased Latuda from 40 to 60 mg/d (pt has both 20 and 40mg tabs at home).   Pt sees Feli Medrano for psychiatry outpatient treatment.    Pt should see her primary MD or Urgent Care for migraine prophylaxis med.   This may not be a headache.  She has headaches every day, not just ECT days.    Gave Rx for Tylenol #3:  1-2 bid prn pain on ECT days only:  #4 tabs.      Umang Kennedy MD

## 2017-11-20 NOTE — DISCHARGE INSTRUCTIONS
ECT Discharge Instructions      During your ECT series:      Do not drive for 24 hours after treatment.  If you will have more than one treatment within a week, no driving until 7 days after your last ECT treatment.    Do not drink alcohol or use street drugs (illicit drugs) while you are having treatments.    Do not make important decisions, including legal decisions.    After each treatment:      Get plenty of rest. A responsible adult must stay with your for at least 6 hours.    Avoid heavy or risky activities for 24 hours.    If you feel light-headed, sit for a few minutes before standing. Have someone help you get up.    If you have nausea (feel sick to your stomach): Drink only clear liquids such as apple juice, ginger ale, broth or 7UP, Be sure to drink plenty of liquids. Move to a normal diet as you feel able.     If you received Toradol, wait 6 hours before taking ibuprofen.    Call your doctor if:     You have a fever over 100F (37.7 C) (taken under the tongue), or a fever that last more than 24 hours.    Your IV site is red, swollen, very painful or is getting more tender.    You have nausea that gets very bad or does not improve.      If you have any symptoms after ECT, tell our staff before your next treatment.    The ECT Department can be reached at [phone].  The ECT Department is open Mondays, Wednesdays and Fridays from 7:00 AM to 2:00 PM.      To speak to a doctor, call: Dr. Kennedy  Office #  [phone] and Fax # [phone]    Other: You had Toradol at 7:34 AM which is a NSAID medication.  Do not take any Ibuprofen, Motrin, Aspirin, Advil, Aleve, Excedrin, or any other NSAID medication until after 1:34 PM.  Questions,  check with your physician or pharmacist.    You had IV morphine 5mg at 8:55 AM for headache.    Please schedule an appointment with your primary MD or Urgent Care for migraine prophylaxis medication for your daily headaches.    Dr. Kennedy gave you a Rx for Tylenol #3:  1-2 tabs  twice a day as needed for pain on ECT days only:  #4 tabs.

## 2017-11-22 ENCOUNTER — ANESTHESIA EVENT (OUTPATIENT)
Dept: BEHAVIORAL HEALTH | Facility: CLINIC | Age: 42
End: 2017-11-22

## 2017-11-22 ENCOUNTER — ANESTHESIA (OUTPATIENT)
Dept: BEHAVIORAL HEALTH | Facility: CLINIC | Age: 42
End: 2017-11-22

## 2017-11-22 ENCOUNTER — HOSPITAL ENCOUNTER (OUTPATIENT)
Dept: BEHAVIORAL HEALTH | Facility: CLINIC | Age: 42
End: 2017-11-22
Attending: PSYCHIATRY & NEUROLOGY
Payer: COMMERCIAL

## 2017-11-22 VITALS
SYSTOLIC BLOOD PRESSURE: 147 MMHG | TEMPERATURE: 98.2 F | DIASTOLIC BLOOD PRESSURE: 67 MMHG | OXYGEN SATURATION: 97 % | RESPIRATION RATE: 20 BRPM

## 2017-11-22 DIAGNOSIS — F33.2 MAJOR DEPRESSIVE DISORDER, RECURRENT SEVERE WITHOUT PSYCHOTIC FEATURES (H): ICD-10-CM

## 2017-11-22 PROCEDURE — 40000671 ZZH STATISTIC ANESTHESIA CASE

## 2017-11-22 PROCEDURE — 90870 ELECTROCONVULSIVE THERAPY: CPT

## 2017-11-22 PROCEDURE — 25000128 H RX IP 250 OP 636: Performed by: PSYCHIATRY & NEUROLOGY

## 2017-11-22 PROCEDURE — 25000128 H RX IP 250 OP 636: Performed by: ANESTHESIOLOGY

## 2017-11-22 PROCEDURE — 25000125 ZZHC RX 250: Performed by: ANESTHESIOLOGY

## 2017-11-22 RX ORDER — ONDANSETRON 2 MG/ML
4 INJECTION INTRAMUSCULAR; INTRAVENOUS ONCE
Status: CANCELLED
Start: 2017-11-22 | End: 2017-11-22

## 2017-11-22 RX ORDER — KETOROLAC TROMETHAMINE 30 MG/ML
30 INJECTION, SOLUTION INTRAMUSCULAR; INTRAVENOUS ONCE
Status: CANCELLED
Start: 2017-11-22 | End: 2017-11-22

## 2017-11-22 RX ORDER — MORPHINE SULFATE 10 MG/ML
5 INJECTION, SOLUTION INTRAMUSCULAR; INTRAVENOUS ONCE
Status: CANCELLED
Start: 2017-11-22 | End: 2017-11-22

## 2017-11-22 RX ORDER — ONDANSETRON 2 MG/ML
4 INJECTION INTRAMUSCULAR; INTRAVENOUS ONCE
Status: COMPLETED | OUTPATIENT
Start: 2017-11-22 | End: 2017-11-22

## 2017-11-22 RX ORDER — KETOROLAC TROMETHAMINE 30 MG/ML
30 INJECTION, SOLUTION INTRAMUSCULAR; INTRAVENOUS ONCE
Status: COMPLETED | OUTPATIENT
Start: 2017-11-22 | End: 2017-11-22

## 2017-11-22 RX ORDER — MORPHINE SULFATE 10 MG/ML
5 INJECTION, SOLUTION INTRAMUSCULAR; INTRAVENOUS ONCE
Status: COMPLETED | OUTPATIENT
Start: 2017-11-22 | End: 2017-11-22

## 2017-11-22 RX ADMIN — KETOROLAC TROMETHAMINE 30 MG: 30 INJECTION, SOLUTION INTRAMUSCULAR at 08:17

## 2017-11-22 RX ADMIN — METHOHEXITAL SODIUM 80 MG: 500 INJECTION, POWDER, LYOPHILIZED, FOR SOLUTION INTRAMUSCULAR; INTRAVENOUS; RECTAL at 08:29

## 2017-11-22 RX ADMIN — SODIUM CHLORIDE, POTASSIUM CHLORIDE, SODIUM LACTATE AND CALCIUM CHLORIDE 1000 ML: 600; 310; 30; 20 INJECTION, SOLUTION INTRAVENOUS at 08:27

## 2017-11-22 RX ADMIN — ONDANSETRON 4 MG: 2 INJECTION INTRAMUSCULAR; INTRAVENOUS at 08:17

## 2017-11-22 RX ADMIN — MORPHINE SULFATE 10 MG: 10 INJECTION, SOLUTION INTRAMUSCULAR; INTRAVENOUS at 08:46

## 2017-11-22 RX ADMIN — Medication 70 MG: at 08:29

## 2017-11-22 NOTE — ANESTHESIA POSTPROCEDURE EVALUATION
Patient: Mindi Eastman    * No procedures listed *    Diagnosis:Severe recurrent major depression without psychotic features (H) [F33.2]  Diagnosis Additional Information: No value filed.    Anesthesia Type:  General    Note:  Anesthesia Post Evaluation    Patient location during evaluation: ECT PACU  Patient participation: Able to fully participate in evaluation  Level of consciousness: awake  Pain management: adequate  Airway patency: patent  Cardiovascular status: acceptable  Respiratory status: acceptable  Hydration status: acceptable  PONV: none     Anesthetic complications: None          Last vitals:  Vitals:    11/22/17 0836 11/22/17 0841 11/22/17 0851   BP: 127/77 134/63 144/74   Resp:      Temp:  36.8  C (98.2  F)    SpO2: 96% 96% 95%         Electronically Signed By: Yonatan Evans MD  November 22, 2017  8:54 AM

## 2017-11-22 NOTE — IP AVS SNAPSHOT
MRN:[id number]                      After Visit Summary   11/22/2017    Mindi Eastman    MRN: [id number]           Visit Information        Provider Department      11/22/2017  8:00 AM Umang Kennedy MD [facility]           Review of your medicines      CONTINUE these medicines which have NOT CHANGED        Dose / Directions    * acetaminophen 325 MG tablet   Commonly known as:  TYLENOL   Used for:  Acute non intractable tension-type headache        Dose:  975 mg   Take 3 tablets (975 mg) by mouth every 6 hours as needed for mild pain or other (Give before coming down for ECT)   Quantity:  100 tablet   Refills:  1       * acetaminophen 500 MG tablet   Commonly known as:  TYLENOL   Used for:  Major depressive disorder, recurrent severe without psychotic features (H)        Dose:  500 mg   Take 1 tablet (500 mg) by mouth 3 times daily as needed for mild pain   Quantity:  100 tablet   Refills:  1       acetaminophen-codeine 300-30 MG per tablet   Commonly known as:  TYLENOL WITH CODEINE #3   Used for:  Major depressive disorder, recurrent severe without psychotic features (H)        Take 1 to 2 po bid prn on ECT days only   Quantity:  4 tablet   Refills:  0       ADDERALL XR PO        Dose:  60 mg   Take 60 mg by mouth daily   Refills:  0       escitalopram 20 MG tablet   Commonly known as:  LEXAPRO   Used for:  Major depressive disorder, recurrent severe without psychotic features (H)        Dose:  20 mg   Take 1 tablet (20 mg) by mouth daily   Quantity:  30 tablet   Refills:  1       hydrOXYzine 25 MG tablet   Commonly known as:  ATARAX   Used for:  SHANICE (generalized anxiety disorder)        Dose:  50 mg   Take 2 tablets (50 mg) by mouth 3 times daily as needed for anxiety   Quantity:  90 tablet   Refills:  1       ibuprofen 600 MG tablet   Commonly known as:  ADVIL/MOTRIN   Used for:  Acute non intractable tension-type headache        Dose:  600 mg   Take 1 tablet (600  mg) by mouth every 6 hours as needed for moderate pain   Quantity:  120 tablet   Refills:  1       loratadine 10 MG tablet   Commonly known as:  CLARITIN        Dose:  10 mg   Take 10 mg by mouth daily   Refills:  0       LORazepam 1 MG tablet   Commonly known as:  ATIVAN   Used for:  SHANICE (generalized anxiety disorder)        Dose:  1 mg   Take 1 tablet (1 mg) by mouth 2 times daily   Quantity:  60 tablet   Refills:  0       lurasidone 40 MG Tabs tablet   Commonly known as:  LATUDA   Used for:  Bipolar I disorder (H), Major depressive disorder, recurrent severe without psychotic features (H)        Dose:  40 mg   Take 1 tablet (40 mg) by mouth At Bedtime   Quantity:  30 tablet   Refills:  1       nicotine 21 MG/24HR 24 hr patch   Commonly known as:  NICODERM CQ   Used for:  Tobacco use        Dose:  1 patch   Place 1 patch onto the skin daily   Quantity:  30 patch   Refills:  1       QUEtiapine 100 MG tablet   Commonly known as:  SEROquel   Used for:  Bipolar I disorder (H), Major depressive disorder, recurrent severe without psychotic features (H)        Dose:  100 mg   Take 1 tablet (100 mg) by mouth At Bedtime   Quantity:  30 tablet   Refills:  1       zolpidem 10 MG tablet   Commonly known as:  AMBIEN   Used for:  Insomnia due to other mental disorder        Dose:  10 mg   Take 1 tablet (10 mg) by mouth At Bedtime   Quantity:  30 tablet   Refills:  0       * Notice:  This list has 2 medication(s) that are the same as other medications prescribed for you. Read the directions carefully, and ask your doctor or other care provider to review them with you.             Protect others around you: Learn how to safely use, store and throw away your medicines at www.disposemymeds.org.         Follow-ups after your visit         Care Instructions        Further instructions from your care team       ECT Discharge Instructions      During your ECT series:      Do not drive for 24 hours after treatment.  If you will have  more than one treatment within a week, no driving until 7 days after your last ECT treatment.    Do not drink alcohol or use street drugs (illicit drugs) while you are having treatments.    Do not make important decisions, including legal decisions.    After each treatment:      Get plenty of rest. A responsible adult must stay with your for at least 6 hours.    Avoid heavy or risky activities for 24 hours.    If you feel light-headed, sit for a few minutes before standing. Have someone help you get up.    If you have nausea (feel sick to your stomach): Drink only clear liquids such as apple juice, ginger ale, broth or 7UP, Be sure to drink plenty of liquids. Move to a normal diet as you feel able.     If you received Toradol, wait 6 hours before taking ibuprofen.    Call your doctor if:     You have a fever over 100F (37.7 C) (taken under the tongue), or a fever that last more than 24 hours.    Your IV site is red, swollen, very painful or is getting more tender.    You have nausea that gets very bad or does not improve.      If you have any symptoms after ECT, tell our staff before your next treatment.    The ECT Department can be reached at [phone].  The ECT Department is open Mondays, Wednesdays and Fridays from 7:00 AM to 2:00 PM.      To speak to a doctor, call: Dr. Kennedy  Office #  [phone] and Fax # [phone]    Other: You had Toradol at 8:17 AM  which is a NSAID medication.  Do not take any Ibuprofen, Motrin, Aspirin, Advil, Aleve, Excedrin, or any other NSAID medication until after  2:17 PM.   Questions,  check with your physician or pharmacist.    You have completed your ECT series.  Return to work on 11/29/17.    Dr. Kennedy increased Latuda from 40 to 60 mg/day (you have both 20 and 40mg tabs at home).        Additional Information About Your Visit        MyChart Information     [employer] lets you send messages to your doctor, view your test results, renew your prescriptions, schedule appointments  "and more. To sign up, go to [email or web address] . Click on \"Log in\" on the left side of the screen, which will take you to the Welcome page. Then click on \"Sign up Now\" on the right side of the page.     You will be asked to enter the access code listed below, as well as some personal information. Please follow the directions to create your username and password.     Your access code is: [email or web address]  Expires: 2018  8:10 PM     Your access code will  in 90 days. If you need help or a new code, please call your [facility] clinic or [phone].        Care EveryWhere ID     This is your Care EveryWhere ID. This could be used by other organizations to access your [facility] medical records  [id number]        Your Vitals Were     Blood Pressure Temperature Respirations Pulse Oximetry          134/63 98.2  F (36.8  C) (Tympanic) 16 96%         Primary Care Provider Office Phone # Fax #    Erica Coelho PA-C [phone]      Equal Access to Services     [facility] : Hadii tonia ku hadvikki Sosara, waaxda luafshinadaha, qaybta kaalanand [place], yamileth diehl . So [facility] [phone].    ATENCIÓN: Si habla español, tiene a martínez disposición servicios gratuitos de asistencia lingüística. Arun al [phone].    We comply with applicable federal civil rights laws and [place] laws. We do not discriminate on the basis of race, color, national origin, age, disability, sex, sexual orientation, or gender identity.            Thank you!     Thank you for choosing [facility] for your care. Our goal is always to provide you with excellent care. Hearing back from our patients is one way we can continue to improve our services. Please take a few minutes to complete the written survey that you may receive in the mail after you visit with us. Thank you!             Medication List: This is a list of all your medications and when to take them. Check marks below indicate your daily home " schedule. Keep this list as a reference.      Medications           Morning Afternoon Evening Bedtime As Needed    * acetaminophen 325 MG tablet   Commonly known as:  TYLENOL   Take 3 tablets (975 mg) by mouth every 6 hours as needed for mild pain or other (Give before coming down for ECT)                                * acetaminophen 500 MG tablet   Commonly known as:  TYLENOL   Take 1 tablet (500 mg) by mouth 3 times daily as needed for mild pain                                acetaminophen-codeine 300-30 MG per tablet   Commonly known as:  TYLENOL WITH CODEINE #3   Take 1 to 2 po bid prn on ECT days only                                ADDERALL XR PO   Take 60 mg by mouth daily                                escitalopram 20 MG tablet   Commonly known as:  LEXAPRO   Take 1 tablet (20 mg) by mouth daily                                hydrOXYzine 25 MG tablet   Commonly known as:  ATARAX   Take 2 tablets (50 mg) by mouth 3 times daily as needed for anxiety                                ibuprofen 600 MG tablet   Commonly known as:  ADVIL/MOTRIN   Take 1 tablet (600 mg) by mouth every 6 hours as needed for moderate pain                                loratadine 10 MG tablet   Commonly known as:  CLARITIN   Take 10 mg by mouth daily                                LORazepam 1 MG tablet   Commonly known as:  ATIVAN   Take 1 tablet (1 mg) by mouth 2 times daily                                lurasidone 40 MG Tabs tablet   Commonly known as:  LATUDA   Take 1 tablet (40 mg) by mouth At Bedtime                                nicotine 21 MG/24HR 24 hr patch   Commonly known as:  NICODERM CQ   Place 1 patch onto the skin daily                                QUEtiapine 100 MG tablet   Commonly known as:  SEROquel   Take 1 tablet (100 mg) by mouth At Bedtime                                zolpidem 10 MG tablet   Commonly known as:  AMBIEN   Take 1 tablet (10 mg) by mouth At Bedtime                                * Notice:  This  list has 2 medication(s) that are the same as other medications prescribed for you. Read the directions carefully, and ask your doctor or other care provider to review them with you.

## 2017-11-22 NOTE — DISCHARGE INSTRUCTIONS
ECT Discharge Instructions      During your ECT series:      Do not drive for 24 hours after treatment.  If you will have more than one treatment within a week, no driving until 7 days after your last ECT treatment.    Do not drink alcohol or use street drugs (illicit drugs) while you are having treatments.    Do not make important decisions, including legal decisions.    After each treatment:      Get plenty of rest. A responsible adult must stay with your for at least 6 hours.    Avoid heavy or risky activities for 24 hours.    If you feel light-headed, sit for a few minutes before standing. Have someone help you get up.    If you have nausea (feel sick to your stomach): Drink only clear liquids such as apple juice, ginger ale, broth or 7UP, Be sure to drink plenty of liquids. Move to a normal diet as you feel able.     If you received Toradol, wait 6 hours before taking ibuprofen.    Call your doctor if:     You have a fever over 100F (37.7 C) (taken under the tongue), or a fever that last more than 24 hours.    Your IV site is red, swollen, very painful or is getting more tender.    You have nausea that gets very bad or does not improve.      If you have any symptoms after ECT, tell our staff before your next treatment.    The ECT Department can be reached at [phone].  The ECT Department is open Mondays, Wednesdays and Fridays from 7:00 AM to 2:00 PM.      To speak to a doctor, call: Dr. Kennedy  Office #  [phone] and Fax # [phone]    Other: You had Toradol at 8:17 AM  which is a NSAID medication.  Do not take any Ibuprofen, Motrin, Aspirin, Advil, Aleve, Excedrin, or any other NSAID medication until after  2:17 PM.   Questions,  check with your physician or pharmacist.    You have completed your ECT series.  Return to work on 11/29/17.    Dr. Kennedy increased Latuda from 40 to 60 mg/day (you have both 20 and 40mg tabs at home).

## 2017-11-22 NOTE — IP AVS SNAPSHOT
[facility]    [facility] [address] [address]    Phone:  [phone]                                       After Visit Summary   11/22/2017    Mindi Eastman    MRN: [id number]           After Visit Summary Signature Page     I have received my discharge instructions, and my questions have been answered. I have discussed any challenges I see with this plan with the nurse or doctor.    ..........................................................................................................................................  Patient/Patient Representative Signature      ..........................................................................................................................................  Patient Representative Print Name and Relationship to Patient    ..................................................               ................................................  Date                                            Time    ..........................................................................................................................................  Reviewed by Signature/Title    ...................................................              ..............................................  Date                                                            Time

## 2017-11-22 NOTE — PROCEDURES
Procedure/Surgery Information   [facility], [place]    Bedside Procedure Note  Date of Service (when I performed the procedure): 11/22/2017    Mindi Eastman is a 42 year old female patient.  1. Major depressive disorder, recurrent severe without psychotic features (H)      Past Medical History:   Diagnosis Date     ADHD      Anxiety      Bipolar disorder (H)      Depressive disorder      OCD (obsessive compulsive disorder)      ROXANNE (obstructive sleep apnea)     On CPAP     Seasonal allergies      Temp: 98  F (36.7  C) Temp src: Tympanic BP: 133/74   Heart Rate: 66 Resp: 16 SpO2: 98 %        Procedures     [facility]   ECT Procedure Note   11/22/2017       Mindi Eastman [id number]   42 year old 1975     Patient Status: Outpatient    Is this the first in a series of 12 treatments?  No    History and Physical: Reviewed in medical record    Consent: Informed consent was signed by: patient    Date Consent Signed: 10/10/2017      Allergies   Allergen Reactions     Darvocet [Propoxyphene N-Apap]        Weight:  0 lbs 0 oz    Patient Preparations: Glasses/Contacts removed         Indications for ECT:   Medications ineffective, Psychotherapies ineffective and treatment resistant depression.         Clinical Narrative:   Patient is returning for ECT for depression.  NPO after 2400.  Alert and Oriented x 3.  Mood is good.  She wants to end ECT today and return to work in a week.           Diagnosis:      Major depressive disorder, recurrent severe without psychotic features (F33.2)  Consider bipolar depression         Pause for the Cause:     Right patient Yes   Right procedure/laterality settings: Yes          Intra-Procedure Documentation:         ECT #: 11   Treatment number this series: 11   Total treatment number: 11     Type of ECT:  Right, unilateral ultrabrief    ECT Medications:    Toradol: 30mg  Zofran: 4mg  Brevital: 80mg  Succinyl  Choline: 70mg  Morphine 5 mg post treatment for headache + 5mg repeat   Lactated Ringers 1 liter      ECT Strip Summary:   Energy Level: 23 percent   Motor Seizure Duration: 62 seconds  EEG Seizure Duration:  88 seconds    Complications: No    Plan:    Patient is completing her series of ECT today.    She's returning to work in 1 week on 11/29/17.  Patient is a  at [employer]).  Increased Latuda from 40 to 60 mg/d (pt has both 20 and 40mg tabs at home).   Pt sees Feli Jacobson in [place] for psychiatry outpatient treatment.        Umang Kennedy MD

## 2018-05-18 ENCOUNTER — ANESTHESIA EVENT (OUTPATIENT)
Dept: BEHAVIORAL HEALTH | Facility: CLINIC | Age: 43
End: 2018-05-18

## 2018-05-18 ENCOUNTER — ANESTHESIA (OUTPATIENT)
Dept: BEHAVIORAL HEALTH | Facility: CLINIC | Age: 43
End: 2018-05-18

## 2018-05-18 ENCOUNTER — HOSPITAL ENCOUNTER (OUTPATIENT)
Dept: BEHAVIORAL HEALTH | Facility: CLINIC | Age: 43
End: 2018-05-18
Attending: PSYCHIATRY & NEUROLOGY
Payer: COMMERCIAL

## 2018-05-18 VITALS
HEART RATE: 72 BPM | DIASTOLIC BLOOD PRESSURE: 70 MMHG | RESPIRATION RATE: 16 BRPM | TEMPERATURE: 98.5 F | SYSTOLIC BLOOD PRESSURE: 140 MMHG | OXYGEN SATURATION: 97 %

## 2018-05-18 DIAGNOSIS — F33.2 MAJOR DEPRESSIVE DISORDER, RECURRENT SEVERE WITHOUT PSYCHOTIC FEATURES (H): ICD-10-CM

## 2018-05-18 PROCEDURE — 25000128 H RX IP 250 OP 636: Performed by: PSYCHIATRY & NEUROLOGY

## 2018-05-18 PROCEDURE — 90870 ELECTROCONVULSIVE THERAPY: CPT

## 2018-05-18 PROCEDURE — 25000125 ZZHC RX 250: Performed by: ANESTHESIOLOGY

## 2018-05-18 PROCEDURE — 40000671 ZZH STATISTIC ANESTHESIA CASE

## 2018-05-18 PROCEDURE — 25000128 H RX IP 250 OP 636: Performed by: ANESTHESIOLOGY

## 2018-05-18 RX ORDER — ONDANSETRON 2 MG/ML
4 INJECTION INTRAMUSCULAR; INTRAVENOUS ONCE
Status: COMPLETED | OUTPATIENT
Start: 2018-05-18 | End: 2018-05-18

## 2018-05-18 RX ORDER — ONDANSETRON 2 MG/ML
4 INJECTION INTRAMUSCULAR; INTRAVENOUS ONCE
Status: CANCELLED
Start: 2018-05-18 | End: 2018-05-18

## 2018-05-18 RX ORDER — MORPHINE SULFATE 4 MG/ML
5 INJECTION, SOLUTION INTRAMUSCULAR; INTRAVENOUS ONCE
Status: COMPLETED | OUTPATIENT
Start: 2018-05-18 | End: 2018-05-18

## 2018-05-18 RX ORDER — KETOROLAC TROMETHAMINE 30 MG/ML
30 INJECTION, SOLUTION INTRAMUSCULAR; INTRAVENOUS ONCE
Status: COMPLETED | OUTPATIENT
Start: 2018-05-18 | End: 2018-05-18

## 2018-05-18 RX ORDER — MORPHINE SULFATE 4 MG/ML
5 INJECTION, SOLUTION INTRAMUSCULAR; INTRAVENOUS ONCE
Status: CANCELLED
Start: 2018-05-18 | End: 2018-05-18

## 2018-05-18 RX ORDER — KETOROLAC TROMETHAMINE 30 MG/ML
30 INJECTION, SOLUTION INTRAMUSCULAR; INTRAVENOUS ONCE
Status: CANCELLED
Start: 2018-05-18 | End: 2018-05-18

## 2018-05-18 RX ADMIN — METHOHEXITAL SODIUM 80 MG: 500 INJECTION, POWDER, LYOPHILIZED, FOR SOLUTION INTRAMUSCULAR; INTRAVENOUS; RECTAL at 07:51

## 2018-05-18 RX ADMIN — MORPHINE SULFATE 4 MG: 4 INJECTION, SOLUTION INTRAMUSCULAR; INTRAVENOUS at 08:49

## 2018-05-18 RX ADMIN — Medication 70 MG: at 07:51

## 2018-05-18 RX ADMIN — KETOROLAC TROMETHAMINE 30 MG: 30 INJECTION, SOLUTION INTRAMUSCULAR; INTRAVENOUS at 07:43

## 2018-05-18 RX ADMIN — SODIUM CHLORIDE, POTASSIUM CHLORIDE, SODIUM LACTATE AND CALCIUM CHLORIDE 1000 ML: 600; 310; 30; 20 INJECTION, SOLUTION INTRAVENOUS at 08:03

## 2018-05-18 RX ADMIN — ONDANSETRON 4 MG: 2 INJECTION INTRAMUSCULAR; INTRAVENOUS at 07:43

## 2018-05-18 RX ADMIN — MORPHINE SULFATE 1 MG: 4 INJECTION, SOLUTION INTRAMUSCULAR; INTRAVENOUS at 08:34

## 2018-05-18 NOTE — IP AVS SNAPSHOT
MRN:[id number]                      After Visit Summary   5/18/2018    Mindi Eastman    MRN: [id number]           Visit Information        Provider Department      5/18/2018  7:15 AM Umang Kennedy MD [facility]           Review of your medicines      CONTINUE these medicines which have NOT CHANGED        Dose / Directions    * acetaminophen 325 MG tablet   Commonly known as:  TYLENOL   Used for:  Acute non intractable tension-type headache        Dose:  975 mg   Take 3 tablets (975 mg) by mouth every 6 hours as needed for mild pain or other (Give before coming down for ECT)   Quantity:  100 tablet   Refills:  1       * acetaminophen 500 MG tablet   Commonly known as:  TYLENOL   Used for:  Major depressive disorder, recurrent severe without psychotic features (H)        Dose:  500 mg   Take 1 tablet (500 mg) by mouth 3 times daily as needed for mild pain   Quantity:  100 tablet   Refills:  1       ADDERALL XR PO        Dose:  60 mg   Take 60 mg by mouth daily   Refills:  0       escitalopram 20 MG tablet   Commonly known as:  LEXAPRO   Used for:  Major depressive disorder, recurrent severe without psychotic features (H)        Dose:  20 mg   Take 1 tablet (20 mg) by mouth daily   Quantity:  30 tablet   Refills:  1       hydrOXYzine 25 MG tablet   Commonly known as:  ATARAX   Used for:  SHANICE (generalized anxiety disorder)        Dose:  50 mg   Take 2 tablets (50 mg) by mouth 3 times daily as needed for anxiety   Quantity:  90 tablet   Refills:  1       ibuprofen 600 MG tablet   Commonly known as:  ADVIL/MOTRIN   Used for:  Acute non intractable tension-type headache        Dose:  600 mg   Take 1 tablet (600 mg) by mouth every 6 hours as needed for moderate pain   Quantity:  120 tablet   Refills:  1       loratadine 10 MG tablet   Commonly known as:  CLARITIN        Dose:  10 mg   Take 10 mg by mouth daily   Refills:  0       LORazepam 1 MG tablet   Commonly known as:   ATIVAN   Used for:  SHANICE (generalized anxiety disorder)        Dose:  1 mg   Take 1 tablet (1 mg) by mouth 2 times daily   Quantity:  60 tablet   Refills:  0       lurasidone 40 MG Tabs tablet   Commonly known as:  LATUDA   Used for:  Bipolar I disorder (H), Major depressive disorder, recurrent severe without psychotic features (H)        Dose:  40 mg   Take 1 tablet (40 mg) by mouth At Bedtime   Quantity:  30 tablet   Refills:  1       nicotine 21 MG/24HR 24 hr patch   Commonly known as:  NICODERM CQ   Used for:  Tobacco use        Dose:  1 patch   Place 1 patch onto the skin daily   Quantity:  30 patch   Refills:  1       QUEtiapine 100 MG tablet   Commonly known as:  SEROquel   Used for:  Bipolar I disorder (H), Major depressive disorder, recurrent severe without psychotic features (H)        Dose:  100 mg   Take 1 tablet (100 mg) by mouth At Bedtime   Quantity:  30 tablet   Refills:  1       zolpidem 10 MG tablet   Commonly known as:  AMBIEN   Used for:  Insomnia due to other mental disorder        Dose:  10 mg   Take 1 tablet (10 mg) by mouth At Bedtime   Quantity:  30 tablet   Refills:  0       * Notice:  This list has 2 medication(s) that are the same as other medications prescribed for you. Read the directions carefully, and ask your doctor or other care provider to review them with you.             Protect others around you: Learn how to safely use, store and throw away your medicines at www.disposemymeds.org.         Follow-ups after your visit        Your next 10 appointments already scheduled     Jun 08, 2018  7:00 AM CDT   Electroconvulsive Therapy with Umang Kennedy MD   [facility] ([facility])    [address]  [facility] [address]   [phone]               Care Instructions        Further instructions from your care team       ECT Discharge Instructions    During your ECT series:      Do not drive for 24 hours after treatment.   "If you will have more than one treatment within a week, no driving until 7 days after your last       ECT treatment.    Do not drink alcohol or use street drugs (illicit drugs) while you are having treatments.    Do not make important decisions, including legal decisions.    After each treatment:      Get plenty of rest. A responsible adult must stay with your for at least 6 hours.    Avoid heavy or risky activities for 24 hours.    If you feel light-headed, sit for a few minutes before standing. Have someone help you get up.    If you have nausea (feel sick to your stomach): Drink only clear liquids such as apple juice, ginger ale, broth or 7UP, Be sure to drink       plenty of liquids. Move to a normal diet as you feel able.     If you received Toradol, wait 6 hours before taking ibuprofen.    Call your doctor if:     You have a fever over 100F (37.7 C) (taken under the tongue), or a fever that last more than 24 hours.    Your IV site is red, swollen, very painful or is getting more tender.    You have nausea that gets very bad or does not improve.      If you have any symptoms after ECT, tell our staff before your next treatment.    The ECT Department can be reached at [phone].  The ECT Department is open Mondays, Wednesdays and Fridays from 7:00            AM to 2:00 PM.      To speak to a doctor, call: Dr. Kennedy  Office #  [phone] and Fax # [phone]    Other: You had Toradol at 7:43 AM which is a NSAID medication.  Do not take any Ibuprofen, Motrin, Aspirin, Advil, Aleve, Excedrin, or any other NSAID medication until after  1:43 PM.   Questions,  check with your physician or pharmacist.         Additional Information About Your Visit        [employer] Information     [employer] lets you send messages to your doctor, view your test results, renew your prescriptions, schedule appointments and more. To sign up, go to [email or web address] . Click on \"Log in\" on the left side of the screen, which will " "take you to the Welcome page. Then click on \"Sign up Now\" on the right side of the page.     You will be asked to enter the access code listed below, as well as some personal information. Please follow the directions to create your username and password.     Your access code is: [email or web address]  Expires: 2018  8:29 AM     Your access code will  in 90 days. If you need help or a new code, please call your [facility] clinic or [phone].        Care EveryWhere ID     This is your Care EveryWhere ID. This could be used by other organizations to access your [facility] medical records  [id number]        Your Vitals Were     Blood Pressure Pulse Temperature Respirations Pulse Oximetry       140/70 72 98.5  F (36.9  C) (Temporal) 16 97%        Primary Care Provider Office Phone # Fax #    Erica Coelho PA-C [phone]      Equal Access to Services     [place] EDMONDSON : Hadii aad ku hadasho Soomaali, waaxda luqadaha, qaybta kaalmada adeegyada, waxay darlynin hayedgar diehl . So [facility] [phone].    ATENCIÓN: Si habla español, tiene a martínez disposición servicios gratuitos de asistencia lingüística. Arun al [phone].    We comply with applicable federal civil rights laws and [place] laws. We do not discriminate on the basis of race, color, national origin, age, disability, sex, sexual orientation, or gender identity.            Thank you!     Thank you for choosing [facility] for your care. Our goal is always to provide you with excellent care. Hearing back from our patients is one way we can continue to improve our services. Please take a few minutes to complete the written survey that you may receive in the mail after you visit with us. Thank you!             Medication List: This is a list of all your medications and when to take them. Check marks below indicate your daily home schedule. Keep this list as a reference.      Medications           Morning Afternoon Evening Bedtime As " Needed    * acetaminophen 325 MG tablet   Commonly known as:  TYLENOL   Take 3 tablets (975 mg) by mouth every 6 hours as needed for mild pain or other (Give before coming down for ECT)                                * acetaminophen 500 MG tablet   Commonly known as:  TYLENOL   Take 1 tablet (500 mg) by mouth 3 times daily as needed for mild pain                                ADDERALL XR PO   Take 60 mg by mouth daily                                escitalopram 20 MG tablet   Commonly known as:  LEXAPRO   Take 1 tablet (20 mg) by mouth daily                                hydrOXYzine 25 MG tablet   Commonly known as:  ATARAX   Take 2 tablets (50 mg) by mouth 3 times daily as needed for anxiety                                ibuprofen 600 MG tablet   Commonly known as:  ADVIL/MOTRIN   Take 1 tablet (600 mg) by mouth every 6 hours as needed for moderate pain                                loratadine 10 MG tablet   Commonly known as:  CLARITIN   Take 10 mg by mouth daily                                LORazepam 1 MG tablet   Commonly known as:  ATIVAN   Take 1 tablet (1 mg) by mouth 2 times daily                                lurasidone 40 MG Tabs tablet   Commonly known as:  LATUDA   Take 1 tablet (40 mg) by mouth At Bedtime                                nicotine 21 MG/24HR 24 hr patch   Commonly known as:  NICODERM CQ   Place 1 patch onto the skin daily                                QUEtiapine 100 MG tablet   Commonly known as:  SEROquel   Take 1 tablet (100 mg) by mouth At Bedtime                                zolpidem 10 MG tablet   Commonly known as:  AMBIEN   Take 1 tablet (10 mg) by mouth At Bedtime                                * Notice:  This list has 2 medication(s) that are the same as other medications prescribed for you. Read the directions carefully, and ask your doctor or other care provider to review them with you.

## 2018-05-18 NOTE — PROCEDURES
Procedure/Surgery Information   [facility], [place]    Bedside Procedure Note  Date of Service (when I performed the procedure): 05/18/2018    Mindi Eastman is a 42 year old female patient.  1. Major depressive disorder, recurrent severe without psychotic features (H)      Past Medical History:   Diagnosis Date     ADHD      Anxiety      Bipolar disorder (H)      Depressive disorder      OCD (obsessive compulsive disorder)      ROXANNE (obstructive sleep apnea)     On CPAP     Seasonal allergies      Temp: 98.2  F (36.8  C) Temp src: Tympanic BP: 132/68 Pulse: 66   Resp: 16 SpO2: 98 % O2 Device: None (Room air)      Procedures     [facility], [place]   ECT Procedure Note   05/18/2018       Mindi Eastman [id number]   42 year old 1975     Patient Status: Outpatient    Is this the first in a series of 12 treatments?  No    History and Physical: Reviewed in medical record    Consent: Informed consent was signed by: patient    Date Consent Signed: 5/18/18    Allergies   Allergen Reactions     Darvocet [Propoxyphene N-Apap]        Weight:  0 lbs 0 oz    Patient Preparations: Glasses/Contacts removed         Indications for ECT:   Medications ineffective, Psychotherapies ineffective and treatment resistant depression.         Clinical Narrative:   Patient is returning for ECT for depression.  NPO after 2400.  Alert and Oriented x 3.  Mood has been slipping the last couple of months.  She wanted to start a maintenance ECT plan.           Diagnosis:      Major depressive disorder, recurrent severe without psychotic features (F33.2)  Consider bipolar depression         Pause for the Cause:     Right patient Yes   Right procedure/laterality settings: Yes          Intra-Procedure Documentation:         ECT #: 1   Treatment number this series: 1   Total treatment number: 12     Type of ECT:  Right, unilateral ultrabrief    ECT Medications:    Toradol:  30mg  Zofran: 4mg  Brevital: 80mg  Succinyl Choline: 70mg  Morphine 5 mg post treatment for headache   Lactated Ringers 1 liter      ECT Strip Summary:   Energy Level: 20 percent (decrease next Tx)  Motor Seizure Duration: 78 seconds  EEG Seizure Duration:  105 seconds    Complications: No    Plan:    Next maintenance ECT is in 3 weeks on 6/8/18  Patient is a  at [email or web address]).      Umang Kennedy MD

## 2018-05-18 NOTE — IP AVS SNAPSHOT
[facility]    [facility] [address] [address]    Phone:  [phone]                                       After Visit Summary   5/18/2018    Mindi Eastman    MRN: [id number]           After Visit Summary Signature Page     I have received my discharge instructions, and my questions have been answered. I have discussed any challenges I see with this plan with the nurse or doctor.    ..........................................................................................................................................  Patient/Patient Representative Signature      ..........................................................................................................................................  Patient Representative Print Name and Relationship to Patient    ..................................................               ................................................  Date                                            Time    ..........................................................................................................................................  Reviewed by Signature/Title    ...................................................              ..............................................  Date                                                            Time

## 2018-05-18 NOTE — ANESTHESIA POSTPROCEDURE EVALUATION
Patient: Mindi Eastman    * No procedures listed *    Diagnosis:* No pre-op diagnosis entered *  Diagnosis Additional Information: No value filed.    Anesthesia Type:  General    Note:  Anesthesia Post Evaluation    Patient location during evaluation: ECT PACU  Patient participation: Able to fully participate in evaluation  Level of consciousness: awake  Pain management: adequate  Airway patency: patent  Cardiovascular status: acceptable  Respiratory status: acceptable  Hydration status: acceptable  PONV: none     Anesthetic complications: None          Last vitals:  Vitals:    05/18/18 0805 05/18/18 0815 05/18/18 0825   BP: 126/75 131/82    Pulse: 59 66 72   Resp: 16 16 16   Temp:      SpO2: 97% 95% 97%         Electronically Signed By: Karoline Allen MD  May 18, 2018  8:33 AM

## 2018-05-18 NOTE — DISCHARGE INSTRUCTIONS
ECT Discharge Instructions    During your ECT series:      Do not drive for 24 hours after treatment.  If you will have more than one treatment within a week, no driving until 7 days after your last       ECT treatment.    Do not drink alcohol or use street drugs (illicit drugs) while you are having treatments.    Do not make important decisions, including legal decisions.    After each treatment:      Get plenty of rest. A responsible adult must stay with your for at least 6 hours.    Avoid heavy or risky activities for 24 hours.    If you feel light-headed, sit for a few minutes before standing. Have someone help you get up.    If you have nausea (feel sick to your stomach): Drink only clear liquids such as apple juice, ginger ale, broth or 7UP, Be sure to drink       plenty of liquids. Move to a normal diet as you feel able.     If you received Toradol, wait 6 hours before taking ibuprofen.    Call your doctor if:     You have a fever over 100F (37.7 C) (taken under the tongue), or a fever that last more than 24 hours.    Your IV site is red, swollen, very painful or is getting more tender.    You have nausea that gets very bad or does not improve.      If you have any symptoms after ECT, tell our staff before your next treatment.    The ECT Department can be reached at [phone].  The ECT Department is open Mondays, Wednesdays and Fridays from 7:00            AM to 2:00 PM.      To speak to a doctor, call: Dr. Kennedy  Office #  [phone] and Fax # [phone]    Other: You had Toradol at 7:43 AM which is a NSAID medication.  Do not take any Ibuprofen, Motrin, Aspirin, Advil, Aleve, Excedrin, or any other NSAID medication until after  1:43 PM.   Questions,  check with your physician or pharmacist.

## 2018-06-08 ENCOUNTER — ANESTHESIA (OUTPATIENT)
Dept: BEHAVIORAL HEALTH | Facility: CLINIC | Age: 43
End: 2018-06-08
Payer: COMMERCIAL

## 2018-06-08 ENCOUNTER — ANESTHESIA EVENT (OUTPATIENT)
Dept: BEHAVIORAL HEALTH | Facility: CLINIC | Age: 43
End: 2018-06-08

## 2018-06-08 ENCOUNTER — HOSPITAL ENCOUNTER (OUTPATIENT)
Dept: BEHAVIORAL HEALTH | Facility: CLINIC | Age: 43
End: 2018-06-08
Attending: PSYCHIATRY & NEUROLOGY
Payer: COMMERCIAL

## 2018-06-08 VITALS
SYSTOLIC BLOOD PRESSURE: 137 MMHG | HEART RATE: 71 BPM | TEMPERATURE: 98.6 F | RESPIRATION RATE: 16 BRPM | OXYGEN SATURATION: 97 % | DIASTOLIC BLOOD PRESSURE: 67 MMHG

## 2018-06-08 DIAGNOSIS — F33.2 MAJOR DEPRESSIVE DISORDER, RECURRENT SEVERE WITHOUT PSYCHOTIC FEATURES (H): ICD-10-CM

## 2018-06-08 PROCEDURE — 90870 ELECTROCONVULSIVE THERAPY: CPT

## 2018-06-08 PROCEDURE — 25000125 ZZHC RX 250: Performed by: ANESTHESIOLOGY

## 2018-06-08 PROCEDURE — 25000128 H RX IP 250 OP 636: Performed by: ANESTHESIOLOGY

## 2018-06-08 PROCEDURE — 25000128 H RX IP 250 OP 636: Performed by: PSYCHIATRY & NEUROLOGY

## 2018-06-08 PROCEDURE — 25000132 ZZH RX MED GY IP 250 OP 250 PS 637: Performed by: PSYCHIATRY & NEUROLOGY

## 2018-06-08 PROCEDURE — 40000671 ZZH STATISTIC ANESTHESIA CASE

## 2018-06-08 RX ORDER — ONDANSETRON 2 MG/ML
4 INJECTION INTRAMUSCULAR; INTRAVENOUS ONCE
Status: CANCELLED
Start: 2018-06-08 | End: 2018-06-08

## 2018-06-08 RX ORDER — MORPHINE SULFATE 4 MG/ML
5 INJECTION, SOLUTION INTRAMUSCULAR; INTRAVENOUS ONCE
Status: CANCELLED
Start: 2018-06-08 | End: 2018-06-08

## 2018-06-08 RX ORDER — ACETAMINOPHEN 500 MG
1000 TABLET ORAL
Status: COMPLETED | OUTPATIENT
Start: 2018-06-08 | End: 2018-06-08

## 2018-06-08 RX ORDER — KETOROLAC TROMETHAMINE 30 MG/ML
30 INJECTION, SOLUTION INTRAMUSCULAR; INTRAVENOUS ONCE
Status: CANCELLED
Start: 2018-06-08 | End: 2018-06-08

## 2018-06-08 RX ORDER — MORPHINE SULFATE 4 MG/ML
5 INJECTION, SOLUTION INTRAMUSCULAR; INTRAVENOUS ONCE
Status: COMPLETED | OUTPATIENT
Start: 2018-06-08 | End: 2018-06-08

## 2018-06-08 RX ORDER — KETOROLAC TROMETHAMINE 30 MG/ML
30 INJECTION, SOLUTION INTRAMUSCULAR; INTRAVENOUS ONCE
Status: COMPLETED | OUTPATIENT
Start: 2018-06-08 | End: 2018-06-08

## 2018-06-08 RX ORDER — ONDANSETRON 2 MG/ML
4 INJECTION INTRAMUSCULAR; INTRAVENOUS ONCE
Status: COMPLETED | OUTPATIENT
Start: 2018-06-08 | End: 2018-06-08

## 2018-06-08 RX ADMIN — ONDANSETRON 4 MG: 2 INJECTION INTRAMUSCULAR; INTRAVENOUS at 07:42

## 2018-06-08 RX ADMIN — MORPHINE SULFATE 5 MG: 4 INJECTION, SOLUTION INTRAMUSCULAR; INTRAVENOUS at 08:28

## 2018-06-08 RX ADMIN — KETOROLAC TROMETHAMINE 30 MG: 30 INJECTION, SOLUTION INTRAMUSCULAR at 07:44

## 2018-06-08 RX ADMIN — Medication 70 MG: at 07:50

## 2018-06-08 RX ADMIN — SODIUM CHLORIDE, POTASSIUM CHLORIDE, SODIUM LACTATE AND CALCIUM CHLORIDE 1000 ML: 600; 310; 30; 20 INJECTION, SOLUTION INTRAVENOUS at 07:41

## 2018-06-08 RX ADMIN — METHOHEXITAL SODIUM 80 MG: 500 INJECTION, POWDER, LYOPHILIZED, FOR SOLUTION INTRAMUSCULAR; INTRAVENOUS; RECTAL at 07:50

## 2018-06-08 RX ADMIN — ACETAMINOPHEN 1000 MG: 500 TABLET, FILM COATED ORAL at 07:45

## 2018-06-08 NOTE — IP AVS SNAPSHOT
MRN:[id number]                      After Visit Summary   6/8/2018    Mindi Eastman    MRN: [id number]           Visit Information        Provider Department      6/8/2018  7:00 AM Umang Kennedy MD [facility]           Review of your medicines      CONTINUE these medicines which have NOT CHANGED        Dose / Directions    * acetaminophen 325 MG tablet   Commonly known as:  TYLENOL   Used for:  Acute non intractable tension-type headache        Dose:  975 mg   Take 3 tablets (975 mg) by mouth every 6 hours as needed for mild pain or other (Give before coming down for ECT)   Quantity:  100 tablet   Refills:  1       * acetaminophen 500 MG tablet   Commonly known as:  TYLENOL   Used for:  Major depressive disorder, recurrent severe without psychotic features (H)        Dose:  500 mg   Take 1 tablet (500 mg) by mouth 3 times daily as needed for mild pain   Quantity:  100 tablet   Refills:  1       ADDERALL XR PO        Dose:  60 mg   Take 60 mg by mouth daily   Refills:  0       escitalopram 20 MG tablet   Commonly known as:  LEXAPRO   Used for:  Major depressive disorder, recurrent severe without psychotic features (H)        Dose:  20 mg   Take 1 tablet (20 mg) by mouth daily   Quantity:  30 tablet   Refills:  1       hydrOXYzine 25 MG tablet   Commonly known as:  ATARAX   Used for:  SHANICE (generalized anxiety disorder)        Dose:  50 mg   Take 2 tablets (50 mg) by mouth 3 times daily as needed for anxiety   Quantity:  90 tablet   Refills:  1       ibuprofen 600 MG tablet   Commonly known as:  ADVIL/MOTRIN   Used for:  Acute non intractable tension-type headache        Dose:  600 mg   Take 1 tablet (600 mg) by mouth every 6 hours as needed for moderate pain   Quantity:  120 tablet   Refills:  1       loratadine 10 MG tablet   Commonly known as:  CLARITIN        Dose:  10 mg   Take 10 mg by mouth daily   Refills:  0       LORazepam 1 MG tablet   Commonly known as:   ATIVAN   Used for:  SHANICE (generalized anxiety disorder)        Dose:  1 mg   Take 1 tablet (1 mg) by mouth 2 times daily   Quantity:  60 tablet   Refills:  0       lurasidone 40 MG Tabs tablet   Commonly known as:  LATUDA   Used for:  Bipolar I disorder (H), Major depressive disorder, recurrent severe without psychotic features (H)        Dose:  40 mg   Take 1 tablet (40 mg) by mouth At Bedtime   Quantity:  30 tablet   Refills:  1       nicotine 21 MG/24HR 24 hr patch   Commonly known as:  NICODERM CQ   Used for:  Tobacco use        Dose:  1 patch   Place 1 patch onto the skin daily   Quantity:  30 patch   Refills:  1       QUEtiapine 100 MG tablet   Commonly known as:  SEROquel   Used for:  Bipolar I disorder (H), Major depressive disorder, recurrent severe without psychotic features (H)        Dose:  100 mg   Take 1 tablet (100 mg) by mouth At Bedtime   Quantity:  30 tablet   Refills:  1       zolpidem 10 MG tablet   Commonly known as:  AMBIEN   Used for:  Insomnia due to other mental disorder        Dose:  10 mg   Take 1 tablet (10 mg) by mouth At Bedtime   Quantity:  30 tablet   Refills:  0       * Notice:  This list has 2 medication(s) that are the same as other medications prescribed for you. Read the directions carefully, and ask your doctor or other care provider to review them with you.             Protect others around you: Learn how to safely use, store and throw away your medicines at www.disposemymeds.org.         Follow-ups after your visit        Your next 10 appointments already scheduled     Jun 29, 2018  7:15 AM CDT   Electroconvulsive Therapy with Umang Kennedy MD   [facility] ([facility])    [address]  [facility] [address]   [phone]               Care Instructions        Further instructions from your care team        ECT Discharge Instructions      During your ECT series:      Do not drive or work heavy equipment until  7 days after your last treatment.    Do not drive for 7 days after your last treatment if you have more than 1 treatment in a week.  Do not drive for 24 hours after your last treatment if you only have 1 treatment in  a week.     Do not drink alcohol or use street drugs (illicit drugs) while you are having  treatments.    Do not make important decisions, including legal decisions.    After each treatment:      Get plenty of rest. A responsible adult must stay with your for at least 6 hours.    Avoid heavy or risky activities for 24 hours.    If you feel light-headed, sit for a few minutes before standing. Have someone  help you get up.    If you have nausea (feel sick to your stomach): Drink only clear liquids such as  apple juice, ginger ale, broth or 7UP, Be sure to drink plenty of liquids. Move to a  normal diet as you feel able.     If you received Toradol, wait 6 hours before taking ibuprofen.    Call your doctor if:     You have a fever over 100F (37.7 C) (taken under the tongue), or a fever that    last more than 24 hours.          Your IV site is red, swollen, very painful or is getting more tender.   You have nausea that gets very bad or does not improve.      If you have any symptoms after ECT, tell our staff before your next treatment.      The ECT Department can be reached at [phone].  The ECT Department is  open Mondays, Wednesdays and Fridays from 7:00 AM to 2:00 PM.         To speak to a doctor, call: Dr. Kennedy  Office #  [phone] and Fax # [phone]    Other:  You have received Toradol today at 07:45 am.  Toradol is an NSAID.  Do not take any NSAID's including: Ibuprofen, Naproxen Sodium, Asprin , Advil, Motrin, Aleve, Josué, etc., until 6-8 hours after the above time, which is 1:45 pm.  If you have any questions check back with your Physician, or pharmacist.  Please do not exceed 3200 mg in a 24 hour period.    You also received 5 mg of Morphine at 08:30 am.    Transported by:  "Ana               Additional Information About Your Visit        MyChart Information     [facility] lets you send messages to your doctor, view your test results, renew your prescriptions, schedule appointments and more. To sign up, go to [email or web address] . Click on \"Log in\" on the left side of the screen, which will take you to the Welcome page. Then click on \"Sign up Now\" on the right side of the page.     You will be asked to enter the access code listed below, as well as some personal information. Please follow the directions to create your username and password.     Your access code is: [email or web address]  Expires: 2018  8:29 AM     Your access code will  in 90 days. If you need help or a new code, please call your [facility] clinic or [phone].        Care EveryWhere ID     This is your Care EveryWhere ID. This could be used by other organizations to access your [facility] medical records  [id number]        Your Vitals Were     Blood Pressure Pulse Temperature Respirations Pulse Oximetry       147/67 69 98.6  F (37  C) (Temporal) 16 96%        Primary Care Provider Office Phone # Fax #    Erica Coelho PA-C [phone]      Equal Access to Services     [place] ED[place]ON : Hadii tonia ku hadasho Soomaali, waaxda luqadaha, qaybta kaalmada adeegyada, yamileht diehl . So [facility] [phone].    ATENCIÓN: Si habla español, tiene a martínez disposición servicios gratuitos de asistencia lingüística. Llame al [phone].    We comply with applicable federal civil rights laws and [place] laws. We do not discriminate on the basis of race, color, national origin, age, disability, sex, sexual orientation, or gender identity.            Thank you!     Thank you for choosing [facility] for your care. Our goal is always to provide you with excellent care. Hearing back from our patients is one way we can continue to improve our services. Please take a few minutes to complete the " written survey that you may receive in the mail after you visit with us. Thank you!             Medication List: This is a list of all your medications and when to take them. Check marks below indicate your daily home schedule. Keep this list as a reference.      Medications           Morning Afternoon Evening Bedtime As Needed    * acetaminophen 325 MG tablet   Commonly known as:  TYLENOL   Take 3 tablets (975 mg) by mouth every 6 hours as needed for mild pain or other (Give before coming down for ECT)   Last time this was given:  1,000 mg on 6/8/2018  7:45 AM                                * acetaminophen 500 MG tablet   Commonly known as:  TYLENOL   Take 1 tablet (500 mg) by mouth 3 times daily as needed for mild pain   Last time this was given:  1,000 mg on 6/8/2018  7:45 AM                                ADDERALL XR PO   Take 60 mg by mouth daily                                escitalopram 20 MG tablet   Commonly known as:  LEXAPRO   Take 1 tablet (20 mg) by mouth daily                                hydrOXYzine 25 MG tablet   Commonly known as:  ATARAX   Take 2 tablets (50 mg) by mouth 3 times daily as needed for anxiety                                ibuprofen 600 MG tablet   Commonly known as:  ADVIL/MOTRIN   Take 1 tablet (600 mg) by mouth every 6 hours as needed for moderate pain                                loratadine 10 MG tablet   Commonly known as:  CLARITIN   Take 10 mg by mouth daily                                LORazepam 1 MG tablet   Commonly known as:  ATIVAN   Take 1 tablet (1 mg) by mouth 2 times daily                                lurasidone 40 MG Tabs tablet   Commonly known as:  LATUDA   Take 1 tablet (40 mg) by mouth At Bedtime                                nicotine 21 MG/24HR 24 hr patch   Commonly known as:  NICODERM CQ   Place 1 patch onto the skin daily                                QUEtiapine 100 MG tablet   Commonly known as:  SEROquel   Take 1 tablet (100 mg) by mouth At  Bedtime                                zolpidem 10 MG tablet   Commonly known as:  AMBIEN   Take 1 tablet (10 mg) by mouth At Bedtime                                * Notice:  This list has 2 medication(s) that are the same as other medications prescribed for you. Read the directions carefully, and ask your doctor or other care provider to review them with you.

## 2018-06-08 NOTE — DISCHARGE INSTRUCTIONS
ECT Discharge Instructions      During your ECT series:      Do not drive or work heavy equipment until 7 days after your last treatment.    Do not drive for 7 days after your last treatment if you have more than 1 treatment in a week.  Do not drive for 24 hours after your last treatment if you only have 1 treatment in  a week.     Do not drink alcohol or use street drugs (illicit drugs) while you are having  treatments.    Do not make important decisions, including legal decisions.    After each treatment:      Get plenty of rest. A responsible adult must stay with your for at least 6 hours.    Avoid heavy or risky activities for 24 hours.    If you feel light-headed, sit for a few minutes before standing. Have someone  help you get up.    If you have nausea (feel sick to your stomach): Drink only clear liquids such as  apple juice, ginger ale, broth or 7UP, Be sure to drink plenty of liquids. Move to a  normal diet as you feel able.     If you received Toradol, wait 6 hours before taking ibuprofen.    Call your doctor if:     You have a fever over 100F (37.7 C) (taken under the tongue), or a fever that    last more than 24 hours.          Your IV site is red, swollen, very painful or is getting more tender.   You have nausea that gets very bad or does not improve.      If you have any symptoms after ECT, tell our staff before your next treatment.      The ECT Department can be reached at [phone].  The ECT Department is  open Mondays, Wednesdays and Fridays from 7:00 AM to 2:00 PM.         To speak to a doctor, call: Dr. Kennedy  Office #  [phone] and Fax # [phone]    Other:  You have received Toradol today at 07:45 am.  Toradol is an NSAID.  Do not take any NSAID's including: Ibuprofen, Naproxen Sodium, Asprin , Advil, Motrin, Aleve, Josué, etc., until 6-8 hours after the above time, which is 1:45 pm.  If you have any questions check back with your Physician, or pharmacist.  Please do not exceed 0290  mg in a 24 hour period.    You also received 5 mg of Morphine at 08:30 am.    Transported by: Ana

## 2018-06-08 NOTE — PROCEDURES
Procedure/Surgery Information   [facility], [place]    Bedside Procedure Note  Date of Service (when I performed the procedure): 06/08/2018    Mindi Eastman is a 42 year old female patient.  1. Major depressive disorder, recurrent severe without psychotic features (H)      Past Medical History:   Diagnosis Date     ADHD      Anxiety      Bipolar disorder (H)      Depressive disorder      OCD (obsessive compulsive disorder)      ROXANNE (obstructive sleep apnea)     On CPAP     Seasonal allergies      Temp: 97.5  F (36.4  C)   BP: 128/74 Pulse: 65   Resp: 16 SpO2: 98 %        Procedures     [facility], [place]   ECT Procedure Note   06/08/2018       Mindi Eastman [id number]   42 year old 1975     Patient Status: Outpatient    Is this the first in a series of 12 treatments?  No    History and Physical: Reviewed in medical record    Consent: Informed consent was signed by: patient    Date Consent Signed: 5/18/18    Allergies   Allergen Reactions     Darvocet [Propoxyphene N-Apap]        Weight:  0 lbs 0 oz    Patient Preparations: Glasses/Contacts removed         Indications for ECT:   Medications ineffective, Psychotherapies ineffective and treatment resistant depression.         Clinical Narrative:   Patient is continuing ECT for depression.  NPO after 2400.  Alert and Oriented x 3.    Mood has been doing ok.  Her ECT held over the last 3 weeks.            Diagnosis:      Major depressive disorder, recurrent severe without psychotic features (F33.2)  Consider bipolar depression         Pause for the Cause:     Right patient Yes   Right procedure/laterality settings: Yes          Intra-Procedure Documentation:         ECT #: 2   Treatment number this series: 2   Total treatment number: 13     Type of ECT:  Right, unilateral ultrabrief    ECT Medications:    Tylenol:  1000mg po  Toradol: 30mg  Zofran: 4mg  Brevital: 80mg  Succinyl Choline:  70mg  Morphine 5 mg post treatment for headache   Lactated Ringers 1 liter      ECT Strip Summary:   Energy Level: 15 percent (decrease next Tx to 10 percent)  Motor Seizure Duration: 111 seconds  EEG Seizure Duration:  127 seconds    Complications: No    Plan:    Next maintenance ECT is in 3 weeks on 6/29/18  Patient is a  at [employer]).      Umang Kennedy MD

## 2018-06-08 NOTE — ANESTHESIA POSTPROCEDURE EVALUATION
Patient: Mindi Eastman    * No procedures listed *    Diagnosis:* No pre-op diagnosis entered *  Diagnosis Additional Information: No value filed.    Anesthesia Type:  General    Note:  Anesthesia Post Evaluation    Patient location during evaluation: ECT PACU  Patient participation: Able to fully participate in evaluation  Level of consciousness: awake  Pain management: adequate  Airway patency: patent  Cardiovascular status: acceptable  Respiratory status: acceptable  Hydration status: acceptable  PONV: none     Anesthetic complications: None          Last vitals:  Vitals:    06/08/18 0807 06/08/18 0817 06/08/18 0827   BP: 145/76 121/54 147/67   Pulse: 61 69 69   Resp: 16 16 16   Temp:      SpO2: 99% 97% 96%         Electronically Signed By: Karoline Allen MD  June 8, 2018  8:29 AM

## 2018-06-29 ENCOUNTER — HOSPITAL ENCOUNTER (OUTPATIENT)
Dept: BEHAVIORAL HEALTH | Facility: CLINIC | Age: 43
End: 2018-06-29
Attending: PSYCHIATRY & NEUROLOGY
Payer: COMMERCIAL

## 2018-06-29 ENCOUNTER — ANESTHESIA EVENT (OUTPATIENT)
Dept: BEHAVIORAL HEALTH | Facility: CLINIC | Age: 43
End: 2018-06-29

## 2018-06-29 ENCOUNTER — ANESTHESIA (OUTPATIENT)
Dept: BEHAVIORAL HEALTH | Facility: CLINIC | Age: 43
End: 2018-06-29

## 2018-06-29 VITALS
TEMPERATURE: 96.8 F | SYSTOLIC BLOOD PRESSURE: 138 MMHG | DIASTOLIC BLOOD PRESSURE: 75 MMHG | OXYGEN SATURATION: 96 % | HEART RATE: 70 BPM | RESPIRATION RATE: 16 BRPM

## 2018-06-29 DIAGNOSIS — F33.2 MAJOR DEPRESSIVE DISORDER, RECURRENT SEVERE WITHOUT PSYCHOTIC FEATURES (H): ICD-10-CM

## 2018-06-29 PROCEDURE — 40000671 ZZH STATISTIC ANESTHESIA CASE

## 2018-06-29 PROCEDURE — 25000128 H RX IP 250 OP 636: Performed by: PSYCHIATRY & NEUROLOGY

## 2018-06-29 PROCEDURE — 90870 ELECTROCONVULSIVE THERAPY: CPT

## 2018-06-29 RX ORDER — MORPHINE SULFATE 4 MG/ML
5 INJECTION, SOLUTION INTRAMUSCULAR; INTRAVENOUS ONCE
Status: CANCELLED
Start: 2018-06-29 | End: 2018-06-29

## 2018-06-29 RX ORDER — MORPHINE SULFATE 4 MG/ML
5 INJECTION, SOLUTION INTRAMUSCULAR; INTRAVENOUS ONCE
Status: COMPLETED | OUTPATIENT
Start: 2018-06-29 | End: 2018-06-29

## 2018-06-29 RX ORDER — KETOROLAC TROMETHAMINE 30 MG/ML
30 INJECTION, SOLUTION INTRAMUSCULAR; INTRAVENOUS ONCE
Status: CANCELLED
Start: 2018-06-29 | End: 2018-06-29

## 2018-06-29 RX ORDER — ONDANSETRON 2 MG/ML
4 INJECTION INTRAMUSCULAR; INTRAVENOUS ONCE
Status: COMPLETED | OUTPATIENT
Start: 2018-06-29 | End: 2018-06-29

## 2018-06-29 RX ORDER — ONDANSETRON 2 MG/ML
4 INJECTION INTRAMUSCULAR; INTRAVENOUS ONCE
Status: CANCELLED
Start: 2018-06-29 | End: 2018-06-29

## 2018-06-29 RX ORDER — KETOROLAC TROMETHAMINE 30 MG/ML
30 INJECTION, SOLUTION INTRAMUSCULAR; INTRAVENOUS ONCE
Status: COMPLETED | OUTPATIENT
Start: 2018-06-29 | End: 2018-06-29

## 2018-06-29 RX ADMIN — SODIUM CHLORIDE, POTASSIUM CHLORIDE, SODIUM LACTATE AND CALCIUM CHLORIDE 1000 ML: 600; 310; 30; 20 INJECTION, SOLUTION INTRAVENOUS at 07:59

## 2018-06-29 RX ADMIN — KETOROLAC TROMETHAMINE 30 MG: 30 INJECTION, SOLUTION INTRAMUSCULAR at 07:56

## 2018-06-29 RX ADMIN — MORPHINE SULFATE 5 MG: 4 INJECTION, SOLUTION INTRAMUSCULAR; INTRAVENOUS at 08:49

## 2018-06-29 RX ADMIN — ONDANSETRON 4 MG: 2 INJECTION INTRAMUSCULAR; INTRAVENOUS at 07:56

## 2018-06-29 NOTE — PROCEDURES
Procedure/Surgery Information   [facility], [place]    Bedside Procedure Note  Date of Service (when I performed the procedure): 06/29/2018    Mindi Eastman is a 42 year old female patient.  1. Major depressive disorder, recurrent severe without psychotic features (H)      Past Medical History:   Diagnosis Date     ADHD      Anxiety      Bipolar disorder (H)      Depressive disorder      OCD (obsessive compulsive disorder)      ROXANNE (obstructive sleep apnea)     On CPAP     Seasonal allergies      Temp: 97.4  F (36.3  C)   BP: 125/73 Pulse: 72   Resp: 16 SpO2: 98 %        Procedures     [facility], [place]   ECT Procedure Note   06/29/2018       Mindi Eastman [id number]   42 year old 1975     Patient Status: Outpatient    Is this the first in a series of 12 treatments?  No    History and Physical: Reviewed in medical record    Consent: Informed consent was signed by: patient    Date Consent Signed: 5/18/18    Allergies   Allergen Reactions     Darvocet [Propoxyphene N-Apap]        Weight:  0 lbs 0 oz    Patient Preparations: Glasses/Contacts removed         Indications for ECT:   Medications ineffective, Psychotherapies ineffective and treatment resistant depression.         Clinical Narrative:   Patient is continuing ECT for depression.  NPO after 2400.  Alert and Oriented x 3.  No problems with the last ECT.  Health has been goood.   Mood has been doing well.  Her ECT held over the last 3 weeks.            Diagnosis:      Major depressive disorder, recurrent severe without psychotic features (F33.2)  Consider bipolar depression         Pause for the Cause:     Right patient Yes   Right procedure/laterality settings: Yes          Intra-Procedure Documentation:         ECT #: 3   Treatment number this series: 3   Total treatment number: 14     Type of ECT:  Right, unilateral ultrabrief    ECT Medications:    Tylenol:  1000mg po  Toradol:  30mg  Zofran: 4mg  Brevital: 80mg  Succinyl Choline: 70mg  Morphine 5 mg post treatment for headache   Lactated Ringers 1 liter      ECT Strip Summary:   Energy Level: 10 percent   Motor Seizure Duration:  87 seconds  EEG Seizure Duration:   87 seconds    Complications: No    Plan:    Next maintenance ECT is in 3 weeks on 7/20/18  Patient is a  at [employer]).      Umang Kennedy MD

## 2018-06-29 NOTE — DISCHARGE INSTRUCTIONS
ECT Discharge Instructions      During your ECT series:      Do not drive for 24 hours after your treatment.  If you have had more than 1 treatment within a week, do not drive until 7 days after your last treatment.    Do not drink alcohol or use street drugs (illicit drugs) while you are having  treatments.    Do not make important decisions, including legal decisions.    After each treatment:      Get plenty of rest. A responsible adult must stay with your for at least 6 hours.    Avoid heavy or risky activities for 24 hours.    If you feel light-headed, sit for a few minutes before standing. Have someone  help you get up.    If you have nausea (feel sick to your stomach): Drink only clear liquids such as  apple juice, ginger ale, broth or 7UP, Be sure to drink plenty of liquids. Move to a  normal diet as you feel able.     If you received Toradol, wait 6 hours before taking ibuprofen.    Call your doctor if:     You have a fever over 100F (37.7 C) (taken under the tongue), or a fever that    last more than 24 hours.          Your IV site is red, swollen, very painful or is getting more tender.   You have nausea that gets very bad or does not improve.      If you have any symptoms after ECT, tell our staff before your next treatment.      The ECT Department can be reached at [phone].  The ECT Department is  open Mondays, Wednesdays and Fridays from 7:00 AM to 2:00 PM.      To speak to a doctor, call: Dr. Kennedy  Office #  [phone] and Fax # [phone]    Dr. Kennedy will be on vacation for your next appointment so Dr. Solares will be covering for him in his absence.      Other:  You have received Toradol today at 08:00 am.  Toradol is an NSAID.  Do not take any NSAID's including: Ibuprofen, Naproxen Sodium, Asprin , Advil, Motrin, Aleve, Josué, etc., until 6-8 hours after the above time, which is 2:00 pm.  If you have any questions check back with your Physician, or pharmacist.  Please do not exceed 3200 mg  in a 24 hour period.     You have received Morphine today at 08:50 am.    Transported by: Mother: Ana

## 2018-06-29 NOTE — ANESTHESIA POSTPROCEDURE EVALUATION
Patient: Mindi Eastman    * No procedures listed *    Diagnosis:* No pre-op diagnosis entered *  Diagnosis Additional Information: No value filed.    Anesthesia Type:  General    Note:  Anesthesia Post Evaluation    Patient location during evaluation: PACU  Patient participation: Able to fully participate in evaluation  Level of consciousness: awake and alert  Pain management: adequate  Airway patency: patent  Cardiovascular status: acceptable  Respiratory status: acceptable  Hydration status: acceptable  PONV: none     Anesthetic complications: None          Last vitals:  Vitals:    06/29/18 0706   BP: 125/73   Pulse: 72   Resp: 16   Temp: 36.3  C (97.4  F)   SpO2: 98%         Electronically Signed By: Micah Thomas MD, MD  June 29, 2018  7:43 AM

## 2018-06-29 NOTE — IP AVS SNAPSHOT
[facility]    [facility] [address] [address]    Phone:  [phone]                                       After Visit Summary   6/29/2018    Mindi Eastman    MRN: [id number]           After Visit Summary Signature Page     I have received my discharge instructions, and my questions have been answered. I have discussed any challenges I see with this plan with the nurse or doctor.    ..........................................................................................................................................  Patient/Patient Representative Signature      ..........................................................................................................................................  Patient Representative Print Name and Relationship to Patient    ..................................................               ................................................  Date                                            Time    ..........................................................................................................................................  Reviewed by Signature/Title    ...................................................              ..............................................  Date                                                            Time

## 2018-06-29 NOTE — IP AVS SNAPSHOT
MRN:[id number]                      After Visit Summary   6/29/2018    Mindi Eastman    MRN: [id number]           Visit Information        Provider Department      6/29/2018  7:15 AM Umang Kennedy MD [facility]           Review of your medicines      CONTINUE these medicines which have NOT CHANGED        Dose / Directions    * acetaminophen 325 MG tablet   Commonly known as:  TYLENOL   Used for:  Acute non intractable tension-type headache        Dose:  975 mg   Take 3 tablets (975 mg) by mouth every 6 hours as needed for mild pain or other (Give before coming down for ECT)   Quantity:  100 tablet   Refills:  1       * acetaminophen 500 MG tablet   Commonly known as:  TYLENOL   Used for:  Major depressive disorder, recurrent severe without psychotic features (H)        Dose:  500 mg   Take 1 tablet (500 mg) by mouth 3 times daily as needed for mild pain   Quantity:  100 tablet   Refills:  1       ADDERALL XR PO        Dose:  60 mg   Take 60 mg by mouth daily   Refills:  0       escitalopram 20 MG tablet   Commonly known as:  LEXAPRO   Used for:  Major depressive disorder, recurrent severe without psychotic features (H)        Dose:  20 mg   Take 1 tablet (20 mg) by mouth daily   Quantity:  30 tablet   Refills:  1       hydrOXYzine 25 MG tablet   Commonly known as:  ATARAX   Used for:  SHANICE (generalized anxiety disorder)        Dose:  50 mg   Take 2 tablets (50 mg) by mouth 3 times daily as needed for anxiety   Quantity:  90 tablet   Refills:  1       ibuprofen 600 MG tablet   Commonly known as:  ADVIL/MOTRIN   Used for:  Acute non intractable tension-type headache        Dose:  600 mg   Take 1 tablet (600 mg) by mouth every 6 hours as needed for moderate pain   Quantity:  120 tablet   Refills:  1       loratadine 10 MG tablet   Commonly known as:  CLARITIN        Dose:  10 mg   Take 10 mg by mouth daily   Refills:  0       LORazepam 1 MG tablet   Commonly known as:   ATIVAN   Used for:  SHANICE (generalized anxiety disorder)        Dose:  1 mg   Take 1 tablet (1 mg) by mouth 2 times daily   Quantity:  60 tablet   Refills:  0       lurasidone 40 MG Tabs tablet   Commonly known as:  LATUDA   Used for:  Bipolar I disorder (H), Major depressive disorder, recurrent severe without psychotic features (H)        Dose:  40 mg   Take 1 tablet (40 mg) by mouth At Bedtime   Quantity:  30 tablet   Refills:  1       nicotine 21 MG/24HR 24 hr patch   Commonly known as:  NICODERM CQ   Used for:  Tobacco use        Dose:  1 patch   Place 1 patch onto the skin daily   Quantity:  30 patch   Refills:  1       QUEtiapine 100 MG tablet   Commonly known as:  SEROquel   Used for:  Bipolar I disorder (H), Major depressive disorder, recurrent severe without psychotic features (H)        Dose:  100 mg   Take 1 tablet (100 mg) by mouth At Bedtime   Quantity:  30 tablet   Refills:  1       zolpidem 10 MG tablet   Commonly known as:  AMBIEN   Used for:  Insomnia due to other mental disorder        Dose:  10 mg   Take 1 tablet (10 mg) by mouth At Bedtime   Quantity:  30 tablet   Refills:  0       * Notice:  This list has 2 medication(s) that are the same as other medications prescribed for you. Read the directions carefully, and ask your doctor or other care provider to review them with you.             Protect others around you: Learn how to safely use, store and throw away your medicines at www.disposemymeds.org.         Follow-ups after your visit        Your next 10 appointments already scheduled     Jul 20, 2018  7:00 AM CDT   Electroconvulsive Therapy with Umang Kennedy MD   [facility] ([facility])    [address]  [facility] [address]   [phone]               Care Instructions        Further instructions from your care team        ECT Discharge Instructions      During your ECT series:      Do not drive for 24 hours after your  treatment.  If you have had more than 1 treatment within a week, do not drive until 7 days after your last treatment.    Do not drink alcohol or use street drugs (illicit drugs) while you are having  treatments.    Do not make important decisions, including legal decisions.    After each treatment:      Get plenty of rest. A responsible adult must stay with your for at least 6 hours.    Avoid heavy or risky activities for 24 hours.    If you feel light-headed, sit for a few minutes before standing. Have someone  help you get up.    If you have nausea (feel sick to your stomach): Drink only clear liquids such as  apple juice, ginger ale, broth or 7UP, Be sure to drink plenty of liquids. Move to a  normal diet as you feel able.     If you received Toradol, wait 6 hours before taking ibuprofen.    Call your doctor if:     You have a fever over 100F (37.7 C) (taken under the tongue), or a fever that    last more than 24 hours.          Your IV site is red, swollen, very painful or is getting more tender.   You have nausea that gets very bad or does not improve.      If you have any symptoms after ECT, tell our staff before your next treatment.      The ECT Department can be reached at [phone].  The ECT Department is  open Mondays, Wednesdays and Fridays from 7:00 AM to 2:00 PM.      To speak to a doctor, call: Dr. Kennedy  Office #  [phone] and Fax # [phone]    Dr. Kennedy will be on vacation for your next appointment so Dr. Solares will be covering for him in his absence.      Other:  You have received Toradol today at 08:00 am.  Toradol is an NSAID.  Do not take any NSAID's including: Ibuprofen, Naproxen Sodium, Asprin , Advil, Motrin, Aleve, Josué, etc., until 6-8 hours after the above time, which is 2:00 pm.  If you have any questions check back with your Physician, or pharmacist.  Please do not exceed 3200 mg in a 24 hour period.     You have received Morphine today at 08:50 am.    Transported by: Mother:  "Ana                 Additional Information About Your Visit        MyChart Information     [employer] lets you send messages to your doctor, view your test results, renew your prescriptions, schedule appointments and more. To sign up, go to [email or web address] . Click on \"Log in\" on the left side of the screen, which will take you to the Welcome page. Then click on \"Sign up Now\" on the right side of the page.     You will be asked to enter the access code listed below, as well as some personal information. Please follow the directions to create your username and password.     Your access code is: [email or web address]  Expires: 2018  8:29 AM     Your access code will  in 90 days. If you need help or a new code, please call your [facility] clinic or [phone].        Care EveryWhere ID     This is your Care EveryWhere ID. This could be used by other organizations to access your [facility] medical records  [id number]        Your Vitals Were     Blood Pressure Pulse Temperature Respirations Pulse Oximetry       138/75 70 96.8  F (36  C) (Temporal) 16 96%        Primary Care Provider Office Phone # Fax #    Erica Coelho PA-C [phone]      Equal Access to Services     [place] : Hadii tonia ku hadasho Soomaali, waaxda luqadaha, qaybta kaalmada adeegyada, yamileth diehl . So [facility] [phone].    ATENCIÓN: Si habla español, tiene a martínez disposición servicios gratuitos de asistencia lingüística. Llame al [phone].    We comply with applicable federal civil rights laws and [place] laws. We do not discriminate on the basis of race, color, national origin, age, disability, sex, sexual orientation, or gender identity.            Thank you!     Thank you for choosing [facility] for your care. Our goal is always to provide you with excellent care. Hearing back from our patients is one way we can continue to improve our services. Please take a few minutes to complete the " written survey that you may receive in the mail after you visit with us. Thank you!             Medication List: This is a list of all your medications and when to take them. Check marks below indicate your daily home schedule. Keep this list as a reference.      Medications           Morning Afternoon Evening Bedtime As Needed    * acetaminophen 325 MG tablet   Commonly known as:  TYLENOL   Take 3 tablets (975 mg) by mouth every 6 hours as needed for mild pain or other (Give before coming down for ECT)                                * acetaminophen 500 MG tablet   Commonly known as:  TYLENOL   Take 1 tablet (500 mg) by mouth 3 times daily as needed for mild pain                                ADDERALL XR PO   Take 60 mg by mouth daily                                escitalopram 20 MG tablet   Commonly known as:  LEXAPRO   Take 1 tablet (20 mg) by mouth daily                                hydrOXYzine 25 MG tablet   Commonly known as:  ATARAX   Take 2 tablets (50 mg) by mouth 3 times daily as needed for anxiety                                ibuprofen 600 MG tablet   Commonly known as:  ADVIL/MOTRIN   Take 1 tablet (600 mg) by mouth every 6 hours as needed for moderate pain                                loratadine 10 MG tablet   Commonly known as:  CLARITIN   Take 10 mg by mouth daily                                LORazepam 1 MG tablet   Commonly known as:  ATIVAN   Take 1 tablet (1 mg) by mouth 2 times daily                                lurasidone 40 MG Tabs tablet   Commonly known as:  LATUDA   Take 1 tablet (40 mg) by mouth At Bedtime                                nicotine 21 MG/24HR 24 hr patch   Commonly known as:  NICODERM CQ   Place 1 patch onto the skin daily                                QUEtiapine 100 MG tablet   Commonly known as:  SEROquel   Take 1 tablet (100 mg) by mouth At Bedtime                                zolpidem 10 MG tablet   Commonly known as:  AMBIEN   Take 1 tablet (10 mg) by mouth  At Bedtime                                * Notice:  This list has 2 medication(s) that are the same as other medications prescribed for you. Read the directions carefully, and ask your doctor or other care provider to review them with you.

## 2018-07-20 ENCOUNTER — ANESTHESIA (OUTPATIENT)
Dept: BEHAVIORAL HEALTH | Facility: CLINIC | Age: 43
End: 2018-07-20
Payer: COMMERCIAL

## 2018-07-20 ENCOUNTER — HOSPITAL ENCOUNTER (OUTPATIENT)
Dept: BEHAVIORAL HEALTH | Facility: CLINIC | Age: 43
End: 2018-07-20
Attending: PSYCHIATRY & NEUROLOGY
Payer: COMMERCIAL

## 2018-07-20 ENCOUNTER — ANESTHESIA EVENT (OUTPATIENT)
Dept: BEHAVIORAL HEALTH | Facility: CLINIC | Age: 43
End: 2018-07-20

## 2018-07-20 VITALS
TEMPERATURE: 97.6 F | DIASTOLIC BLOOD PRESSURE: 76 MMHG | OXYGEN SATURATION: 98 % | HEART RATE: 56 BPM | SYSTOLIC BLOOD PRESSURE: 122 MMHG | RESPIRATION RATE: 16 BRPM

## 2018-07-20 DIAGNOSIS — F33.2 MAJOR DEPRESSIVE DISORDER, RECURRENT SEVERE WITHOUT PSYCHOTIC FEATURES (H): ICD-10-CM

## 2018-07-20 PROCEDURE — 90870 ELECTROCONVULSIVE THERAPY: CPT

## 2018-07-20 PROCEDURE — 25000128 H RX IP 250 OP 636: Performed by: PSYCHIATRY & NEUROLOGY

## 2018-07-20 PROCEDURE — 90870 ELECTROCONVULSIVE THERAPY: CPT | Performed by: PSYCHIATRY & NEUROLOGY

## 2018-07-20 PROCEDURE — 25000128 H RX IP 250 OP 636: Performed by: STUDENT IN AN ORGANIZED HEALTH CARE EDUCATION/TRAINING PROGRAM

## 2018-07-20 PROCEDURE — 40000671 ZZH STATISTIC ANESTHESIA CASE

## 2018-07-20 PROCEDURE — 25000125 ZZHC RX 250: Performed by: STUDENT IN AN ORGANIZED HEALTH CARE EDUCATION/TRAINING PROGRAM

## 2018-07-20 RX ORDER — KETOROLAC TROMETHAMINE 30 MG/ML
30 INJECTION, SOLUTION INTRAMUSCULAR; INTRAVENOUS ONCE
Status: CANCELLED
Start: 2018-07-20 | End: 2018-07-20

## 2018-07-20 RX ORDER — KETOROLAC TROMETHAMINE 30 MG/ML
30 INJECTION, SOLUTION INTRAMUSCULAR; INTRAVENOUS ONCE
Status: COMPLETED | OUTPATIENT
Start: 2018-07-20 | End: 2018-07-20

## 2018-07-20 RX ORDER — ONDANSETRON 2 MG/ML
4 INJECTION INTRAMUSCULAR; INTRAVENOUS ONCE
Status: CANCELLED
Start: 2018-07-20 | End: 2018-07-20

## 2018-07-20 RX ORDER — MORPHINE SULFATE 4 MG/ML
5 INJECTION, SOLUTION INTRAMUSCULAR; INTRAVENOUS ONCE
Status: COMPLETED | OUTPATIENT
Start: 2018-07-20 | End: 2018-07-20

## 2018-07-20 RX ORDER — ONDANSETRON 2 MG/ML
4 INJECTION INTRAMUSCULAR; INTRAVENOUS ONCE
Status: COMPLETED | OUTPATIENT
Start: 2018-07-20 | End: 2018-07-20

## 2018-07-20 RX ORDER — MORPHINE SULFATE 4 MG/ML
5 INJECTION, SOLUTION INTRAMUSCULAR; INTRAVENOUS ONCE
Status: CANCELLED
Start: 2018-07-20 | End: 2018-07-20

## 2018-07-20 RX ADMIN — Medication 70 MG: at 07:50

## 2018-07-20 RX ADMIN — MORPHINE SULFATE 5 MG: 4 INJECTION, SOLUTION INTRAMUSCULAR; INTRAVENOUS at 08:23

## 2018-07-20 RX ADMIN — KETOROLAC TROMETHAMINE 30 MG: 30 INJECTION, SOLUTION INTRAMUSCULAR at 07:45

## 2018-07-20 RX ADMIN — ONDANSETRON 4 MG: 2 INJECTION INTRAMUSCULAR; INTRAVENOUS at 07:45

## 2018-07-20 RX ADMIN — SODIUM CHLORIDE, POTASSIUM CHLORIDE, SODIUM LACTATE AND CALCIUM CHLORIDE 1000 ML: 600; 310; 30; 20 INJECTION, SOLUTION INTRAVENOUS at 07:46

## 2018-07-20 RX ADMIN — METHOHEXITAL SODIUM 80 MG: 500 INJECTION, POWDER, LYOPHILIZED, FOR SOLUTION INTRAMUSCULAR; INTRAVENOUS; RECTAL at 07:50

## 2018-07-20 NOTE — IP AVS SNAPSHOT
MRN:[id number]                      After Visit Summary   7/20/2018    Mindi Eastman    MRN: [id number]           Visit Information        Provider Department      7/20/2018  7:00 AM Alvarez Solares MD [facility]           Review of your medicines      CONTINUE these medicines which have NOT CHANGED        Dose / Directions    * acetaminophen 325 MG tablet   Commonly known as:  TYLENOL   Used for:  Acute non intractable tension-type headache        Dose:  975 mg   Take 3 tablets (975 mg) by mouth every 6 hours as needed for mild pain or other (Give before coming down for ECT)   Quantity:  100 tablet   Refills:  1       * acetaminophen 500 MG tablet   Commonly known as:  TYLENOL   Used for:  Major depressive disorder, recurrent severe without psychotic features (H)        Dose:  500 mg   Take 1 tablet (500 mg) by mouth 3 times daily as needed for mild pain   Quantity:  100 tablet   Refills:  1       ADDERALL XR PO        Dose:  60 mg   Take 60 mg by mouth daily   Refills:  0       escitalopram 20 MG tablet   Commonly known as:  LEXAPRO   Used for:  Major depressive disorder, recurrent severe without psychotic features (H)        Dose:  20 mg   Take 1 tablet (20 mg) by mouth daily   Quantity:  30 tablet   Refills:  1       hydrOXYzine 25 MG tablet   Commonly known as:  ATARAX   Used for:  SHANICE (generalized anxiety disorder)        Dose:  50 mg   Take 2 tablets (50 mg) by mouth 3 times daily as needed for anxiety   Quantity:  90 tablet   Refills:  1       ibuprofen 600 MG tablet   Commonly known as:  ADVIL/MOTRIN   Used for:  Acute non intractable tension-type headache        Dose:  600 mg   Take 1 tablet (600 mg) by mouth every 6 hours as needed for moderate pain   Quantity:  120 tablet   Refills:  1       loratadine 10 MG tablet   Commonly known as:  CLARITIN        Dose:  10 mg   Take 10 mg by mouth daily   Refills:  0       LORazepam 1 MG tablet   Commonly known as:   ATIVAN   Used for:  SHANICE (generalized anxiety disorder)        Dose:  1 mg   Take 1 tablet (1 mg) by mouth 2 times daily   Quantity:  60 tablet   Refills:  0       lurasidone 40 MG Tabs tablet   Commonly known as:  LATUDA   Used for:  Bipolar I disorder (H), Major depressive disorder, recurrent severe without psychotic features (H)        Dose:  40 mg   Take 1 tablet (40 mg) by mouth At Bedtime   Quantity:  30 tablet   Refills:  1       nicotine 21 MG/24HR 24 hr patch   Commonly known as:  NICODERM CQ   Used for:  Tobacco use        Dose:  1 patch   Place 1 patch onto the skin daily   Quantity:  30 patch   Refills:  1       QUEtiapine 100 MG tablet   Commonly known as:  SEROquel   Used for:  Bipolar I disorder (H), Major depressive disorder, recurrent severe without psychotic features (H)        Dose:  100 mg   Take 1 tablet (100 mg) by mouth At Bedtime   Quantity:  30 tablet   Refills:  1       zolpidem 10 MG tablet   Commonly known as:  AMBIEN   Used for:  Insomnia due to other mental disorder        Dose:  10 mg   Take 1 tablet (10 mg) by mouth At Bedtime   Quantity:  30 tablet   Refills:  0       * Notice:  This list has 2 medication(s) that are the same as other medications prescribed for you. Read the directions carefully, and ask your doctor or other care provider to review them with you.             Protect others around you: Learn how to safely use, store and throw away your medicines at www.disposemymeds.org.         Follow-ups after your visit        Your next 10 appointments already scheduled     Aug 10, 2018  6:45 AM CDT   Electroconvulsive Therapy with Umang Kennedy MD   [facility] ([facility])    [address]  [facility] [address]   [phone]               Care Instructions        Further instructions from your care team       ECT Discharge Instructions      During your ECT series:      Do not drive for 24 hours after treatment.  " If you will have more than one treatment within a week, no driving until 7 days after your last ECT treatment.    Do not drink alcohol or use street drugs (illicit drugs) while you are having treatments.    Do not make important decisions, including legal decisions.    After each treatment:      Get plenty of rest. A responsible adult must stay with your for at least 6 hours.    Avoid heavy or risky activities for 24 hours.    If you feel light-headed, sit for a few minutes before standing. Have someone help you get up.    If you have nausea (feel sick to your stomach): Drink only clear liquids such as apple juice, ginger ale, broth or 7UP, Be sure to drink plenty of liquids. Move to a normal diet as you feel able.     If you received Toradol, wait 6 hours before taking ibuprofen.    Call your doctor if:     You have a fever over 100F (37.7 C) (taken under the tongue), or a fever that last more than 24 hours.    Your IV site is red, swollen, very painful or is getting more tender.    You have nausea that gets very bad or does not improve.      If you have any symptoms after ECT, tell our staff before your next treatment.    The ECT Department can be reached at [phone].  The ECT Department is open Mondays, Wednesdays and Fridays from 7:00 AM to 2:00 PM.    You had Toradol at 745  which is a NSAID medication.  Do not take any Ibuprofen, Motrin, Aspirin, Advil, Aleve, Excedrin, or any other NSAID medication until after 145pm.   Questions,  check with your physician or pharmacist.      To speak to Dr. Kennedy  Office #  [phone] and Fax # [phone]             Additional Information About Your Visit        [employer] Information     [employer] lets you send messages to your doctor, view your test results, renew your prescriptions, schedule appointments and more. To sign up, go to [email or web address] . Click on \"Log in\" on the left side of the screen, which will take you to the Welcome page. Then click on \"Sign " "up Now\" on the right side of the page.     You will be asked to enter the access code listed below, as well as some personal information. Please follow the directions to create your username and password.     Your access code is: [email or web address]  Expires: 2018  8:29 AM     Your access code will  in 90 days. If you need help or a new code, please call your [facility] clinic or [phone].        Care EveryWhere ID     This is your Care EveryWhere ID. This could be used by other organizations to access your [facility] medical records  [id number]        Your Vitals Were     Blood Pressure Pulse Temperature Respirations Pulse Oximetry       122/76 56 97.6  F (36.4  C) (Tympanic) 16 98%        Primary Care Provider Office Phone # Fax #    Erica Coelho PA-C [phone]      Equal Access to Services     [facility]: Hadii tonia ku hadasho Soomaali, waaxda luqadaha, qaybta kaalmada adeegyada, yamileth diehl . So [facility] [phone].    ATENCIÓN: Si habla español, tiene a martínez disposición servicios gratuitos de asistencia lingüística. Arun al [phone].    We comply with applicable federal civil rights laws and [place] laws. We do not discriminate on the basis of race, color, national origin, age, disability, sex, sexual orientation, or gender identity.            Thank you!     Thank you for choosing [facility] for your care. Our goal is always to provide you with excellent care. Hearing back from our patients is one way we can continue to improve our services. Please take a few minutes to complete the written survey that you may receive in the mail after you visit with us. Thank you!             Medication List: This is a list of all your medications and when to take them. Check marks below indicate your daily home schedule. Keep this list as a reference.      Medications           Morning Afternoon Evening Bedtime As Needed    * acetaminophen 325 MG tablet   Commonly " known as:  TYLENOL   Take 3 tablets (975 mg) by mouth every 6 hours as needed for mild pain or other (Give before coming down for ECT)                                * acetaminophen 500 MG tablet   Commonly known as:  TYLENOL   Take 1 tablet (500 mg) by mouth 3 times daily as needed for mild pain                                ADDERALL XR PO   Take 60 mg by mouth daily                                escitalopram 20 MG tablet   Commonly known as:  LEXAPRO   Take 1 tablet (20 mg) by mouth daily                                hydrOXYzine 25 MG tablet   Commonly known as:  ATARAX   Take 2 tablets (50 mg) by mouth 3 times daily as needed for anxiety                                ibuprofen 600 MG tablet   Commonly known as:  ADVIL/MOTRIN   Take 1 tablet (600 mg) by mouth every 6 hours as needed for moderate pain                                loratadine 10 MG tablet   Commonly known as:  CLARITIN   Take 10 mg by mouth daily                                LORazepam 1 MG tablet   Commonly known as:  ATIVAN   Take 1 tablet (1 mg) by mouth 2 times daily                                lurasidone 40 MG Tabs tablet   Commonly known as:  LATUDA   Take 1 tablet (40 mg) by mouth At Bedtime                                nicotine 21 MG/24HR 24 hr patch   Commonly known as:  NICODERM CQ   Place 1 patch onto the skin daily                                QUEtiapine 100 MG tablet   Commonly known as:  SEROquel   Take 1 tablet (100 mg) by mouth At Bedtime                                zolpidem 10 MG tablet   Commonly known as:  AMBIEN   Take 1 tablet (10 mg) by mouth At Bedtime                                * Notice:  This list has 2 medication(s) that are the same as other medications prescribed for you. Read the directions carefully, and ask your doctor or other care provider to review them with you.

## 2018-07-20 NOTE — IP AVS SNAPSHOT
[facility]    [facility] [address] [address]    Phone:  [phone]                                       After Visit Summary   7/20/2018    Mindi Eastman    MRN: [id number]           After Visit Summary Signature Page     I have received my discharge instructions, and my questions have been answered. I have discussed any challenges I see with this plan with the nurse or doctor.    ..........................................................................................................................................  Patient/Patient Representative Signature      ..........................................................................................................................................  Patient Representative Print Name and Relationship to Patient    ..................................................               ................................................  Date                                            Time    ..........................................................................................................................................  Reviewed by Signature/Title    ...................................................              ..............................................  Date                                                            Time

## 2018-07-20 NOTE — ANESTHESIA POSTPROCEDURE EVALUATION
Patient: Mindi Eastman    * No procedures listed *    Diagnosis:* No pre-op diagnosis entered *  Diagnosis Additional Information: No value filed.    Anesthesia Type:  General    Note:  Anesthesia Post Evaluation    Patient location during evaluation: ECT PACU  Patient participation: Able to fully participate in evaluation  Level of consciousness: awake and alert  Pain management: adequate  Airway patency: patent  Cardiovascular status: acceptable  Respiratory status: acceptable  Hydration status: acceptable  PONV: none     Anesthetic complications: None          Last vitals:  Vitals:    07/20/18 0700 07/20/18 0759 07/20/18 0805   BP: 126/78 130/77 125/78   Pulse: 64 77 71   Resp: 16 16 16   Temp: 35.7  C (96.2  F) 36.4  C (97.6  F)    SpO2: 99% 100% 100%         Electronically Signed By: Leonel Vergara MD  July 20, 2018  8:12 AM

## 2018-07-20 NOTE — PROCEDURES
Procedure/Surgery Information   [facility] [place]    Bedside Procedure Note  Date of Service (when I performed the procedure): 07/20/2018    Mindi Eastman is a 42 year old female patient.  1. Major depressive disorder, recurrent severe without psychotic features (H)      Past Medical History:   Diagnosis Date     ADHD      Anxiety      Bipolar disorder (H)      Depressive disorder      OCD (obsessive compulsive disorder)      ROXANNE (obstructive sleep apnea)     On CPAP     Seasonal allergies      Temp: 96.2  F (35.7  C) Temp src: Tympanic BP: 126/78 Pulse: 64   Resp: 16 SpO2: 99 % O2 Device: None (Room air)      Procedures     [facility], [place]   ECT Procedure Note   07/20/2018       Mindi Eastman [id number]   42 year old 1975     Patient Status: Outpatient    Is this the first in a series of 12 treatments?  No    History and Physical: Reviewed in medical record    Consent: Informed consent was signed by: patient    Date Consent Signed: 5/18/18    Allergies   Allergen Reactions     Darvocet [Propoxyphene N-Apap]        Weight:  0 lbs 0 oz    Patient Preparations: Glasses/Contacts removed         Indications for ECT:   Medications ineffective, Psychotherapies ineffective and treatment resistant depression.         Clinical Narrative:   Patient is continuing ECT for depression.  NPO after 2400.  Alert and Oriented x 3.  No problems with the last ECT.  Health has been goood.   Mood has been a bit down over last week or so due to some social stressors. She would like to continue with current rate of treatment.         Diagnosis:      Major depressive disorder, recurrent severe without psychotic features (F33.2)  Consider bipolar depression         Pause for the Cause:     Right patient Yes   Right procedure/laterality settings: Yes          Intra-Procedure Documentation:         ECT #: 4   Treatment number this series: 4   Total treatment number:  15     Type of ECT:  Right, unilateral ultrabrief    ECT Medications:    Tylenol:  1000mg po  Toradol: 30mg  Zofran: 4mg  Brevital: 80mg  Succinyl Choline: 70mg  Morphine 5 mg post treatment for headache   Lactated Ringers 1 liter      ECT Strip Summary:   Energy Level: 10 percent   Motor Seizure Duration: 22 seconds  EEG Seizure Duration: 97 seconds    Complications: No    Plan:    Next maintenance ECT is in 3 weeks on 8/10/18      Alvarez Solares MD

## 2018-07-20 NOTE — DISCHARGE INSTRUCTIONS
ECT Discharge Instructions      During your ECT series:      Do not drive for 24 hours after treatment.  If you will have more than one treatment within a week, no driving until 7 days after your last ECT treatment.    Do not drink alcohol or use street drugs (illicit drugs) while you are having treatments.    Do not make important decisions, including legal decisions.    After each treatment:      Get plenty of rest. A responsible adult must stay with your for at least 6 hours.    Avoid heavy or risky activities for 24 hours.    If you feel light-headed, sit for a few minutes before standing. Have someone help you get up.    If you have nausea (feel sick to your stomach): Drink only clear liquids such as apple juice, ginger ale, broth or 7UP, Be sure to drink plenty of liquids. Move to a normal diet as you feel able.     If you received Toradol, wait 6 hours before taking ibuprofen.    Call your doctor if:     You have a fever over 100F (37.7 C) (taken under the tongue), or a fever that last more than 24 hours.    Your IV site is red, swollen, very painful or is getting more tender.    You have nausea that gets very bad or does not improve.      If you have any symptoms after ECT, tell our staff before your next treatment.    The ECT Department can be reached at [phone].  The ECT Department is open Mondays, Wednesdays and Fridays from 7:00 AM to 2:00 PM.    You had Toradol at 745  which is a NSAID medication.  Do not take any Ibuprofen, Motrin, Aspirin, Advil, Aleve, Excedrin, or any other NSAID medication until after 145pm.   Questions,  check with your physician or pharmacist.      To speak to Dr. Kennedy  Office #  [phone] and Fax # [phone]

## 2018-08-10 ENCOUNTER — ANESTHESIA (OUTPATIENT)
Dept: BEHAVIORAL HEALTH | Facility: CLINIC | Age: 43
End: 2018-08-10

## 2018-08-10 ENCOUNTER — ANESTHESIA EVENT (OUTPATIENT)
Dept: BEHAVIORAL HEALTH | Facility: CLINIC | Age: 43
End: 2018-08-10

## 2018-08-10 ENCOUNTER — HOSPITAL ENCOUNTER (OUTPATIENT)
Dept: BEHAVIORAL HEALTH | Facility: CLINIC | Age: 43
End: 2018-08-10
Attending: PSYCHIATRY & NEUROLOGY
Payer: COMMERCIAL

## 2018-08-10 VITALS
RESPIRATION RATE: 20 BRPM | HEART RATE: 60 BPM | SYSTOLIC BLOOD PRESSURE: 117 MMHG | TEMPERATURE: 97.2 F | OXYGEN SATURATION: 95 % | DIASTOLIC BLOOD PRESSURE: 68 MMHG

## 2018-08-10 DIAGNOSIS — F33.2 MAJOR DEPRESSIVE DISORDER, RECURRENT SEVERE WITHOUT PSYCHOTIC FEATURES (H): ICD-10-CM

## 2018-08-10 PROCEDURE — 25000125 ZZHC RX 250: Performed by: ANESTHESIOLOGY

## 2018-08-10 PROCEDURE — 25000132 ZZH RX MED GY IP 250 OP 250 PS 637: Performed by: PSYCHIATRY & NEUROLOGY

## 2018-08-10 PROCEDURE — 25000128 H RX IP 250 OP 636: Performed by: PSYCHIATRY & NEUROLOGY

## 2018-08-10 PROCEDURE — 25000128 H RX IP 250 OP 636: Performed by: ANESTHESIOLOGY

## 2018-08-10 PROCEDURE — 40000671 ZZH STATISTIC ANESTHESIA CASE

## 2018-08-10 PROCEDURE — 90870 ELECTROCONVULSIVE THERAPY: CPT

## 2018-08-10 RX ORDER — MORPHINE SULFATE 4 MG/ML
5 INJECTION, SOLUTION INTRAMUSCULAR; INTRAVENOUS ONCE
Status: CANCELLED
Start: 2018-08-10 | End: 2018-08-10

## 2018-08-10 RX ORDER — ONDANSETRON 2 MG/ML
4 INJECTION INTRAMUSCULAR; INTRAVENOUS ONCE
Status: COMPLETED | OUTPATIENT
Start: 2018-08-10 | End: 2018-08-10

## 2018-08-10 RX ORDER — MORPHINE SULFATE 4 MG/ML
5 INJECTION, SOLUTION INTRAMUSCULAR; INTRAVENOUS ONCE
Status: COMPLETED | OUTPATIENT
Start: 2018-08-10 | End: 2018-08-10

## 2018-08-10 RX ORDER — KETOROLAC TROMETHAMINE 30 MG/ML
30 INJECTION, SOLUTION INTRAMUSCULAR; INTRAVENOUS ONCE
Status: COMPLETED | OUTPATIENT
Start: 2018-08-10 | End: 2018-08-10

## 2018-08-10 RX ORDER — KETOROLAC TROMETHAMINE 30 MG/ML
30 INJECTION, SOLUTION INTRAMUSCULAR; INTRAVENOUS ONCE
Status: CANCELLED
Start: 2018-08-10 | End: 2018-08-10

## 2018-08-10 RX ORDER — ONDANSETRON 2 MG/ML
4 INJECTION INTRAMUSCULAR; INTRAVENOUS ONCE
Status: CANCELLED
Start: 2018-08-10 | End: 2018-08-10

## 2018-08-10 RX ORDER — ACETAMINOPHEN 500 MG
1000 TABLET ORAL
Status: COMPLETED | OUTPATIENT
Start: 2018-08-10 | End: 2018-08-10

## 2018-08-10 RX ADMIN — ONDANSETRON 4 MG: 2 INJECTION INTRAMUSCULAR; INTRAVENOUS at 07:35

## 2018-08-10 RX ADMIN — SODIUM CHLORIDE, POTASSIUM CHLORIDE, SODIUM LACTATE AND CALCIUM CHLORIDE 1000 ML: 600; 310; 30; 20 INJECTION, SOLUTION INTRAVENOUS at 07:43

## 2018-08-10 RX ADMIN — MORPHINE SULFATE 5 MG: 4 INJECTION, SOLUTION INTRAMUSCULAR; INTRAVENOUS at 08:21

## 2018-08-10 RX ADMIN — KETOROLAC TROMETHAMINE 30 MG: 30 INJECTION, SOLUTION INTRAMUSCULAR at 07:36

## 2018-08-10 RX ADMIN — ACETAMINOPHEN 1000 MG: 500 TABLET, FILM COATED ORAL at 08:31

## 2018-08-10 RX ADMIN — Medication 70 MG: at 07:55

## 2018-08-10 RX ADMIN — METHOHEXITAL SODIUM 80 MG: 500 INJECTION, POWDER, LYOPHILIZED, FOR SOLUTION INTRAMUSCULAR; INTRAVENOUS; RECTAL at 07:55

## 2018-08-10 NOTE — IP AVS SNAPSHOT
MRN:[id number]                      After Visit Summary   8/10/2018    Mindi Eastman    MRN: [id number]           Visit Information        Provider Department      8/10/2018  7:00 AM Umang Kennedy MD [facility]           Review of your medicines      CONTINUE these medicines which have NOT CHANGED        Dose / Directions    * acetaminophen 325 MG tablet   Commonly known as:  TYLENOL   Used for:  Acute non intractable tension-type headache        Dose:  975 mg   Take 3 tablets (975 mg) by mouth every 6 hours as needed for mild pain or other (Give before coming down for ECT)   Quantity:  100 tablet   Refills:  1       * acetaminophen 500 MG tablet   Commonly known as:  TYLENOL   Used for:  Major depressive disorder, recurrent severe without psychotic features (H)        Dose:  500 mg   Take 1 tablet (500 mg) by mouth 3 times daily as needed for mild pain   Quantity:  100 tablet   Refills:  1       ADDERALL XR PO        Dose:  60 mg   Take 60 mg by mouth daily   Refills:  0       escitalopram 20 MG tablet   Commonly known as:  LEXAPRO   Used for:  Major depressive disorder, recurrent severe without psychotic features (H)        Dose:  20 mg   Take 1 tablet (20 mg) by mouth daily   Quantity:  30 tablet   Refills:  1       hydrOXYzine 25 MG tablet   Commonly known as:  ATARAX   Used for:  SHANICE (generalized anxiety disorder)        Dose:  50 mg   Take 2 tablets (50 mg) by mouth 3 times daily as needed for anxiety   Quantity:  90 tablet   Refills:  1       ibuprofen 600 MG tablet   Commonly known as:  ADVIL/MOTRIN   Used for:  Acute non intractable tension-type headache        Dose:  600 mg   Take 1 tablet (600 mg) by mouth every 6 hours as needed for moderate pain   Quantity:  120 tablet   Refills:  1       loratadine 10 MG tablet   Commonly known as:  CLARITIN        Dose:  10 mg   Take 10 mg by mouth daily   Refills:  0       LORazepam 1 MG tablet   Commonly known as:   ATIVAN   Used for:  SHANICE (generalized anxiety disorder)        Dose:  1 mg   Take 1 tablet (1 mg) by mouth 2 times daily   Quantity:  60 tablet   Refills:  0       lurasidone 40 MG Tabs tablet   Commonly known as:  LATUDA   Used for:  Bipolar I disorder (H), Major depressive disorder, recurrent severe without psychotic features (H)        Dose:  40 mg   Take 1 tablet (40 mg) by mouth At Bedtime   Quantity:  30 tablet   Refills:  1       nicotine 21 MG/24HR 24 hr patch   Commonly known as:  NICODERM CQ   Used for:  Tobacco use        Dose:  1 patch   Place 1 patch onto the skin daily   Quantity:  30 patch   Refills:  1       QUEtiapine 100 MG tablet   Commonly known as:  SEROquel   Used for:  Bipolar I disorder (H), Major depressive disorder, recurrent severe without psychotic features (H)        Dose:  100 mg   Take 1 tablet (100 mg) by mouth At Bedtime   Quantity:  30 tablet   Refills:  1       zolpidem 10 MG tablet   Commonly known as:  AMBIEN   Used for:  Insomnia due to other mental disorder        Dose:  10 mg   Take 1 tablet (10 mg) by mouth At Bedtime   Quantity:  30 tablet   Refills:  0       * Notice:  This list has 2 medication(s) that are the same as other medications prescribed for you. Read the directions carefully, and ask your doctor or other care provider to review them with you.             Protect others around you: Learn how to safely use, store and throw away your medicines at www.disposemymeds.org.         Follow-ups after your visit        Your next 10 appointments already scheduled     Aug 31, 2018  7:00 AM CDT   Electroconvulsive Therapy with Umang Kennedy MD   [facility] ([facility])    [address]  [facility] [address]   [phone]               Care Instructions        Further instructions from your care team       ECT Discharge Instructions      During your ECT series:      Do not drive or work heavy equipment until  7 days after your last treatment.    Do not drink alcohol or use street drugs (illicit drugs) while you are having treatments.    Do not make important decisions, including legal decisions.    After each treatment:      Get plenty of rest. A responsible adult must stay with your for at least 6 hours.    Do not drive for 24 hours after ECT treatment. If you have more than one treatment within a week, no driving until 7 days after your last ECT treatment.    Avoid heavy or risky activities for 24 hours.    If you feel light-headed, sit for a few minutes before standing. Have someone help you get up.    If you have nausea (feel sick to your stomach): Drink only clear liquids such as apple juice, ginger ale, broth or 7UP, Be sure to drink plenty of liquids. Move to a normal diet as you feel able.     If you received Toradol, wait 6 hours before taking ibuprofen.    Call your doctor if:     You have a fever over 100F (37.7 C) (taken under the tongue), or a fever that last more than 24 hours.    Your IV site is red, swollen, very painful or is getting more tender.    You have nausea that gets very bad or does not improve.      If you have any symptoms after ECT, tell our staff before your next treatment.    The ECT Department can be reached at [phone].  The ECT Department is open Mondays, Wednesdays and Fridays from 7:00 AM to 2:00 PM.     To speak to a doctor, call: Dr. Kennedy [phone]    New instructions:    Other: You have received Toradol today at 7:36 AM. Toradol is an NSAID.  Do not take any NSAID's including: Ibuprofen, Naproxen Sodium, Asprin , Advil, Motrin, Aleve, Josué, etc., until six hours after the above time which would be 1:36 PM. If you have any questions check back with your Physician, or pharmacist.     You were given tylenol 1000 mg at 8:31 AM for pain.  Do not take any additional tylenol until  6 hours after that time which would be 2:31 PM. If you have any questions contact your physician or  "pharmacist.    You were given Morphine 5 mg at 8:21 AM for pain.    Transported by: patients mother, Ana      _________________________________________________  ________________________  Patient/Responsible party Signature      Date          ________________________________________________   ________________________  Nurse Signature        Date     Additional Information About Your Visit        MyChart Information     FileThis lets you send messages to your doctor, view your test results, renew your prescriptions, schedule appointments and more. To sign up, go to [email or web address] . Click on \"Log in\" on the left side of the screen, which will take you to the Welcome page. Then click on \"Sign up Now\" on the right side of the page.     You will be asked to enter the access code listed below, as well as some personal information. Please follow the directions to create your username and password.     Your access code is: [email or web address]  Expires: 2018  8:29 AM     Your access code will  in 90 days. If you need help or a new code, please call your [facility] clinic or [phone].        Care EveryWhere ID     This is your Care EveryWhere ID. This could be used by other organizations to access your [facility] medical records  [id number]        Your Vitals Were     Blood Pressure Pulse Temperature Respirations Pulse Oximetry       117/68 60 97.2  F (36.2  C) (Tympanic) 20 95%        Primary Care Provider Office Phone # Fax #    Erica Coehlo PA-C [phone]      Equal Access to Services     A[place] EDMONDSON : Evelia Irby, debbie henley, qaybta kaalmayamileth parker ah. So [facility] [phone].    ATENCIÓN: Si habla español, tiene a martínez disposición servicios gratuitos de asistencia lingüística. Llame al [phone].    We comply with applicable federal civil rights laws and [place] laws. We do not discriminate on the basis of race, color, " national origin, age, disability, sex, sexual orientation, or gender identity.            Thank you!     Thank you for choosing [facility] for your care. Our goal is always to provide you with excellent care. Hearing back from our patients is one way we can continue to improve our services. Please take a few minutes to complete the written survey that you may receive in the mail after you visit with us. Thank you!             Medication List: This is a list of all your medications and when to take them. Check marks below indicate your daily home schedule. Keep this list as a reference.      Medications           Morning Afternoon Evening Bedtime As Needed    * acetaminophen 325 MG tablet   Commonly known as:  TYLENOL   Take 3 tablets (975 mg) by mouth every 6 hours as needed for mild pain or other (Give before coming down for ECT)   Last time this was given:  1,000 mg on 8/10/2018  8:31 AM                                * acetaminophen 500 MG tablet   Commonly known as:  TYLENOL   Take 1 tablet (500 mg) by mouth 3 times daily as needed for mild pain   Last time this was given:  1,000 mg on 8/10/2018  8:31 AM                                ADDERALL XR PO   Take 60 mg by mouth daily                                escitalopram 20 MG tablet   Commonly known as:  LEXAPRO   Take 1 tablet (20 mg) by mouth daily                                hydrOXYzine 25 MG tablet   Commonly known as:  ATARAX   Take 2 tablets (50 mg) by mouth 3 times daily as needed for anxiety                                ibuprofen 600 MG tablet   Commonly known as:  ADVIL/MOTRIN   Take 1 tablet (600 mg) by mouth every 6 hours as needed for moderate pain                                loratadine 10 MG tablet   Commonly known as:  CLARITIN   Take 10 mg by mouth daily                                LORazepam 1 MG tablet   Commonly known as:  ATIVAN   Take 1 tablet (1 mg) by mouth 2 times daily                                lurasidone 40 MG Tabs tablet    Commonly known as:  LATUDA   Take 1 tablet (40 mg) by mouth At Bedtime                                nicotine 21 MG/24HR 24 hr patch   Commonly known as:  NICODERM CQ   Place 1 patch onto the skin daily                                QUEtiapine 100 MG tablet   Commonly known as:  SEROquel   Take 1 tablet (100 mg) by mouth At Bedtime                                zolpidem 10 MG tablet   Commonly known as:  AMBIEN   Take 1 tablet (10 mg) by mouth At Bedtime                                * Notice:  This list has 2 medication(s) that are the same as other medications prescribed for you. Read the directions carefully, and ask your doctor or other care provider to review them with you.

## 2018-08-10 NOTE — PROCEDURES
Procedure/Surgery Information   [facility], [place]    Bedside Procedure Note  Date of Service (when I performed the procedure): 08/10/2018    Mindi Eastman is a 42 year old female patient.  1. Major depressive disorder, recurrent severe without psychotic features (H)      Past Medical History:   Diagnosis Date     ADHD      Anxiety      Bipolar disorder (H)      Depressive disorder      OCD (obsessive compulsive disorder)      ROXANNE (obstructive sleep apnea)     On CPAP     Seasonal allergies      Temp: 97.5  F (36.4  C)   BP: 118/77 Pulse: 69   Resp: 16 SpO2: 99 %        Procedures     [facility], [place]   ECT Procedure Note   08/10/2018       Mindi Eastman [id number]   42 year old 1975     Patient Status: Outpatient    Is this the first in a series of 12 treatments?  No    History and Physical: Reviewed in medical record    Consent: Informed consent was signed by: patient    Date Consent Signed: 5/18/18    Allergies   Allergen Reactions     Darvocet [Propoxyphene N-Apap]        Weight:  0 lbs 0 oz    Patient Preparations: Glasses/Contacts removed         Indications for ECT:   Medications ineffective, Psychotherapies ineffective and treatment resistant depression.         Clinical Narrative:   Patient is continuing ECT for depression.  NPO after 2400.  Alert and Oriented x 3.  No problems with the last ECT.  Patient wants to keep ECT every 3 weeks.  She does not feel ready to go out further.  Mood lasts about 3 weeks but then starts to wane.           Diagnosis:      Major depressive disorder, recurrent severe without psychotic features (F33.2)  Consider bipolar depression         Pause for the Cause:     Right patient Yes   Right procedure/laterality settings: Yes          Intra-Procedure Documentation:         ECT #: 5   Treatment number this series: 5   Total treatment number: 16     Type of ECT:  Right, unilateral ultrabrief    ECT  Medications:    Tylenol:  1000mg po (got after ECT today)  Toradol: 30mg  Zofran: 4mg  Brevital: 80mg  Succinyl Choline: 70mg  Morphine 5 mg post treatment for headache   Lactated Ringers 1 liter      ECT Strip Summary:   Energy Level: 10 percent   Motor Seizure Duration: 41 seconds  EEG Seizure Duration:  75 seconds    Complications: No    Plan:    Next maintenance ECT is in 3 weeks on 8/31/18      Umang Kennedy MD

## 2018-08-10 NOTE — IP AVS SNAPSHOT
[facility]    [facility] [address] [address]    Phone:  [phone]                                       After Visit Summary   8/10/2018    Mindi Eastman    MRN: [id number]           After Visit Summary Signature Page     I have received my discharge instructions, and my questions have been answered. I have discussed any challenges I see with this plan with the nurse or doctor.    ..........................................................................................................................................  Patient/Patient Representative Signature      ..........................................................................................................................................  Patient Representative Print Name and Relationship to Patient    ..................................................               ................................................  Date                                            Time    ..........................................................................................................................................  Reviewed by Signature/Title    ...................................................              ..............................................  Date                                                            Time

## 2018-08-10 NOTE — ANESTHESIA POSTPROCEDURE EVALUATION
Patient: Mindi Eastman    * No procedures listed *    Diagnosis:* No pre-op diagnosis entered *  Diagnosis Additional Information: No value filed.    Anesthesia Type:  General    Note:  Anesthesia Post Evaluation    Patient location during evaluation: PACU and Bedside  Patient participation: Able to fully participate in evaluation  Level of consciousness: awake and alert  Pain management: adequate  Airway patency: patent  Cardiovascular status: acceptable  Respiratory status: acceptable  Hydration status: balanced  PONV: none     Anesthetic complications: None          Last vitals:  Vitals:    08/10/18 0705 08/10/18 0806 08/10/18 0811   BP: 118/77 127/75 118/72   Pulse: 69 71 71   Resp: 16 20 20   Temp: 36.4  C (97.5  F)  36.2  C (97.2  F)   SpO2: 99% 98% 96%         Electronically Signed By: Herminia Law MD  August 10, 2018  8:14 AM

## 2018-08-10 NOTE — DISCHARGE INSTRUCTIONS
ECT Discharge Instructions      During your ECT series:      Do not drive or work heavy equipment until 7 days after your last treatment.    Do not drink alcohol or use street drugs (illicit drugs) while you are having treatments.    Do not make important decisions, including legal decisions.    After each treatment:      Get plenty of rest. A responsible adult must stay with your for at least 6 hours.    Do not drive for 24 hours after ECT treatment. If you have more than one treatment within a week, no driving until 7 days after your last ECT treatment.    Avoid heavy or risky activities for 24 hours.    If you feel light-headed, sit for a few minutes before standing. Have someone help you get up.    If you have nausea (feel sick to your stomach): Drink only clear liquids such as apple juice, ginger ale, broth or 7UP, Be sure to drink plenty of liquids. Move to a normal diet as you feel able.     If you received Toradol, wait 6 hours before taking ibuprofen.    Call your doctor if:     You have a fever over 100F (37.7 C) (taken under the tongue), or a fever that last more than 24 hours.    Your IV site is red, swollen, very painful or is getting more tender.    You have nausea that gets very bad or does not improve.      If you have any symptoms after ECT, tell our staff before your next treatment.    The ECT Department can be reached at [phone].  The ECT Department is open Mondays, Wednesdays and Fridays from 7:00 AM to 2:00 PM.     To speak to a doctor, call: Dr. Kennedy [phone]    New instructions:    Other: You have received Toradol today at 7:36 AM. Toradol is an NSAID.  Do not take any NSAID's including: Ibuprofen, Naproxen Sodium, Asprin , Advil, Motrin, Aleve, Josué, etc., until six hours after the above time which would be 1:36 PM. If you have any questions check back with your Physician, or pharmacist.     You were given tylenol 1000 mg at 8:31 AM for pain.  Do not take any additional tylenol  until  6 hours after that time which would be 2:31 PM. If you have any questions contact your physician or pharmacist.    You were given Morphine 5 mg at 8:21 AM for pain.    Transported by: patients mother, Ana      _________________________________________________  ________________________  Patient/Responsible party Signature      Date          ________________________________________________   ________________________  Nurse Signature        Date

## 2018-08-31 ENCOUNTER — ANESTHESIA EVENT (OUTPATIENT)
Dept: BEHAVIORAL HEALTH | Facility: CLINIC | Age: 43
End: 2018-08-31

## 2018-08-31 ENCOUNTER — ANESTHESIA (OUTPATIENT)
Dept: BEHAVIORAL HEALTH | Facility: CLINIC | Age: 43
End: 2018-08-31
Payer: COMMERCIAL

## 2018-08-31 ENCOUNTER — HOSPITAL ENCOUNTER (OUTPATIENT)
Dept: BEHAVIORAL HEALTH | Facility: CLINIC | Age: 43
End: 2018-08-31
Attending: PSYCHIATRY & NEUROLOGY
Payer: COMMERCIAL

## 2018-08-31 VITALS
HEART RATE: 78 BPM | TEMPERATURE: 97.5 F | OXYGEN SATURATION: 94 % | RESPIRATION RATE: 16 BRPM | SYSTOLIC BLOOD PRESSURE: 130 MMHG | DIASTOLIC BLOOD PRESSURE: 78 MMHG

## 2018-08-31 DIAGNOSIS — F33.2 MAJOR DEPRESSIVE DISORDER, RECURRENT SEVERE WITHOUT PSYCHOTIC FEATURES (H): ICD-10-CM

## 2018-08-31 PROCEDURE — 40000671 ZZH STATISTIC ANESTHESIA CASE

## 2018-08-31 PROCEDURE — 25000128 H RX IP 250 OP 636: Performed by: PSYCHIATRY & NEUROLOGY

## 2018-08-31 PROCEDURE — 90870 ELECTROCONVULSIVE THERAPY: CPT

## 2018-08-31 PROCEDURE — 25000128 H RX IP 250 OP 636: Performed by: ANESTHESIOLOGY

## 2018-08-31 PROCEDURE — 25000125 ZZHC RX 250: Performed by: ANESTHESIOLOGY

## 2018-08-31 RX ORDER — KETOROLAC TROMETHAMINE 30 MG/ML
30 INJECTION, SOLUTION INTRAMUSCULAR; INTRAVENOUS ONCE
Status: COMPLETED | OUTPATIENT
Start: 2018-08-31 | End: 2018-08-31

## 2018-08-31 RX ORDER — ONDANSETRON 2 MG/ML
4 INJECTION INTRAMUSCULAR; INTRAVENOUS ONCE
Status: COMPLETED | OUTPATIENT
Start: 2018-08-31 | End: 2018-08-31

## 2018-08-31 RX ORDER — KETOROLAC TROMETHAMINE 30 MG/ML
30 INJECTION, SOLUTION INTRAMUSCULAR; INTRAVENOUS ONCE
Status: CANCELLED
Start: 2018-08-31 | End: 2018-08-31

## 2018-08-31 RX ORDER — MORPHINE SULFATE 4 MG/ML
5 INJECTION, SOLUTION INTRAMUSCULAR; INTRAVENOUS ONCE
Status: CANCELLED
Start: 2018-08-31 | End: 2018-08-31

## 2018-08-31 RX ORDER — ONDANSETRON 2 MG/ML
4 INJECTION INTRAMUSCULAR; INTRAVENOUS ONCE
Status: CANCELLED
Start: 2018-08-31 | End: 2018-08-31

## 2018-08-31 RX ORDER — MORPHINE SULFATE 4 MG/ML
5 INJECTION, SOLUTION INTRAMUSCULAR; INTRAVENOUS ONCE
Status: COMPLETED | OUTPATIENT
Start: 2018-08-31 | End: 2018-08-31

## 2018-08-31 RX ADMIN — METHOHEXITAL SODIUM 80 MG: 500 INJECTION, POWDER, LYOPHILIZED, FOR SOLUTION INTRAMUSCULAR; INTRAVENOUS; RECTAL at 08:13

## 2018-08-31 RX ADMIN — SODIUM CHLORIDE, POTASSIUM CHLORIDE, SODIUM LACTATE AND CALCIUM CHLORIDE 1000 ML: 600; 310; 30; 20 INJECTION, SOLUTION INTRAVENOUS at 08:07

## 2018-08-31 RX ADMIN — MORPHINE SULFATE 5 MG: 4 INJECTION, SOLUTION INTRAMUSCULAR; INTRAVENOUS at 08:42

## 2018-08-31 RX ADMIN — KETOROLAC TROMETHAMINE 30 MG: 30 INJECTION, SOLUTION INTRAMUSCULAR at 08:07

## 2018-08-31 RX ADMIN — Medication 70 MG: at 08:13

## 2018-08-31 RX ADMIN — ONDANSETRON 4 MG: 2 INJECTION INTRAMUSCULAR; INTRAVENOUS at 08:07

## 2018-08-31 NOTE — IP AVS SNAPSHOT
MRN:[id number]                      After Visit Summary   8/31/2018    Mindi Eastman    MRN: [id number]           Visit Information        Provider Department      8/31/2018  7:00 AM Umang Kennedy MD [facility]           Review of your medicines      CONTINUE these medicines which have NOT CHANGED        Dose / Directions    * acetaminophen 325 MG tablet   Commonly known as:  TYLENOL   Used for:  Acute non intractable tension-type headache        Dose:  975 mg   Take 3 tablets (975 mg) by mouth every 6 hours as needed for mild pain or other (Give before coming down for ECT)   Quantity:  100 tablet   Refills:  1       * acetaminophen 500 MG tablet   Commonly known as:  TYLENOL   Used for:  Major depressive disorder, recurrent severe without psychotic features (H)        Dose:  500 mg   Take 1 tablet (500 mg) by mouth 3 times daily as needed for mild pain   Quantity:  100 tablet   Refills:  1       ADDERALL XR PO        Dose:  60 mg   Take 60 mg by mouth daily   Refills:  0       escitalopram 20 MG tablet   Commonly known as:  LEXAPRO   Used for:  Major depressive disorder, recurrent severe without psychotic features (H)        Dose:  20 mg   Take 1 tablet (20 mg) by mouth daily   Quantity:  30 tablet   Refills:  1       hydrOXYzine 25 MG tablet   Commonly known as:  ATARAX   Used for:  SHANICE (generalized anxiety disorder)        Dose:  50 mg   Take 2 tablets (50 mg) by mouth 3 times daily as needed for anxiety   Quantity:  90 tablet   Refills:  1       ibuprofen 600 MG tablet   Commonly known as:  ADVIL/MOTRIN   Used for:  Acute non intractable tension-type headache        Dose:  600 mg   Take 1 tablet (600 mg) by mouth every 6 hours as needed for moderate pain   Quantity:  120 tablet   Refills:  1       loratadine 10 MG tablet   Commonly known as:  CLARITIN        Dose:  10 mg   Take 10 mg by mouth daily   Refills:  0       LORazepam 1 MG tablet   Commonly known as:   ATIVAN   Used for:  SHANICE (generalized anxiety disorder)        Dose:  1 mg   Take 1 tablet (1 mg) by mouth 2 times daily   Quantity:  60 tablet   Refills:  0       lurasidone 40 MG Tabs tablet   Commonly known as:  LATUDA   Used for:  Bipolar I disorder (H), Major depressive disorder, recurrent severe without psychotic features (H)        Dose:  40 mg   Take 1 tablet (40 mg) by mouth At Bedtime   Quantity:  30 tablet   Refills:  1       nicotine 21 MG/24HR 24 hr patch   Commonly known as:  NICODERM CQ   Used for:  Tobacco use        Dose:  1 patch   Place 1 patch onto the skin daily   Quantity:  30 patch   Refills:  1       QUEtiapine 100 MG tablet   Commonly known as:  SEROquel   Used for:  Bipolar I disorder (H), Major depressive disorder, recurrent severe without psychotic features (H)        Dose:  100 mg   Take 1 tablet (100 mg) by mouth At Bedtime   Quantity:  30 tablet   Refills:  1       zolpidem 10 MG tablet   Commonly known as:  AMBIEN   Used for:  Insomnia due to other mental disorder        Dose:  10 mg   Take 1 tablet (10 mg) by mouth At Bedtime   Quantity:  30 tablet   Refills:  0       * Notice:  This list has 2 medication(s) that are the same as other medications prescribed for you. Read the directions carefully, and ask your doctor or other care provider to review them with you.             Protect others around you: Learn how to safely use, store and throw away your medicines at www.disposemymeds.org.         Follow-ups after your visit        Your next 10 appointments already scheduled     Sep 21, 2018  7:00 AM CDT   Electroconvulsive Therapy with Umang Kennedy MD   [facility] ([facility])    [address]  [facility] [address]   [phone]               Care Instructions        Further instructions from your care team        ECT Discharge Instructions      During your ECT series:      Do not drive or work heavy equipment until  7 days after your last treatment.    Do not drive for 7 days after your last treatment if you have more than 1  treatment in a week.   Do not drive for 24 hours after your last treatment if you only have 1 treatment in  a week.     Do not drink alcohol or use street drugs (illicit drugs) while you are having  treatments.    Do not make important decisions, including legal decisions.    After each treatment:      Get plenty of rest. A responsible adult must stay with your for at least 6 hours.    Avoid heavy or risky activities for 24 hours.    If you feel light-headed, sit for a few minutes before standing. Have someone  help you get up.    If you have nausea (feel sick to your stomach): Drink only clear liquids such as  apple juice, ginger ale, broth or 7UP, Be sure to drink plenty of liquids. Move to a  normal diet as you feel able.     If you received Toradol, wait 6 hours before taking ibuprofen.    Call your doctor if:     You have a fever over 100F (37.7 C) (taken under the tongue), or a fever that    last more than 24 hours.          Your IV site is red, swollen, very painful or is getting more tender.   You have nausea that gets very bad or does not improve.      If you have any symptoms after ECT, tell our staff before your next treatment.      The ECT Department can be reached at [phone].  The ECT Department is  open Mondays, Wednesdays and Fridays from 7:00 AM to 2:00 PM.         To speak to a doctor, call:  Dr. Kennedy  Office #  [phone] and Fax # [phone]      Other:  You have received Toradol today at 08:00 am Toradol is an NSAID.  Do not take any NSAID's including: Ibuprofen, Naproxen Sodium, Asprin , Advil, Motrin, Aleve, Josué, etc., until 6-8 hours after the above time, which is 2:00 pm. If you have any questions check back with your Physician, or pharmacist.  Please do not exceed 3200 mg in a 24 hour period.      Transported by: Cristi               Additional Information About Your  "Visit        [employer] Information     [employer] lets you send messages to your doctor, view your test results, renew your prescriptions, schedule appointments and more. To sign up, go to [email or web address] . Click on \"Log in\" on the left side of the screen, which will take you to the Welcome page. Then click on \"Sign up Now\" on the right side of the page.     You will be asked to enter the access code listed below, as well as some personal information. Please follow the directions to create your username and password.     Your access code is: [email or web address]  Expires: 2018  9:19 AM     Your access code will  in 90 days. If you need help or a new code, please call your [facility] clinic or [phone].        Care EveryWhere ID     This is your Care EveryWhere ID. This could be used by other organizations to access your [facility] medical records  [id number]        Your Vitals Were     Blood Pressure Pulse Temperature Respirations Pulse Oximetry       130/78 78 97.5  F (36.4  C) (Tympanic) 16 94%        Primary Care Provider Office Phone # Fax #    Erica Coelho PA-C [phone]      Equal Access to Services     [place] : Hadii aad ku hadasho Soomaali, waaxda luqadaha, qaybta kaalmada adeegyada, waxay edson diehl . So [facility] [phone].    ATENCIÓN: Si habla español, tiene a martínez disposición servicios gratuitos de asistencia lingüística. Llame al [phone].    We comply with applicable federal civil rights laws and [place] laws. We do not discriminate on the basis of race, color, national origin, age, disability, sex, sexual orientation, or gender identity.            Thank you!     Thank you for choosing [facility] for your care. Our goal is always to provide you with excellent care. Hearing back from our patients is one way we can continue to improve our services. Please take a few minutes to complete the written survey that you may receive in the mail after " you visit with us. Thank you!             Medication List: This is a list of all your medications and when to take them. Check marks below indicate your daily home schedule. Keep this list as a reference.      Medications           Morning Afternoon Evening Bedtime As Needed    * acetaminophen 325 MG tablet   Commonly known as:  TYLENOL   Take 3 tablets (975 mg) by mouth every 6 hours as needed for mild pain or other (Give before coming down for ECT)                                * acetaminophen 500 MG tablet   Commonly known as:  TYLENOL   Take 1 tablet (500 mg) by mouth 3 times daily as needed for mild pain                                ADDERALL XR PO   Take 60 mg by mouth daily                                escitalopram 20 MG tablet   Commonly known as:  LEXAPRO   Take 1 tablet (20 mg) by mouth daily                                hydrOXYzine 25 MG tablet   Commonly known as:  ATARAX   Take 2 tablets (50 mg) by mouth 3 times daily as needed for anxiety                                ibuprofen 600 MG tablet   Commonly known as:  ADVIL/MOTRIN   Take 1 tablet (600 mg) by mouth every 6 hours as needed for moderate pain                                loratadine 10 MG tablet   Commonly known as:  CLARITIN   Take 10 mg by mouth daily                                LORazepam 1 MG tablet   Commonly known as:  ATIVAN   Take 1 tablet (1 mg) by mouth 2 times daily                                lurasidone 40 MG Tabs tablet   Commonly known as:  LATUDA   Take 1 tablet (40 mg) by mouth At Bedtime                                nicotine 21 MG/24HR 24 hr patch   Commonly known as:  NICODERM CQ   Place 1 patch onto the skin daily                                QUEtiapine 100 MG tablet   Commonly known as:  SEROquel   Take 1 tablet (100 mg) by mouth At Bedtime                                zolpidem 10 MG tablet   Commonly known as:  AMBIEN   Take 1 tablet (10 mg) by mouth At Bedtime                                * Notice:   This list has 2 medication(s) that are the same as other medications prescribed for you. Read the directions carefully, and ask your doctor or other care provider to review them with you.

## 2018-08-31 NOTE — ANESTHESIA POSTPROCEDURE EVALUATION
Patient: Mindi Eastman    * No procedures listed *    Diagnosis:* No pre-op diagnosis entered *  Diagnosis Additional Information: No value filed.    Anesthesia Type:  General    Note:  Anesthesia Post Evaluation    Patient location during evaluation: PACU and Bedside  Patient participation: Able to fully participate in evaluation  Level of consciousness: awake and alert  Pain management: adequate  Airway patency: patent  Cardiovascular status: acceptable  Respiratory status: acceptable  Hydration status: balanced  PONV: none     Anesthetic complications: None          Last vitals:  Vitals:    08/31/18 0823 08/31/18 0828 08/31/18 0838   BP: 133/81 121/74 130/78   Pulse: 72 77 78   Resp: 16 16    Temp: 36.4  C (97.5  F)     SpO2: 98% 98% 94%         Electronically Signed By: Herminia Law MD  August 31, 2018  8:47 AM

## 2018-08-31 NOTE — PROCEDURES
Procedure/Surgery Information   [facility], [place]    Bedside Procedure Note  Date of Service (when I performed the procedure): 08/31/2018    Mindi Eastman is a 42 year old female patient.  1. Major depressive disorder, recurrent severe without psychotic features (H)      Past Medical History:   Diagnosis Date     ADHD      Anxiety      Bipolar disorder (H)      Depressive disorder      OCD (obsessive compulsive disorder)      ROXANNE (obstructive sleep apnea)     On CPAP     Seasonal allergies                           Procedures     [facility], [place]   ECT Procedure Note   08/31/2018       Mindi Eastman [id number]   42 year old 1975     Patient Status: Outpatient    Is this the first in a series of 12 treatments?  No    History and Physical: Reviewed in medical record    Consent: Informed consent was signed by: patient    Date Consent Signed: 5/18/18    Allergies   Allergen Reactions     Darvocet [Propoxyphene N-Apap]        Weight:  0 lbs 0 oz    Patient Preparations: Glasses/Contacts removed         Indications for ECT:   Medications ineffective, Psychotherapies ineffective and treatment resistant depression.         Clinical Narrative:   Patient is continuing ECT for depression.  NPO after 2400.  Alert and Oriented x 3.  No problems with the last ECT.  No med changes or health problems.  Mood is good.    Patient wants to keep ECT every 3 weeks, as that's about how long it lasts.           Diagnosis:      Major depressive disorder, recurrent severe without psychotic features (F33.2)  Consider bipolar depression         Pause for the Cause:     Right patient Yes   Right procedure/laterality settings: Yes          Intra-Procedure Documentation:         ECT #: 6   Treatment number this series: 6   Total treatment number: 17     Type of ECT:  Right, unilateral ultrabrief    ECT Medications:    Tylenol:  1000mg po (took at home today)  Toradol:  30mg  Zofran: 4mg  Brevital: 80mg  Succinyl Choline: 70mg  Morphine 5 mg post treatment for headache   Lactated Ringers 1 liter      ECT Strip Summary:   Energy Level: 10 percent   Motor Seizure Duration:  54 seconds  EEG Seizure Duration:   67 seconds    Complications: No    Plan:    Next maintenance ECT is in 3 weeks on 9/21/18      Umang Kennedy MD

## 2018-08-31 NOTE — DISCHARGE INSTRUCTIONS
ECT Discharge Instructions      During your ECT series:      Do not drive or work heavy equipment until 7 days after your last treatment.    Do not drive for 7 days after your last treatment if you have more than 1  treatment in a week.   Do not drive for 24 hours after your last treatment if you only have 1 treatment in  a week.     Do not drink alcohol or use street drugs (illicit drugs) while you are having  treatments.    Do not make important decisions, including legal decisions.    After each treatment:      Get plenty of rest. A responsible adult must stay with your for at least 6 hours.    Avoid heavy or risky activities for 24 hours.    If you feel light-headed, sit for a few minutes before standing. Have someone  help you get up.    If you have nausea (feel sick to your stomach): Drink only clear liquids such as  apple juice, ginger ale, broth or 7UP, Be sure to drink plenty of liquids. Move to a  normal diet as you feel able.     If you received Toradol, wait 6 hours before taking ibuprofen.    Call your doctor if:     You have a fever over 100F (37.7 C) (taken under the tongue), or a fever that    last more than 24 hours.          Your IV site is red, swollen, very painful or is getting more tender.   You have nausea that gets very bad or does not improve.      If you have any symptoms after ECT, tell our staff before your next treatment.      The ECT Department can be reached at [phone].  The ECT Department is  open Mondays, Wednesdays and Fridays from 7:00 AM to 2:00 PM.         To speak to a doctor, call:  Dr. Kennedy  Office #  [phone] and Fax # [phone]      Other:  You have received Toradol today at 08:00 am Toradol is an NSAID.  Do not take any NSAID's including: Ibuprofen, Naproxen Sodium, Asprin , Advil, Motrin, Aleve, Josué, etc., until 6-8 hours after the above time, which is 2:00 pm. If you have any questions check back with your Physician, or pharmacist.  Please do not exceed  3200 mg in a 24 hour period.      Transported by: Jaci

## 2018-08-31 NOTE — IP AVS SNAPSHOT
[facility]    [facility] [address] [facility] [address]    Phone:  [phone]                                       After Visit Summary   8/31/2018    Mindi Eastman    MRN: [id number]           After Visit Summary Signature Page     I have received my discharge instructions, and my questions have been answered. I have discussed any challenges I see with this plan with the nurse or doctor.    ..........................................................................................................................................  Patient/Patient Representative Signature      ..........................................................................................................................................  Patient Representative Print Name and Relationship to Patient    ..................................................               ................................................  Date                                            Time    ..........................................................................................................................................  Reviewed by Signature/Title    ...................................................              ..............................................  Date                                                            Time          22EPIC Rev 08/18

## 2018-09-21 ENCOUNTER — HOSPITAL ENCOUNTER (OUTPATIENT)
Dept: BEHAVIORAL HEALTH | Facility: CLINIC | Age: 43
End: 2018-09-21
Attending: PSYCHIATRY & NEUROLOGY
Payer: COMMERCIAL

## 2018-09-21 ENCOUNTER — ANESTHESIA (OUTPATIENT)
Dept: BEHAVIORAL HEALTH | Facility: CLINIC | Age: 43
End: 2018-09-21

## 2018-09-21 ENCOUNTER — ANESTHESIA EVENT (OUTPATIENT)
Dept: BEHAVIORAL HEALTH | Facility: CLINIC | Age: 43
End: 2018-09-21

## 2018-09-21 VITALS
HEART RATE: 62 BPM | RESPIRATION RATE: 16 BRPM | TEMPERATURE: 98.9 F | DIASTOLIC BLOOD PRESSURE: 82 MMHG | SYSTOLIC BLOOD PRESSURE: 143 MMHG | OXYGEN SATURATION: 99 %

## 2018-09-21 DIAGNOSIS — F33.2 MAJOR DEPRESSIVE DISORDER, RECURRENT SEVERE WITHOUT PSYCHOTIC FEATURES (H): ICD-10-CM

## 2018-09-21 PROCEDURE — 25000128 H RX IP 250 OP 636: Performed by: ANESTHESIOLOGY

## 2018-09-21 PROCEDURE — 25000128 H RX IP 250 OP 636: Performed by: PSYCHIATRY & NEUROLOGY

## 2018-09-21 PROCEDURE — 90870 ELECTROCONVULSIVE THERAPY: CPT

## 2018-09-21 PROCEDURE — 25000125 ZZHC RX 250: Performed by: ANESTHESIOLOGY

## 2018-09-21 PROCEDURE — 40000671 ZZH STATISTIC ANESTHESIA CASE

## 2018-09-21 RX ORDER — MORPHINE SULFATE 4 MG/ML
5 INJECTION, SOLUTION INTRAMUSCULAR; INTRAVENOUS ONCE
Status: COMPLETED | OUTPATIENT
Start: 2018-09-21 | End: 2018-09-21

## 2018-09-21 RX ORDER — MORPHINE SULFATE 4 MG/ML
5 INJECTION, SOLUTION INTRAMUSCULAR; INTRAVENOUS ONCE
Status: CANCELLED
Start: 2018-09-21 | End: 2018-09-21

## 2018-09-21 RX ORDER — ONDANSETRON 2 MG/ML
4 INJECTION INTRAMUSCULAR; INTRAVENOUS ONCE
Status: COMPLETED | OUTPATIENT
Start: 2018-09-21 | End: 2018-09-21

## 2018-09-21 RX ORDER — KETOROLAC TROMETHAMINE 30 MG/ML
30 INJECTION, SOLUTION INTRAMUSCULAR; INTRAVENOUS ONCE
Status: CANCELLED
Start: 2018-09-21 | End: 2018-09-21

## 2018-09-21 RX ORDER — ONDANSETRON 2 MG/ML
4 INJECTION INTRAMUSCULAR; INTRAVENOUS ONCE
Status: CANCELLED
Start: 2018-09-21 | End: 2018-09-21

## 2018-09-21 RX ORDER — KETOROLAC TROMETHAMINE 30 MG/ML
30 INJECTION, SOLUTION INTRAMUSCULAR; INTRAVENOUS ONCE
Status: COMPLETED | OUTPATIENT
Start: 2018-09-21 | End: 2018-09-21

## 2018-09-21 RX ADMIN — METHOHEXITAL SODIUM 80 MG: 500 INJECTION, POWDER, LYOPHILIZED, FOR SOLUTION INTRAMUSCULAR; INTRAVENOUS; RECTAL at 07:41

## 2018-09-21 RX ADMIN — SODIUM CHLORIDE, POTASSIUM CHLORIDE, SODIUM LACTATE AND CALCIUM CHLORIDE 1000 ML: 600; 310; 30; 20 INJECTION, SOLUTION INTRAVENOUS at 07:42

## 2018-09-21 RX ADMIN — Medication 70 MG: at 07:41

## 2018-09-21 RX ADMIN — MORPHINE SULFATE 5 MG: 4 INJECTION, SOLUTION INTRAMUSCULAR; INTRAVENOUS at 08:26

## 2018-09-21 RX ADMIN — KETOROLAC TROMETHAMINE 30 MG: 30 INJECTION, SOLUTION INTRAMUSCULAR at 07:44

## 2018-09-21 RX ADMIN — ONDANSETRON 4 MG: 2 INJECTION INTRAMUSCULAR; INTRAVENOUS at 07:41

## 2018-09-21 NOTE — IP AVS SNAPSHOT
[facility]    [facility] [address] [address]    Phone:  [phone]                                       After Visit Summary   9/21/2018    Mindi Eastman    MRN: [id number]           After Visit Summary Signature Page     I have received my discharge instructions, and my questions have been answered. I have discussed any challenges I see with this plan with the nurse or doctor.    ..........................................................................................................................................  Patient/Patient Representative Signature      ..........................................................................................................................................  Patient Representative Print Name and Relationship to Patient    ..................................................               ................................................  Date                                   Time    ..........................................................................................................................................  Reviewed by Signature/Title    ...................................................              ..............................................  Date                                               Time          22EPIC Rev 08/18

## 2018-09-21 NOTE — IP AVS SNAPSHOT
MRN:[id number]                      After Visit Summary   9/21/2018    Mindi Eastman    MRN: [id number]           Visit Information        Provider Department      9/21/2018  7:00 AM Umang Kennedy MD [facility]           Review of your medicines      CONTINUE these medicines which have NOT CHANGED        Dose / Directions    * acetaminophen 325 MG tablet   Commonly known as:  TYLENOL   Used for:  Acute non intractable tension-type headache        Dose:  975 mg   Take 3 tablets (975 mg) by mouth every 6 hours as needed for mild pain or other (Give before coming down for ECT)   Quantity:  100 tablet   Refills:  1       * acetaminophen 500 MG tablet   Commonly known as:  TYLENOL   Used for:  Major depressive disorder, recurrent severe without psychotic features (H)        Dose:  500 mg   Take 1 tablet (500 mg) by mouth 3 times daily as needed for mild pain   Quantity:  100 tablet   Refills:  1       ADDERALL XR PO        Dose:  60 mg   Take 60 mg by mouth daily   Refills:  0       escitalopram 20 MG tablet   Commonly known as:  LEXAPRO   Used for:  Major depressive disorder, recurrent severe without psychotic features (H)        Dose:  20 mg   Take 1 tablet (20 mg) by mouth daily   Quantity:  30 tablet   Refills:  1       hydrOXYzine 25 MG tablet   Commonly known as:  ATARAX   Used for:  SHANICE (generalized anxiety disorder)        Dose:  50 mg   Take 2 tablets (50 mg) by mouth 3 times daily as needed for anxiety   Quantity:  90 tablet   Refills:  1       ibuprofen 600 MG tablet   Commonly known as:  ADVIL/MOTRIN   Used for:  Acute non intractable tension-type headache        Dose:  600 mg   Take 1 tablet (600 mg) by mouth every 6 hours as needed for moderate pain   Quantity:  120 tablet   Refills:  1       loratadine 10 MG tablet   Commonly known as:  CLARITIN        Dose:  10 mg   Take 10 mg by mouth daily   Refills:  0       LORazepam 1 MG tablet   Commonly known as:   ATIVAN   Used for:  SHANICE (generalized anxiety disorder)        Dose:  1 mg   Take 1 tablet (1 mg) by mouth 2 times daily   Quantity:  60 tablet   Refills:  0       lurasidone 40 MG Tabs tablet   Commonly known as:  LATUDA   Used for:  Bipolar I disorder (H), Major depressive disorder, recurrent severe without psychotic features (H)        Dose:  40 mg   Take 1 tablet (40 mg) by mouth At Bedtime   Quantity:  30 tablet   Refills:  1       nicotine 21 MG/24HR 24 hr patch   Commonly known as:  NICODERM CQ   Used for:  Tobacco use        Dose:  1 patch   Place 1 patch onto the skin daily   Quantity:  30 patch   Refills:  1       QUEtiapine 100 MG tablet   Commonly known as:  SEROquel   Used for:  Bipolar I disorder (H), Major depressive disorder, recurrent severe without psychotic features (H)        Dose:  100 mg   Take 1 tablet (100 mg) by mouth At Bedtime   Quantity:  30 tablet   Refills:  1       zolpidem 10 MG tablet   Commonly known as:  AMBIEN   Used for:  Insomnia due to other mental disorder        Dose:  10 mg   Take 1 tablet (10 mg) by mouth At Bedtime   Quantity:  30 tablet   Refills:  0       * Notice:  This list has 2 medication(s) that are the same as other medications prescribed for you. Read the directions carefully, and ask your doctor or other care provider to review them with you.             Protect others around you: Learn how to safely use, store and throw away your medicines at www.disposemymeds.org.         Follow-ups after your visit        Your next 10 appointments already scheduled     Oct 12, 2018  7:30 AM CDT   Electroconvulsive Therapy with Umang Kennedy MD   [facility] ([facility])    [address]  [facility] [address]   [phone]               Care Instructions        Further instructions from your care team       ECT Discharge Instructions      During your ECT series:      Do not drive or work heavy equipment until  7 days after your last treatment.    Do not drink alcohol or use street drugs (illicit drugs) while you are having treatments.    Do not make important decisions, including legal decisions.    After your maintenance ECT treatment:      Do not drive for 24 hours after treatment.  If you will be having more than one treatment witihin a week, no driving until 7 days after your last ECT treatment.         After each treatment:      Get plenty of rest. A responsible adult must stay with your for at least 6 hours.    Avoid heavy or risky activities for 24 hours.    If you feel light-headed, sit for a few minutes before standing. Have someone help you get up.    If you have nausea (feel sick to your stomach): Drink only clear liquids such as apple juice, ginger ale, broth or 7UP, Be sure to drink plenty of liquids. Move to a normal diet as you feel able.     If you received Toradol, wait 6 hours before taking ibuprofen.    Call your doctor if:     You have a fever over 100F (37.7 C) (taken under the tongue), or a fever that last more than 24 hours.    Your IV site is red, swollen, very painful or is getting more tender.    You have nausea that gets very bad or does not improve.      If you have any symptoms after ECT, tell our staff before your next treatment.    The ECT Department can be reached at [phone].  The ECT Department is open Mondays, Wednesdays and Fridays from 7:00 AM to 2:00 PM.    To speak to a doctor, call: Dr. Kennedy's office # [phone] Fax # [phone]    Other: You have received Toradol today at 0745a.  Toradol is an NSAID.  Do not take any NSAID's including: Ibuprofen, Naproxen, Aspirin, Advil, Motrin, Aleve, Josué, etc., until 6 hours after the above time (145p).  If you have any questions, contact your physician or pharmacist.  You received 1000mL of fluids (lactated ringers) which helps maintain hydration, decreasing chance for headaches, body aches & nausea after your treatment today.  You  "received IV Morphine 5mg at 0826a.      Transported by: amena Matthews     Additional Information About Your Visit        [employer] Information     [facility] lets you send messages to your doctor, view your test results, renew your prescriptions, schedule appointments and more. To sign up, go to [email or web address] . Click on \"Log in\" on the left side of the screen, which will take you to the Welcome page. Then click on \"Sign up Now\" on the right side of the page.     You will be asked to enter the access code listed below, as well as some personal information. Please follow the directions to create your username and password.     Your access code is: [email or web address]  Expires: 2018  9:19 AM     Your access code will  in 90 days. If you need help or a new code, please call your [facility] clinic or [phone].        Care EveryWhere ID     This is your Care EveryWhere ID. This could be used by other organizations to access your [facility] medical records  [id number]        Your Vitals Were     Blood Pressure Pulse Temperature Respirations Pulse Oximetry       143/82 62 98.9  F (37.2  C) (Temporal) 16 99%        Primary Care Provider Office Phone # Fax #    Erica Coelho PA-C [phone]      Equal Access to Services     [facility]: Hadii aad ku hadasho Soomaali, waaxda luqadaha, qaybta kaalmada adeegyada, yamileth diehl . So [facility] [phone].    ATENCIÓN: Si habla español, tiene a martínez disposición servicios gratuitos de asistencia lingüística. Llame al [phone].    We comply with applicable federal civil rights laws and [place] laws. We do not discriminate on the basis of race, color, national origin, age, disability, sex, sexual orientation, or gender identity.            Thank you!     Thank you for choosing [facility] for your care. Our goal is always to provide you with excellent care. Hearing back from our patients is one way we can continue to improve " our services. Please take a few minutes to complete the written survey that you may receive in the mail after you visit with us. Thank you!             Medication List: This is a list of all your medications and when to take them. Check marks below indicate your daily home schedule. Keep this list as a reference.      Medications           Morning Afternoon Evening Bedtime As Needed    * acetaminophen 325 MG tablet   Commonly known as:  TYLENOL   Take 3 tablets (975 mg) by mouth every 6 hours as needed for mild pain or other (Give before coming down for ECT)                                * acetaminophen 500 MG tablet   Commonly known as:  TYLENOL   Take 1 tablet (500 mg) by mouth 3 times daily as needed for mild pain                                ADDERALL XR PO   Take 60 mg by mouth daily                                escitalopram 20 MG tablet   Commonly known as:  LEXAPRO   Take 1 tablet (20 mg) by mouth daily                                hydrOXYzine 25 MG tablet   Commonly known as:  ATARAX   Take 2 tablets (50 mg) by mouth 3 times daily as needed for anxiety                                ibuprofen 600 MG tablet   Commonly known as:  ADVIL/MOTRIN   Take 1 tablet (600 mg) by mouth every 6 hours as needed for moderate pain                                loratadine 10 MG tablet   Commonly known as:  CLARITIN   Take 10 mg by mouth daily                                LORazepam 1 MG tablet   Commonly known as:  ATIVAN   Take 1 tablet (1 mg) by mouth 2 times daily                                lurasidone 40 MG Tabs tablet   Commonly known as:  LATUDA   Take 1 tablet (40 mg) by mouth At Bedtime                                nicotine 21 MG/24HR 24 hr patch   Commonly known as:  NICODERM CQ   Place 1 patch onto the skin daily                                QUEtiapine 100 MG tablet   Commonly known as:  SEROquel   Take 1 tablet (100 mg) by mouth At Bedtime                                zolpidem 10 MG tablet    Commonly known as:  AMBIEN   Take 1 tablet (10 mg) by mouth At Bedtime                                * Notice:  This list has 2 medication(s) that are the same as other medications prescribed for you. Read the directions carefully, and ask your doctor or other care provider to review them with you.

## 2018-09-21 NOTE — DISCHARGE INSTRUCTIONS
ECT Discharge Instructions      During your ECT series:      Do not drive or work heavy equipment until 7 days after your last treatment.    Do not drink alcohol or use street drugs (illicit drugs) while you are having treatments.    Do not make important decisions, including legal decisions.    After your maintenance ECT treatment:      Do not drive for 24 hours after treatment.  If you will be having more than one treatment witihin a week, no driving until 7 days after your last ECT treatment.         After each treatment:      Get plenty of rest. A responsible adult must stay with your for at least 6 hours.    Avoid heavy or risky activities for 24 hours.    If you feel light-headed, sit for a few minutes before standing. Have someone help you get up.    If you have nausea (feel sick to your stomach): Drink only clear liquids such as apple juice, ginger ale, broth or 7UP, Be sure to drink plenty of liquids. Move to a normal diet as you feel able.     If you received Toradol, wait 6 hours before taking ibuprofen.    Call your doctor if:     You have a fever over 100F (37.7 C) (taken under the tongue), or a fever that last more than 24 hours.    Your IV site is red, swollen, very painful or is getting more tender.    You have nausea that gets very bad or does not improve.      If you have any symptoms after ECT, tell our staff before your next treatment.    The ECT Department can be reached at [phone].  The ECT Department is open Mondays, Wednesdays and Fridays from 7:00 AM to 2:00 PM.    To speak to a doctor, call: Dr. Kennedy's office # [phone] Fax # [phone]    Other: You have received Toradol today at 0745a.  Toradol is an NSAID.  Do not take any NSAID's including: Ibuprofen, Naproxen, Aspirin, Advil, Motrin, Aleve, Josué, etc., until 6 hours after the above time (145p).  If you have any questions, contact your physician or pharmacist.  You received 1000mL of fluids (lactated ringers) which helps  maintain hydration, decreasing chance for headaches, body aches & nausea after your treatment today.  You received IV Morphine 5mg at 0826a.      Transported by: amena Matthews

## 2018-09-21 NOTE — ANESTHESIA POSTPROCEDURE EVALUATION
Patient: Mindi Eastman    * No procedures listed *    Diagnosis:* No pre-op diagnosis entered *  Diagnosis Additional Information: No value filed.    Anesthesia Type:  General    Note:  Anesthesia Post Evaluation    Patient location during evaluation: PACU  Patient participation: Able to fully participate in evaluation  Level of consciousness: awake and alert  Pain management: adequate  Airway patency: patent  Cardiovascular status: acceptable and hemodynamically stable  Respiratory status: acceptable  Hydration status: acceptable  PONV: none     Anesthetic complications: None          Last vitals:  Vitals:    09/21/18 0702   BP: 146/76   Pulse: 63   Resp: 16   Temp: 36.1  C (97  F)   SpO2: 100%         Electronically Signed By: Micah Thomas MD, MD  September 21, 2018  7:10 AM

## 2018-09-21 NOTE — PROCEDURES
Procedure/Surgery Information   [facility], [place]    Bedside Procedure Note  Date of Service (when I performed the procedure): 09/21/2018    Mindi Eastman is a 42 year old female patient.  1. Major depressive disorder, recurrent severe without psychotic features (H)      Past Medical History:   Diagnosis Date     ADHD      Anxiety      Bipolar disorder (H)      Depressive disorder      OCD (obsessive compulsive disorder)      ROXNANE (obstructive sleep apnea)     On CPAP     Seasonal allergies      Temp: 97  F (36.1  C)   BP: 146/76 Pulse: 63   Resp: 16 SpO2: 100 %        Procedures     [facility], [place]   ECT Procedure Note   09/21/2018       Mindi Eastman [id number]   42 year old 1975     Patient Status: Outpatient    Is this the first in a series of 12 treatments?  No    History and Physical: Reviewed in medical record    Consent: Informed consent was signed by: patient    Date Consent Signed: 5/18/18    Allergies   Allergen Reactions     Darvocet [Propoxyphene N-Apap]        Weight:  0 lbs 0 oz    Patient Preparations: Glasses/Contacts removed         Indications for ECT:   Medications ineffective, Psychotherapies ineffective and treatment resistant depression.         Clinical Narrative:   Patient is continuing ECT for depression.  NPO after 2400.  Alert and Oriented x 3.  No problems with the last ECT.  No med changes or health problems.   Last ECT was 3 weeks ago.   Mood was good for 2 weeks and has slipped the past week.            Diagnosis:      Major depressive disorder, recurrent severe without psychotic features (F33.2)         Pause for the Cause:     Right patient Yes   Right procedure/laterality settings: Yes          Intra-Procedure Documentation:       ECT #: 7   Treatment number this series: 7   Total treatment number: 18     Type of ECT:  Right, unilateral ultrabrief    ECT Medications:    Tylenol:  1000mg po (took at home  today)  Toradol: 30mg  Zofran: 4mg  Brevital: 80mg  Succinyl Choline: 70mg  Morphine 5 mg post treatment for headache   Lactated Ringers 1 liter      ECT Strip Summary:   Energy Level: 10 percent   Motor Seizure Duration: 50 seconds  EEG Seizure Duration:  67 seconds    Complications: No    Plan:    Next maintenance ECT is in 3 weeks on 10/12/18      Umang Kennedy MD

## 2018-10-03 ENCOUNTER — TRANSFERRED RECORDS (OUTPATIENT)
Dept: HEALTH INFORMATION MANAGEMENT | Facility: CLINIC | Age: 43
End: 2018-10-03

## 2018-10-03 LAB
CREAT SERPL-MCNC: 0.84 MG/DL (ref 0.55–1.02)
GFR SERPL CREATININE-BSD FRML MDRD: >60 ML/MIN/1.73M2
GLUCOSE SERPL-MCNC: 113 MG/DL (ref 70–100)
POTASSIUM SERPL-SCNC: 4.5 MMOL/L (ref 3.5–5.2)

## 2018-10-12 ENCOUNTER — ANESTHESIA EVENT (OUTPATIENT)
Dept: BEHAVIORAL HEALTH | Facility: CLINIC | Age: 43
End: 2018-10-12

## 2018-10-12 ENCOUNTER — HOSPITAL ENCOUNTER (OUTPATIENT)
Dept: BEHAVIORAL HEALTH | Facility: CLINIC | Age: 43
End: 2018-10-12
Attending: PSYCHIATRY & NEUROLOGY
Payer: COMMERCIAL

## 2018-10-12 ENCOUNTER — ANESTHESIA (OUTPATIENT)
Dept: BEHAVIORAL HEALTH | Facility: CLINIC | Age: 43
End: 2018-10-12

## 2018-10-12 VITALS
RESPIRATION RATE: 16 BRPM | SYSTOLIC BLOOD PRESSURE: 141 MMHG | TEMPERATURE: 97.5 F | HEART RATE: 72 BPM | DIASTOLIC BLOOD PRESSURE: 82 MMHG | OXYGEN SATURATION: 94 %

## 2018-10-12 DIAGNOSIS — F33.2 MAJOR DEPRESSIVE DISORDER, RECURRENT SEVERE WITHOUT PSYCHOTIC FEATURES (H): ICD-10-CM

## 2018-10-12 DIAGNOSIS — R11.12 PROJECTILE VOMITING WITH NAUSEA: Primary | ICD-10-CM

## 2018-10-12 PROCEDURE — 25000125 ZZHC RX 250: Performed by: ANESTHESIOLOGY

## 2018-10-12 PROCEDURE — 25000128 H RX IP 250 OP 636: Performed by: ANESTHESIOLOGY

## 2018-10-12 PROCEDURE — 25000128 H RX IP 250 OP 636: Performed by: PSYCHIATRY & NEUROLOGY

## 2018-10-12 PROCEDURE — 90870 ELECTROCONVULSIVE THERAPY: CPT

## 2018-10-12 PROCEDURE — 40000671 ZZH STATISTIC ANESTHESIA CASE

## 2018-10-12 RX ORDER — ONDANSETRON 2 MG/ML
4 INJECTION INTRAMUSCULAR; INTRAVENOUS ONCE
Status: COMPLETED | OUTPATIENT
Start: 2018-10-12 | End: 2018-10-12

## 2018-10-12 RX ORDER — KETOROLAC TROMETHAMINE 30 MG/ML
30 INJECTION, SOLUTION INTRAMUSCULAR; INTRAVENOUS ONCE
Status: CANCELLED
Start: 2018-10-12 | End: 2018-10-12

## 2018-10-12 RX ORDER — MORPHINE SULFATE 4 MG/ML
6 INJECTION, SOLUTION INTRAMUSCULAR; INTRAVENOUS ONCE
Status: CANCELLED
Start: 2018-10-12 | End: 2018-10-12

## 2018-10-12 RX ORDER — ONDANSETRON 2 MG/ML
4 INJECTION INTRAMUSCULAR; INTRAVENOUS ONCE
Status: CANCELLED
Start: 2018-10-12 | End: 2018-10-12

## 2018-10-12 RX ORDER — ONDANSETRON 4 MG/1
4 TABLET, FILM COATED ORAL EVERY 8 HOURS PRN
Qty: 30 TABLET | Refills: 0 | Status: SHIPPED | OUTPATIENT
Start: 2018-10-12 | End: 2022-10-20

## 2018-10-12 RX ORDER — MORPHINE SULFATE 4 MG/ML
5 INJECTION, SOLUTION INTRAMUSCULAR; INTRAVENOUS ONCE
Status: CANCELLED
Start: 2018-10-12 | End: 2018-10-12

## 2018-10-12 RX ORDER — KETOROLAC TROMETHAMINE 30 MG/ML
30 INJECTION, SOLUTION INTRAMUSCULAR; INTRAVENOUS ONCE
Status: COMPLETED | OUTPATIENT
Start: 2018-10-12 | End: 2018-10-12

## 2018-10-12 RX ORDER — MORPHINE SULFATE 4 MG/ML
5 INJECTION, SOLUTION INTRAMUSCULAR; INTRAVENOUS ONCE
Status: COMPLETED | OUTPATIENT
Start: 2018-10-12 | End: 2018-10-12

## 2018-10-12 RX ADMIN — MORPHINE SULFATE 6 MG: 4 INJECTION, SOLUTION INTRAMUSCULAR; INTRAVENOUS at 09:03

## 2018-10-12 RX ADMIN — KETOROLAC TROMETHAMINE 30 MG: 30 INJECTION, SOLUTION INTRAMUSCULAR at 08:04

## 2018-10-12 RX ADMIN — Medication 70 MG: at 08:10

## 2018-10-12 RX ADMIN — ONDANSETRON 4 MG: 2 INJECTION, SOLUTION INTRAMUSCULAR; INTRAVENOUS at 08:03

## 2018-10-12 RX ADMIN — SODIUM CHLORIDE, POTASSIUM CHLORIDE, SODIUM LACTATE AND CALCIUM CHLORIDE 1000 ML: 600; 310; 30; 20 INJECTION, SOLUTION INTRAVENOUS at 08:05

## 2018-10-12 RX ADMIN — METHOHEXITAL SODIUM 80 MG: 500 INJECTION, POWDER, LYOPHILIZED, FOR SOLUTION INTRAMUSCULAR; INTRAVENOUS; RECTAL at 08:10

## 2018-10-12 NOTE — ANESTHESIA POSTPROCEDURE EVALUATION
Patient: Mindi Eastman    * No procedures listed *    Diagnosis:* No pre-op diagnosis entered *  Diagnosis Additional Information: No value filed.    Anesthesia Type:  General    Note:  Anesthesia Post Evaluation    Patient location during evaluation: ECT PACU  Patient participation: Able to fully participate in evaluation  Level of consciousness: awake  Pain management: adequate  Airway patency: patent  Cardiovascular status: acceptable  Respiratory status: acceptable  Hydration status: acceptable  PONV: none     Anesthetic complications: None          Last vitals:  Vitals:    10/12/18 0824 10/12/18 0831 10/12/18 0846   BP: (!) 130/99 117/70 138/86   Pulse:      Resp: 16 16 16   Temp: 36.4  C (97.5  F)     SpO2: 93% 95% 95%         Electronically Signed By: Yonatan Evans MD  October 12, 2018  9:01 AM

## 2018-10-12 NOTE — PROCEDURES
"Procedure/Surgery Information   [facility], [place]    Bedside Procedure Note  Date of Service (when I performed the procedure): 10/12/2018    Mindi Eastman is a 42 year old female patient.  1. Major depressive disorder, recurrent severe without psychotic features (H)      Past Medical History:   Diagnosis Date     ADHD      Anxiety      Bipolar disorder (H)      Depressive disorder      OCD (obsessive compulsive disorder)      ROXANNE (obstructive sleep apnea)     On CPAP     Seasonal allergies      Temp: 97.9  F (36.6  C) Temp src: Tympanic BP: 140/90 Pulse: 72   Resp: 16 SpO2: 98 % O2 Device: None (Room air)      Procedures     [facility], [place]   ECT Procedure Note   10/12/2018       Mindi Eastman [id number]   42 year old 1975     Patient Status: Outpatient    Is this the first in a series of 12 treatments?  No    History and Physical: Reviewed in medical record    Consent: Informed consent was signed by: patient    Date Consent Signed: 5/18/18    Allergies   Allergen Reactions     Darvocet [Propoxyphene N-Apap]        Weight:  0 lbs 0 oz    Patient Preparations: Glasses/Contacts removed         Indications for ECT:   Medications ineffective, Psychotherapies ineffective and treatment resistant depression.         Clinical Narrative:   Patient is continuing ECT for depression.  NPO after 2400.  Alert and Oriented x 3.  No problems with the last ECT.  No med changes or health problems.   Last ECT was 3 weeks ago.   Mood was good for not quite 2 weeks and then \"fell like a brick wall.\"  She wants to keep ECT at 3 weeks for now.            Diagnosis:      Major depressive disorder, recurrent severe without psychotic features (F33.2)         Pause for the Cause:     Right patient Yes   Right procedure/laterality settings: Yes          Intra-Procedure Documentation:       ECT #: 8   Treatment number this series: 8   Total treatment number: 19 "     Type of ECT:  Right, unilateral ultrabrief    ECT Medications:    Tylenol:  1000mg po (took at home today)  Toradol: 30mg  Zofran: 4mg  Brevital: 80mg  Succinyl Choline: 70mg  Morphine 5 mg post treatment for headache   Lactated Ringers 1 liter      ECT Strip Summary:   Energy Level: 10 percent   Motor Seizure Duration:  89 seconds  EEG Seizure Duration:   110 seconds    Complications: No    Plan:    Next maintenance ECT is in 3 weeks on 11/2/18  Sent a Rx of Zofran 4mg tabs, take 1 po tid prn nausea to her [facility] pharmacy in [place].        Umang Kennedy MD

## 2018-10-12 NOTE — IP AVS SNAPSHOT
[facility]    [facility] [address] [address]    Phone:  [phone]                                       After Visit Summary   10/12/2018    Mindi Eastman    MRN: [id number]           After Visit Summary Signature Page     I have received my discharge instructions, and my questions have been answered. I have discussed any challenges I see with this plan with the nurse or doctor.    ..........................................................................................................................................  Patient/Patient Representative Signature      ..........................................................................................................................................  Patient Representative Print Name and Relationship to Patient    ..................................................               ................................................  Date                                   Time    ..........................................................................................................................................  Reviewed by Signature/Title    ...................................................              ..............................................  Date                                               Time          22EPIC Rev 08/18

## 2018-10-12 NOTE — OR NURSING
Patient to receive morphine post ECT. MD to edit dose order, change in order delayed so administration of medication was delayed. Patient received morphine at 0903, was observed and monitored until 0920 and then moved to Discharge area where pt will continue to be monitored by discharge RN.     Yamile SOUZA

## 2018-10-12 NOTE — IP AVS SNAPSHOT
MRN:[id number]                      After Visit Summary   10/12/2018    Mindi Eastman    MRN: [id number]           Visit Information        Provider Department      10/12/2018  7:30 AM Umang Kennedy MD [facility]           Review of your medicines      START taking        Dose / Directions    ondansetron 4 MG tablet   Commonly known as:  ZOFRAN   Used for:  Major depressive disorder, recurrent severe without psychotic features (H), Projectile vomiting with nausea        Dose:  4 mg   Take 1 tablet (4 mg) by mouth every 8 hours as needed for nausea   Quantity:  30 tablet   Refills:  0         CONTINUE these medicines which have NOT CHANGED        Dose / Directions    * acetaminophen 325 MG tablet   Commonly known as:  TYLENOL   Used for:  Acute non intractable tension-type headache        Dose:  975 mg   Take 3 tablets (975 mg) by mouth every 6 hours as needed for mild pain or other (Give before coming down for ECT)   Quantity:  100 tablet   Refills:  1       * acetaminophen 500 MG tablet   Commonly known as:  TYLENOL   Used for:  Major depressive disorder, recurrent severe without psychotic features (H)        Dose:  500 mg   Take 1 tablet (500 mg) by mouth 3 times daily as needed for mild pain   Quantity:  100 tablet   Refills:  1       ADDERALL XR PO        Dose:  60 mg   Take 60 mg by mouth daily   Refills:  0       escitalopram 20 MG tablet   Commonly known as:  LEXAPRO   Used for:  Major depressive disorder, recurrent severe without psychotic features (H)        Dose:  20 mg   Take 1 tablet (20 mg) by mouth daily   Quantity:  30 tablet   Refills:  1       hydrOXYzine 25 MG tablet   Commonly known as:  ATARAX   Used for:  SHANICE (generalized anxiety disorder)        Dose:  50 mg   Take 2 tablets (50 mg) by mouth 3 times daily as needed for anxiety   Quantity:  90 tablet   Refills:  1       ibuprofen 600 MG tablet   Commonly known as:  ADVIL/MOTRIN   Used for:  Acute  non intractable tension-type headache        Dose:  600 mg   Take 1 tablet (600 mg) by mouth every 6 hours as needed for moderate pain   Quantity:  120 tablet   Refills:  1       loratadine 10 MG tablet   Commonly known as:  CLARITIN        Dose:  10 mg   Take 10 mg by mouth daily   Refills:  0       LORazepam 1 MG tablet   Commonly known as:  ATIVAN   Used for:  SHANICE (generalized anxiety disorder)        Dose:  1 mg   Take 1 tablet (1 mg) by mouth 2 times daily   Quantity:  60 tablet   Refills:  0       lurasidone 40 MG Tabs tablet   Commonly known as:  LATUDA   Used for:  Bipolar I disorder (H), Major depressive disorder, recurrent severe without psychotic features (H)        Dose:  40 mg   Take 1 tablet (40 mg) by mouth At Bedtime   Quantity:  30 tablet   Refills:  1       nicotine 21 MG/24HR 24 hr patch   Commonly known as:  NICODERM CQ   Used for:  Tobacco use        Dose:  1 patch   Place 1 patch onto the skin daily   Quantity:  30 patch   Refills:  1       QUEtiapine 100 MG tablet   Commonly known as:  SEROquel   Used for:  Bipolar I disorder (H), Major depressive disorder, recurrent severe without psychotic features (H)        Dose:  100 mg   Take 1 tablet (100 mg) by mouth At Bedtime   Quantity:  30 tablet   Refills:  1       zolpidem 10 MG tablet   Commonly known as:  AMBIEN   Used for:  Insomnia due to other mental disorder        Dose:  10 mg   Take 1 tablet (10 mg) by mouth At Bedtime   Quantity:  30 tablet   Refills:  0       * Notice:  This list has 2 medication(s) that are the same as other medications prescribed for you. Read the directions carefully, and ask your doctor or other care provider to review them with you.         Where to get your medicines      These medications were sent to [facility] [address] AT [facility]  [address], [facility] [address]     Phone:  [phone]     ondansetron 4 MG tablet               Prescriptions  were sent or printed at these locations (1 Prescription)                   [id number] - [place], [place] - [address] AT [facility] [address], [facility] [address]    Telephone:  [phone]   Fax:  [phone]   Hours:                  E-Prescribed (1 of 1)         ondansetron (ZOFRAN) 4 MG tablet                 Protect others around you: Learn how to safely use, store and throw away your medicines at www.disposemymeds.org.         Follow-ups after your visit        Your next 10 appointments already scheduled     Nov 02, 2018  7:15 AM CDT   Electroconvulsive Therapy with Umang Kennedy MD   [facility] ([facility])    [address]  [facility] [address] [phone]               Care Instructions        Further instructions from your care team       ECT Discharge Instructions      During your ECT series:      Do not drive or work heavy equipment until 7 days after your last treatment.    Do not drink alcohol or use street drugs (illicit drugs) while you are having treatments.    Do not make important decisions, including legal decisions.    After each treatment:      Get plenty of rest. A responsible adult must stay with your for at least 6 hours.    Do not drive for 24 hours after ECT treatment. If you have more than one treatment within a week, no driving until 7 days after your last ECT treatment.    Avoid heavy or risky activities for 24 hours.    If you feel light-headed, sit for a few minutes before standing. Have someone help you get up.    If you have nausea (feel sick to your stomach): Drink only clear liquids such as apple juice, ginger ale, broth or 7UP, Be sure to drink plenty of liquids. Move to a normal diet as you feel able.     If you received Toradol, wait 6 hours before taking ibuprofen.    Call your doctor if:     You have a fever over 100F (37.7 C) (taken under the tongue), or  "a fever that last more than 24 hours.    Your IV site is red, swollen, very painful or is getting more tender.    You have nausea that gets very bad or does not improve.      If you have any symptoms after ECT, tell our staff before your next treatment.    The ECT Department can be reached at [phone].  The ECT Department is open ,  and  from 7:00 AM to 2:00 PM.     To speak to a doctor, call: Dr. Kennedy [phone]    New instructions: Dr. Kennedy sent a prescription for Zofran 4mg tabs, take 1 tablet three times per day as needed for nausea to her [facility] in [place].      Other: You have received Toradol today at 8:04 AM. Toradol is an NSAID.  Do not take any NSAID's including: Ibuprofen, Naproxen Sodium, Asprin , Advil, Motrin, Aleve, Josué, etc., until six hours after the above time which would be 2:04 PM . If you have any questions check back with your Physician, or pharmacist.   You were given Morphine 6 mg IV at 9:03 AM for post ECT headache.    Transported by: patients mother, Ana      _________________________________________________  ________________________  Patient/Responsible party Signature      Date          ________________________________________________   ________________________  Nurse Signature        Date     Additional Information About Your Visit        MyChart Information     [facility] lets you send messages to your doctor, view your test results, renew your prescriptions, schedule appointments and more. To sign up, go to [email or web address] . Click on \"Log in\" on the left side of the screen, which will take you to the Welcome page. Then click on \"Sign up Now\" on the right side of the page.     You will be asked to enter the access code listed below, as well as some personal information. Please follow the directions to create your username and password.     Your access code is: [email or web address]  Expires: 2018  9:19 AM     Your access code will  " in 90 days. If you need help or a new code, please call your [place] clinic or [phone].        Care EveryWhere ID     This is your Care EveryWhere ID. This could be used by other organizations to access your [place] medical records  [id number]        Your Vitals Were     Blood Pressure Pulse Temperature Respirations Pulse Oximetry       141/82 72 97.5  F (36.4  C) (Tympanic) 16 94%        Primary Care Provider Office Phone # Fax #    Erica Coelho PA-C [phone]      Equal Access to Services     [facility]: Hadii aad ku hadasho Soomaali, waaxda luqadaha, qaybta kaalmada adeegyada, yamileth diehl . So [facility] [phone].    ATENCIÓN: Si habla español, tiene a martínez disposición servicios gratuitos de asistencia lingüística. [facility] [phone].    We comply with applicable federal civil rights laws and [place] laws. We do not discriminate on the basis of race, color, national origin, age, disability, sex, sexual orientation, or gender identity.            Thank you!     Thank you for choosing [place] for your care. Our goal is always to provide you with excellent care. Hearing back from our patients is one way we can continue to improve our services. Please take a few minutes to complete the written survey that you may receive in the mail after you visit with us. Thank you!             Medication List: This is a list of all your medications and when to take them. Check marks below indicate your daily home schedule. Keep this list as a reference.      Medications           Morning Afternoon Evening Bedtime As Needed    * acetaminophen 325 MG tablet   Commonly known as:  TYLENOL   Take 3 tablets (975 mg) by mouth every 6 hours as needed for mild pain or other (Give before coming down for ECT)                                * acetaminophen 500 MG tablet   Commonly known as:  TYLENOL   Take 1 tablet (500 mg) by mouth 3 times daily as needed for mild pain                                 ADDERALL XR PO   Take 60 mg by mouth daily                                escitalopram 20 MG tablet   Commonly known as:  LEXAPRO   Take 1 tablet (20 mg) by mouth daily                                hydrOXYzine 25 MG tablet   Commonly known as:  ATARAX   Take 2 tablets (50 mg) by mouth 3 times daily as needed for anxiety                                ibuprofen 600 MG tablet   Commonly known as:  ADVIL/MOTRIN   Take 1 tablet (600 mg) by mouth every 6 hours as needed for moderate pain                                loratadine 10 MG tablet   Commonly known as:  CLARITIN   Take 10 mg by mouth daily                                LORazepam 1 MG tablet   Commonly known as:  ATIVAN   Take 1 tablet (1 mg) by mouth 2 times daily                                lurasidone 40 MG Tabs tablet   Commonly known as:  LATUDA   Take 1 tablet (40 mg) by mouth At Bedtime                                nicotine 21 MG/24HR 24 hr patch   Commonly known as:  NICODERM CQ   Place 1 patch onto the skin daily                                ondansetron 4 MG tablet   Commonly known as:  ZOFRAN   Take 1 tablet (4 mg) by mouth every 8 hours as needed for nausea                                QUEtiapine 100 MG tablet   Commonly known as:  SEROquel   Take 1 tablet (100 mg) by mouth At Bedtime                                zolpidem 10 MG tablet   Commonly known as:  AMBIEN   Take 1 tablet (10 mg) by mouth At Bedtime                                * Notice:  This list has 2 medication(s) that are the same as other medications prescribed for you. Read the directions carefully, and ask your doctor or other care provider to review them with you.

## 2018-10-12 NOTE — DISCHARGE INSTRUCTIONS
ECT Discharge Instructions      During your ECT series:      Do not drive or work heavy equipment until 7 days after your last treatment.    Do not drink alcohol or use street drugs (illicit drugs) while you are having treatments.    Do not make important decisions, including legal decisions.    After each treatment:      Get plenty of rest. A responsible adult must stay with your for at least 6 hours.    Do not drive for 24 hours after ECT treatment. If you have more than one treatment within a week, no driving until 7 days after your last ECT treatment.    Avoid heavy or risky activities for 24 hours.    If you feel light-headed, sit for a few minutes before standing. Have someone help you get up.    If you have nausea (feel sick to your stomach): Drink only clear liquids such as apple juice, ginger ale, broth or 7UP, Be sure to drink plenty of liquids. Move to a normal diet as you feel able.     If you received Toradol, wait 6 hours before taking ibuprofen.    Call your doctor if:     You have a fever over 100F (37.7 C) (taken under the tongue), or a fever that last more than 24 hours.    Your IV site is red, swollen, very painful or is getting more tender.    You have nausea that gets very bad or does not improve.      If you have any symptoms after ECT, tell our staff before your next treatment.    The ECT Department can be reached at [phone].  The ECT Department is open Mondays, Wednesdays and Fridays from 7:00 AM to 2:00 PM.     To speak to a doctor, call: Dr. Kennedy [phone]    New instructions: Dr. Kennedy sent a prescription for Zofran 4mg tabs, take 1 tablet three times per day as needed for nausea to her [facility] pharmacy in [place].      Other: You have received Toradol today at 8:04 AM. Toradol is an NSAID.  Do not take any NSAID's including: Ibuprofen, Naproxen Sodium, Asprin , Advil, Motrin, Aleve, Josué, etc., until six hours after the above time which would be 2:04 PM . If you have any  questions check back with your Physician, or pharmacist.   You were given Morphine 6 mg IV at 9:03 AM for post ECT headache.    Transported by: patients mother, Ana      _________________________________________________  ________________________  Patient/Responsible party Signature      Date          ________________________________________________   ________________________  Nurse Signature        Date

## 2018-10-12 NOTE — OR NURSING
Phase 2 discharge criteria met. Morphine given for headache. Will move to discharge area.    Yamile Arita RN on 10/12/2018 at 9:18 AM

## 2018-11-02 ENCOUNTER — ANESTHESIA EVENT (OUTPATIENT)
Dept: BEHAVIORAL HEALTH | Facility: CLINIC | Age: 43
End: 2018-11-02

## 2018-11-02 ENCOUNTER — HOSPITAL ENCOUNTER (OUTPATIENT)
Dept: BEHAVIORAL HEALTH | Facility: CLINIC | Age: 43
End: 2018-11-02
Attending: PSYCHIATRY & NEUROLOGY
Payer: COMMERCIAL

## 2018-11-02 ENCOUNTER — ANESTHESIA (OUTPATIENT)
Dept: BEHAVIORAL HEALTH | Facility: CLINIC | Age: 43
End: 2018-11-02

## 2018-11-02 VITALS
DIASTOLIC BLOOD PRESSURE: 75 MMHG | TEMPERATURE: 97.4 F | RESPIRATION RATE: 16 BRPM | SYSTOLIC BLOOD PRESSURE: 140 MMHG | OXYGEN SATURATION: 95 % | HEART RATE: 68 BPM

## 2018-11-02 DIAGNOSIS — F33.2 MAJOR DEPRESSIVE DISORDER, RECURRENT SEVERE WITHOUT PSYCHOTIC FEATURES (H): ICD-10-CM

## 2018-11-02 PROCEDURE — 90870 ELECTROCONVULSIVE THERAPY: CPT

## 2018-11-02 PROCEDURE — 25000125 ZZHC RX 250: Performed by: ANESTHESIOLOGY

## 2018-11-02 PROCEDURE — 25000128 H RX IP 250 OP 636: Performed by: PSYCHIATRY & NEUROLOGY

## 2018-11-02 PROCEDURE — 25000128 H RX IP 250 OP 636: Performed by: ANESTHESIOLOGY

## 2018-11-02 PROCEDURE — 40000671 ZZH STATISTIC ANESTHESIA CASE

## 2018-11-02 RX ORDER — KETOROLAC TROMETHAMINE 30 MG/ML
30 INJECTION, SOLUTION INTRAMUSCULAR; INTRAVENOUS ONCE
Status: COMPLETED | OUTPATIENT
Start: 2018-11-02 | End: 2018-11-02

## 2018-11-02 RX ORDER — MORPHINE SULFATE 4 MG/ML
6 INJECTION, SOLUTION INTRAMUSCULAR; INTRAVENOUS ONCE
Status: COMPLETED | OUTPATIENT
Start: 2018-11-02 | End: 2018-11-02

## 2018-11-02 RX ORDER — MORPHINE SULFATE 4 MG/ML
6 INJECTION, SOLUTION INTRAMUSCULAR; INTRAVENOUS ONCE
Status: CANCELLED
Start: 2018-11-02 | End: 2018-11-02

## 2018-11-02 RX ORDER — ONDANSETRON 2 MG/ML
4 INJECTION INTRAMUSCULAR; INTRAVENOUS ONCE
Status: CANCELLED
Start: 2018-11-02 | End: 2018-11-02

## 2018-11-02 RX ORDER — KETOROLAC TROMETHAMINE 30 MG/ML
30 INJECTION, SOLUTION INTRAMUSCULAR; INTRAVENOUS ONCE
Status: CANCELLED
Start: 2018-11-02 | End: 2018-11-02

## 2018-11-02 RX ORDER — ONDANSETRON 2 MG/ML
8 INJECTION INTRAMUSCULAR; INTRAVENOUS ONCE
Status: CANCELLED
Start: 2018-11-02 | End: 2018-11-02

## 2018-11-02 RX ORDER — ONDANSETRON 2 MG/ML
4 INJECTION INTRAMUSCULAR; INTRAVENOUS ONCE
Status: COMPLETED | OUTPATIENT
Start: 2018-11-02 | End: 2018-11-02

## 2018-11-02 RX ADMIN — KETOROLAC TROMETHAMINE 30 MG: 30 INJECTION, SOLUTION INTRAMUSCULAR at 08:00

## 2018-11-02 RX ADMIN — ONDANSETRON 4 MG: 2 INJECTION INTRAMUSCULAR; INTRAVENOUS at 07:56

## 2018-11-02 RX ADMIN — ONDANSETRON 4 MG: 2 INJECTION INTRAMUSCULAR; INTRAVENOUS at 08:49

## 2018-11-02 RX ADMIN — MORPHINE SULFATE 6 MG: 4 INJECTION, SOLUTION INTRAMUSCULAR; INTRAVENOUS at 08:08

## 2018-11-02 RX ADMIN — METHOHEXITAL SODIUM 80 MG: 500 INJECTION, POWDER, LYOPHILIZED, FOR SOLUTION INTRAMUSCULAR; INTRAVENOUS; RECTAL at 08:07

## 2018-11-02 RX ADMIN — SODIUM CHLORIDE, POTASSIUM CHLORIDE, SODIUM LACTATE AND CALCIUM CHLORIDE 1000 ML: 600; 310; 30; 20 INJECTION, SOLUTION INTRAVENOUS at 07:56

## 2018-11-02 RX ADMIN — Medication 70 MG: at 08:07

## 2018-11-02 NOTE — ANESTHESIA PREPROCEDURE EVALUATION
Anesthesia Pre-Procedure Evaluation    Patient: Mindi Eastman   MRN:     [id number] Gender:   female   Age:    43 year old :      1975        Preoperative Diagnosis: * No pre-op diagnosis entered *   * No procedures listed *     Past Medical History:   Diagnosis Date     ADHD      Anxiety      Bipolar disorder (H)      Depressive disorder      OCD (obsessive compulsive disorder)      ROXANNE (obstructive sleep apnea)     On CPAP     Seasonal allergies       Past Surgical History:   Procedure Laterality Date     APPENDECTOMY       CARPAL TUNNEL RELEASE RT/LT Bilateral      CHOLECYSTECTOMY       EXTRACTION(S) DENTAL      [place] Teeth     HC KNEE SCOPE,MED/LAT MENISCUS REPAIR Right     X 2     TONSILLECTOMY            Anesthesia Evaluation     . Pt has had prior anesthetic. Type: General    No history of anesthetic complications          ROS/MED HX    ENT/Pulmonary:     (+)sleep apnea, uses CPAP , . .    Neurologic:  - neg neurologic ROS     Cardiovascular:  - neg cardiovascular ROS       METS/Exercise Tolerance:     Hematologic:  - neg hematologic  ROS       Musculoskeletal:  - neg musculoskeletal ROS       GI/Hepatic:  - neg GI/hepatic ROS       Renal/Genitourinary:  - ROS Renal section negative       Endo:  - neg endo ROS       Psychiatric:     (+) psychiatric history depression      Infectious Disease:  - neg infectious disease ROS       Malignancy:         Other:                         PHYSICAL EXAM:   Mental Status/Neuro: A/A/O   Airway: Facies: Feasible  Mallampati: I  Mouth/Opening: Full  TM distance: > 6 cm  Neck ROM: Full   Respiratory: Auscultation: CTAB     Resp. Rate: Normal     Resp. Effort: Normal      CV: Rhythm: Regular  Rate: Age appropriate  Heart: Normal Sounds   Comments:      Dental: Normal                  Lab Results   Component Value Date    WBC 6.3 10/05/2017    HGB 11.7 10/05/2017    HCT 35.8 10/05/2017     10/05/2017     10/05/2017    POTASSIUM 4.5 10/03/2018     "CHLORIDE 108 10/05/2017    CO2 28 10/05/2017    BUN 8 10/05/2017    CR 0.84 10/03/2018     (A) 10/03/2018    GALO 8.0 (L) 10/05/2017    ALBUMIN 3.2 (L) 10/05/2017    PROTTOTAL 6.2 (L) 10/05/2017    ALT 28 10/05/2017    AST 31 10/05/2017    ALKPHOS 68 10/05/2017    BILITOTAL 0.4 10/05/2017    PTT 38 (H) 06/06/2011    INR 0.99 06/06/2011    TSH 1.81 10/05/2017    HCG Negative 10/05/2017    HCGS Negative 06/06/2011       Preop Vitals  BP Readings from Last 3 Encounters:   11/02/18 140/77   10/12/18 141/82   09/21/18 143/82    Pulse Readings from Last 3 Encounters:   11/02/18 68   10/12/18 72   09/21/18 62      Resp Readings from Last 3 Encounters:   11/02/18 16   10/12/18 16   09/21/18 16    SpO2 Readings from Last 3 Encounters:   11/02/18 97%   10/12/18 94%   09/21/18 99%      Temp Readings from Last 1 Encounters:   11/02/18 36.6  C (97.9  F) (Tympanic)    Ht Readings from Last 1 Encounters:   10/16/17 1.676 m (5' 6\")      Wt Readings from Last 1 Encounters:   10/16/17 86.2 kg (190 lb)    Estimated body mass index is 30.67 kg/(m^2) as calculated from the following:    Height as of 10/16/17: 1.676 m (5' 6\").    Weight as of 10/16/17: 86.2 kg (190 lb).     LDA:  Peripheral IV 11/02/18 Right Hand (Active)   Site Assessment WDL 11/2/2018  7:55 AM   Line Status Infusing 11/2/2018  7:55 AM   Phlebitis Scale 0-->no symptoms 11/2/2018  7:55 AM   Number of days:0            Assessment:   ASA SCORE: 2       Documentation: H&P complete; Preop Testing complete; Consents complete   Proceeding: Proceed without further delay  Tobacco Use:  Active user of Tobacco     Plan:   Anes. Type:  General   Pre-Induction: Midazolam IV; Acetaminophen PO   Induction:  IV (Standard)   Airway: Oral ETT   Access/Monitoring: PIV   Maintenance: Balanced   Emergence: Procedure Site   Logistics: Same Day Surgery     Postop Pain/Sedation Strategy:  Standard-Options: Opioids PRN     PONV Management:  Adult Risk Factors: Female, Postop Opioids "     CONSENT: Direct conversation                               Micah Thomas MD, MD

## 2018-11-02 NOTE — PROGRESS NOTES
Patient complaining of some nausea that is not subsiding. Asking MD's if we can give some more zofran.

## 2018-11-02 NOTE — PROCEDURES
Procedure/Surgery Information   [facility], [place]    Bedside Procedure Note  Date of Service (when I performed the procedure): 11/02/2018    Mindi Eastman is a 42 year old female patient.  1. Major depressive disorder, recurrent severe without psychotic features (H)      Past Medical History:   Diagnosis Date     ADHD      Anxiety      Bipolar disorder (H)      Depressive disorder      OCD (obsessive compulsive disorder)      ROXANNE (obstructive sleep apnea)     On CPAP     Seasonal allergies      Temp: 97.9  F (36.6  C) Temp src: Tympanic BP: 140/77 Pulse: 68   Resp: 16 SpO2: 97 % O2 Device: None (Room air)      Procedures     [facility], [place]   ECT Procedure Note   11/02/2018       Mindi Eastman [id number]   42 year old 1975     Patient Status: Outpatient    Is this the first in a series of 12 treatments?  No    History and Physical: Reviewed in medical record    Consent: Informed consent was signed by: patient    Date Consent Signed: 5/18/18    Allergies   Allergen Reactions     Darvocet [Propoxyphene N-Apap]        Weight:  0 lbs 0 oz    Patient Preparations: Glasses/Contacts removed         Indications for ECT:   Medications ineffective, Psychotherapies ineffective and treatment resistant depression.         Clinical Narrative:   Patient is continuing ECT for depression.  NPO after 2400.  Alert and Oriented x 3.  No problems with the last ECT.  No med changes or health problems.   Last ECT was 3 weeks ago on 10/12/18.  She wants to continue treatment at that interval.  Has some nausea not until she gets home after ECT.  She has Zofran at home which works well.           Diagnosis:      Major depressive disorder, recurrent severe without psychotic features (F33.2)         Pause for the Cause:     Right patient Yes   Right procedure/laterality settings: Yes          Intra-Procedure Documentation:       ECT #: 9   Treatment number this  series: 9   Total treatment number: 20     Type of ECT:  Right, unilateral ultrabrief    ECT Medications:    Tylenol:  1000mg po (took at home today)  Toradol: 30mg  Zofran: 4mg + 4mg (give 8mg next Tx)  Brevital: 80mg  Succinyl Choline: 70mg  Morphine 5 mg in treatment room for headache   Lactated Ringers 1 liter      ECT Strip Summary:   Energy Level: 10 percent   Motor Seizure Duration:  60 seconds  EEG Seizure Duration:   77 seconds    Complications: No    Plan:    Next maintenance ECT is in 3 weeks on 11/23/18      Umang Kennedy MD

## 2018-11-02 NOTE — PROGRESS NOTES
Patient received second dose of zofran and said she was feeling better and wanted to go home. Patient wheeled to the discharge room to go over her instructions.

## 2018-11-02 NOTE — ANESTHESIA POSTPROCEDURE EVALUATION
Anesthesia POST Procedure Evaluation    Patient: Mindi Eastman   MRN:     [id number] Gender:   female   Age:    43 year old :      1975        Preoperative Diagnosis: * No pre-op diagnosis entered *   * No procedures listed *   Postop Comments: No value filed.       Anesthesia Type:  General    Reportable Event: NO     PAIN: Uncomplicated   Sign Out status: Comfortable, Well controlled pain     PONV: No PONV   Sign Out status:  No Nausea or Vomiting     Neuro/Psych: Uneventful perioperative course   Sign Out Status: Preoperative baseline; Age appropriate mentation     Airway/Resp.: Uneventful perioperative course   Sign Out Status: Non labored breathing, age appropriate RR; Resp. Status within EXPECTED Parameters     CV: Uneventful perioperative course   Sign Out status: Appropriate BP and perfusion indices; Appropriate HR/Rhythm     Disposition:   Sign Out in:  PACU  Recovery Course: Uneventful  Follow-Up: Not required           Last Anesthesia Record Vitals:      Last PACU/Preop Vitals:  Vitals:    18 0704   BP: 140/77   Pulse: 68   Resp: 16   Temp: 36.6  C (97.9  F)   SpO2: 97%         Electronically Signed By: Micah Thomas MD, MD, 2018, 8:05 AM

## 2018-11-02 NOTE — DISCHARGE INSTRUCTIONS
ECT Discharge Instructions      During your ECT series:      Do not drive for 24 hours after treatment.  If you will have more than one treatment within a week, no driving until 7 days after your last ECT treatment.    Do not drink alcohol or use street drugs (illicit drugs) while you are having treatments.    Do not make important decisions, including legal decisions.    After each treatment:      Get plenty of rest. A responsible adult must stay with your for at least 6 hours.    Avoid heavy or risky activities for 24 hours.    If you feel light-headed, sit for a few minutes before standing. Have someone help you get up.    If you have nausea (feel sick to your stomach): Drink only clear liquids such as apple juice, ginger ale, broth or 7UP, Be sure to drink plenty of liquids. Move to a normal diet as you feel able.     If you received Toradol, wait 6 hours before taking ibuprofen.    Call your doctor if:     You have a fever over 100F (37.7 C) (taken under the tongue), or a fever that last more than 24 hours.    Your IV site is red, swollen, very painful or is getting more tender.    You have nausea that gets very bad or does not improve.      If you have any symptoms after ECT, tell our staff before your next treatment.    The ECT Department can be reached at [phone].  The ECT Department is open Mondays, Wednesdays and Fridays from 7:00 AM to 2:00 PM.    You had Toradol at 8am  which is a NSAID medication.  Do not take any Ibuprofen, Motrin, Aspirin, Advil, Aleve, Excedrin, or any other NSAID medication until after  2pm .   Questions,  check with your physician or pharmacist.      To speak to Dr. Kennedy  Office #  [phone] and Fax # [phone]    Dr Kennedy sent a prescription of Zofran to your [facility]'s pharmacy in [place].You had 8mg of zofran IV today at 9am. Do not take any more zofran today until at least 5pm and then give as indicated on the bottle.

## 2018-11-02 NOTE — IP AVS SNAPSHOT
[facility]    [facility] [address] [address]    Phone:  [phone]                                       After Visit Summary   11/2/2018    Mindi Eastman    MRN: [id number]           After Visit Summary Signature Page     I have received my discharge instructions, and my questions have been answered. I have discussed any challenges I see with this plan with the nurse or doctor.    ..........................................................................................................................................  Patient/Patient Representative Signature      ..........................................................................................................................................  Patient Representative Print Name and Relationship to Patient    ..................................................               ................................................  Date                                   Time    ..........................................................................................................................................  Reviewed by Signature/Title    ...................................................              ..............................................  Date                                               Time          22EPIC Rev 08/18

## 2018-11-02 NOTE — IP AVS SNAPSHOT
MRN:[id number]                      After Visit Summary   11/2/2018    Mindi Eastman    MRN: [id number]           Visit Information        Provider Department      11/2/2018  7:15 AM Umang Kennedy MD [facility]           Review of your medicines      CONTINUE these medicines which have NOT CHANGED        Dose / Directions    * acetaminophen 325 MG tablet   Commonly known as:  TYLENOL   Used for:  Acute non intractable tension-type headache        Dose:  975 mg   Take 3 tablets (975 mg) by mouth every 6 hours as needed for mild pain or other (Give before coming down for ECT)   Quantity:  100 tablet   Refills:  1       * acetaminophen 500 MG tablet   Commonly known as:  TYLENOL   Used for:  Major depressive disorder, recurrent severe without psychotic features (H)        Dose:  500 mg   Take 1 tablet (500 mg) by mouth 3 times daily as needed for mild pain   Quantity:  100 tablet   Refills:  1       ADDERALL XR PO        Dose:  60 mg   Take 60 mg by mouth daily   Refills:  0       CELEXA PO   Indication:  Depression        Dose:  40 mg   Take 40 mg by mouth daily   Refills:  0       hydrOXYzine 25 MG tablet   Commonly known as:  ATARAX   Used for:  SHANICE (generalized anxiety disorder)        Dose:  50 mg   Take 2 tablets (50 mg) by mouth 3 times daily as needed for anxiety   Quantity:  90 tablet   Refills:  1       ibuprofen 600 MG tablet   Commonly known as:  ADVIL/MOTRIN   Used for:  Acute non intractable tension-type headache        Dose:  600 mg   Take 1 tablet (600 mg) by mouth every 6 hours as needed for moderate pain   Quantity:  120 tablet   Refills:  1       loratadine 10 MG tablet   Commonly known as:  CLARITIN        Dose:  10 mg   Take 10 mg by mouth daily   Refills:  0       LORazepam 1 MG tablet   Commonly known as:  ATIVAN   Used for:  SHANICE (generalized anxiety disorder)        Dose:  1 mg   Take 1 tablet (1 mg) by mouth 2 times daily   Quantity:  60 tablet    Refills:  0       lurasidone 40 MG Tabs tablet   Commonly known as:  LATUDA   Used for:  Bipolar I disorder (H), Major depressive disorder, recurrent severe without psychotic features (H)        Dose:  40 mg   Take 1 tablet (40 mg) by mouth At Bedtime   Quantity:  30 tablet   Refills:  1       nicotine 21 MG/24HR 24 hr patch   Commonly known as:  NICODERM CQ   Used for:  Tobacco use        Dose:  1 patch   Place 1 patch onto the skin daily   Quantity:  30 patch   Refills:  1       ondansetron 4 MG tablet   Commonly known as:  ZOFRAN   Used for:  Major depressive disorder, recurrent severe without psychotic features (H), Projectile vomiting with nausea        Dose:  4 mg   Take 1 tablet (4 mg) by mouth every 8 hours as needed for nausea   Quantity:  30 tablet   Refills:  0       QUEtiapine 100 MG tablet   Commonly known as:  SEROquel   Used for:  Bipolar I disorder (H), Major depressive disorder, recurrent severe without psychotic features (H)        Dose:  100 mg   Take 1 tablet (100 mg) by mouth At Bedtime   Quantity:  30 tablet   Refills:  1       zolpidem 10 MG tablet   Commonly known as:  AMBIEN   Used for:  Insomnia due to other mental disorder        Dose:  10 mg   Take 1 tablet (10 mg) by mouth At Bedtime   Quantity:  30 tablet   Refills:  0       * Notice:  This list has 2 medication(s) that are the same as other medications prescribed for you. Read the directions carefully, and ask your doctor or other care provider to review them with you.             Protect others around you: Learn how to safely use, store and throw away your medicines at www.disposemymeds.org.         Follow-ups after your visit        Your next 10 appointments already scheduled     Nov 23, 2018  7:00 AM CST   Electroconvulsive Therapy with Umang Kennedy MD   [facility] ([facility])    [address]  [facility] [address]   [phone]               Care  Instructions        Further instructions from your care team       ECT Discharge Instructions      During your ECT series:      Do not drive for 24 hours after treatment.  If you will have more than one treatment within a week, no driving until 7 days after your last ECT treatment.    Do not drink alcohol or use street drugs (illicit drugs) while you are having treatments.    Do not make important decisions, including legal decisions.    After each treatment:      Get plenty of rest. A responsible adult must stay with your for at least 6 hours.    Avoid heavy or risky activities for 24 hours.    If you feel light-headed, sit for a few minutes before standing. Have someone help you get up.    If you have nausea (feel sick to your stomach): Drink only clear liquids such as apple juice, ginger ale, broth or 7UP, Be sure to drink plenty of liquids. Move to a normal diet as you feel able.     If you received Toradol, wait 6 hours before taking ibuprofen.    Call your doctor if:     You have a fever over 100F (37.7 C) (taken under the tongue), or a fever that last more than 24 hours.    Your IV site is red, swollen, very painful or is getting more tender.    You have nausea that gets very bad or does not improve.      If you have any symptoms after ECT, tell our staff before your next treatment.    The ECT Department can be reached at [phone].  The ECT Department is open Mondays, Wednesdays and Fridays from 7:00 AM to 2:00 PM.    You had Toradol at 8am  which is a NSAID medication.  Do not take any Ibuprofen, Motrin, Aspirin, Advil, Aleve, Excedrin, or any other NSAID medication until after  2pm .   Questions,  check with your physician or pharmacist.      To speak to Dr. Kennedy  Office #  [phone] and Fax # [phone]    Dr Kennedy sent a prescription of Zofran to your [facility]'s pharmacy in [place].You had 8mg of zofran IV today at 9am. Do not take any more zofran today until at least 5pm and then give as  "indicated on the bottle.                   Additional Information About Your Visit        [employer] Information     [employer] lets you send messages to your doctor, view your test results, renew your prescriptions, schedule appointments and more. To sign up, go to [email or web address] . Click on \"Log in\" on the left side of the screen, which will take you to the Welcome page. Then click on \"Sign up Now\" on the right side of the page.     You will be asked to enter the access code listed below, as well as some personal information. Please follow the directions to create your username and password.     Your access code is: [email or web address]  Expires: 2018  9:19 AM     Your access code will  in 90 days. If you need help or a new code, please call your [facility] clinic or [phone].        Care EveryWhere ID     This is your Care EveryWhere ID. This could be used by other organizations to access your [facility] medical records  [id number]        Your Vitals Were     Blood Pressure Pulse Temperature Respirations Pulse Oximetry       140/75 68 97.4  F (36.3  C) (Tympanic) 16 95%        Primary Care Provider Office Phone # Fax #    Erica Coelho PA-C [phone]      Equal Access to Services     ROXANNA EDMONDSON : Hadii tonia ku hadasho Soomaali, waaxda luqadaha, qaybta kaalmada adeegyada, waxriccardo diehl . So [facility] [phone].    ATENCIÓN: Si habla español, tiene a martínez disposición servicios gratuitos de asistencia lingüística. Llame al [phone].    We comply with applicable federal civil rights laws and [place] laws. We do not discriminate on the basis of race, color, national origin, age, disability, sex, sexual orientation, or gender identity.            Thank you!     Thank you for choosing [facility] for your care. Our goal is always to provide you with excellent care. Hearing back from our patients is one way we can continue to improve our services. Please take a few " minutes to complete the written survey that you may receive in the mail after you visit with us. Thank you!             Medication List: This is a list of all your medications and when to take them. Check marks below indicate your daily home schedule. Keep this list as a reference.      Medications           Morning Afternoon Evening Bedtime As Needed    * acetaminophen 325 MG tablet   Commonly known as:  TYLENOL   Take 3 tablets (975 mg) by mouth every 6 hours as needed for mild pain or other (Give before coming down for ECT)                                * acetaminophen 500 MG tablet   Commonly known as:  TYLENOL   Take 1 tablet (500 mg) by mouth 3 times daily as needed for mild pain                                ADDERALL XR PO   Take 60 mg by mouth daily                                CELEXA PO   Take 40 mg by mouth daily                                hydrOXYzine 25 MG tablet   Commonly known as:  ATARAX   Take 2 tablets (50 mg) by mouth 3 times daily as needed for anxiety                                ibuprofen 600 MG tablet   Commonly known as:  ADVIL/MOTRIN   Take 1 tablet (600 mg) by mouth every 6 hours as needed for moderate pain                                loratadine 10 MG tablet   Commonly known as:  CLARITIN   Take 10 mg by mouth daily                                LORazepam 1 MG tablet   Commonly known as:  ATIVAN   Take 1 tablet (1 mg) by mouth 2 times daily                                lurasidone 40 MG Tabs tablet   Commonly known as:  LATUDA   Take 1 tablet (40 mg) by mouth At Bedtime                                nicotine 21 MG/24HR 24 hr patch   Commonly known as:  NICODERM CQ   Place 1 patch onto the skin daily                                ondansetron 4 MG tablet   Commonly known as:  ZOFRAN   Take 1 tablet (4 mg) by mouth every 8 hours as needed for nausea                                QUEtiapine 100 MG tablet   Commonly known as:  SEROquel   Take 1 tablet (100 mg) by mouth At  Bedtime                                zolpidem 10 MG tablet   Commonly known as:  AMBIEN   Take 1 tablet (10 mg) by mouth At Bedtime                                * Notice:  This list has 2 medication(s) that are the same as other medications prescribed for you. Read the directions carefully, and ask your doctor or other care provider to review them with you.

## 2018-11-23 ENCOUNTER — ANESTHESIA (OUTPATIENT)
Dept: BEHAVIORAL HEALTH | Facility: CLINIC | Age: 43
End: 2018-11-23

## 2018-11-23 ENCOUNTER — ANESTHESIA EVENT (OUTPATIENT)
Dept: BEHAVIORAL HEALTH | Facility: CLINIC | Age: 43
End: 2018-11-23

## 2018-11-23 ENCOUNTER — HOSPITAL ENCOUNTER (OUTPATIENT)
Dept: BEHAVIORAL HEALTH | Facility: CLINIC | Age: 43
End: 2018-11-23
Attending: PSYCHIATRY & NEUROLOGY
Payer: COMMERCIAL

## 2018-11-23 VITALS
DIASTOLIC BLOOD PRESSURE: 82 MMHG | TEMPERATURE: 98.1 F | HEART RATE: 68 BPM | SYSTOLIC BLOOD PRESSURE: 130 MMHG | OXYGEN SATURATION: 95 % | RESPIRATION RATE: 12 BRPM

## 2018-11-23 DIAGNOSIS — F33.2 MAJOR DEPRESSIVE DISORDER, RECURRENT SEVERE WITHOUT PSYCHOTIC FEATURES (H): ICD-10-CM

## 2018-11-23 PROCEDURE — 25000125 ZZHC RX 250: Performed by: ANESTHESIOLOGY

## 2018-11-23 PROCEDURE — 40000671 ZZH STATISTIC ANESTHESIA CASE

## 2018-11-23 PROCEDURE — 90870 ELECTROCONVULSIVE THERAPY: CPT

## 2018-11-23 PROCEDURE — 25000128 H RX IP 250 OP 636: Performed by: PSYCHIATRY & NEUROLOGY

## 2018-11-23 PROCEDURE — 25000128 H RX IP 250 OP 636: Performed by: ANESTHESIOLOGY

## 2018-11-23 RX ORDER — ONDANSETRON 2 MG/ML
8 INJECTION INTRAMUSCULAR; INTRAVENOUS ONCE
Status: COMPLETED | OUTPATIENT
Start: 2018-11-23 | End: 2018-11-23

## 2018-11-23 RX ORDER — MORPHINE SULFATE 4 MG/ML
6 INJECTION, SOLUTION INTRAMUSCULAR; INTRAVENOUS ONCE
Status: CANCELLED
Start: 2018-11-23 | End: 2018-11-23

## 2018-11-23 RX ORDER — MORPHINE SULFATE 4 MG/ML
6 INJECTION, SOLUTION INTRAMUSCULAR; INTRAVENOUS ONCE
Status: COMPLETED | OUTPATIENT
Start: 2018-11-23 | End: 2018-11-23

## 2018-11-23 RX ORDER — KETOROLAC TROMETHAMINE 30 MG/ML
30 INJECTION, SOLUTION INTRAMUSCULAR; INTRAVENOUS ONCE
Status: COMPLETED | OUTPATIENT
Start: 2018-11-23 | End: 2018-11-23

## 2018-11-23 RX ORDER — ONDANSETRON 2 MG/ML
8 INJECTION INTRAMUSCULAR; INTRAVENOUS ONCE
Status: CANCELLED
Start: 2018-11-23 | End: 2018-11-23

## 2018-11-23 RX ORDER — KETOROLAC TROMETHAMINE 30 MG/ML
30 INJECTION, SOLUTION INTRAMUSCULAR; INTRAVENOUS ONCE
Status: CANCELLED
Start: 2018-11-23 | End: 2018-11-23

## 2018-11-23 RX ADMIN — Medication 70 MG: at 07:36

## 2018-11-23 RX ADMIN — MORPHINE SULFATE 6 MG: 4 INJECTION, SOLUTION INTRAMUSCULAR; INTRAVENOUS at 07:42

## 2018-11-23 RX ADMIN — KETOROLAC TROMETHAMINE 30 MG: 30 INJECTION, SOLUTION INTRAMUSCULAR at 07:31

## 2018-11-23 RX ADMIN — ONDANSETRON 8 MG: 2 INJECTION INTRAMUSCULAR; INTRAVENOUS at 07:31

## 2018-11-23 RX ADMIN — METHOHEXITAL SODIUM 80 MG: 500 INJECTION, POWDER, LYOPHILIZED, FOR SOLUTION INTRAMUSCULAR; INTRAVENOUS; RECTAL at 07:36

## 2018-11-23 RX ADMIN — SODIUM CHLORIDE, POTASSIUM CHLORIDE, SODIUM LACTATE AND CALCIUM CHLORIDE 1000 ML: 600; 310; 30; 20 INJECTION, SOLUTION INTRAVENOUS at 07:32

## 2018-11-23 NOTE — ANESTHESIA POSTPROCEDURE EVALUATION
Anesthesia POST Procedure Evaluation    Patient: Mindi Eastman   MRN:     [id number] Gender:   female   Age:    43 year old :      1975        Preoperative Diagnosis: * No pre-op diagnosis entered *   * No procedures listed *   Postop Comments: No value filed.       Anesthesia Type:  General    Reportable Event: NO     PAIN: Uncomplicated   Sign Out status: Comfortable, Well controlled pain     PONV: No PONV   Sign Out status:  No Nausea or Vomiting     Neuro/Psych: Uneventful perioperative course   Sign Out Status: Preoperative baseline; Age appropriate mentation     Airway/Resp.: Uneventful perioperative course   Sign Out Status: Non labored breathing, age appropriate RR; Resp. Status within EXPECTED Parameters     CV: Uneventful perioperative course   Sign Out status: Appropriate BP and perfusion indices; Appropriate HR/Rhythm     Disposition:   Sign Out in:  PACU  Recovery Course: Uneventful  Follow-Up: Not required     Comments/Narrative:  No significant issues           Last Anesthesia Record Vitals:      Last PACU/Preop Vitals:  Vitals:    18 0657   BP: 133/75   Pulse: 68   Resp: 16   Temp: 36.7  C (98  F)   SpO2: 99%         Electronically Signed By: Micah Thomas MD, MD, 2018, 7:05 AM

## 2018-11-23 NOTE — DISCHARGE INSTRUCTIONS
ECT Discharge Instructions      During your ECT series:      Do not drive or work heavy equipment until 7 days after your last treatment.    Do not drink alcohol or use street drugs (illicit drugs) while you are having treatments.    Do not make important decisions, including legal decisions.  .After your maintenance treatment:    Do not drive for 24 hrs after your maintenance treatment.  If you have two or more treatments in a week, do not drive until 7 days after your last treatment.        After each treatment:      Get plenty of rest. A responsible adult must stay with your for at least 6 hours.    Avoid heavy or risky activities for 24 hours.    If you feel light-headed, sit for a few minutes before standing. Have someone help you get up.    If you have nausea (feel sick to your stomach): Drink only clear liquids such as apple juice, ginger ale, broth or 7UP, Be sure to drink plenty of liquids. Move to a normal diet as you feel able.     If you received Toradol, wait 6 hours before taking ibuprofen.    Call your doctor if:     You have a fever over 100F (37.7 C) (taken under the tongue), or a fever that last more than 24 hours.    Your IV site is red, swollen, very painful or is getting more tender.    You have nausea that gets very bad or does not improve.      If you have any symptoms after ECT, tell our staff before your next treatment.    The ECT Department can be reached at [phone].  The ECT Department is open Mondays, Wednesdays and Fridays from 7:00 AM to 2:00 PM.     To speak to a doctor, call: Dr. Kennedy's clinic [phone], Office [phone], Fax [phone]    Other: Today you have received IV Toradol  at 7:31 pm for headache/pain.  Toradol is an NSAID. Do no take any NSAIDs  including ibuprofen, Naproxen Sodium, Asprin, Advil, Motrin, Aleve, Josué, Excedrin with Aspirin, etc, Do not take any of these medications until 6 hours after above time (1:32 pm).  You may take Tylenol six hours after  previous dose taken at home as directed if headache persists. If you have any questions please contact your physician, or pharmacist.   You also received Morphine sulfate 6 mg at 7:42; Zofran 8 mg at 7:31 for nausea and Lactated Ringers 1000 ml at 7:32 for headache/nausea.    Transported by: amena Soriano

## 2018-11-23 NOTE — IP AVS SNAPSHOT
[facility]    [facility] [address] [address]    Phone:  [phone]                                       After Visit Summary   11/23/2018    Mindi Eastman    MRN: [id number]           After Visit Summary Signature Page     I have received my discharge instructions, and my questions have been answered. I have discussed any challenges I see with this plan with the nurse or doctor.    ..........................................................................................................................................  Patient/Patient Representative Signature      ..........................................................................................................................................  Patient Representative Print Name and Relationship to Patient    ..................................................               ................................................  Date                                   Time    ..........................................................................................................................................  Reviewed by Signature/Title    ...................................................              ..............................................  Date                                               Time          22EPIC Rev 08/18

## 2018-11-23 NOTE — ANESTHESIA PREPROCEDURE EVALUATION
Anesthesia Pre-Procedure Evaluation    Patient: Mindi Eastman   MRN:     [id number] Gender:   female   Age:    43 year old :      1975        Preoperative Diagnosis: * No pre-op diagnosis entered *   * No procedures listed *     Past Medical History:   Diagnosis Date     ADHD      Anxiety      Bipolar disorder (H)      Depressive disorder      OCD (obsessive compulsive disorder)      ROXANNE (obstructive sleep apnea)     On CPAP     Seasonal allergies       Past Surgical History:   Procedure Laterality Date     APPENDECTOMY       CARPAL TUNNEL RELEASE RT/LT Bilateral      CHOLECYSTECTOMY       EXTRACTION(S) DENTAL      [place] Teeth     HC KNEE SCOPE,MED/LAT MENISCUS REPAIR Right     X 2     TONSILLECTOMY            Anesthesia Evaluation     . Pt has had prior anesthetic. Type: General    No history of anesthetic complications          ROS/MED HX    ENT/Pulmonary:     (+)sleep apnea, uses CPAP , . .    Neurologic:  - neg neurologic ROS     Cardiovascular:  - neg cardiovascular ROS       METS/Exercise Tolerance:     Hematologic:  - neg hematologic  ROS       Musculoskeletal:  - neg musculoskeletal ROS       GI/Hepatic:  - neg GI/hepatic ROS       Renal/Genitourinary:  - ROS Renal section negative       Endo:  - neg endo ROS       Psychiatric:     (+) psychiatric history depression      Infectious Disease:  - neg infectious disease ROS       Malignancy:         Other:                         PHYSICAL EXAM:   Mental Status/Neuro: A/A/O   Airway: Facies: Feasible  Mallampati: I  Mouth/Opening: Full  TM distance: > 6 cm  Neck ROM: Full   Respiratory: Auscultation: CTAB     Resp. Rate: Normal     Resp. Effort: Normal      CV: Rhythm: Regular  Rate: Age appropriate  Heart: Normal Sounds   Comments:      Dental: Normal                  Lab Results   Component Value Date    WBC 6.3 10/05/2017    HGB 11.7 10/05/2017    HCT 35.8 10/05/2017     10/05/2017     10/05/2017    POTASSIUM 4.5 10/03/2018     "CHLORIDE 108 10/05/2017    CO2 28 10/05/2017    BUN 8 10/05/2017    CR 0.84 10/03/2018     (A) 10/03/2018    GALO 8.0 (L) 10/05/2017    ALBUMIN 3.2 (L) 10/05/2017    PROTTOTAL 6.2 (L) 10/05/2017    ALT 28 10/05/2017    AST 31 10/05/2017    ALKPHOS 68 10/05/2017    BILITOTAL 0.4 10/05/2017    PTT 38 (H) 06/06/2011    INR 0.99 06/06/2011    TSH 1.81 10/05/2017    HCG Negative 10/05/2017    HCGS Negative 06/06/2011       Preop Vitals  BP Readings from Last 3 Encounters:   11/23/18 133/75   11/02/18 140/75   10/12/18 141/82    Pulse Readings from Last 3 Encounters:   11/23/18 68   11/02/18 68   10/12/18 72      Resp Readings from Last 3 Encounters:   11/23/18 16   11/02/18 16   10/12/18 16    SpO2 Readings from Last 3 Encounters:   11/23/18 99%   11/02/18 95%   10/12/18 94%      Temp Readings from Last 1 Encounters:   11/23/18 36.7  C (98  F)    Ht Readings from Last 1 Encounters:   10/16/17 1.676 m (5' 6\")      Wt Readings from Last 1 Encounters:   10/16/17 86.2 kg (190 lb)    Estimated body mass index is 30.67 kg/(m^2) as calculated from the following:    Height as of 10/16/17: 1.676 m (5' 6\").    Weight as of 10/16/17: 86.2 kg (190 lb).     LDA:            Assessment:   ASA SCORE: 2       Documentation: H&P complete; Preop Testing complete; Consents complete   Proceeding: Proceed without further delay  Tobacco Use:  Active user of Tobacco     Plan:   Anes. Type:  General   Pre-Induction: Midazolam IV; Acetaminophen PO   Induction:  IV (Standard)   Airway: Oral ETT   Access/Monitoring: PIV   Maintenance: Balanced   Emergence: Procedure Site   Logistics: Same Day Surgery     Postop Pain/Sedation Strategy:  Standard-Options: Opioids PRN     PONV Management:  Adult Risk Factors: Female, Postop Opioids                         Micah Thomas MD, MD  "

## 2018-11-23 NOTE — PROGRESS NOTES
Discharge criteria met. Handoff given to discharge nurse, patient moved to discharge room at this time. VSS, A/O, IV removed.

## 2018-11-23 NOTE — IP AVS SNAPSHOT
MRN:[id number]                      After Visit Summary   11/23/2018    Mindi Eastman    MRN: [id number]           Visit Information        Provider Department      11/23/2018  7:00 AM Umang Kennedy MD [facility]           Review of your medicines      CONTINUE these medicines which have NOT CHANGED        Dose / Directions    * acetaminophen 325 MG tablet   Commonly known as:  TYLENOL   Used for:  Acute non intractable tension-type headache        Dose:  975 mg   Take 3 tablets (975 mg) by mouth every 6 hours as needed for mild pain or other (Give before coming down for ECT)   Quantity:  100 tablet   Refills:  1       * acetaminophen 500 MG tablet   Commonly known as:  TYLENOL   Used for:  Major depressive disorder, recurrent severe without psychotic features (H)        Dose:  500 mg   Take 1 tablet (500 mg) by mouth 3 times daily as needed for mild pain   Quantity:  100 tablet   Refills:  1       ADDERALL XR PO        Dose:  60 mg   Take 60 mg by mouth daily   Refills:  0       CELEXA PO   Indication:  Depression        Dose:  40 mg   Take 40 mg by mouth daily   Refills:  0       hydrOXYzine 25 MG tablet   Commonly known as:  ATARAX   Used for:  SHANICE (generalized anxiety disorder)        Dose:  50 mg   Take 2 tablets (50 mg) by mouth 3 times daily as needed for anxiety   Quantity:  90 tablet   Refills:  1       ibuprofen 600 MG tablet   Commonly known as:  ADVIL/MOTRIN   Used for:  Acute non intractable tension-type headache        Dose:  600 mg   Take 1 tablet (600 mg) by mouth every 6 hours as needed for moderate pain   Quantity:  120 tablet   Refills:  1       loratadine 10 MG tablet   Commonly known as:  CLARITIN        Dose:  10 mg   Take 10 mg by mouth daily   Refills:  0       LORazepam 1 MG tablet   Commonly known as:  ATIVAN   Used for:  SHANICE (generalized anxiety disorder)        Dose:  1 mg   Take 1 tablet (1 mg) by mouth 2 times daily   Quantity:  60 tablet    Refills:  0       lurasidone 40 MG Tabs tablet   Commonly known as:  LATUDA   Used for:  Bipolar I disorder (H), Major depressive disorder, recurrent severe without psychotic features (H)        Dose:  40 mg   Take 1 tablet (40 mg) by mouth At Bedtime   Quantity:  30 tablet   Refills:  1       nicotine 21 MG/24HR 24 hr patch   Commonly known as:  NICODERM CQ   Used for:  Tobacco use        Dose:  1 patch   Place 1 patch onto the skin daily   Quantity:  30 patch   Refills:  1       ondansetron 4 MG tablet   Commonly known as:  ZOFRAN   Used for:  Major depressive disorder, recurrent severe without psychotic features (H), Projectile vomiting with nausea        Dose:  4 mg   Take 1 tablet (4 mg) by mouth every 8 hours as needed for nausea   Quantity:  30 tablet   Refills:  0       QUEtiapine 100 MG tablet   Commonly known as:  SEROquel   Used for:  Bipolar I disorder (H), Major depressive disorder, recurrent severe without psychotic features (H)        Dose:  100 mg   Take 1 tablet (100 mg) by mouth At Bedtime   Quantity:  30 tablet   Refills:  1       zolpidem 10 MG tablet   Commonly known as:  AMBIEN   Used for:  Insomnia due to other mental disorder        Dose:  10 mg   Take 1 tablet (10 mg) by mouth At Bedtime   Quantity:  30 tablet   Refills:  0       * Notice:  This list has 2 medication(s) that are the same as other medications prescribed for you. Read the directions carefully, and ask your doctor or other care provider to review them with you.             Protect others around you: Learn how to safely use, store and throw away your medicines at www.disposemymeds.org.         Follow-ups after your visit        Your next 10 appointments already scheduled     Dec 14, 2018  7:15 AM CST   Electroconvulsive Therapy with Umang Kennedy MD   [facility] ([facility])    [address]  [facility] [address]   [phone]               Care  Instructions        Further instructions from your care team       ECT Discharge Instructions      During your ECT series:      Do not drive or work heavy equipment until 7 days after your last treatment.    Do not drink alcohol or use street drugs (illicit drugs) while you are having treatments.    Do not make important decisions, including legal decisions.  .After your maintenance treatment:    Do not drive for 24 hrs after your maintenance treatment.  If you have two or more treatments in a week, do not drive until 7 days after your last treatment.        After each treatment:      Get plenty of rest. A responsible adult must stay with your for at least 6 hours.    Avoid heavy or risky activities for 24 hours.    If you feel light-headed, sit for a few minutes before standing. Have someone help you get up.    If you have nausea (feel sick to your stomach): Drink only clear liquids such as apple juice, ginger ale, broth or 7UP, Be sure to drink plenty of liquids. Move to a normal diet as you feel able.     If you received Toradol, wait 6 hours before taking ibuprofen.    Call your doctor if:     You have a fever over 100F (37.7 C) (taken under the tongue), or a fever that last more than 24 hours.    Your IV site is red, swollen, very painful or is getting more tender.    You have nausea that gets very bad or does not improve.      If you have any symptoms after ECT, tell our staff before your next treatment.    The ECT Department can be reached at [phone].  The ECT Department is open Mondays, Wednesdays and Fridays from 7:00 AM to 2:00 PM.     To speak to a doctor, call: Dr. Kennedy's clinic [phone], Office [phone], Fax [phone]    Other: Today you have received IV Toradol  at 7:31 pm for headache/pain.  Toradol is an NSAID. Do no take any NSAIDs  including ibuprofen, Naproxen Sodium, Asprin, Advil, Motrin, Aleve, Josué, Excedrin with Aspirin, etc, Do not take any of these medications until 6 hours  "after above time (1:32 pm).  You may take Tylenol six hours after previous dose taken at home as directed if headache persists. If you have any questions please contact your physician, or pharmacist.   You also received Morphine sulfate 6 mg at 7:42; Zofran 8 mg at 7:31 for nausea and Lactated Ringers 1000 ml at 7:32 for headache/nausea.    Transported by: amena Soriano                   Additional Information About Your Visit        [facility] Information     [facility] lets you send messages to your doctor, view your test results, renew your prescriptions, schedule appointments and more. To sign up, go to [email or web address] . Click on \"Log in\" on the left side of the screen, which will take you to the Welcome page. Then click on \"Sign up Now\" on the right side of the page.     You will be asked to enter the access code listed below, as well as some personal information. Please follow the directions to create your username and password.     Your access code is: [email or web address]  Expires: 2018  8:19 AM     Your access code will  in 90 days. If you need help or a new code, please call your [facility] clinic or [phone].        Care EveryWhere ID     This is your Care EveryWhere ID. This could be used by other organizations to access your [facility] medical records  [id number]        Your Vitals Were     Blood Pressure Pulse Temperature Respirations Pulse Oximetry       138/85 68 97.5  F (36.4  C) 17 99%        Primary Care Provider Office Phone # Fax #    Erica Coelho PA-C [phone]      Equal Access to Services     [facility]: Hadii aad leslie hadasho Soasra, waaxda luqadaha, qaybta kaalmada yamileth covington . So [facility] [phone].    ATENCIÓN: Si habla español, tiene a martínez disposición servicios gratuitos de asistencia lingüística. Llame al [phone].    We comply with applicable federal civil rights laws and [place] laws. We do not discriminate on " the basis of race, color, national origin, age, disability, sex, sexual orientation, or gender identity.            Thank you!     Thank you for choosing [facility] for your care. Our goal is always to provide you with excellent care. Hearing back from our patients is one way we can continue to improve our services. Please take a few minutes to complete the written survey that you may receive in the mail after you visit with us. Thank you!             Medication List: This is a list of all your medications and when to take them. Check marks below indicate your daily home schedule. Keep this list as a reference.      Medications           Morning Afternoon Evening Bedtime As Needed    * acetaminophen 325 MG tablet   Commonly known as:  TYLENOL   Take 3 tablets (975 mg) by mouth every 6 hours as needed for mild pain or other (Give before coming down for ECT)                                * acetaminophen 500 MG tablet   Commonly known as:  TYLENOL   Take 1 tablet (500 mg) by mouth 3 times daily as needed for mild pain                                ADDERALL XR PO   Take 60 mg by mouth daily                                CELEXA PO   Take 40 mg by mouth daily                                hydrOXYzine 25 MG tablet   Commonly known as:  ATARAX   Take 2 tablets (50 mg) by mouth 3 times daily as needed for anxiety                                ibuprofen 600 MG tablet   Commonly known as:  ADVIL/MOTRIN   Take 1 tablet (600 mg) by mouth every 6 hours as needed for moderate pain                                loratadine 10 MG tablet   Commonly known as:  CLARITIN   Take 10 mg by mouth daily                                LORazepam 1 MG tablet   Commonly known as:  ATIVAN   Take 1 tablet (1 mg) by mouth 2 times daily                                lurasidone 40 MG Tabs tablet   Commonly known as:  LATUDA   Take 1 tablet (40 mg) by mouth At Bedtime                                nicotine 21 MG/24HR 24 hr patch   Commonly known  as:  NICODERM CQ   Place 1 patch onto the skin daily                                ondansetron 4 MG tablet   Commonly known as:  ZOFRAN   Take 1 tablet (4 mg) by mouth every 8 hours as needed for nausea                                QUEtiapine 100 MG tablet   Commonly known as:  SEROquel   Take 1 tablet (100 mg) by mouth At Bedtime                                zolpidem 10 MG tablet   Commonly known as:  AMBIEN   Take 1 tablet (10 mg) by mouth At Bedtime                                * Notice:  This list has 2 medication(s) that are the same as other medications prescribed for you. Read the directions carefully, and ask your doctor or other care provider to review them with you.

## 2018-11-23 NOTE — PROCEDURES
Procedure/Surgery Information   [facility], [place]    Bedside Procedure Note  Date of Service (when I performed the procedure): 11/23/2018    Mindi Eastman is a 42 year old female patient.  1. Major depressive disorder, recurrent severe without psychotic features (H)      Past Medical History:   Diagnosis Date     ADHD      Anxiety      Bipolar disorder (H)      Depressive disorder      OCD (obsessive compulsive disorder)      ROXANNE (obstructive sleep apnea)     On CPAP     Seasonal allergies      Temp: 98  F (36.7  C)   BP: 133/75 Pulse: 68   Resp: 16 SpO2: 99 %        Procedures     [facility], [place]   ECT Procedure Note   11/23/2018       Mindi Eastman [id number]   42 year old 1975     Patient Status: Outpatient    Is this the first in a series of 12 treatments?  No    History and Physical: Reviewed in medical record    Consent: Informed consent was signed by: patient    Date Consent Signed: 5/18/18    Allergies   Allergen Reactions     Darvocet [Propoxyphene N-Apap]        Weight:  0 lbs 0 oz    Patient Preparations: Glasses/Contacts removed         Indications for ECT:   Medications ineffective, Psychotherapies ineffective and treatment resistant depression.         Clinical Narrative:   Patient is continuing ECT for depression.  NPO after 2400.  Alert and Oriented x 3.  No problems with the last ECT.  No med changes or health problems.   Last ECT was 3 weeks ago on.  She wants to continue treatment at that interval.  Mood has been good these last 3 weeks without dropoff.           Diagnosis:      Major depressive disorder, recurrent severe without psychotic features (F33.2)         Pause for the Cause:     Right patient Yes   Right procedure/laterality settings: Yes          Intra-Procedure Documentation:       ECT #: 10   Treatment number this series: 10   Total treatment number: 21     Type of ECT:  Right, unilateral ultrabrief    ECT  Medications:    Tylenol:  1000mg po (took at home today)  Toradol: 30mg  Zofran: 8mg   Brevital: 80mg  Succinyl Choline: 70mg  Morphine 5 mg in treatment room for headache   Lactated Ringers 1 liter      ECT Strip Summary:   Energy Level: 10 percent   Motor Seizure Duration:  30 seconds  EEG Seizure Duration:   30 seconds    Complications: No    Plan:    Next maintenance ECT is in 3 weeks on 12/14/18      Umang Kennedy MD

## 2018-12-14 ENCOUNTER — ANESTHESIA EVENT (OUTPATIENT)
Dept: BEHAVIORAL HEALTH | Facility: CLINIC | Age: 43
End: 2018-12-14

## 2018-12-14 ENCOUNTER — ANESTHESIA (OUTPATIENT)
Dept: BEHAVIORAL HEALTH | Facility: CLINIC | Age: 43
End: 2018-12-14

## 2018-12-14 ENCOUNTER — HOSPITAL ENCOUNTER (OUTPATIENT)
Dept: BEHAVIORAL HEALTH | Facility: CLINIC | Age: 43
End: 2018-12-14
Attending: PSYCHIATRY & NEUROLOGY
Payer: COMMERCIAL

## 2018-12-14 VITALS
OXYGEN SATURATION: 93 % | SYSTOLIC BLOOD PRESSURE: 123 MMHG | DIASTOLIC BLOOD PRESSURE: 77 MMHG | RESPIRATION RATE: 16 BRPM | TEMPERATURE: 97.2 F | HEART RATE: 68 BPM

## 2018-12-14 DIAGNOSIS — F33.2 MAJOR DEPRESSIVE DISORDER, RECURRENT SEVERE WITHOUT PSYCHOTIC FEATURES (H): Primary | ICD-10-CM

## 2018-12-14 PROCEDURE — 40000671 ZZH STATISTIC ANESTHESIA CASE

## 2018-12-14 PROCEDURE — 25000128 H RX IP 250 OP 636: Performed by: PSYCHIATRY & NEUROLOGY

## 2018-12-14 PROCEDURE — 90870 ELECTROCONVULSIVE THERAPY: CPT

## 2018-12-14 RX ORDER — MORPHINE SULFATE 4 MG/ML
6 INJECTION, SOLUTION INTRAMUSCULAR; INTRAVENOUS ONCE
Status: COMPLETED | OUTPATIENT
Start: 2018-12-14 | End: 2018-12-14

## 2018-12-14 RX ORDER — KETOROLAC TROMETHAMINE 30 MG/ML
30 INJECTION, SOLUTION INTRAMUSCULAR; INTRAVENOUS ONCE
Status: CANCELLED
Start: 2018-12-14 | End: 2018-12-14

## 2018-12-14 RX ORDER — ONDANSETRON 2 MG/ML
8 INJECTION INTRAMUSCULAR; INTRAVENOUS ONCE
Status: CANCELLED
Start: 2018-12-14 | End: 2018-12-14

## 2018-12-14 RX ORDER — KETOROLAC TROMETHAMINE 30 MG/ML
30 INJECTION, SOLUTION INTRAMUSCULAR; INTRAVENOUS ONCE
Status: COMPLETED | OUTPATIENT
Start: 2018-12-14 | End: 2018-12-14

## 2018-12-14 RX ORDER — ONDANSETRON 2 MG/ML
8 INJECTION INTRAMUSCULAR; INTRAVENOUS ONCE
Status: COMPLETED | OUTPATIENT
Start: 2018-12-14 | End: 2018-12-14

## 2018-12-14 RX ORDER — MORPHINE SULFATE 4 MG/ML
6 INJECTION, SOLUTION INTRAMUSCULAR; INTRAVENOUS ONCE
Status: CANCELLED
Start: 2018-12-14 | End: 2018-12-14

## 2018-12-14 RX ADMIN — SODIUM CHLORIDE, POTASSIUM CHLORIDE, SODIUM LACTATE AND CALCIUM CHLORIDE 1000 ML: 600; 310; 30; 20 INJECTION, SOLUTION INTRAVENOUS at 07:24

## 2018-12-14 RX ADMIN — MORPHINE SULFATE 6 MG: 4 INJECTION, SOLUTION INTRAMUSCULAR; INTRAVENOUS at 07:56

## 2018-12-14 RX ADMIN — KETOROLAC TROMETHAMINE 30 MG: 30 INJECTION, SOLUTION INTRAMUSCULAR; INTRAVENOUS at 07:24

## 2018-12-14 RX ADMIN — ONDANSETRON 8 MG: 2 INJECTION INTRAMUSCULAR; INTRAVENOUS at 07:24

## 2018-12-14 NOTE — ANESTHESIA PREPROCEDURE EVALUATION
Anesthesia Pre-Procedure Evaluation    Patient: Mindi Eastman   MRN:     [id number] Gender:   female   Age:    43 year old :      1975        Preoperative Diagnosis: * No pre-op diagnosis entered *   * No procedures listed *     Past Medical History:   Diagnosis Date     ADHD      Anxiety      Bipolar disorder (H)      Depressive disorder      OCD (obsessive compulsive disorder)      ROXANNE (obstructive sleep apnea)     On CPAP     Seasonal allergies       Past Surgical History:   Procedure Laterality Date     APPENDECTOMY       CARPAL TUNNEL RELEASE RT/LT Bilateral      CHOLECYSTECTOMY       EXTRACTION(S) DENTAL      [place] Teeth     HC KNEE SCOPE,MED/LAT MENISCUS REPAIR Right     X 2     TONSILLECTOMY            Anesthesia Evaluation     . Pt has had prior anesthetic. Type: General    No history of anesthetic complications          ROS/MED HX    ENT/Pulmonary:  - neg pulmonary ROS     Neurologic:  - neg neurologic ROS     Cardiovascular:  - neg cardiovascular ROS       METS/Exercise Tolerance:     Hematologic:  - neg hematologic  ROS       Musculoskeletal:  - neg musculoskeletal ROS       GI/Hepatic:  - neg GI/hepatic ROS       Renal/Genitourinary:  - ROS Renal section negative       Endo:  - neg endo ROS       Psychiatric:         Infectious Disease:         Malignancy:      - no malignancy   Other:                         PHYSICAL EXAM:   Mental Status/Neuro: A/A/O   Airway: Facies: Feasible  Mallampati: I  Mouth/Opening: Full  TM distance: > 6 cm  Neck ROM: Full   Respiratory: Auscultation: CTAB     Resp. Rate: Normal     Resp. Effort: Normal      CV: Rhythm: Regular  Rate: Age appropriate  Heart: Normal Sounds   Comments:      Dental: Normal                  Lab Results   Component Value Date    WBC 6.3 10/05/2017    HGB 11.7 10/05/2017    HCT 35.8 10/05/2017     10/05/2017     10/05/2017    POTASSIUM 4.5 10/03/2018    CHLORIDE 108 10/05/2017    CO2 28 10/05/2017    BUN 8 10/05/2017     "CR 0.84 10/03/2018     (A) 10/03/2018    GALO 8.0 (L) 10/05/2017    ALBUMIN 3.2 (L) 10/05/2017    PROTTOTAL 6.2 (L) 10/05/2017    ALT 28 10/05/2017    AST 31 10/05/2017    ALKPHOS 68 10/05/2017    BILITOTAL 0.4 10/05/2017    PTT 38 (H) 06/06/2011    INR 0.99 06/06/2011    TSH 1.81 10/05/2017    HCG Negative 10/05/2017    HCGS Negative 06/06/2011       Preop Vitals  BP Readings from Last 3 Encounters:   12/14/18 130/75   11/23/18 130/82   11/02/18 140/75    Pulse Readings from Last 3 Encounters:   12/14/18 68   11/23/18 68   11/02/18 68      Resp Readings from Last 3 Encounters:   12/14/18 16   11/23/18 12   11/02/18 16    SpO2 Readings from Last 3 Encounters:   12/14/18 99%   11/23/18 95%   11/02/18 95%      Temp Readings from Last 1 Encounters:   12/14/18 36.4  C (97.5  F)    Ht Readings from Last 1 Encounters:   10/16/17 1.676 m (5' 6\")      Wt Readings from Last 1 Encounters:   10/16/17 86.2 kg (190 lb)    Estimated body mass index is 30.67 kg/m  as calculated from the following:    Height as of 10/16/17: 1.676 m (5' 6\").    Weight as of 10/16/17: 86.2 kg (190 lb).     LDA:  Peripheral IV 12/14/18 Right;Posterior Hand (Active)   Number of days: 0            Assessment:   ASA SCORE: 2       Documentation: H&P complete; Preop Testing complete; Consents complete   Proceeding: Proceed without further delay  Tobacco Use:  Active user of Tobacco     Plan:   Anes. Type:  General   Pre-Induction: Midazolam IV; Acetaminophen PO   Induction:  IV (Standard)   Airway: Oral ETT   Access/Monitoring: PIV   Maintenance: Balanced   Emergence: Procedure Site   Logistics: Same Day Surgery     Postop Pain/Sedation Strategy:  Standard-Options: Opioids PRN     PONV Management:  Adult Risk Factors: Female, Postop Opioids     CONSENT:   Plan and risks discussed with: Patient                            Micah Thomas MD, MD  "

## 2018-12-14 NOTE — DISCHARGE INSTRUCTIONS
ECT Discharge Instructions      During your ECT series:      Do not drive for 24 hours after treatment. If you will have more than one treatment within a week, no driving until 7 days after your last ECT treatment.     Do not drink alcohol or use street drugs (illicit drugs) while you are having treatments.    Do not make important decisions, including legal decisions.    After each treatment:      Get plenty of rest. A responsible adult must stay with your for at least 6 hours.    Avoid heavy or risky activities for 24 hours.    If you feel light-headed, sit for a few minutes before standing. Have someone help you get up.    If you have nausea (feel sick to your stomach): Drink only clear liquids such as apple juice, ginger ale, broth or 7UP, Be sure to drink plenty of liquids. Move to a normal diet as you feel able.     If you received Toradol, wait 6 hours before taking ibuprofen.    Call your doctor if:     You have a fever over 100F (37.7 C) (taken under the tongue), or a fever that last more than 24 hours.    Your IV site is red, swollen, very painful or is getting more tender.    You have nausea that gets very bad or does not improve.      If you have any symptoms after ECT, tell our staff before your next treatment.    The ECT Department can be reached at [phone].  The ECT Department is open Mondays, Wednesdays and Fridays from 7:00 AM to 2:00 PM.      To speak to a doctor, call: Dr. Kennedy at [phone]      Other: You had Toradol at 7:24  Which is an NSAID medication. Do not take any Ibuprofen, Excedrin, Motrin, Aleve, Advil, Asprin, or any other NSAID medication until after 1:24 pm .You also had Morphine Sulfate 6 mg 7:56 am for headache. Questions, check with your physician or pharmacist

## 2018-12-14 NOTE — ANESTHESIA POSTPROCEDURE EVALUATION
Anesthesia POST Procedure Evaluation    Patient: Mindi Eastman   MRN:     [id number] Gender:   female   Age:    43 year old :      1975        Preoperative Diagnosis: * No pre-op diagnosis entered *   * No procedures listed *   Postop Comments: No value filed.       Anesthesia Type:  General    Reportable Event: NO     PAIN: Uncomplicated   Sign Out status: Comfortable, Well controlled pain     PONV: No PONV   Sign Out status:  No Nausea or Vomiting     Neuro/Psych: Uneventful perioperative course   Sign Out Status: Preoperative baseline; Age appropriate mentation     Airway/Resp.: Uneventful perioperative course   Sign Out Status: Non labored breathing, age appropriate RR; Resp. Status within EXPECTED Parameters     CV: Uneventful perioperative course   Sign Out status: Appropriate BP and perfusion indices; Appropriate HR/Rhythm     Disposition:   Sign Out in:  PACU  Disposition:  Phase II; Home  Recovery Course: Uneventful  Follow-Up: Not required           Last Anesthesia Record Vitals:  CRNA VITALS  2018 0711 - 2018 0741      2018             Pulse:  90    Ht Rate:  89    SpO2:  98 %    Resp Rate (set):  8          Last PACU/Preop Vitals:  Vitals:    18 0700   BP: 130/75   Pulse: 68   Resp: 16   Temp: 36.4  C (97.5  F)   SpO2: 99%         Electronically Signed By: Micah Thomas MD, MD, 2018, 7:41 AM

## 2018-12-14 NOTE — PROCEDURES
Procedure/Surgery Information   [facility], [place]    Bedside Procedure Note  Date of Service (when I performed the procedure): 12/14/2018    Mindi Eastman is a 42 year old female patient.  1. Major depressive disorder, recurrent severe without psychotic features (H)      Past Medical History:   Diagnosis Date     ADHD      Anxiety      Bipolar disorder (H)      Depressive disorder      OCD (obsessive compulsive disorder)      ROXANNE (obstructive sleep apnea)     On CPAP     Seasonal allergies                           Procedures     [facility], [place]   ECT Procedure Note   12/14/2018       Mindi Eastman [id number]   42 year old 1975     Patient Status: Outpatient    Is this the first in a series of 12 treatments?  No    History and Physical: Reviewed in medical record    Consent: Informed consent was signed by: patient    Date Consent Signed: 5/18/18    Allergies   Allergen Reactions     Darvocet [Propoxyphene N-Apap]        Weight:  0 lbs 0 oz    Patient Preparations: Glasses/Contacts removed         Indications for ECT:   Medications ineffective, Psychotherapies ineffective and treatment resistant depression.         Clinical Narrative:   Patient is continuing ECT for depression.  NPO after 2400.  Alert and Oriented x 3.  No problems with the last ECT 11/23/18.  No med changes or health problems.   She wants to continue treatment a 3 week interval.           Diagnosis:      Major depressive disorder, recurrent severe without psychotic features (F33.2)         Pause for the Cause:     Right patient Yes   Right procedure/laterality settings: Yes          Intra-Procedure Documentation:       ECT #: 11   Treatment number this series: 11   Total treatment number: 22     Type of ECT:  Right, unilateral ultrabrief    ECT Medications:    Tylenol:  1000mg po (took at home today)  Toradol: 30mg  Zofran: 8mg   Brevital: 80mg  Succinyl Choline:  70mg  Morphine 5 mg in treatment room for headache   Lactated Ringers 1 liter      ECT Strip Summary:   Energy Level: 13 percent   Motor Seizure Duration:  38 seconds  EEG Seizure Duration:   76 seconds    Complications: No    Plan:    Next maintenance ECT is in 3 weeks on 1/4/19       Umang Kennedy MD

## 2018-12-14 NOTE — IP AVS SNAPSHOT
[facility]  [facility] [address] [address]  Phone:  [phone]                                    After Visit Summary   12/14/2018    Mindi Eastman    MRN: [id number]           After Visit Summary Signature Page    I have received my discharge instructions, and my questions have been answered. I have discussed any challenges I see with this plan with the nurse or doctor.    ..........................................................................................................................................  Patient/Patient Representative Signature      ..........................................................................................................................................  Patient Representative Print Name and Relationship to Patient    ..................................................               ................................................  Date                                   Time    ..........................................................................................................................................  Reviewed by Signature/Title    ...................................................              ..............................................  Date                                               Time          22EPIC Rev 08/18

## 2018-12-19 NOTE — ANESTHESIA POSTPROCEDURE EVALUATION
Patient: Mindi Eastman    * No procedures listed *    Diagnosis:Severe recurrent major depression without psychotic features (H) [F33.2]  Diagnosis Additional Information: No value filed.    Anesthesia Type:  General    Note:  Anesthesia Post Evaluation    Patient location during evaluation: PACU  Patient participation: Able to fully participate in evaluation  Level of consciousness: awake and alert  Pain management: adequate  Airway patency: patent  Respiratory status: acceptable  Hydration status: acceptable  PONV: none     Anesthetic complications: None          Last vitals:  Vitals:    10/09/17 0700 10/10/17 0700 10/11/17 0820   BP:      Pulse:      Resp: 16 16 16   Temp: 36.8  C (98.3  F) 36.9  C (98.5  F) 36.8  C (98.3  F)   SpO2:   98%         Electronically Signed By: Micah Thomas MD, MD  October 11, 2017  11:16 AM   [place]

## 2019-01-04 ENCOUNTER — ANESTHESIA (OUTPATIENT)
Dept: BEHAVIORAL HEALTH | Facility: CLINIC | Age: 44
End: 2019-01-04
Payer: COMMERCIAL

## 2019-01-04 ENCOUNTER — HOSPITAL ENCOUNTER (OUTPATIENT)
Dept: BEHAVIORAL HEALTH | Facility: CLINIC | Age: 44
End: 2019-01-04
Attending: PSYCHIATRY & NEUROLOGY
Payer: COMMERCIAL

## 2019-01-04 ENCOUNTER — ANESTHESIA EVENT (OUTPATIENT)
Dept: BEHAVIORAL HEALTH | Facility: CLINIC | Age: 44
End: 2019-01-04

## 2019-01-04 VITALS
RESPIRATION RATE: 16 BRPM | SYSTOLIC BLOOD PRESSURE: 147 MMHG | TEMPERATURE: 97.7 F | OXYGEN SATURATION: 94 % | DIASTOLIC BLOOD PRESSURE: 84 MMHG | HEART RATE: 59 BPM

## 2019-01-04 DIAGNOSIS — F33.2 MAJOR DEPRESSIVE DISORDER, RECURRENT SEVERE WITHOUT PSYCHOTIC FEATURES (H): Primary | ICD-10-CM

## 2019-01-04 PROCEDURE — 25000128 H RX IP 250 OP 636: Performed by: ANESTHESIOLOGY

## 2019-01-04 PROCEDURE — 90870 ELECTROCONVULSIVE THERAPY: CPT

## 2019-01-04 PROCEDURE — 40000671 ZZH STATISTIC ANESTHESIA CASE

## 2019-01-04 PROCEDURE — 25000125 ZZHC RX 250: Performed by: ANESTHESIOLOGY

## 2019-01-04 PROCEDURE — 25000128 H RX IP 250 OP 636: Performed by: PSYCHIATRY & NEUROLOGY

## 2019-01-04 RX ORDER — KETOROLAC TROMETHAMINE 30 MG/ML
30 INJECTION, SOLUTION INTRAMUSCULAR; INTRAVENOUS ONCE
Status: CANCELLED
Start: 2019-01-04 | End: 2019-01-04

## 2019-01-04 RX ORDER — MORPHINE SULFATE 4 MG/ML
6 INJECTION, SOLUTION INTRAMUSCULAR; INTRAVENOUS ONCE
Status: CANCELLED
Start: 2019-01-04 | End: 2019-01-04

## 2019-01-04 RX ORDER — MORPHINE SULFATE 4 MG/ML
6 INJECTION, SOLUTION INTRAMUSCULAR; INTRAVENOUS ONCE
Status: COMPLETED | OUTPATIENT
Start: 2019-01-04 | End: 2019-01-04

## 2019-01-04 RX ORDER — KETOROLAC TROMETHAMINE 30 MG/ML
30 INJECTION, SOLUTION INTRAMUSCULAR; INTRAVENOUS ONCE
Status: COMPLETED | OUTPATIENT
Start: 2019-01-04 | End: 2019-01-04

## 2019-01-04 RX ORDER — ONDANSETRON 2 MG/ML
8 INJECTION INTRAMUSCULAR; INTRAVENOUS ONCE
Status: CANCELLED
Start: 2019-01-04 | End: 2019-01-04

## 2019-01-04 RX ORDER — ONDANSETRON 2 MG/ML
8 INJECTION INTRAMUSCULAR; INTRAVENOUS ONCE
Status: COMPLETED | OUTPATIENT
Start: 2019-01-04 | End: 2019-01-04

## 2019-01-04 RX ADMIN — METHOHEXITAL SODIUM 80 MG: 500 INJECTION, POWDER, LYOPHILIZED, FOR SOLUTION INTRAMUSCULAR; INTRAVENOUS; RECTAL at 07:51

## 2019-01-04 RX ADMIN — KETOROLAC TROMETHAMINE 30 MG: 30 INJECTION, SOLUTION INTRAMUSCULAR at 07:23

## 2019-01-04 RX ADMIN — SODIUM CHLORIDE, POTASSIUM CHLORIDE, SODIUM LACTATE AND CALCIUM CHLORIDE 1000 ML: 600; 310; 30; 20 INJECTION, SOLUTION INTRAVENOUS at 07:22

## 2019-01-04 RX ADMIN — Medication 70 MG: at 07:51

## 2019-01-04 RX ADMIN — MORPHINE SULFATE 4 MG: 4 INJECTION, SOLUTION INTRAMUSCULAR; INTRAVENOUS at 07:52

## 2019-01-04 RX ADMIN — ONDANSETRON 8 MG: 2 INJECTION INTRAMUSCULAR; INTRAVENOUS at 07:25

## 2019-01-04 NOTE — IP AVS SNAPSHOT
[facility]  [facility] [address] [address]  Phone:  [phone]                                    After Visit Summary   1/4/2019    Mindi Eastman    MRN: [id number]           After Visit Summary Signature Page    I have received my discharge instructions, and my questions have been answered. I have discussed any challenges I see with this plan with the nurse or doctor.    ..........................................................................................................................................  Patient/Patient Representative Signature      ..........................................................................................................................................  Patient Representative Print Name and Relationship to Patient    ..................................................               ................................................  Date                                   Time    ..........................................................................................................................................  Reviewed by Signature/Title    ...................................................              ..............................................  Date                                               Time          22EPIC Rev 08/18

## 2019-01-04 NOTE — DISCHARGE INSTRUCTIONS
ECT Discharge Instructions      During your ECT series:      Do not drive or work heavy equipment until 7 days after your last treatment.    Do not drink alcohol or use street drugs (illicit drugs) while you are having treatments.    Do not make important decisions, including legal decisions.    After each treatment:      Get plenty of rest. A responsible adult must stay with your for at least 6 hours.    Do not drive for 24 hours after ECT treatment. If you have more than one treatment within a week, no driving until 7 days after your last ECT treatment.    Avoid heavy or risky activities for 24 hours.    If you feel light-headed, sit for a few minutes before standing. Have someone help you get up.    If you have nausea (feel sick to your stomach): Drink only clear liquids such as apple juice, ginger ale, broth or 7UP, Be sure to drink plenty of liquids. Move to a normal diet as you feel able.     If you received Toradol, wait 6 hours before taking ibuprofen.    Call your doctor if:     You have a fever over 100F (37.7 C) (taken under the tongue), or a fever that last more than 24 hours.    Your IV site is red, swollen, very painful or is getting more tender.    You have nausea that gets very bad or does not improve.      If you have any symptoms after ECT, tell our staff before your next treatment.    The ECT Department can be reached at [phone].  The ECT Department is open Mondays, Wednesdays and Fridays from 7:00 AM to 2:00 PM.     To speak to a doctor, call: Dr. Kennedy [phone]    New instructions:     Other: You have received Toradol today at 7:23 AM. Toradol is an NSAID.  Do not take any NSAID's including: Ibuprofen, Naproxen Sodium, Asprin , Advil, Motrin, Aleve, Josué, etc., until six hours after the above time which would be 1:23 AM. If you have any questions check back with your Physician, or pharmacist.   You were given zofran 8 mg to prevent nausea at 7:25 AM.  You were given Morphine 6 mg for  pain at               Transported by: patients mother, Ana      _________________________________________________  ________________________  Patient/Responsible party Signature      Date          ________________________________________________   ________________________  Nurse Signature        Date

## 2019-01-04 NOTE — PROCEDURES
Procedure/Surgery Information   [facility], [place]    Bedside Procedure Note  Date of Service (when I performed the procedure): 01/04/2019    Mindi Eastman is a 42 year old female patient.  1. Major depressive disorder, recurrent severe without psychotic features (H)      Past Medical History:   Diagnosis Date     ADHD      Anxiety      Bipolar disorder (H)      Depressive disorder      OCD (obsessive compulsive disorder)      ROXANNE (obstructive sleep apnea)     On CPAP     Seasonal allergies      Temp: 97.8  F (36.6  C)   BP: 125/67 Pulse: 77   Resp: 16 SpO2: 96 %        Procedures     [facility], [place]   ECT Procedure Note   01/04/2019       iMndi Eastman [id number]   42 year old 1975     Patient Status: Outpatient    Is this the first in a series of 12 treatments?  No    History and Physical: Reviewed in medical record    Consent: Informed consent was signed by: patient    Date Consent Signed: 5/18/18    Allergies   Allergen Reactions     Darvocet [Propoxyphene N-Apap]        Weight:  0 lbs 0 oz    Patient Preparations: Glasses/Contacts removed         Indications for ECT:   Medications ineffective, Psychotherapies ineffective and treatment resistant depression.         Clinical Narrative:   Patient is continuing ECT for depression.  NPO after 2400.  Alert and Oriented x 3.  No problems with the last ECT 12/14/18.  No med changes or health problems.   She wants to continue treatment a 3 week interval.           Diagnosis:      Major depressive disorder, recurrent severe without psychotic features (F33.2)         Pause for the Cause:     Right patient Yes   Right procedure/laterality settings: Yes          Intra-Procedure Documentation:       ECT #: 12   Treatment number this series: 12   Total treatment number: 23     Type of ECT:  Right, unilateral ultrabrief    ECT Medications:    Tylenol:  1000mg po (took at home today)  Toradol:  30mg  Zofran: 8mg   Brevital: 80mg  Succinyl Choline: 70mg  Morphine 6 mg in treatment room for headache   Lactated Ringers 1 liter      ECT Strip Summary:   Energy Level: 13 percent   Motor Seizure Duration:  59 seconds  EEG Seizure Duration:   59 seconds    Complications: No    Plan:    Next maintenance ECT is in 3 weeks on 1/25/19       Umang Kennedy MD

## 2019-01-04 NOTE — ANESTHESIA PREPROCEDURE EVALUATION
Anesthesia Pre-Procedure Evaluation    Patient: Mindi Eastman   MRN:     [id number] Gender:   female   Age:    43 year old :      1975        Preoperative Diagnosis: * No pre-op diagnosis entered *   * No procedures listed *     Past Medical History:   Diagnosis Date     ADHD      Anxiety      Bipolar disorder (H)      Depressive disorder      OCD (obsessive compulsive disorder)      ROXANNE (obstructive sleep apnea)     On CPAP     Seasonal allergies       Past Surgical History:   Procedure Laterality Date     APPENDECTOMY       CARPAL TUNNEL RELEASE RT/LT Bilateral      CHOLECYSTECTOMY       EXTRACTION(S) DENTAL      [place] [facility]     HC KNEE SCOPE,MED/LAT MENISCUS REPAIR Right     X 2     TONSILLECTOMY            Anesthesia Evaluation     . Pt has had prior anesthetic. Type: General    No history of anesthetic complications          ROS/MED HX    ENT/Pulmonary:     (+)sleep apnea, uses CPAP , . .    Neurologic:  - neg neurologic ROS     Cardiovascular:  - neg cardiovascular ROS       METS/Exercise Tolerance:     Hematologic:  - neg hematologic  ROS       Musculoskeletal:  - neg musculoskeletal ROS       GI/Hepatic:  - neg GI/hepatic ROS       Renal/Genitourinary:  - ROS Renal section negative       Endo:  - neg endo ROS       Psychiatric:     (+) psychiatric history depression      Infectious Disease:  - neg infectious disease ROS       Malignancy:         Other:                         PHYSICAL EXAM:   Mental Status/Neuro: A/A/O   Airway: Facies: Feasible  Mallampati: I  Mouth/Opening: Full  TM distance: > 6 cm  Neck ROM: Full   Respiratory: Auscultation: CTAB     Resp. Rate: Normal     Resp. Effort: Normal      CV: Rhythm: Regular  Rate: Age appropriate  Heart: Normal Sounds   Comments:      Dental: Normal                  Lab Results   Component Value Date    WBC 6.3 10/05/2017    HGB 11.7 10/05/2017    HCT 35.8 10/05/2017     10/05/2017     10/05/2017    POTASSIUM 4.5 10/03/2018     "CHLORIDE 108 10/05/2017    CO2 28 10/05/2017    BUN 8 10/05/2017    CR 0.84 10/03/2018     (A) 10/03/2018    GALO 8.0 (L) 10/05/2017    ALBUMIN 3.2 (L) 10/05/2017    PROTTOTAL 6.2 (L) 10/05/2017    ALT 28 10/05/2017    AST 31 10/05/2017    ALKPHOS 68 10/05/2017    BILITOTAL 0.4 10/05/2017    PTT 38 (H) 06/06/2011    INR 0.99 06/06/2011    TSH 1.81 10/05/2017    HCG Negative 10/05/2017    HCGS Negative 06/06/2011       Preop Vitals  BP Readings from Last 3 Encounters:   01/04/19 125/67   12/14/18 123/77   11/23/18 130/82    Pulse Readings from Last 3 Encounters:   01/04/19 77   12/14/18 68   11/23/18 68      Resp Readings from Last 3 Encounters:   01/04/19 16   12/14/18 16   11/23/18 12    SpO2 Readings from Last 3 Encounters:   01/04/19 96%   12/14/18 93%   11/23/18 95%      Temp Readings from Last 1 Encounters:   01/04/19 36.6  C (97.8  F)    Ht Readings from Last 1 Encounters:   10/16/17 1.676 m (5' 6\")      Wt Readings from Last 1 Encounters:   10/16/17 86.2 kg (190 lb)    Estimated body mass index is 30.67 kg/m  as calculated from the following:    Height as of 10/16/17: 1.676 m (5' 6\").    Weight as of 10/16/17: 86.2 kg (190 lb).     LDA:  Peripheral IV 12/14/18 Right;Posterior Hand (Active)   Number of days: 21       Peripheral IV 01/04/19 Right Hand (Active)   Number of days: 0            Assessment:   ASA SCORE: 2       Documentation: H&P complete; Preop Testing complete; Consents complete   Proceeding: Proceed without further delay  Tobacco Use:  Active user of Tobacco     Plan:   Anes. Type:  General   Pre-Induction: Midazolam IV; Acetaminophen PO   Induction:  IV (Standard)   Airway: Oral ETT   Access/Monitoring: PIV   Maintenance: Balanced   Emergence: Procedure Site   Logistics: Same Day Surgery     Postop Pain/Sedation Strategy:  Standard-Options: Opioids PRN     PONV Management:  Adult Risk Factors: Female, Postop Opioids                             Herminia Law MD  "

## 2019-01-04 NOTE — ANESTHESIA POSTPROCEDURE EVALUATION
Anesthesia POST Procedure Evaluation    Patient: Mindi Eastman   MRN:     [id number] Gender:   female   Age:    43 year old :      1975        Preoperative Diagnosis: * No pre-op diagnosis entered *   * No procedures listed *   Postop Comments: No value filed.       Anesthesia Type:  General    Reportable Event: NO     PAIN: Uncomplicated   Sign Out status: Comfortable, Well controlled pain     PONV: No PONV   Sign Out status:  No Nausea or Vomiting     Neuro/Psych: Uneventful perioperative course   Sign Out Status: Preoperative baseline; Age appropriate mentation     Airway/Resp.: Uneventful perioperative course   Sign Out Status: Non labored breathing, age appropriate RR; Resp. Status within EXPECTED Parameters     CV: Uneventful perioperative course   Sign Out status: Appropriate BP and perfusion indices; Appropriate HR/Rhythm     Disposition:   Sign Out in:  PACU  Disposition:  Phase II; Home  Recovery Course: Uneventful  Follow-Up: Not required           Last Anesthesia Record Vitals:  CRNA VITALS  2019 0727 - 2019 0827      2019             NIBP:  145/94  (Abnormal)     Pulse:  82    NIBP Mean:  113    Temp:  36  C (96.8  F)    SpO2:  97 %    Resp Rate (set):  8          Last PACU/Preop Vitals:  Vitals:    19 0800 19 0815 19 0830   BP: 141/90 130/78 147/84   Pulse:   59   Resp: 16 16 16   Temp: 36.4  C (97.6  F)  36.5  C (97.7  F)   SpO2: 95% 93% 94%         Electronically Signed By: Herminia Law MD, 2019, 8:36 AM

## 2019-01-24 ENCOUNTER — ANESTHESIA EVENT (OUTPATIENT)
Dept: BEHAVIORAL HEALTH | Facility: CLINIC | Age: 44
End: 2019-01-24

## 2019-01-24 NOTE — ANESTHESIA PREPROCEDURE EVALUATION
Anesthesia Pre-Procedure Evaluation    Patient: Mindi Eastman   MRN:     [id number] Gender:   female   Age:    43 year old :      1975        Preoperative Diagnosis: * No pre-op diagnosis entered *   * No procedures listed *     Past Medical History:   Diagnosis Date     ADHD      Anxiety      Bipolar disorder (H)      Depressive disorder      OCD (obsessive compulsive disorder)      ROXANNE (obstructive sleep apnea)     On CPAP     Seasonal allergies       Past Surgical History:   Procedure Laterality Date     APPENDECTOMY       CARPAL TUNNEL RELEASE RT/LT Bilateral      CHOLECYSTECTOMY       EXTRACTION(S) DENTAL      [place] [facility]     HC KNEE SCOPE,MED/LAT MENISCUS REPAIR Right     X 2     TONSILLECTOMY            Anesthesia Evaluation     . Pt has had prior anesthetic. Type: General    No history of anesthetic complications          ROS/MED HX    ENT/Pulmonary:     (+)sleep apnea, uses CPAP , . .    Neurologic:  - neg neurologic ROS     Cardiovascular:  - neg cardiovascular ROS       METS/Exercise Tolerance:     Hematologic:  - neg hematologic  ROS       Musculoskeletal:  - neg musculoskeletal ROS       GI/Hepatic:  - neg GI/hepatic ROS       Renal/Genitourinary:  - ROS Renal section negative       Endo:  - neg endo ROS       Psychiatric:     (+) psychiatric history depression      Infectious Disease:  - neg infectious disease ROS       Malignancy:         Other:                         PHYSICAL EXAM:   Mental Status/Neuro: A/A/O   Airway: Facies: Feasible  Mallampati: I  Mouth/Opening: Full  TM distance: > 6 cm  Neck ROM: Full   Respiratory: Auscultation: CTAB     Resp. Rate: Normal     Resp. Effort: Normal      CV: Rhythm: Regular  Rate: Age appropriate  Heart: Normal Sounds   Comments:      Dental: Normal                  Lab Results   Component Value Date    WBC 6.3 10/05/2017    HGB 11.7 10/05/2017    HCT 35.8 10/05/2017     10/05/2017     10/05/2017    POTASSIUM 4.5 10/03/2018     "CHLORIDE 108 10/05/2017    CO2 28 10/05/2017    BUN 8 10/05/2017    CR 0.84 10/03/2018     (A) 10/03/2018    GALO 8.0 (L) 10/05/2017    ALBUMIN 3.2 (L) 10/05/2017    PROTTOTAL 6.2 (L) 10/05/2017    ALT 28 10/05/2017    AST 31 10/05/2017    ALKPHOS 68 10/05/2017    BILITOTAL 0.4 10/05/2017    PTT 38 (H) 06/06/2011    INR 0.99 06/06/2011    TSH 1.81 10/05/2017    HCG Negative 10/05/2017    HCGS Negative 06/06/2011       Preop Vitals  BP Readings from Last 3 Encounters:   01/04/19 147/84   12/14/18 123/77   11/23/18 130/82    Pulse Readings from Last 3 Encounters:   01/04/19 59   12/14/18 68   11/23/18 68      Resp Readings from Last 3 Encounters:   01/04/19 16   12/14/18 16   11/23/18 12    SpO2 Readings from Last 3 Encounters:   01/04/19 94%   12/14/18 93%   11/23/18 95%      Temp Readings from Last 1 Encounters:   01/04/19 36.5  C (97.7  F) (Temporal)    Ht Readings from Last 1 Encounters:   10/16/17 1.676 m (5' 6\")      Wt Readings from Last 1 Encounters:   10/16/17 86.2 kg (190 lb)    Estimated body mass index is 30.67 kg/m  as calculated from the following:    Height as of 10/16/17: 1.676 m (5' 6\").    Weight as of 10/16/17: 86.2 kg (190 lb).     LDA:            Assessment:   ASA SCORE: 2       Documentation: H&P complete; Preop Testing complete; Consents complete   Proceeding: Proceed without further delay  Tobacco Use:  NO Active use of Tobacco/UNKNOWN Tobacco use status     Plan:   Anes. Type:  General      Induction:  IV (Standard)   Airway: Native Airway   Access/Monitoring: PIV   Maintenance: Balanced   Emergence: Procedure Site   Logistics: Same Day Surgery     Postop Pain/Sedation Strategy:  Standard-Options: Opioids PRN     PONV Management:  Adult Risk Factors: Female, Non-Smoker, Postop Opioids     CONSENT: Direct conversation   Plan and risks discussed with: Patient   Blood Products: Consent Deferred (Minimal Blood Loss)                             Reese Matt MD  "

## 2019-01-25 ENCOUNTER — HOSPITAL ENCOUNTER (OUTPATIENT)
Dept: BEHAVIORAL HEALTH | Facility: CLINIC | Age: 44
End: 2019-01-25
Attending: PSYCHIATRY & NEUROLOGY
Payer: COMMERCIAL

## 2019-01-25 ENCOUNTER — ANESTHESIA (OUTPATIENT)
Dept: BEHAVIORAL HEALTH | Facility: CLINIC | Age: 44
End: 2019-01-25

## 2019-01-25 VITALS
RESPIRATION RATE: 12 BRPM | HEART RATE: 74 BPM | SYSTOLIC BLOOD PRESSURE: 126 MMHG | OXYGEN SATURATION: 91 % | TEMPERATURE: 97.1 F | DIASTOLIC BLOOD PRESSURE: 78 MMHG

## 2019-01-25 DIAGNOSIS — F33.2 MAJOR DEPRESSIVE DISORDER, RECURRENT SEVERE WITHOUT PSYCHOTIC FEATURES (H): Primary | ICD-10-CM

## 2019-01-25 PROCEDURE — 25000128 H RX IP 250 OP 636: Performed by: PSYCHIATRY & NEUROLOGY

## 2019-01-25 PROCEDURE — 40000671 ZZH STATISTIC ANESTHESIA CASE

## 2019-01-25 PROCEDURE — 90870 ELECTROCONVULSIVE THERAPY: CPT

## 2019-01-25 PROCEDURE — 25000125 ZZHC RX 250: Performed by: ANESTHESIOLOGY

## 2019-01-25 PROCEDURE — 25000128 H RX IP 250 OP 636: Performed by: ANESTHESIOLOGY

## 2019-01-25 RX ORDER — KETOROLAC TROMETHAMINE 30 MG/ML
30 INJECTION, SOLUTION INTRAMUSCULAR; INTRAVENOUS ONCE
Status: CANCELLED
Start: 2019-01-25 | End: 2019-01-25

## 2019-01-25 RX ORDER — ONDANSETRON 2 MG/ML
8 INJECTION INTRAMUSCULAR; INTRAVENOUS ONCE
Status: CANCELLED
Start: 2019-01-25 | End: 2019-01-25

## 2019-01-25 RX ORDER — MORPHINE SULFATE 4 MG/ML
6 INJECTION, SOLUTION INTRAMUSCULAR; INTRAVENOUS ONCE
Status: COMPLETED | OUTPATIENT
Start: 2019-01-25 | End: 2019-01-25

## 2019-01-25 RX ORDER — ONDANSETRON 2 MG/ML
8 INJECTION INTRAMUSCULAR; INTRAVENOUS ONCE
Status: COMPLETED | OUTPATIENT
Start: 2019-01-25 | End: 2019-01-25

## 2019-01-25 RX ORDER — MORPHINE SULFATE 4 MG/ML
6 INJECTION, SOLUTION INTRAMUSCULAR; INTRAVENOUS ONCE
Status: CANCELLED
Start: 2019-01-25 | End: 2019-01-25

## 2019-01-25 RX ORDER — KETOROLAC TROMETHAMINE 30 MG/ML
30 INJECTION, SOLUTION INTRAMUSCULAR; INTRAVENOUS ONCE
Status: COMPLETED | OUTPATIENT
Start: 2019-01-25 | End: 2019-01-25

## 2019-01-25 RX ADMIN — ONDANSETRON 8 MG: 2 INJECTION INTRAMUSCULAR; INTRAVENOUS at 07:39

## 2019-01-25 RX ADMIN — SODIUM CHLORIDE, POTASSIUM CHLORIDE, SODIUM LACTATE AND CALCIUM CHLORIDE 1000 ML: 600; 310; 30; 20 INJECTION, SOLUTION INTRAVENOUS at 07:37

## 2019-01-25 RX ADMIN — KETOROLAC TROMETHAMINE 30 MG: 30 INJECTION, SOLUTION INTRAMUSCULAR; INTRAVENOUS at 07:37

## 2019-01-25 RX ADMIN — Medication 70 MG: at 07:47

## 2019-01-25 RX ADMIN — MORPHINE SULFATE 6 MG: 4 INJECTION, SOLUTION INTRAMUSCULAR; INTRAVENOUS at 07:56

## 2019-01-25 RX ADMIN — METHOHEXITAL SODIUM 80 MG: 500 INJECTION, POWDER, LYOPHILIZED, FOR SOLUTION INTRAMUSCULAR; INTRAVENOUS; RECTAL at 07:47

## 2019-01-25 NOTE — PROCEDURES
Procedure/Surgery Information   [facility], [place]    Bedside Procedure Note  Date of Service (when I performed the procedure): 01/25/2019    Mindi Eastman is a 42 year old female patient.  1. Major depressive disorder, recurrent severe without psychotic features (H)      Past Medical History:   Diagnosis Date     ADHD      Anxiety      Bipolar disorder (H)      Depressive disorder      OCD (obsessive compulsive disorder)      ROXANNE (obstructive sleep apnea)     On CPAP     Seasonal allergies      Temp: 97.7  F (36.5  C)   BP: 125/66 Pulse: 74   Resp: 16 SpO2: 99 %        Procedures     [facility], [place]   ECT Procedure Note   01/25/2019       Mindi Eastman [id number]   42 year old 1975     Patient Status: Outpatient    Is this the first in a series of 12 treatments?  No    History and Physical: Reviewed in medical record    Consent: Informed consent was signed by: patient    Date Consent Signed: 1/25/19    Allergies   Allergen Reactions     Darvocet [Propoxyphene N-Apap]        Weight:  0 lbs 0 oz    Patient Preparations: Glasses/Contacts removed         Indications for ECT:   Medications ineffective, Psychotherapies ineffective and treatment resistant depression.         Clinical Narrative:   Patient is continuing ECT for depression.  NPO after 2400.  Alert and Oriented x 3.  No problems with the last ECT 3 weeks ago.  No med changes or health problems.   She wants to continue treatment a 3 week interval.           Diagnosis:      Major depressive disorder, recurrent severe without psychotic features (F33.2)         Pause for the Cause:     Right patient Yes   Right procedure/laterality settings: Yes          Intra-Procedure Documentation:       ECT #: 1 of 12   Treatment number this series: 13   Total treatment number: 24     Type of ECT:  Right, unilateral ultrabrief    ECT Medications:    Tylenol:  1000mg po (took at home today)  Toradol:  30mg  Zofran: 8mg   Brevital: 80mg  Succinyl Choline: 70mg  Morphine 6 mg in treatment room for headache   Lactated Ringers 1 liter      ECT Strip Summary:   Energy Level: 12 percent   Motor Seizure Duration:  56 seconds  EEG Seizure Duration:   102 seconds    Complications: No    Plan:    Next maintenance ECT is in 3 weeks on 2/15/19      Umang Kennedy MD

## 2019-01-25 NOTE — DISCHARGE INSTRUCTIONS
ECT Discharge Instructions      During your ECT series:      Do not drive or work heavy equipment until 7 days after your last treatment.    Do not drink alcohol or use street drugs (illicit drugs) while you are having treatments.    Do not make important decisions, including legal decisions.    After each treatment:      Get plenty of rest. A responsible adult must stay with your for at least 6 hours.    Avoid heavy or risky activities for 24 hours.     Do not drive for at least 24 hours after your treatment.     If you have more than one treatment within one week, do not drive for 7 days after your last treatment.     If you feel light-headed, sit for a few minutes before standing. Have someone help you get up.    If you have nausea (feel sick to your stomach): Drink only clear liquids such as apple juice, ginger ale, broth or 7UP, Be sure to drink plenty of liquids. Move to a normal diet as you feel able.     If you received Toradol, wait 6 hours before taking ibuprofen.    Call your doctor if:     You have a fever over 100F (37.7 C) (taken under the tongue), or a fever that last more than 24 hours.    Your IV site is red, swollen, very painful or is getting more tender.    You have nausea that gets very bad or does not improve.      If you have any symptoms after ECT, tell our staff before your next treatment.    The ECT Department can be reached at [phone].  The ECT Department is open Mondays, Wednesdays and Fridays from 7:00 AM to 2:00 PM.    To speak to a doctor, call:  Your primary care provider or Dr. Kennedy at [phone]      Other: You had Toradol at 0737 Which is an NSAID medication. Do not take any Ibuprofen, Excedrin, Motrin, Aleve, Advil, Asprin, or any other NSAID medication until after 1:37 Questions, check with your physician or pharmacist  You also had zofran at 0739  & Morphine at 0800      Transported by: Christie

## 2019-01-25 NOTE — ANESTHESIA POSTPROCEDURE EVALUATION
Anesthesia POST Procedure Evaluation    Patient: Mindi Eastman   MRN:     [id number] Gender:   female   Age:    43 year old :      1975        Preoperative Diagnosis: * No pre-op diagnosis entered *   * No procedures listed *   Postop Comments: No value filed.       Anesthesia Type:  General    Reportable Event: NO     PAIN: Uncomplicated   Sign Out status: Comfortable, Well controlled pain     PONV: No PONV   Sign Out status:  No Nausea or Vomiting     Neuro/Psych: Uneventful perioperative course   Sign Out Status: Preoperative baseline; Age appropriate mentation     Airway/Resp.: Uneventful perioperative course   Sign Out Status: Non labored breathing, age appropriate RR; Resp. Status within EXPECTED Parameters     CV: Uneventful perioperative course   Sign Out status: Appropriate BP and perfusion indices; Appropriate HR/Rhythm     Disposition:   Sign Out in:  PACU  Disposition:  Phase II; Home  Recovery Course: Uneventful  Follow-Up: Not required     Comments/Narrative:  Patient doing well post-operatively.  No significant issues.  Hemodynamically stable, pain well controlled, nausea well controlled.  Stable for discharge from the PACU             Last Anesthesia Record Vitals:  CRNA VITALS  2019 0726 - 2019 0826      2019             Ht Rate:  100    Resp Rate (observed):  10    Resp Rate (set):  8          Last PACU/Preop Vitals:  Vitals:    19 0756 19 0814 19 0824   BP: 135/84 106/40 126/78   Pulse:      Resp: 17 16 12   Temp: 36.7  C (98  F) 36.1  C (97  F) 36.2  C (97.1  F)   SpO2: 90% 91%          Electronically Signed By: Reese Matt MD, 2019, 8:26 AM

## 2019-01-25 NOTE — IP AVS SNAPSHOT
[facility]  [facility] [address] [address]  Phone:  [phone]                                    After Visit Summary   1/25/2019    Mindi Eastman    MRN: [id number]           After Visit Summary Signature Page    I have received my discharge instructions, and my questions have been answered. I have discussed any challenges I see with this plan with the nurse or doctor.    ..........................................................................................................................................  Patient/Patient Representative Signature      ..........................................................................................................................................  Patient Representative Print Name and Relationship to Patient    ..................................................               ................................................  Date                                   Time    ..........................................................................................................................................  Reviewed by Signature/Title    ...................................................              ..............................................  Date                                               Time          22EPIC Rev 08/18

## 2019-02-15 ENCOUNTER — ANESTHESIA (OUTPATIENT)
Dept: BEHAVIORAL HEALTH | Facility: CLINIC | Age: 44
End: 2019-02-15
Payer: COMMERCIAL

## 2019-02-15 ENCOUNTER — ANESTHESIA EVENT (OUTPATIENT)
Dept: BEHAVIORAL HEALTH | Facility: CLINIC | Age: 44
End: 2019-02-15

## 2019-02-15 ENCOUNTER — HOSPITAL ENCOUNTER (OUTPATIENT)
Dept: BEHAVIORAL HEALTH | Facility: CLINIC | Age: 44
End: 2019-02-15
Attending: PSYCHIATRY & NEUROLOGY
Payer: COMMERCIAL

## 2019-02-15 VITALS
OXYGEN SATURATION: 93 % | SYSTOLIC BLOOD PRESSURE: 139 MMHG | DIASTOLIC BLOOD PRESSURE: 84 MMHG | RESPIRATION RATE: 16 BRPM | TEMPERATURE: 97.4 F | HEART RATE: 78 BPM

## 2019-02-15 DIAGNOSIS — F33.2 MAJOR DEPRESSIVE DISORDER, RECURRENT SEVERE WITHOUT PSYCHOTIC FEATURES (H): Primary | ICD-10-CM

## 2019-02-15 PROCEDURE — 25000128 H RX IP 250 OP 636: Performed by: ANESTHESIOLOGY

## 2019-02-15 PROCEDURE — 90870 ELECTROCONVULSIVE THERAPY: CPT

## 2019-02-15 PROCEDURE — 40000671 ZZH STATISTIC ANESTHESIA CASE

## 2019-02-15 PROCEDURE — 25000128 H RX IP 250 OP 636: Performed by: PSYCHIATRY & NEUROLOGY

## 2019-02-15 PROCEDURE — 25000125 ZZHC RX 250: Performed by: ANESTHESIOLOGY

## 2019-02-15 PROCEDURE — 25800030 ZZH RX IP 258 OP 636: Performed by: PSYCHIATRY & NEUROLOGY

## 2019-02-15 RX ORDER — ONDANSETRON 2 MG/ML
8 INJECTION INTRAMUSCULAR; INTRAVENOUS ONCE
Status: CANCELLED
Start: 2019-02-15 | End: 2019-02-15

## 2019-02-15 RX ORDER — KETOROLAC TROMETHAMINE 30 MG/ML
30 INJECTION, SOLUTION INTRAMUSCULAR; INTRAVENOUS ONCE
Status: COMPLETED | OUTPATIENT
Start: 2019-02-15 | End: 2019-02-15

## 2019-02-15 RX ORDER — ACETAMINOPHEN 500 MG
1000 TABLET ORAL
Status: ACTIVE | OUTPATIENT
Start: 2019-02-15 | End: 2019-02-15

## 2019-02-15 RX ORDER — KETOROLAC TROMETHAMINE 30 MG/ML
30 INJECTION, SOLUTION INTRAMUSCULAR; INTRAVENOUS ONCE
Status: CANCELLED
Start: 2019-02-15 | End: 2019-02-15

## 2019-02-15 RX ORDER — MORPHINE SULFATE 4 MG/ML
6 INJECTION, SOLUTION INTRAMUSCULAR; INTRAVENOUS ONCE
Status: CANCELLED
Start: 2019-02-15 | End: 2019-02-15

## 2019-02-15 RX ORDER — ONDANSETRON 2 MG/ML
8 INJECTION INTRAMUSCULAR; INTRAVENOUS ONCE
Status: COMPLETED | OUTPATIENT
Start: 2019-02-15 | End: 2019-02-15

## 2019-02-15 RX ORDER — MORPHINE SULFATE 4 MG/ML
6 INJECTION, SOLUTION INTRAMUSCULAR; INTRAVENOUS ONCE
Status: COMPLETED | OUTPATIENT
Start: 2019-02-15 | End: 2019-02-15

## 2019-02-15 RX ADMIN — KETOROLAC TROMETHAMINE 30 MG: 30 INJECTION, SOLUTION INTRAMUSCULAR at 07:51

## 2019-02-15 RX ADMIN — Medication 70 MG: at 08:02

## 2019-02-15 RX ADMIN — MORPHINE SULFATE 6 MG: 4 INJECTION, SOLUTION INTRAMUSCULAR; INTRAVENOUS at 08:07

## 2019-02-15 RX ADMIN — ONDANSETRON 8 MG: 2 INJECTION INTRAMUSCULAR; INTRAVENOUS at 07:50

## 2019-02-15 RX ADMIN — SODIUM CHLORIDE, POTASSIUM CHLORIDE, SODIUM LACTATE AND CALCIUM CHLORIDE 1000 ML: 600; 310; 30; 20 INJECTION, SOLUTION INTRAVENOUS at 07:50

## 2019-02-15 RX ADMIN — METHOHEXITAL SODIUM 80 MG: 500 INJECTION, POWDER, LYOPHILIZED, FOR SOLUTION INTRAMUSCULAR; INTRAVENOUS; RECTAL at 08:02

## 2019-02-15 NOTE — ANESTHESIA PREPROCEDURE EVALUATION
Anesthesia Pre-Procedure Evaluation    Patient: Mindi Eastman   MRN:     [id number] Gender:   female   Age:    43 year old :      1975        Preoperative Diagnosis: * No pre-op diagnosis entered *   * No procedures listed *     Past Medical History:   Diagnosis Date     ADHD      Anxiety      Bipolar disorder (H)      Depressive disorder      OCD (obsessive compulsive disorder)      ROXANNE (obstructive sleep apnea)     On CPAP     Seasonal allergies       Past Surgical History:   Procedure Laterality Date     APPENDECTOMY       CARPAL TUNNEL RELEASE RT/LT Bilateral      CHOLECYSTECTOMY       EXTRACTION(S) DENTAL      [place] Teeth     HC KNEE SCOPE,MED/LAT MENISCUS REPAIR Right     X 2     TONSILLECTOMY            Anesthesia Evaluation     . Pt has had prior anesthetic. Type: General    No history of anesthetic complications          ROS/MED HX    ENT/Pulmonary:     (+)sleep apnea, uses CPAP , . .    Neurologic:  - neg neurologic ROS     Cardiovascular:  - neg cardiovascular ROS       METS/Exercise Tolerance:     Hematologic:  - neg hematologic  ROS       Musculoskeletal:  - neg musculoskeletal ROS       GI/Hepatic:  - neg GI/hepatic ROS       Renal/Genitourinary:  - ROS Renal section negative       Endo:  - neg endo ROS       Psychiatric:     (+) psychiatric history depression      Infectious Disease:  - neg infectious disease ROS       Malignancy:         Other:                         PHYSICAL EXAM:   Mental Status/Neuro: A/A/O   Airway: Facies: Feasible  Mallampati: I  Mouth/Opening: Full  TM distance: > 6 cm  Neck ROM: Full   Respiratory: Auscultation: CTAB     Resp. Rate: Normal     Resp. Effort: Normal      CV: Rhythm: Regular  Rate: Age appropriate  Heart: Normal Sounds   Comments:      Dental: Normal                  Lab Results   Component Value Date    WBC 6.3 10/05/2017    HGB 11.7 10/05/2017    HCT 35.8 10/05/2017     10/05/2017     10/05/2017    POTASSIUM 4.5 10/03/2018     "CHLORIDE 108 10/05/2017    CO2 28 10/05/2017    BUN 8 10/05/2017    CR 0.84 10/03/2018     (A) 10/03/2018    GALO 8.0 (L) 10/05/2017    ALBUMIN 3.2 (L) 10/05/2017    PROTTOTAL 6.2 (L) 10/05/2017    ALT 28 10/05/2017    AST 31 10/05/2017    ALKPHOS 68 10/05/2017    BILITOTAL 0.4 10/05/2017    PTT 38 (H) 06/06/2011    INR 0.99 06/06/2011    TSH 1.81 10/05/2017    HCG Negative 10/05/2017    HCGS Negative 06/06/2011       Preop Vitals  BP Readings from Last 3 Encounters:   02/15/19 138/65   01/25/19 126/78   01/04/19 147/84    Pulse Readings from Last 3 Encounters:   02/15/19 76   01/25/19 74   01/04/19 59      Resp Readings from Last 3 Encounters:   02/15/19 16   01/25/19 12   01/04/19 16    SpO2 Readings from Last 3 Encounters:   02/15/19 98%   01/25/19 91%   01/04/19 94%      Temp Readings from Last 1 Encounters:   02/15/19 36.7  C (98  F)    Ht Readings from Last 1 Encounters:   10/16/17 1.676 m (5' 6\")      Wt Readings from Last 1 Encounters:   10/16/17 86.2 kg (190 lb)    Estimated body mass index is 30.67 kg/m  as calculated from the following:    Height as of 10/16/17: 1.676 m (5' 6\").    Weight as of 10/16/17: 86.2 kg (190 lb).     LDA:  Peripheral IV 02/15/19 Right;Posterior Hand (Active)   Number of days: 0            Assessment:   ASA SCORE: 2       Documentation: H&P complete; Preop Testing complete; Consents complete   Proceeding: Proceed without further delay  Tobacco Use:  NO Active use of Tobacco/UNKNOWN Tobacco use status     Plan:   Anes. Type:  General      Induction:  IV (Standard)   Airway: Native Airway   Access/Monitoring: PIV   Maintenance: Balanced   Emergence: Procedure Site   Logistics: Same Day Surgery     Postop Pain/Sedation Strategy:  Standard-Options: Opioids PRN     PONV Management:  Adult Risk Factors: Female, Non-Smoker, Postop Opioids     CONSENT: Direct conversation   Plan and risks discussed with: Patient   Blood Products: Consent Deferred (Minimal Blood Loss)              "                Td Bates MD

## 2019-02-15 NOTE — DISCHARGE INSTRUCTIONS
ECT Discharge Instructions      During your ECT series:      Do not drive or work heavy equipment until 7 days after your last treatment.    Do not drink alcohol or use street drugs (illicit drugs) while you are having treatments.    Do not make important decisions, including legal decisions.    After each treatment:      Get plenty of rest. A responsible adult must stay with your for at least 6 hours.    Avoid heavy or risky activities for 24 hours.    If you feel light-headed, sit for a few minutes before standing. Have someone help you get up.    If you have nausea (feel sick to your stomach): Drink only clear liquids such as apple juice, ginger ale, broth or 7UP, Be sure to drink plenty of liquids. Move to a normal diet as you feel able.     If you received Toradol, wait 6 hours before taking ibuprofen.    Call your doctor if:     You have a fever over 100F (37.7 C) (taken under the tongue), or a fever that last more than 24 hours.    Your IV site is red, swollen, very painful or is getting more tender.    You have nausea that gets very bad or does not improve.      If you have any symptoms after ECT, tell our staff before your next treatment.    The ECT Department can be reached at [phone].  The ECT Department is open Mondays, Wednesdays and Fridays from 7:00 AM to 2:00 PM.    To speak to a doctor, call: Dr. Kennedy's clinic [phone], Office [phone], Fax [phone]    Plan:    Take Extra Strength Tylenol, 1000 mg with sip of water at home before coming to ECT.    Next maintenance ECT is in 3 weeks on 3/8/19    Other: Today you have received IV Toradol 30 mg at 7:50 mg AM for headache/pain.  Toradol is an NSAID. Do no take any NSAIDs  including ibuprofen, Naproxen Sodium, Asprin, Advil, Motrin, Aleve, Josué, Excedrin with Aspirin, etc, Do not take any of these medications until 6 hours after above time (1:50 PM). You also received Morphine Sulfate 6 mg IM at 8:10 AM for headache and Zofran 8 mg IV  for nausea at 7:50 AM If you have any questions please contact your physician, or pharmacist.    Transported by:

## 2019-02-15 NOTE — IP AVS SNAPSHOT
[facility]  [facility] [address] [address]  Phone:  [phone]                                    After Visit Summary   2/15/2019    Mindi Eastman    MRN: [id number]           After Visit Summary Signature Page    I have received my discharge instructions, and my questions have been answered. I have discussed any challenges I see with this plan with the nurse or doctor.    ..........................................................................................................................................  Patient/Patient Representative Signature      ..........................................................................................................................................  Patient Representative Print Name and Relationship to Patient    ..................................................               ................................................  Date                                   Time    ..........................................................................................................................................  Reviewed by Signature/Title    ...................................................              ..............................................  Date                                               Time          22EPIC Rev 08/18

## 2019-02-15 NOTE — PROCEDURES
Procedure/Surgery Information   [facility], [place]    Bedside Procedure Note  Date of Service (when I performed the procedure): 02/15/2019    Mindi Eastman is a 42 year old female patient.  1. Major depressive disorder, recurrent severe without psychotic features (H)      Past Medical History:   Diagnosis Date     ADHD      Anxiety      Bipolar disorder (H)      Depressive disorder      OCD (obsessive compulsive disorder)      ROXANNE (obstructive sleep apnea)     On CPAP     Seasonal allergies      Temp: 98  F (36.7  C)   BP: 138/65 Pulse: 76   Resp: 16 SpO2: 98 %        Procedures     [facility], [place]   ECT Procedure Note   02/15/2019       Mindi Eastman [id number]   42 year old 1975     Patient Status: Outpatient    Is this the first in a series of 12 treatments?  No    History and Physical: Reviewed in medical record    Consent: Informed consent was signed by: patient    Date Consent Signed: 1/25/19    Allergies   Allergen Reactions     Darvocet [Propoxyphene N-Apap]        Weight:  0 lbs 0 oz    Patient Preparations: Glasses/Contacts removed         Indications for ECT:   Medications ineffective, Psychotherapies ineffective and treatment resistant depression.         Clinical Narrative:   Patient is continuing ECT for depression.  NPO after 2400.  Alert and Oriented x 3.  No problems with the last ECT which was on 1/25/19 (3 weeks ago).  No med changes or health problems.   She wants to continue treatment a 3 week interval.           Diagnosis:      Major depressive disorder, recurrent severe without psychotic features (F33.2)         Pause for the Cause:     Right patient Yes   Right procedure/laterality settings: Yes          Intra-Procedure Documentation:       ECT #: 2 of 12   Treatment number this series: 14   Total treatment number: 25     Type of ECT:  Right, unilateral ultrabrief    ECT Medications:    Tylenol:  1000mg po (gave in  recovery room as she was out of it)  Toradol: 30mg  Zofran: 8mg   Brevital: 80mg  Succinyl Choline: 70mg  Morphine 6 mg in treatment room for headache   Lactated Ringers 1 liter      ECT Strip Summary:   Energy Level: 12 percent   Motor Seizure Duration:  75 seconds  EEG Seizure Duration:   75 seconds    Complications: No    Plan:    Buy some more Extra Strength Tylenol and take 1000mg po at home before coming to ECT.    Next maintenance ECT is in 3 weeks on 3/8/19      Umang Kennedy MD

## 2019-02-15 NOTE — ANESTHESIA POSTPROCEDURE EVALUATION
Anesthesia POST Procedure Evaluation    Patient: Mindi Eastman   MRN:     [id number] Gender:   female   Age:    43 year old :      1975        Preoperative Diagnosis: * No pre-op diagnosis entered *   * No procedures listed *   Postop Comments: No value filed.       Anesthesia Type:  General    Reportable Event: NO     PAIN: Uncomplicated   Sign Out status: Comfortable, Well controlled pain     PONV: No PONV   Sign Out status:  No Nausea or Vomiting     Neuro/Psych: Uneventful perioperative course   Sign Out Status: Preoperative baseline; Age appropriate mentation     Airway/Resp.: Uneventful perioperative course   Sign Out Status: Non labored breathing, age appropriate RR; Resp. Status within EXPECTED Parameters     CV: Uneventful perioperative course   Sign Out status: Appropriate BP and perfusion indices; Appropriate HR/Rhythm     Disposition:   Sign Out in:  PACU  Disposition:  Phase II; Home  Recovery Course: Uneventful  Follow-Up: Not required           Last Anesthesia Record Vitals:  CRNA VITALS  2/15/2019 0740 - 2/15/2019 0834      2/15/2019             Resp Rate (set):  8          Last PACU/Preop Vitals:  Vitals:    02/15/19 0808 02/15/19 0822 02/15/19 0832   BP: 146/90 133/71 139/84   Pulse: 90 96 78   Resp: 16 14 16   Temp: 37.5  C (99.5  F)  36.3  C (97.4  F)   SpO2: 93% 94% 93%         Electronically Signed By: Td Bates MD, February 15, 2019, 8:34 AM

## 2019-03-06 ENCOUNTER — OFFICE VISIT (OUTPATIENT)
Dept: URGENT CARE | Facility: URGENT CARE | Age: 44
End: 2019-03-06
Payer: COMMERCIAL

## 2019-03-06 VITALS
DIASTOLIC BLOOD PRESSURE: 82 MMHG | OXYGEN SATURATION: 93 % | SYSTOLIC BLOOD PRESSURE: 140 MMHG | TEMPERATURE: 97.2 F | HEART RATE: 99 BPM

## 2019-03-06 DIAGNOSIS — L02.411 CUTANEOUS ABSCESS OF RIGHT AXILLA: Primary | ICD-10-CM

## 2019-03-06 PROCEDURE — 10060 I&D ABSCESS SIMPLE/SINGLE: CPT | Performed by: PHYSICIAN ASSISTANT

## 2019-03-06 PROCEDURE — 99202 OFFICE O/P NEW SF 15 MIN: CPT | Mod: 25 | Performed by: PHYSICIAN ASSISTANT

## 2019-03-06 RX ORDER — IBUPROFEN 800 MG/1
800 TABLET, FILM COATED ORAL EVERY 8 HOURS PRN
Qty: 40 TABLET | Refills: 0 | Status: SHIPPED | OUTPATIENT
Start: 2019-03-06 | End: 2019-06-16

## 2019-03-06 RX ORDER — SULFAMETHOXAZOLE/TRIMETHOPRIM 800-160 MG
1 TABLET ORAL 2 TIMES DAILY
Qty: 20 TABLET | Refills: 0 | Status: ON HOLD | OUTPATIENT
Start: 2019-03-06 | End: 2019-06-18

## 2019-03-06 NOTE — LETTER
[place] [facility]  [address] [address]  [phone]      March 6, 2019    RE:  Mindi Eastman                                                                                                                                                       [address] [address]            To whom it may concern:    Mindi Eastman was seen in clinic today for illness.  She may return to work when she has been fever free for 24 hours.            Sincerely,        Camryn Doyle [facility]

## 2019-03-07 NOTE — PROGRESS NOTES
Patient presents with:  Urgent Care: swollen lymph node under armpet right arm started 2weeks       SUBJECTIVE:  Mindi Eastman is a 43 year old female who presents to the clinic today for a painful lump in her right axilla for 2 weeks, worsening now with increased pain and increased size.  No trauma.  No fevers.      Past Medical History:   Diagnosis Date     ADHD      Anxiety      Bipolar disorder (H)      Depressive disorder      OCD (obsessive compulsive disorder)      ROXANNE (obstructive sleep apnea)     On CPAP     Seasonal allergies         Allergies   Allergen Reactions     Darvocet [Propoxyphene N-Apap]      Social History     Tobacco Use     Smoking status: Former Smoker     Types: Cigarettes     Last attempt to quit: 2010     Years since quittin.0     Smokeless tobacco: Never Used     Tobacco comment: e cig    Substance Use Topics     Alcohol use: Yes     Comment: occ       ROS:  CONSTITUTIONAL:NEGATIVE for fever, chills, change in weight  INTEGUMENTARY/SKIN: as per HPI  MUSCULOSKELETAL: NEGATIVE for significant arthralgias or myalgia  NEURO: NEGATIVE for weakness, dizziness or paresthesias  Review of systems negative except as stated above.    EXAM:   /82   Pulse 99   Temp 97.2  F (36.2  C) (Oral)   SpO2 93%   GENERAL: alert, no acute distress.  SKIN:  Erythematous raised lesion with fluctuant center, approximately 2cm in diameter.      PROCEDURE:  Area was cleaned with betadine x 3.  #11 blade was used to make a 7mm incision into the fluctuant center.  Copious purulent material was expressed.      (L02.411) Cutaneous abscess of right axilla  (primary encounter diagnosis)  Comment: incised and drained in clinic  Plan: sulfamethoxazole-trimethoprim (BACTRIM         DS/SEPTRA DS) 800-160 MG tablet, ibuprofen         (ADVIL/MOTRIN) 800 MG tablet        DRAIN SKIN         ABSCESS SIMPLE/SINGLE          Warm compress to area for 20 minutes 4-5 times a day for first couple of days.       Patient expresses understanding and agreement with the assessment and plan as above.

## 2019-03-07 NOTE — PATIENT INSTRUCTIONS
(L02.411) Cutaneous abscess of right axilla  (primary encounter diagnosis)  Comment: incised and drained in clinic  Plan: sulfamethoxazole-trimethoprim (BACTRIM         DS/SEPTRA DS) 800-160 MG tablet, ibuprofen         (ADVIL/MOTRIN) 800 MG tablet          Warm compress to area for 20 minutes 4-5 times a day for first couple of days.

## 2019-03-08 ENCOUNTER — HOSPITAL ENCOUNTER (OUTPATIENT)
Dept: BEHAVIORAL HEALTH | Facility: CLINIC | Age: 44
End: 2019-03-08
Attending: PSYCHIATRY & NEUROLOGY
Payer: COMMERCIAL

## 2019-03-08 ENCOUNTER — ANESTHESIA (OUTPATIENT)
Dept: BEHAVIORAL HEALTH | Facility: CLINIC | Age: 44
End: 2019-03-08

## 2019-03-08 ENCOUNTER — ANESTHESIA EVENT (OUTPATIENT)
Dept: BEHAVIORAL HEALTH | Facility: CLINIC | Age: 44
End: 2019-03-08

## 2019-03-08 VITALS
RESPIRATION RATE: 14 BRPM | OXYGEN SATURATION: 97 % | HEART RATE: 81 BPM | SYSTOLIC BLOOD PRESSURE: 136 MMHG | DIASTOLIC BLOOD PRESSURE: 91 MMHG | TEMPERATURE: 97.8 F

## 2019-03-08 DIAGNOSIS — F33.2 MAJOR DEPRESSIVE DISORDER, RECURRENT SEVERE WITHOUT PSYCHOTIC FEATURES (H): Primary | ICD-10-CM

## 2019-03-08 PROCEDURE — 25000125 ZZHC RX 250: Performed by: STUDENT IN AN ORGANIZED HEALTH CARE EDUCATION/TRAINING PROGRAM

## 2019-03-08 PROCEDURE — 25000128 H RX IP 250 OP 636: Performed by: STUDENT IN AN ORGANIZED HEALTH CARE EDUCATION/TRAINING PROGRAM

## 2019-03-08 PROCEDURE — 25000128 H RX IP 250 OP 636: Performed by: PSYCHIATRY & NEUROLOGY

## 2019-03-08 PROCEDURE — 37000008 ZZH ANESTHESIA TECHNICAL FEE, 1ST 30 MIN

## 2019-03-08 PROCEDURE — 90870 ELECTROCONVULSIVE THERAPY: CPT

## 2019-03-08 PROCEDURE — 25800030 ZZH RX IP 258 OP 636: Performed by: PSYCHIATRY & NEUROLOGY

## 2019-03-08 RX ORDER — MORPHINE SULFATE 4 MG/ML
6 INJECTION, SOLUTION INTRAMUSCULAR; INTRAVENOUS ONCE
Status: COMPLETED | OUTPATIENT
Start: 2019-03-08 | End: 2019-03-08

## 2019-03-08 RX ORDER — ONDANSETRON 2 MG/ML
8 INJECTION INTRAMUSCULAR; INTRAVENOUS ONCE
Status: COMPLETED | OUTPATIENT
Start: 2019-03-08 | End: 2019-03-08

## 2019-03-08 RX ORDER — KETOROLAC TROMETHAMINE 30 MG/ML
30 INJECTION, SOLUTION INTRAMUSCULAR; INTRAVENOUS ONCE
Status: CANCELLED
Start: 2019-03-08 | End: 2019-03-08

## 2019-03-08 RX ORDER — MORPHINE SULFATE 4 MG/ML
6 INJECTION, SOLUTION INTRAMUSCULAR; INTRAVENOUS ONCE
Status: CANCELLED
Start: 2019-03-08 | End: 2019-03-08

## 2019-03-08 RX ORDER — KETOROLAC TROMETHAMINE 30 MG/ML
30 INJECTION, SOLUTION INTRAMUSCULAR; INTRAVENOUS ONCE
Status: COMPLETED | OUTPATIENT
Start: 2019-03-08 | End: 2019-03-08

## 2019-03-08 RX ORDER — ONDANSETRON 2 MG/ML
8 INJECTION INTRAMUSCULAR; INTRAVENOUS ONCE
Status: CANCELLED
Start: 2019-03-08 | End: 2019-03-08

## 2019-03-08 RX ADMIN — KETOROLAC TROMETHAMINE 30 MG: 30 INJECTION, SOLUTION INTRAMUSCULAR at 07:32

## 2019-03-08 RX ADMIN — ONDANSETRON 8 MG: 2 INJECTION INTRAMUSCULAR; INTRAVENOUS at 07:30

## 2019-03-08 RX ADMIN — Medication 70 MG: at 07:57

## 2019-03-08 RX ADMIN — MORPHINE SULFATE 6 MG: 4 INJECTION, SOLUTION INTRAMUSCULAR; INTRAVENOUS at 08:03

## 2019-03-08 RX ADMIN — SODIUM CHLORIDE, POTASSIUM CHLORIDE, SODIUM LACTATE AND CALCIUM CHLORIDE 1000 ML: 600; 310; 30; 20 INJECTION, SOLUTION INTRAVENOUS at 07:27

## 2019-03-08 RX ADMIN — METHOHEXITAL SODIUM 80 MG: 500 INJECTION, POWDER, LYOPHILIZED, FOR SOLUTION INTRAMUSCULAR; INTRAVENOUS; RECTAL at 07:57

## 2019-03-08 NOTE — DISCHARGE INSTRUCTIONS
ECT Discharge Instructions      During your ECT series:      Do not drive or work heavy equipment until 7 days after your last treatment.    Do not drink alcohol or use street drugs (illicit drugs) while you are having treatments.    Do not make important decisions, including legal decisions.    After each treatment:      Get plenty of rest. A responsible adult must stay with your for at least 6 hours.    Avoid heavy or risky activities for 24 hours.    If you feel light-headed, sit for a few minutes before standing. Have someone help you get up.    If you have nausea (feel sick to your stomach): Drink only clear liquids such as apple juice, ginger ale, broth or 7UP, Be sure to drink plenty of liquids. Move to a normal diet as you feel able.     If you received Toradol, wait 6 hours before taking ibuprofen.    Call your doctor if:     You have a fever over 100F (37.7 C) (taken under the tongue), or a fever that last more than 24 hours.    Your IV site is red, swollen, very painful or is getting more tender.    You have nausea that gets very bad or does not improve.      If you have any symptoms after ECT, tell our staff before your next treatment.    The ECT Department can be reached at [phone].  The ECT Department is open Mondays, Wednesdays and Fridays from 7:00 AM to 2:00 PM.    To speak to a doctor, call: Dr. Kennedy's clinic [phone], Office [phone], Fax [phone]    Other: Today you have received IV Toradol 30 at 7:30 AM for headache/pain.  Toradol is an NSAID. Do no take any NSAIDs  including ibuprofen, Naproxen Sodium, Asprin, Advil, Motrin, Aleve, Josué, Excedrin with Aspirin, etc, Do not take any of these medications until 6 hours after above time (1:30 PM). You also received Morphine Sulfate 6 mg IV at 8:00 AM for headache and Zofran 8 mg IV for nausea at 7:30 AM. If you have any questions please contact your physician, or pharmacist.      Plan:    Buy some more Extra Strength Tylenol and  take 1000mg with sip of water at home before coming to ECT.    Next maintenance ECT is in 3 weeks on 3/29/19  Alvarez Solares MD    Transported by: amena Soriano

## 2019-03-08 NOTE — ANESTHESIA PREPROCEDURE EVALUATION
Anesthesia Pre-Procedure Evaluation    Patient: Mindi Eastman   MRN:     [id number] Gender:   female   Age:    43 year old :      1975        Preoperative Diagnosis: * No pre-op diagnosis entered *   * No procedures listed *     Past Medical History:   Diagnosis Date     ADHD      Anxiety      Bipolar disorder (H)      Depressive disorder      OCD (obsessive compulsive disorder)      ROXANNE (obstructive sleep apnea)     On CPAP     Seasonal allergies       Past Surgical History:   Procedure Laterality Date     APPENDECTOMY       CARPAL TUNNEL RELEASE RT/LT Bilateral      CHOLECYSTECTOMY       EXTRACTION(S) DENTAL      [place] Teeth     HC KNEE SCOPE,MED/LAT MENISCUS REPAIR Right     X 2     TONSILLECTOMY            Anesthesia Evaluation     . Pt has had prior anesthetic. Type: General    No history of anesthetic complications          ROS/MED HX    ENT/Pulmonary:     (+)sleep apnea, uses CPAP , . .    Neurologic:  - neg neurologic ROS     Cardiovascular:  - neg cardiovascular ROS       METS/Exercise Tolerance:     Hematologic:  - neg hematologic  ROS       Musculoskeletal:  - neg musculoskeletal ROS       GI/Hepatic:  - neg GI/hepatic ROS       Renal/Genitourinary:  - ROS Renal section negative       Endo:  - neg endo ROS       Psychiatric:     (+) psychiatric history depression      Infectious Disease:  - neg infectious disease ROS       Malignancy:         Other:                         PHYSICAL EXAM:   Mental Status/Neuro: A/A/O   Airway: Facies: Feasible  Mallampati: I  Mouth/Opening: Full  TM distance: > 6 cm  Neck ROM: Full   Respiratory: Auscultation: CTAB     Resp. Rate: Normal     Resp. Effort: Normal      CV: Rhythm: Regular  Rate: Age appropriate  Heart: Normal Sounds   Comments:      Dental: Normal                  Lab Results   Component Value Date    WBC 6.3 10/05/2017    HGB 11.7 10/05/2017    HCT 35.8 10/05/2017     10/05/2017     10/05/2017    POTASSIUM 4.5 10/03/2018     "CHLORIDE 108 10/05/2017    CO2 28 10/05/2017    BUN 8 10/05/2017    CR 0.84 10/03/2018     (A) 10/03/2018    GALO 8.0 (L) 10/05/2017    ALBUMIN 3.2 (L) 10/05/2017    PROTTOTAL 6.2 (L) 10/05/2017    ALT 28 10/05/2017    AST 31 10/05/2017    ALKPHOS 68 10/05/2017    BILITOTAL 0.4 10/05/2017    PTT 38 (H) 06/06/2011    INR 0.99 06/06/2011    TSH 1.81 10/05/2017    HCG Negative 10/05/2017    HCGS Negative 06/06/2011       Preop Vitals  BP Readings from Last 3 Encounters:   03/08/19 126/76   03/06/19 140/82   02/15/19 139/84    Pulse Readings from Last 3 Encounters:   03/08/19 74   03/06/19 99   02/15/19 78      Resp Readings from Last 3 Encounters:   03/08/19 16   02/15/19 16   01/25/19 12    SpO2 Readings from Last 3 Encounters:   03/08/19 100%   03/06/19 93%   02/15/19 93%      Temp Readings from Last 1 Encounters:   03/08/19 36.4  C (97.5  F) (Tympanic)    Ht Readings from Last 1 Encounters:   10/16/17 1.676 m (5' 6\")      Wt Readings from Last 1 Encounters:   10/16/17 86.2 kg (190 lb)    Estimated body mass index is 30.67 kg/m  as calculated from the following:    Height as of 10/16/17: 1.676 m (5' 6\").    Weight as of 10/16/17: 86.2 kg (190 lb).     LDA:            Assessment:   ASA SCORE: 2       Documentation: H&P complete; Preop Testing complete; Consents complete   Proceeding: Proceed without further delay  Tobacco Use:  NO Active use of Tobacco/UNKNOWN Tobacco use status     Plan:   Anes. Type:  General      Induction:  IV (Standard)   Airway: Native Airway   Access/Monitoring: PIV   Maintenance: Balanced   Emergence: Procedure Site   Logistics: Same Day Surgery     Postop Pain/Sedation Strategy:  Standard-Options: Opioids PRN     PONV Management:  Adult Risk Factors: Female, Non-Smoker, Postop Opioids     CONSENT: Direct conversation   Plan and risks discussed with: Patient   Blood Products: Consent Deferred (Minimal Blood Loss)                             Leonel Vergara MD  "

## 2019-03-08 NOTE — ANESTHESIA POSTPROCEDURE EVALUATION
Anesthesia POST Procedure Evaluation    Patient: Mindi Eastman   MRN:     [id number] Gender:   female   Age:    43 year old :      1975        Preoperative Diagnosis: * No pre-op diagnosis entered *   * No procedures listed *   Postop Comments: No value filed.       Anesthesia Type:  General    Reportable Event: NO     PAIN: Uncomplicated   Sign Out status: Comfortable, Well controlled pain     PONV: No PONV   Sign Out status:  No Nausea or Vomiting     Neuro/Psych: Uneventful perioperative course   Sign Out Status: Preoperative baseline; Age appropriate mentation     Airway/Resp.: Uneventful perioperative course   Sign Out Status: Non labored breathing, age appropriate RR; Resp. Status within EXPECTED Parameters     CV: Uneventful perioperative course   Sign Out status: Appropriate BP and perfusion indices; Appropriate HR/Rhythm     Disposition:   Sign Out in:  PACU (ect)  Disposition:  Home; Phase II  Recovery Course: Uneventful  Follow-Up: Not required           Last Anesthesia Record Vitals:  CRNA VITALS  3/8/2019 0735 - 3/8/2019 0824      3/8/2019             Temp:  36.5  C (97.7  F)          Last PACU/Preop Vitals:  Vitals:    19 0703 19 0803 19 0819   BP: 126/76 (!) 139/93 123/81   Pulse: 74 93 92   Resp: 16 14 16   Temp: 36.4  C (97.5  F) 36.5  C (97.7  F)    SpO2: 100% 92% 94%         Electronically Signed By: Leonel Vergara MD, 2019, 8:24 AM

## 2019-03-08 NOTE — PROCEDURES
"Procedure/Surgery Information   [facility], [place]    Bedside Procedure Note  Date of Service (when I performed the procedure): 03/08/2019    Mindi Eastman is a 42 year old female patient.  1. Major depressive disorder, recurrent severe without psychotic features (H)      Past Medical History:   Diagnosis Date     ADHD      Anxiety      Bipolar disorder (H)      Depressive disorder      OCD (obsessive compulsive disorder)      ROXANNE (obstructive sleep apnea)     On CPAP     Seasonal allergies      Temp: 97.5  F (36.4  C) Temp src: Tympanic BP: 126/76 Pulse: 74   Resp: 16 SpO2: 100 % O2 Device: None (Room air)      Procedures     [facility], [place]   ECT Procedure Note   03/08/2019       Mindi Eastman [id number]   42 year old 1975     Patient Status: Outpatient    Is this the first in a series of 12 treatments?  No    History and Physical: Reviewed in medical record    Consent: Informed consent was signed by: patient    Date Consent Signed: 1/25/19    Allergies   Allergen Reactions     Darvocet [Propoxyphene N-Apap]        Weight:  0 lbs 0 oz    Patient Preparations: Glasses/Contacts removed         Indications for ECT:   Medications ineffective, Psychotherapies ineffective and treatment resistant depression.         Clinical Narrative:   Patient is continuing ECT for depression.  NPO after 2400.  Alert and Oriented x 3.  No problems with the last ECT which was on 1/25/19 (3 weeks ago).  No med changes or health problems.   She wants to continue treatment a 3 week interval.  She does say that the last few weeks were \"a bit rough\" but she is not interested in coming in more frequently.         Diagnosis:      Major depressive disorder, recurrent severe without psychotic features (F33.2)         Pause for the Cause:     Right patient Yes   Right procedure/laterality settings: Yes          Intra-Procedure Documentation:       ECT #: 3 of 12 "   Treatment number this series: 15   Total treatment number: 26     Type of ECT:  Right, unilateral ultrabrief    ECT Medications:    Tylenol:  1000mg po (gave in recovery room as she was out of it)  Toradol: 30mg  Zofran: 8mg   Brevital: 80mg  Succinyl Choline: 70mg  Morphine 6 mg in treatment room for headache   Lactated Ringers 1 liter      ECT Strip Summary:   Energy Level: 12 percent   Motor Seizure Duration: 48 seconds  EEG Seizure Duration: ~130 seconds    Complications: No    Plan:    Buy some more Extra Strength Tylenol and take 1000mg po at home before coming to ECT.    Next maintenance ECT is in 3 weeks on 3/29/19      Alvarez Solares MD

## 2019-03-08 NOTE — IP AVS SNAPSHOT
[facility]  [facility] [address] [address]  Phone:  [phone]                                    After Visit Summary   3/8/2019    Mindi Eastman    MRN: [id number]           After Visit Summary Signature Page    I have received my discharge instructions, and my questions have been answered. I have discussed any challenges I see with this plan with the nurse or doctor.    ..........................................................................................................................................  Patient/Patient Representative Signature      ..........................................................................................................................................  Patient Representative Print Name and Relationship to Patient    ..................................................               ................................................  Date                                   Time    ..........................................................................................................................................  Reviewed by Signature/Title    ...................................................              ..............................................  Date                                               Time          22EPIC Rev 08/18

## 2019-03-29 ENCOUNTER — HOSPITAL ENCOUNTER (OUTPATIENT)
Dept: BEHAVIORAL HEALTH | Facility: CLINIC | Age: 44
End: 2019-03-29
Attending: PSYCHIATRY & NEUROLOGY
Payer: COMMERCIAL

## 2019-03-29 ENCOUNTER — ANESTHESIA (OUTPATIENT)
Dept: BEHAVIORAL HEALTH | Facility: CLINIC | Age: 44
End: 2019-03-29
Payer: COMMERCIAL

## 2019-03-29 ENCOUNTER — ANESTHESIA EVENT (OUTPATIENT)
Dept: BEHAVIORAL HEALTH | Facility: CLINIC | Age: 44
End: 2019-03-29

## 2019-03-29 VITALS
HEART RATE: 68 BPM | DIASTOLIC BLOOD PRESSURE: 91 MMHG | OXYGEN SATURATION: 94 % | SYSTOLIC BLOOD PRESSURE: 150 MMHG | RESPIRATION RATE: 14 BRPM | TEMPERATURE: 97.4 F

## 2019-03-29 DIAGNOSIS — F33.2 MAJOR DEPRESSIVE DISORDER, RECURRENT SEVERE WITHOUT PSYCHOTIC FEATURES (H): Primary | ICD-10-CM

## 2019-03-29 PROCEDURE — 25000128 H RX IP 250 OP 636: Performed by: ANESTHESIOLOGY

## 2019-03-29 PROCEDURE — 25800030 ZZH RX IP 258 OP 636: Performed by: PSYCHIATRY & NEUROLOGY

## 2019-03-29 PROCEDURE — 37000008 ZZH ANESTHESIA TECHNICAL FEE, 1ST 30 MIN

## 2019-03-29 PROCEDURE — 90870 ELECTROCONVULSIVE THERAPY: CPT

## 2019-03-29 PROCEDURE — 25000128 H RX IP 250 OP 636: Performed by: PSYCHIATRY & NEUROLOGY

## 2019-03-29 PROCEDURE — 25000125 ZZHC RX 250: Performed by: ANESTHESIOLOGY

## 2019-03-29 RX ORDER — KETOROLAC TROMETHAMINE 30 MG/ML
30 INJECTION, SOLUTION INTRAMUSCULAR; INTRAVENOUS ONCE
Status: CANCELLED
Start: 2019-03-29 | End: 2019-03-29

## 2019-03-29 RX ORDER — KETOROLAC TROMETHAMINE 30 MG/ML
30 INJECTION, SOLUTION INTRAMUSCULAR; INTRAVENOUS ONCE
Status: COMPLETED | OUTPATIENT
Start: 2019-03-29 | End: 2019-03-29

## 2019-03-29 RX ORDER — MORPHINE SULFATE 4 MG/ML
6 INJECTION, SOLUTION INTRAMUSCULAR; INTRAVENOUS ONCE
Status: COMPLETED | OUTPATIENT
Start: 2019-03-29 | End: 2019-03-29

## 2019-03-29 RX ORDER — ONDANSETRON 2 MG/ML
8 INJECTION INTRAMUSCULAR; INTRAVENOUS ONCE
Status: COMPLETED | OUTPATIENT
Start: 2019-03-29 | End: 2019-03-29

## 2019-03-29 RX ORDER — MORPHINE SULFATE 4 MG/ML
6 INJECTION, SOLUTION INTRAMUSCULAR; INTRAVENOUS ONCE
Status: CANCELLED
Start: 2019-03-29 | End: 2019-03-29

## 2019-03-29 RX ORDER — ONDANSETRON 2 MG/ML
8 INJECTION INTRAMUSCULAR; INTRAVENOUS ONCE
Status: CANCELLED
Start: 2019-03-29 | End: 2019-03-29

## 2019-03-29 RX ADMIN — SODIUM CHLORIDE, POTASSIUM CHLORIDE, SODIUM LACTATE AND CALCIUM CHLORIDE 1000 ML: 600; 310; 30; 20 INJECTION, SOLUTION INTRAVENOUS at 07:24

## 2019-03-29 RX ADMIN — KETOROLAC TROMETHAMINE 30 MG: 30 INJECTION, SOLUTION INTRAMUSCULAR; INTRAVENOUS at 07:25

## 2019-03-29 RX ADMIN — Medication 70 MG: at 07:34

## 2019-03-29 RX ADMIN — ONDANSETRON 8 MG: 2 INJECTION INTRAMUSCULAR; INTRAVENOUS at 07:27

## 2019-03-29 RX ADMIN — METHOHEXITAL SODIUM 80 MG: 500 INJECTION, POWDER, LYOPHILIZED, FOR SOLUTION INTRAMUSCULAR; INTRAVENOUS; RECTAL at 07:34

## 2019-03-29 RX ADMIN — MORPHINE SULFATE 6 MG: 4 INJECTION, SOLUTION INTRAMUSCULAR; INTRAVENOUS at 07:38

## 2019-03-29 NOTE — ANESTHESIA PREPROCEDURE EVALUATION
Anesthesia Pre-Procedure Evaluation    Patient: Mindi Eastman   MRN:     [id number] Gender:   female   Age:    43 year old :      1975        Preoperative Diagnosis: * No pre-op diagnosis entered *   * No procedures listed *     Past Medical History:   Diagnosis Date     ADHD      Anxiety      Bipolar disorder (H)      Depressive disorder      OCD (obsessive compulsive disorder)      ROXANNE (obstructive sleep apnea)     On CPAP     Seasonal allergies       Past Surgical History:   Procedure Laterality Date     APPENDECTOMY       CARPAL TUNNEL RELEASE RT/LT Bilateral      CHOLECYSTECTOMY       EXTRACTION(S) DENTAL      [place] [facility]     HC KNEE SCOPE,MED/LAT MENISCUS REPAIR Right     X 2     TONSILLECTOMY            Anesthesia Evaluation     . Pt has had prior anesthetic. Type: General    No history of anesthetic complications          ROS/MED HX    ENT/Pulmonary:     (+)sleep apnea, uses CPAP , . .    Neurologic:  - neg neurologic ROS     Cardiovascular:  - neg cardiovascular ROS       METS/Exercise Tolerance:     Hematologic:  - neg hematologic  ROS       Musculoskeletal:  - neg musculoskeletal ROS       GI/Hepatic:  - neg GI/hepatic ROS       Renal/Genitourinary:  - ROS Renal section negative       Endo:  - neg endo ROS       Psychiatric:     (+) psychiatric history depression      Infectious Disease:  - neg infectious disease ROS       Malignancy:         Other:                         PHYSICAL EXAM:   Mental Status/Neuro: A/A/O   Airway: Facies: Feasible  Mallampati: I  Mouth/Opening: Full  TM distance: > 6 cm  Neck ROM: Full   Respiratory: Auscultation: CTAB     Resp. Rate: Normal     Resp. Effort: Normal      CV: Rhythm: Regular  Rate: Age appropriate  Heart: Normal Sounds   Comments:      Dental: Normal                  Lab Results   Component Value Date    WBC 6.3 10/05/2017    HGB 11.7 10/05/2017    HCT 35.8 10/05/2017     10/05/2017     10/05/2017    POTASSIUM 4.5 10/03/2018     "CHLORIDE 108 10/05/2017    CO2 28 10/05/2017    BUN 8 10/05/2017    CR 0.84 10/03/2018     (A) 10/03/2018    GALO 8.0 (L) 10/05/2017    ALBUMIN 3.2 (L) 10/05/2017    PROTTOTAL 6.2 (L) 10/05/2017    ALT 28 10/05/2017    AST 31 10/05/2017    ALKPHOS 68 10/05/2017    BILITOTAL 0.4 10/05/2017    PTT 38 (H) 06/06/2011    INR 0.99 06/06/2011    TSH 1.81 10/05/2017    HCG Negative 10/05/2017    HCGS Negative 06/06/2011       Preop Vitals  BP Readings from Last 3 Encounters:   03/29/19 132/83   03/08/19 (!) 136/91   03/06/19 140/82    Pulse Readings from Last 3 Encounters:   03/29/19 69   03/08/19 81   03/06/19 99      Resp Readings from Last 3 Encounters:   03/29/19 16   03/08/19 14   02/15/19 16    SpO2 Readings from Last 3 Encounters:   03/29/19 99%   03/08/19 97%   03/06/19 93%      Temp Readings from Last 1 Encounters:   03/29/19 36.4  C (97.5  F)    Ht Readings from Last 1 Encounters:   10/16/17 1.676 m (5' 6\")      Wt Readings from Last 1 Encounters:   10/16/17 86.2 kg (190 lb)    Estimated body mass index is 30.67 kg/m  as calculated from the following:    Height as of 10/16/17: 1.676 m (5' 6\").    Weight as of 10/16/17: 86.2 kg (190 lb).     LDA:  Peripheral IV 03/29/19 Right;Posterior Hand (Active)   Number of days: 0            Assessment:   ASA SCORE: 2       Documentation: H&P complete; Preop Testing complete; Consents complete   Proceeding: Proceed without further delay  Tobacco Use:  NO Active use of Tobacco/UNKNOWN Tobacco use status     Plan:   Anes. Type:  General      Induction:  IV (Standard)   Airway: Native Airway   Access/Monitoring: PIV   Maintenance: Balanced   Emergence: Procedure Site   Logistics: Same Day Surgery     Postop Pain/Sedation Strategy:  Standard-Options: Opioids PRN     PONV Management:  Adult Risk Factors: Female, Non-Smoker, Postop Opioids     CONSENT: Direct conversation   Plan and risks discussed with: Patient   Blood Products: Consent Deferred (Minimal Blood Loss) "                             Thomas Clark MD

## 2019-03-29 NOTE — IP AVS SNAPSHOT
MRN:[id number]                      After Visit Summary   3/29/2019    Mindi Eastman    MRN: [id number]           Visit Information        Provider Department      3/29/2019  6:45 AM Umang Kennedy MD [facility]           Review of your medicines      UNREVIEWED medicines. Ask your doctor about these medicines       Dose / Directions   sulfamethoxazole-trimethoprim 800-160 MG tablet  Commonly known as:  BACTRIM DS/SEPTRA DS  Used for:  Cutaneous abscess of right axilla  Ask about: Should I take this medication?      Dose:  1 tablet  Take 1 tablet by mouth 2 times daily for 10 days  Quantity:  20 tablet  Refills:  0        CONTINUE these medicines which have NOT CHANGED       Dose / Directions   * acetaminophen 325 MG tablet  Commonly known as:  TYLENOL  Used for:  Acute non intractable tension-type headache      Dose:  975 mg  Take 3 tablets (975 mg) by mouth every 6 hours as needed for mild pain or other (Give before coming down for ECT)  Quantity:  100 tablet  Refills:  1     * acetaminophen 500 MG tablet  Commonly known as:  TYLENOL  Used for:  Major depressive disorder, recurrent severe without psychotic features (H)      Dose:  500 mg  Take 1 tablet (500 mg) by mouth 3 times daily as needed for mild pain  Quantity:  100 tablet  Refills:  1     ADDERALL XR PO      Dose:  60 mg  Take 60 mg by mouth daily  Refills:  0     FLUoxetine 20 MG capsule  Commonly known as:  PROzac      Dose:  20 mg  Take 20 mg by mouth daily  Refills:  0     * ibuprofen 600 MG tablet  Commonly known as:  ADVIL/MOTRIN  Used for:  Acute non intractable tension-type headache      Dose:  600 mg  Take 1 tablet (600 mg) by mouth every 6 hours as needed for moderate pain  Quantity:  120 tablet  Refills:  1     * ibuprofen 800 MG tablet  Commonly known as:  ADVIL/MOTRIN  Used for:  Cutaneous abscess of right axilla      Dose:  800 mg  Take 1 tablet (800 mg) by mouth every 8 hours as needed for  moderate pain  Quantity:  40 tablet  Refills:  0     loratadine 10 MG tablet  Commonly known as:  CLARITIN      Dose:  10 mg  Take 10 mg by mouth daily  Refills:  0     LORazepam 1 MG tablet  Commonly known as:  ATIVAN  Used for:  SHANICE (generalized anxiety disorder)      Dose:  1 mg  Take 1 tablet (1 mg) by mouth 2 times daily  Quantity:  60 tablet  Refills:  0     lurasidone 40 MG Tabs tablet  Commonly known as:  LATUDA  Used for:  Bipolar I disorder (H), Major depressive disorder, recurrent severe without psychotic features (H)      Dose:  40 mg  Take 1 tablet (40 mg) by mouth At Bedtime  Quantity:  30 tablet  Refills:  1     nicotine 21 MG/24HR 24 hr patch  Commonly known as:  NICODERM CQ  Used for:  Tobacco use      Dose:  1 patch  Place 1 patch onto the skin daily  Quantity:  30 patch  Refills:  1     ondansetron 4 MG tablet  Commonly known as:  ZOFRAN  Used for:  Major depressive disorder, recurrent severe without psychotic features (H), Projectile vomiting with nausea      Dose:  4 mg  Take 1 tablet (4 mg) by mouth every 8 hours as needed for nausea  Quantity:  30 tablet  Refills:  0     QUEtiapine 100 MG tablet  Commonly known as:  SEROquel  Used for:  Bipolar I disorder (H), Major depressive disorder, recurrent severe without psychotic features (H)      Dose:  100 mg  Take 1 tablet (100 mg) by mouth At Bedtime  Quantity:  30 tablet  Refills:  1     zolpidem 10 MG tablet  Commonly known as:  AMBIEN  Used for:  Insomnia due to other mental disorder      Dose:  10 mg  Take 1 tablet (10 mg) by mouth At Bedtime  Quantity:  30 tablet  Refills:  0         * This list has 4 medication(s) that are the same as other medications prescribed for you. Read the directions carefully, and ask your doctor or other care provider to review them with you.                  Protect others around you: Learn how to safely use, store and throw away your medicines at www.disposemymeds.org.       Follow-ups after your visit       Your  next 10 appointments already scheduled    Apr 12, 2019  7:00 AM CDT  Electroconvulsive Therapy with Umang Kennedy MD  [facility] ([facility]) [address]  [facility] [id number] [phone]      Care Instructions       Further instructions from your care team       ECT Discharge Instructions      During your ECT series:      Do not drive or work heavy equipment until 7 days after your last treatment.    Do not drink alcohol or use street drugs (illicit drugs) while you are having treatments.    Do not make important decisions, including legal decisions.    After each treatment:      Get plenty of rest. A responsible adult must stay with your for at least 6 hours.    Avoid heavy or risky activities for 24 hours.    If you feel light-headed, sit for a few minutes before standing. Have someone help you get up.    If you have nausea (feel sick to your stomach): Drink only clear liquids such as apple juice, ginger ale, broth or 7UP, Be sure to drink plenty of liquids. Move to a normal diet as you feel able.     If you received Toradol, wait 6 hours before taking ibuprofen.    Call your doctor if:     You have a fever over 100F (37.7 C) (taken under the tongue), or a fever that last more than 24 hours.    Your IV site is red, swollen, very painful or is getting more tender.    You have nausea that gets very bad or does not improve.      If you have any symptoms after ECT, tell our staff before your next treatment.    The ECT Department can be reached at [phone].  The ECT Department is open Mondays, Wednesdays and Fridays from 7:00 AM to 2:00 PM.      To speak to a doctor, call: Dr. Kennedy's clinic [phone], Office [phone], Fax [phone]    Plan:    Take Extra Strength Tylenol 1000mg with sip of water at home before coming to ECT.        Other: Today you have received IV Toradol 30mg at 7:30mgam for headache/pain.  Toradol is an NSAID.  "Do no take any NSAIDs  including ibuprofen, Naproxen Sodium, Asprin, Advil, Motrin, Aleve, Josué, Excedrin with Aspirin, etc, Do not take any of these medications until 6 hours after above time (1:30 pm). You also recieved Zofran 4 mg IV at 7:30 am for nausea and Morphine Sulfate 6 mg IV at 7:38 am for headache. If you have any questions please contact your physician, or pharmacist.    Transported by: Christie gutierrez          Additional Information About Your Visit       GOW[employer] Information    [employer] lets you send messages to your doctor, view your test results, renew your prescriptions, schedule appointments and more. To sign up, go to [email or web address] . Click on \"Log in\" on the left side of the screen, which will take you to the Welcome page. Then click on \"Sign up Now\" on the right side of the page.     You will be asked to enter the access code listed below, as well as some personal information. Please follow the directions to create your username and password.     Your access code is: [email or web address]  Expires: 2019  9:42 AM     Your access code will  in 60 days. If you need help or a new code, please call your [facility] clinic or [phone].       Care EveryWhere ID    This is your Care EveryWhere ID. This could be used by other organizations to access your [facility] medical records  [id number]       Your Vitals Were     Blood Pressure   150/91      Pulse   68    Temperature   97.4  F (36.3  C) (Temporal)    Respirations   14    Pulse Oximetry   94%          Primary Care Provider Office Phone # Fax #    Robyn Smiley [phone]      Equal Access to Services    [facility] CenterMELITON : Hadii tonia Irby, wacourtneyda kale, qaybrhona kaalmada adria, yamileth peters. So [facility] [phone].    ATENCIÓN: Si habla español, tiene a martínez disposición servicios gratuitos de asistencia lingüística. Llame al [phone].    We comply with applicable federal " civil rights laws and [place] laws. We do not discriminate on the basis of race, color, national origin, age, disability, sex, sexual orientation, or gender identity.           Thank you!    Thank you for choosing [facility] for your care. Our goal is always to provide you with excellent care. Hearing back from our patients is one way we can continue to improve our services. Please take a few minutes to complete the written survey that you may receive in the mail after you visit with us. Thank you!            Medication List      Medications          Morning Afternoon Evening Bedtime As Needed    * acetaminophen 325 MG tablet  Also known as:  TYLENOL  INSTRUCTIONS:  Take 3 tablets (975 mg) by mouth every 6 hours as needed for mild pain or other (Give before coming down for ECT)                     * acetaminophen 500 MG tablet  Also known as:  TYLENOL  INSTRUCTIONS:  Take 1 tablet (500 mg) by mouth 3 times daily as needed for mild pain                     ADDERALL XR PO  INSTRUCTIONS:  Take 60 mg by mouth daily                     FLUoxetine 20 MG capsule  Also known as:  PROzac  INSTRUCTIONS:  Take 20 mg by mouth daily                     * ibuprofen 600 MG tablet  Also known as:  ADVIL/MOTRIN  INSTRUCTIONS:  Take 1 tablet (600 mg) by mouth every 6 hours as needed for moderate pain                     * ibuprofen 800 MG tablet  Also known as:  ADVIL/MOTRIN  INSTRUCTIONS:  Take 1 tablet (800 mg) by mouth every 8 hours as needed for moderate pain                     loratadine 10 MG tablet  Also known as:  CLARITIN  INSTRUCTIONS:  Take 10 mg by mouth daily                     LORazepam 1 MG tablet  Also known as:  ATIVAN  INSTRUCTIONS:  Take 1 tablet (1 mg) by mouth 2 times daily                     lurasidone 40 MG Tabs tablet  Also known as:  LATUDA  INSTRUCTIONS:  Take 1 tablet (40 mg) by mouth At Bedtime                     nicotine 21 MG/24HR 24 hr patch  Also known as:  NICODERM CQ  INSTRUCTIONS:  Place 1 patch  onto the skin daily                     ondansetron 4 MG tablet  Also known as:  ZOFRAN  INSTRUCTIONS:  Take 1 tablet (4 mg) by mouth every 8 hours as needed for nausea  LAST TAKEN:  Ask your nurse or doctor                     QUEtiapine 100 MG tablet  Also known as:  SEROquel  INSTRUCTIONS:  Take 1 tablet (100 mg) by mouth At Bedtime                     zolpidem 10 MG tablet  Also known as:  AMBIEN  INSTRUCTIONS:  Take 1 tablet (10 mg) by mouth At Bedtime                        * This list has 4 medication(s) that are the same as other medications prescribed for you. Read the directions carefully, and ask your doctor or other care provider to review them with you.            ASK your doctor about these medications          Morning Afternoon Evening Bedtime As Needed    sulfamethoxazole-trimethoprim 800-160 MG tablet  Also known as:  BACTRIM DS/SEPTRA DS  INSTRUCTIONS:  Take 1 tablet by mouth 2 times daily for 10 days  Ask about: Should I take this medication?

## 2019-03-29 NOTE — IP AVS SNAPSHOT
[facility]  [facility] [address] [address]  Phone:  [phone]                                    After Visit Summary   3/29/2019    Mindi Eastman    MRN: [id number]           After Visit Summary Signature Page    I have received my discharge instructions, and my questions have been answered. I have discussed any challenges I see with this plan with the nurse or doctor.    ..........................................................................................................................................  Patient/Patient Representative Signature      ..........................................................................................................................................  Patient Representative Print Name and Relationship to Patient    ..................................................               ................................................  Date                                   Time    ..........................................................................................................................................  Reviewed by Signature/Title    ...................................................              ..............................................  Date                                               Time          22EPIC Rev 08/18

## 2019-03-29 NOTE — PROCEDURES
Procedure/Surgery Information   [facility], [place]    Bedside Procedure Note  Date of Service (when I performed the procedure): 03/29/2019    Mindi Eastman is a 42 year old female patient.  1. Major depressive disorder, recurrent severe without psychotic features (H)      Past Medical History:   Diagnosis Date     ADHD      Anxiety      Bipolar disorder (H)      Depressive disorder      OCD (obsessive compulsive disorder)      ROXANNE (obstructive sleep apnea)     On CPAP     Seasonal allergies      Temp: 97.5  F (36.4  C)   BP: 132/83 Pulse: 69   Resp: 16 SpO2: 99 %        Procedures     [facility], [place]   ECT Procedure Note   03/29/2019       Mindi Eastman [id number]   42 year old 1975     Patient Status: Outpatient    Is this the first in a series of 12 treatments?  No    History and Physical: Reviewed in medical record    Consent: Informed consent was signed by: patient    Date Consent Signed: 1/25/19    Allergies   Allergen Reactions     Darvocet [Propoxyphene N-Apap]        Weight:  0 lbs 0 oz    Patient Preparations: Glasses/Contacts removed         Indications for ECT:   Medications ineffective, Psychotherapies ineffective and treatment resistant depression.         Clinical Narrative:   Patient is continuing ECT for depression.  NPO after 2400.  Alert and Oriented x 3.  No problems with the last ECT which was 3 weeks ago.  No med changes or health problems.   She wants to continue treatment a 3 week interval.  Changed last week from Celexa to Prozac.  ECT wears off before the 2nd week.  She wants to try going back to 2 weeks instead of 3 weeks between ECT.           Diagnosis:      Major depressive disorder, recurrent severe without psychotic features (F33.2)         Pause for the Cause:     Right patient Yes   Right procedure/laterality settings: Yes          Intra-Procedure Documentation:       ECT #: 4 of 12   Treatment number this  series: 16   Total treatment number: 27     Type of ECT:  Right, unilateral ultrabrief    ECT Medications:    Tylenol:  1000mg po at home  Toradol: 30mg  Zofran: 8mg   Brevital: 80mg  Succinyl Choline: 70mg  Morphine 6 mg in treatment room for headache   Lactated Ringers 1 liter      ECT Strip Summary:   Energy Level: 12 percent   Motor Seizure Duration: 63 seconds  EEG Seizure Duration:  86 seconds    Complications: No    Plan:    Take Extra Strength Tylenol 1000mg po at home before coming to ECT.    Next maintenance ECT is in 2 weeks on 4/12/19  Her [employer] paperwork will be brought to Dr. Kennedy's office for his nurse to complete.        Umang Kennedy MD

## 2019-03-29 NOTE — ANESTHESIA POSTPROCEDURE EVALUATION
Anesthesia POST Procedure Evaluation    Patient: Mindi Eastman   MRN:     [id number] Gender:   female   Age:    43 year old :      1975        Preoperative Diagnosis: * No pre-op diagnosis entered *   * No procedures listed *   Postop Comments: No value filed.       Anesthesia Type:  General    Reportable Event: NO     PAIN: Uncomplicated   Sign Out status: Comfortable, Well controlled pain     PONV: No PONV   Sign Out status:  No Nausea or Vomiting     Neuro/Psych: Uneventful perioperative course   Sign Out Status: Preoperative baseline; Age appropriate mentation     Airway/Resp.: Uneventful perioperative course   Sign Out Status: Non labored breathing, age appropriate RR; Resp. Status within EXPECTED Parameters     CV: Uneventful perioperative course   Sign Out status: Appropriate BP and perfusion indices; Appropriate HR/Rhythm     Disposition:   Sign Out in:  PACU  Disposition:  Phase II; Home  Recovery Course: Uneventful  Follow-Up: Not required           Last Anesthesia Record Vitals:  CRNA VITALS  3/29/2019 0709 - 3/29/2019 0739      3/29/2019             Resp Rate (set):  8          Last PACU/Preop Vitals:  Vitals:    19 0706 19 0739 19 0753   BP: 132/83 137/85 136/80   Pulse: 69 86 98   Resp: 16 14 16   Temp: 36.4  C (97.5  F) 36.2  C (97.1  F)    SpO2: 99% 94% 93%         Electronically Signed By: Thomas Clark MD, 2019, 7:59 AM

## 2019-03-29 NOTE — DISCHARGE INSTRUCTIONS
ECT Discharge Instructions      During your ECT series:      Do not drive or work heavy equipment until 7 days after your last treatment.    Do not drink alcohol or use street drugs (illicit drugs) while you are having treatments.    Do not make important decisions, including legal decisions.    After each treatment:      Get plenty of rest. A responsible adult must stay with your for at least 6 hours.    Avoid heavy or risky activities for 24 hours.    If you feel light-headed, sit for a few minutes before standing. Have someone help you get up.    If you have nausea (feel sick to your stomach): Drink only clear liquids such as apple juice, ginger ale, broth or 7UP, Be sure to drink plenty of liquids. Move to a normal diet as you feel able.     If you received Toradol, wait 6 hours before taking ibuprofen.    Call your doctor if:     You have a fever over 100F (37.7 C) (taken under the tongue), or a fever that last more than 24 hours.    Your IV site is red, swollen, very painful or is getting more tender.    You have nausea that gets very bad or does not improve.      If you have any symptoms after ECT, tell our staff before your next treatment.    The ECT Department can be reached at [phone].  The ECT Department is open Mondays, Wednesdays and Fridays from 7:00 AM to 2:00 PM.      To speak to a doctor, call: Dr. Kennedy's clinic [phone], Office [phone], Fax [phone]    Plan:    Take Extra Strength Tylenol 1000mg with sip of water at home before coming to ECT.        Other: Today you have received IV Toradol 30mg at 7:30mgam for headache/pain.  Toradol is an NSAID. Do no take any NSAIDs  including ibuprofen, Naproxen Sodium, Asprin, Advil, Motrin, Aleve, Josué, Excedrin with Aspirin, etc, Do not take any of these medications until 6 hours after above time (1:30 pm). You also recieved Zofran 4 mg IV at 7:30 am for nausea and Morphine Sulfate 6 mg IV at 7:38 am for headache. If you have any questions  please contact your physician, or pharmacist.    Transported by: Christie gutierrez

## 2019-04-11 ENCOUNTER — ANESTHESIA EVENT (OUTPATIENT)
Dept: BEHAVIORAL HEALTH | Facility: CLINIC | Age: 44
End: 2019-04-11

## 2019-04-12 ENCOUNTER — ANESTHESIA (OUTPATIENT)
Dept: BEHAVIORAL HEALTH | Facility: CLINIC | Age: 44
End: 2019-04-12
Payer: COMMERCIAL

## 2019-04-12 ENCOUNTER — HOSPITAL ENCOUNTER (OUTPATIENT)
Dept: BEHAVIORAL HEALTH | Facility: CLINIC | Age: 44
End: 2019-04-12
Attending: PSYCHIATRY & NEUROLOGY
Payer: COMMERCIAL

## 2019-04-12 VITALS
OXYGEN SATURATION: 93 % | TEMPERATURE: 98.5 F | DIASTOLIC BLOOD PRESSURE: 61 MMHG | SYSTOLIC BLOOD PRESSURE: 113 MMHG | RESPIRATION RATE: 18 BRPM | HEART RATE: 73 BPM

## 2019-04-12 DIAGNOSIS — F33.2 MAJOR DEPRESSIVE DISORDER, RECURRENT SEVERE WITHOUT PSYCHOTIC FEATURES (H): Primary | ICD-10-CM

## 2019-04-12 PROCEDURE — 25800030 ZZH RX IP 258 OP 636: Performed by: PSYCHIATRY & NEUROLOGY

## 2019-04-12 PROCEDURE — 25000125 ZZHC RX 250: Performed by: ANESTHESIOLOGY

## 2019-04-12 PROCEDURE — 25000128 H RX IP 250 OP 636: Performed by: PSYCHIATRY & NEUROLOGY

## 2019-04-12 PROCEDURE — 90870 ELECTROCONVULSIVE THERAPY: CPT

## 2019-04-12 PROCEDURE — 37000008 ZZH ANESTHESIA TECHNICAL FEE, 1ST 30 MIN

## 2019-04-12 PROCEDURE — 25000128 H RX IP 250 OP 636: Performed by: ANESTHESIOLOGY

## 2019-04-12 RX ORDER — ONDANSETRON 2 MG/ML
8 INJECTION INTRAMUSCULAR; INTRAVENOUS ONCE
Status: CANCELLED
Start: 2019-04-12

## 2019-04-12 RX ORDER — ONDANSETRON 2 MG/ML
8 INJECTION INTRAMUSCULAR; INTRAVENOUS ONCE
Status: COMPLETED | OUTPATIENT
Start: 2019-04-12 | End: 2019-04-12

## 2019-04-12 RX ORDER — KETOROLAC TROMETHAMINE 30 MG/ML
30 INJECTION, SOLUTION INTRAMUSCULAR; INTRAVENOUS ONCE
Status: COMPLETED | OUTPATIENT
Start: 2019-04-12 | End: 2019-04-12

## 2019-04-12 RX ORDER — KETOROLAC TROMETHAMINE 30 MG/ML
30 INJECTION, SOLUTION INTRAMUSCULAR; INTRAVENOUS ONCE
Status: CANCELLED
Start: 2019-04-12

## 2019-04-12 RX ORDER — MORPHINE SULFATE 4 MG/ML
6 INJECTION, SOLUTION INTRAMUSCULAR; INTRAVENOUS ONCE
Status: COMPLETED | OUTPATIENT
Start: 2019-04-12 | End: 2019-04-12

## 2019-04-12 RX ORDER — MORPHINE SULFATE 4 MG/ML
6 INJECTION, SOLUTION INTRAMUSCULAR; INTRAVENOUS ONCE
Status: CANCELLED
Start: 2019-04-12

## 2019-04-12 RX ADMIN — SODIUM CHLORIDE, POTASSIUM CHLORIDE, SODIUM LACTATE AND CALCIUM CHLORIDE 1000 ML: 600; 310; 30; 20 INJECTION, SOLUTION INTRAVENOUS at 07:30

## 2019-04-12 RX ADMIN — MORPHINE SULFATE 6 MG: 4 INJECTION, SOLUTION INTRAMUSCULAR; INTRAVENOUS at 07:45

## 2019-04-12 RX ADMIN — METHOHEXITAL SODIUM 80 MG: 500 INJECTION, POWDER, LYOPHILIZED, FOR SOLUTION INTRAMUSCULAR; INTRAVENOUS; RECTAL at 07:41

## 2019-04-12 RX ADMIN — Medication 70 MG: at 07:41

## 2019-04-12 RX ADMIN — ONDANSETRON 8 MG: 2 INJECTION INTRAMUSCULAR; INTRAVENOUS at 07:29

## 2019-04-12 RX ADMIN — KETOROLAC TROMETHAMINE 30 MG: 30 INJECTION, SOLUTION INTRAMUSCULAR; INTRAVENOUS at 07:30

## 2019-04-12 NOTE — PROCEDURES
Procedure/Surgery Information   [facility], [place]    Bedside Procedure Note  Date of Service (when I performed the procedure): 04/12/2019    Mindi Eastman is a 42 year old female patient.  1. Major depressive disorder, recurrent severe without psychotic features (H)      Past Medical History:   Diagnosis Date     ADHD      Anxiety      Bipolar disorder (H)      Depressive disorder      OCD (obsessive compulsive disorder)      ROXANNE (obstructive sleep apnea)     On CPAP     Seasonal allergies      Temp: 98  F (36.7  C)   BP: 111/61 Pulse: 81   Resp: 16 SpO2: 97 %        Procedures     [facility], [place]   ECT Procedure Note   04/12/2019       Mindi Eastman [id number]   42 year old 1975     Patient Status: Outpatient    Is this the first in a series of 12 treatments?  No    History and Physical: Reviewed in medical record    Consent: Informed consent was signed by: patient    Date Consent Signed: 1/25/19    Allergies   Allergen Reactions     Darvocet [Propoxyphene N-Apap]        Weight:  0 lbs 0 oz    Patient Preparations: Glasses/Contacts removed         Indications for ECT:   Medications ineffective, Psychotherapies ineffective and treatment resistant depression.         Clinical Narrative:   Patient is continuing ECT for depression.  NPO after 2400.  Alert and Oriented x 3.  No problems with the last ECT which was 2 weeks ago.  No med changes or health problems.   She wants to continue treatment a 2 week interval.  She's on Prozac which is arely gwell.           Diagnosis:      Major depressive disorder, recurrent severe without psychotic features (F33.2)         Pause for the Cause:     Right patient Yes   Right procedure/laterality settings: Yes          Intra-Procedure Documentation:       ECT #: 5 of 12   Treatment number this series: 17   Total treatment number: 28     Type of ECT:  Right, unilateral ultrabrief    ECT Medications:     Tylenol:  1000mg po at home  Toradol: 30mg  Zofran: 8mg   Brevital: 80mg  Succinyl Choline: 70mg  Morphine 6 mg in treatment room for headache   Lactated Ringers 1 liter      ECT Strip Summary:   Energy Level: 10 percent   Motor Seizure Duration: 51 seconds  EEG Seizure Duration:  79 seconds    Complications: No    Plan:    Take Extra Strength Tylenol 1000mg po at home before coming to ECT.    Next maintenance ECT is in 2 weeks on 4/26/19      Umang Kennedy MD

## 2019-04-12 NOTE — DISCHARGE INSTRUCTIONS
ECT Discharge Instructions      During your ECT series:      Do not drive or work heavy equipment until 7 days after your last treatment.    Do not drink alcohol or use street drugs (illicit drugs) while you are having treatments.    Do not make important decisions, including legal decisions.    After each treatment:      Get plenty of rest. A responsible adult must stay with your for at least 6 hours.    Avoid heavy or risky activities for 24 hours.    If you feel light-headed, sit for a few minutes before standing. Have someone help you get up.    If you have nausea (feel sick to your stomach): Drink only clear liquids such as apple juice, ginger ale, broth or 7UP, Be sure to drink plenty of liquids. Move to a normal diet as you feel able.     If you received Toradol, wait 6 hours before taking ibuprofen.    Call your doctor if:     You have a fever over 100F (37.7 C) (taken under the tongue), or a fever that last more than 24 hours.    Your IV site is red, swollen, very painful or is getting more tender.    You have nausea that gets very bad or does not improve.      If you have any symptoms after ECT, tell our staff before your next treatment.    The ECT Department can be reached at [phone].  The ECT Department is open Mondays, Wednesdays and Fridays from 7:00 AM to 2:00 PM.      To speak to a doctor, call: Dr. Kennedy Office # [phone] and Fax # [phone]    New prescriptions: Take Extra Strength Tylenol 1000mg by mouth at home before coming to ECT.    Other: You had Toradol at 7:30 AM which is an NSAID medication. Do not take any ibuprofen, motrin, aspirin or any other NSAID medication until after 1:30 PM. Questions, check with your physician or pharmacist.    Transported by: FatherRoberto Carlos      _________________________________________________  ________________________  Patient/Responsible party  Signature      Date          ________________________________________________   ________________________  Nurse Signature        Date

## 2019-04-12 NOTE — IP AVS SNAPSHOT
MRN:[id number]                      After Visit Summary   4/12/2019    Mindi Eastman    MRN: [id number]           Visit Information        Provider Department      4/12/2019  7:00 AM Umang Kennedy MD [facility]           Review of your medicines      UNREVIEWED medicines. Ask your doctor about these medicines       Dose / Directions   sulfamethoxazole-trimethoprim 800-160 MG tablet  Commonly known as:  BACTRIM DS/SEPTRA DS  Used for:  Cutaneous abscess of right axilla  Ask about: Should I take this medication?      Dose:  1 tablet  Take 1 tablet by mouth 2 times daily for 10 days  Quantity:  20 tablet  Refills:  0        CONTINUE these medicines which have NOT CHANGED       Dose / Directions   * acetaminophen 325 MG tablet  Commonly known as:  TYLENOL  Used for:  Acute non intractable tension-type headache      Dose:  975 mg  Take 3 tablets (975 mg) by mouth every 6 hours as needed for mild pain or other (Give before coming down for ECT)  Quantity:  100 tablet  Refills:  1     * acetaminophen 500 MG tablet  Commonly known as:  TYLENOL  Used for:  Major depressive disorder, recurrent severe without psychotic features (H)      Dose:  500 mg  Take 1 tablet (500 mg) by mouth 3 times daily as needed for mild pain  Quantity:  100 tablet  Refills:  1     ADDERALL XR PO      Dose:  60 mg  Take 60 mg by mouth daily  Refills:  0     FLUoxetine 20 MG capsule  Commonly known as:  PROzac      Dose:  20 mg  Take 20 mg by mouth daily  Refills:  0     * ibuprofen 600 MG tablet  Commonly known as:  ADVIL/MOTRIN  Used for:  Acute non intractable tension-type headache      Dose:  600 mg  Take 1 tablet (600 mg) by mouth every 6 hours as needed for moderate pain  Quantity:  120 tablet  Refills:  1     * ibuprofen 800 MG tablet  Commonly known as:  ADVIL/MOTRIN  Used for:  Cutaneous abscess of right axilla      Dose:  800 mg  Take 1 tablet (800 mg) by mouth every 8 hours as needed for  moderate pain  Quantity:  40 tablet  Refills:  0     loratadine 10 MG tablet  Commonly known as:  CLARITIN      Dose:  10 mg  Take 10 mg by mouth daily  Refills:  0     LORazepam 1 MG tablet  Commonly known as:  ATIVAN  Used for:  SHANICE (generalized anxiety disorder)      Dose:  1 mg  Take 1 tablet (1 mg) by mouth 2 times daily  Quantity:  60 tablet  Refills:  0     lurasidone 40 MG Tabs tablet  Commonly known as:  LATUDA  Used for:  Bipolar I disorder (H), Major depressive disorder, recurrent severe without psychotic features (H)      Dose:  40 mg  Take 1 tablet (40 mg) by mouth At Bedtime  Quantity:  30 tablet  Refills:  1     nicotine 21 MG/24HR 24 hr patch  Commonly known as:  NICODERM CQ  Used for:  Tobacco use      Dose:  1 patch  Place 1 patch onto the skin daily  Quantity:  30 patch  Refills:  1     ondansetron 4 MG tablet  Commonly known as:  ZOFRAN  Used for:  Major depressive disorder, recurrent severe without psychotic features (H), Projectile vomiting with nausea      Dose:  4 mg  Take 1 tablet (4 mg) by mouth every 8 hours as needed for nausea  Quantity:  30 tablet  Refills:  0     QUEtiapine 100 MG tablet  Commonly known as:  SEROquel  Used for:  Bipolar I disorder (H), Major depressive disorder, recurrent severe without psychotic features (H)      Dose:  100 mg  Take 1 tablet (100 mg) by mouth At Bedtime  Quantity:  30 tablet  Refills:  1     zolpidem 10 MG tablet  Commonly known as:  AMBIEN  Used for:  Insomnia due to other mental disorder      Dose:  10 mg  Take 1 tablet (10 mg) by mouth At Bedtime  Quantity:  30 tablet  Refills:  0         * This list has 4 medication(s) that are the same as other medications prescribed for you. Read the directions carefully, and ask your doctor or other care provider to review them with you.                  Protect others around you: Learn how to safely use, store and throw away your medicines at www.disposemymeds.org.       Follow-ups after your visit       Your  next 10 appointments already scheduled    Apr 26, 2019  7:45 AM CDT  Electroconvulsive Therapy with Umang Kennedy MD  [facility] ([facility]) [address]  [facility] [phone]         Care Instructions       Further instructions from your care team       ECT Discharge Instructions      During your ECT series:      Do not drive or work heavy equipment until 7 days after your last treatment.    Do not drink alcohol or use street drugs (illicit drugs) while you are having treatments.    Do not make important decisions, including legal decisions.    After each treatment:      Get plenty of rest. A responsible adult must stay with your for at least 6 hours.    Avoid heavy or risky activities for 24 hours.    If you feel light-headed, sit for a few minutes before standing. Have someone help you get up.    If you have nausea (feel sick to your stomach): Drink only clear liquids such as apple juice, ginger ale, broth or 7UP, Be sure to drink plenty of liquids. Move to a normal diet as you feel able.     If you received Toradol, wait 6 hours before taking ibuprofen.    Call your doctor if:     You have a fever over 100F (37.7 C) (taken under the tongue), or a fever that last more than 24 hours.    Your IV site is red, swollen, very painful or is getting more tender.    You have nausea that gets very bad or does not improve.      If you have any symptoms after ECT, tell our staff before your next treatment.    The ECT Department can be reached at [phone].  The ECT Department is open Mondays, Wednesdays and Fridays from 7:00 AM to 2:00 PM.      To speak to a doctor, call: Dr. Kennedy Office # [phone] and Fax # [phone]    New prescriptions: Take Extra Strength Tylenol 1000mg by mouth at home before coming to ECT.    Other: You had Toradol at 7:30 AM which is an NSAID medication. Do not take any ibuprofen, motrin, aspirin or any  "other NSAID medication until after 1:30 PM. Questions, check with your physician or pharmacist.    Transported by: FatherRoberto Carlos      _________________________________________________  ________________________  Patient/Responsible party Signature      Date          ________________________________________________   ________________________  Nurse Signature        Date    Additional Information About Your Visit       MyChart Information    [employer] lets you send messages to your doctor, view your test results, renew your prescriptions, schedule appointments and more. To sign up, go to [email or web address] . Click on \"Log in\" on the left side of the screen, which will take you to the Welcome page. Then click on \"Sign up Now\" on the right side of the page.     You will be asked to enter the access code listed below, as well as some personal information. Please follow the directions to create your username and password.     Your access code is: [email or web address]  Expires: 2019  9:42 AM     Your access code will  in 60 days. If you need help or a new code, please call your [place] clinic or [phone].       Care EveryWhere ID    This is your Care EveryWhere ID. This could be used by other organizations to access your [place] medical records  [id number]       Your Vitals Were     Blood Pressure   123/68    Pulse   81    Temperature   99.2  F (37.3  C) (Temporal)    Respirations   17    Pulse Oximetry   94%          Primary Care Provider Office Phone # Fax #    Robyn Smiley [phone]      Equal Access to Services    [place] EDMONDSON AH: Evelia Irby, waaxda luqdimitri santiago waxay idiin hayaan adeeg kharash la'aan ah. So [facility] [phone].    ATENCIÓN: Si habla español, tiene a martínez disposición servicios gratuitos de asistencia lingüística. Llame al [phone].    We comply with applicable federal civil rights laws and [place] laws. We do not " discriminate on the basis of race, color, national origin, age, disability, sex, sexual orientation, or gender identity.           Thank you!    Thank you for choosing [facility] for your care. Our goal is always to provide you with excellent care. Hearing back from our patients is one way we can continue to improve our services. Please take a few minutes to complete the written survey that you may receive in the mail after you visit with us. Thank you!            Medication List      Medications          Morning Afternoon Evening Bedtime As Needed    * acetaminophen 325 MG tablet  Also known as:  TYLENOL  INSTRUCTIONS:  Take 3 tablets (975 mg) by mouth every 6 hours as needed for mild pain or other (Give before coming down for ECT)                     * acetaminophen 500 MG tablet  Also known as:  TYLENOL  INSTRUCTIONS:  Take 1 tablet (500 mg) by mouth 3 times daily as needed for mild pain                     ADDERALL XR PO  INSTRUCTIONS:  Take 60 mg by mouth daily                     FLUoxetine 20 MG capsule  Also known as:  PROzac  INSTRUCTIONS:  Take 20 mg by mouth daily                     * ibuprofen 600 MG tablet  Also known as:  ADVIL/MOTRIN  INSTRUCTIONS:  Take 1 tablet (600 mg) by mouth every 6 hours as needed for moderate pain                     * ibuprofen 800 MG tablet  Also known as:  ADVIL/MOTRIN  INSTRUCTIONS:  Take 1 tablet (800 mg) by mouth every 8 hours as needed for moderate pain                     loratadine 10 MG tablet  Also known as:  CLARITIN  INSTRUCTIONS:  Take 10 mg by mouth daily                     LORazepam 1 MG tablet  Also known as:  ATIVAN  INSTRUCTIONS:  Take 1 tablet (1 mg) by mouth 2 times daily                     lurasidone 40 MG Tabs tablet  Also known as:  LATUDA  INSTRUCTIONS:  Take 1 tablet (40 mg) by mouth At Bedtime                     nicotine 21 MG/24HR 24 hr patch  Also known as:  NICODERM CQ  INSTRUCTIONS:  Place 1 patch onto the skin daily                      ondansetron 4 MG tablet  Also known as:  ZOFRAN  INSTRUCTIONS:  Take 1 tablet (4 mg) by mouth every 8 hours as needed for nausea  LAST TAKEN:  Ask your nurse or doctor                     QUEtiapine 100 MG tablet  Also known as:  SEROquel  INSTRUCTIONS:  Take 1 tablet (100 mg) by mouth At Bedtime                     zolpidem 10 MG tablet  Also known as:  AMBIEN  INSTRUCTIONS:  Take 1 tablet (10 mg) by mouth At Bedtime                        * This list has 4 medication(s) that are the same as other medications prescribed for you. Read the directions carefully, and ask your doctor or other care provider to review them with you.            ASK your doctor about these medications          Morning Afternoon Evening Bedtime As Needed    sulfamethoxazole-trimethoprim 800-160 MG tablet  Also known as:  BACTRIM DS/SEPTRA DS  INSTRUCTIONS:  Take 1 tablet by mouth 2 times daily for 10 days  Ask about: Should I take this medication?

## 2019-04-12 NOTE — PROGRESS NOTES
Patient meets recovery room discharge criteria. Pt discharged from recovery room via wheelchair to discharge room at  0827.  Pt still complaining of headache on top of her head.  Took tylenol 1000 mg at 0630, writer gave 30 of toradol and patient received 6 mg of morphine.  Pt declined cold pack to head and said she would do it when she got home.  Patient rated head pain a 4 on a 0-10 scale, with 10 being the worst pain. Pt brought to discharge room and is currently drinking juice and eating miroslava crackers.

## 2019-04-12 NOTE — ANESTHESIA POSTPROCEDURE EVALUATION
Anesthesia POST Procedure Evaluation    Patient: Mindi Eastman   MRN:     [id number] Gender:   female   Age:    43 year old :      1975        Preoperative Diagnosis: * No pre-op diagnosis entered *   * No procedures listed *   Postop Comments: No value filed.       Anesthesia Type:  General    Reportable Event: NO     PAIN: Uncomplicated   Sign Out status: Comfortable, Well controlled pain     PONV: No PONV   Sign Out status:  No Nausea or Vomiting     Neuro/Psych: Uneventful perioperative course   Sign Out Status: Preoperative baseline; Age appropriate mentation     Airway/Resp.: Uneventful perioperative course   Sign Out Status: Non labored breathing, age appropriate RR; Resp. Status within EXPECTED Parameters     CV: Uneventful perioperative course   Sign Out status: Appropriate BP and perfusion indices; Appropriate HR/Rhythm     Disposition:   Sign Out in:  PACU  Disposition:  Phase II; Home  Recovery Course: Uneventful  Follow-Up: Not required     Comments/Narrative:  Patient doing well post-operatively.  No significant issues.  Hemodynamically stable, pain well controlled, nausea well controlled.  Stable for discharge from the PACU             Last Anesthesia Record Vitals:  CRNA VITALS  2019 0717 - 2019 0817      2019             Resp Rate (set):  8          Last PACU Vitals:  No vitals data found for the desired time range.        Electronically Signed By: Reese Matt MD, 2019, 8:25 AM

## 2019-04-12 NOTE — IP AVS SNAPSHOT
[facility]  [facility] [address] [address]  Phone:  [phone]                                    After Visit Summary   4/12/2019    Mindi Eastman    MRN: [id number]           After Visit Summary Signature Page    I have received my discharge instructions, and my questions have been answered. I have discussed any challenges I see with this plan with the nurse or doctor.    ..........................................................................................................................................  Patient/Patient Representative Signature      ..........................................................................................................................................  Patient Representative Print Name and Relationship to Patient    ..................................................               ................................................  Date                                   Time    ..........................................................................................................................................  Reviewed by Signature/Title    ...................................................              ..............................................  Date                                               Time          22EPIC Rev 08/18

## 2019-04-12 NOTE — ANESTHESIA PREPROCEDURE EVALUATION
Anesthesia Pre-Procedure Evaluation    Patient: Mindi Eastman   MRN:     [id number] Gender:   female   Age:    43 year old :      1975        Preoperative Diagnosis: * No pre-op diagnosis entered *   * No procedures listed *     Past Medical History:   Diagnosis Date     ADHD      Anxiety      Bipolar disorder (H)      Depressive disorder      OCD (obsessive compulsive disorder)      ROXANNE (obstructive sleep apnea)     On CPAP     Seasonal allergies       Past Surgical History:   Procedure Laterality Date     APPENDECTOMY       CARPAL TUNNEL RELEASE RT/LT Bilateral      CHOLECYSTECTOMY       EXTRACTION(S) DENTAL      [place] Teeth     HC KNEE SCOPE,MED/LAT MENISCUS REPAIR Right     X 2     TONSILLECTOMY            Anesthesia Evaluation     . Pt has had prior anesthetic. Type: General    No history of anesthetic complications          ROS/MED HX    ENT/Pulmonary:     (+)sleep apnea, uses CPAP , . .    Neurologic:  - neg neurologic ROS     Cardiovascular:  - neg cardiovascular ROS       METS/Exercise Tolerance:     Hematologic:  - neg hematologic  ROS       Musculoskeletal:  - neg musculoskeletal ROS       GI/Hepatic:  - neg GI/hepatic ROS       Renal/Genitourinary:  - ROS Renal section negative       Endo:  - neg endo ROS       Psychiatric:     (+) psychiatric history depression      Infectious Disease:  - neg infectious disease ROS       Malignancy:         Other:                         PHYSICAL EXAM:   Mental Status/Neuro: A/A/O   Airway: Facies: Feasible  Mallampati: I  Mouth/Opening: Full  TM distance: > 6 cm  Neck ROM: Full   Respiratory: Auscultation: CTAB     Resp. Rate: Normal     Resp. Effort: Normal      CV: Rhythm: Regular  Rate: Age appropriate  Heart: Normal Sounds   Comments:      Dental: Normal                  Lab Results   Component Value Date    WBC 6.3 10/05/2017    HGB 11.7 10/05/2017    HCT 35.8 10/05/2017     10/05/2017     10/05/2017    POTASSIUM 4.5 10/03/2018     "CHLORIDE 108 10/05/2017    CO2 28 10/05/2017    BUN 8 10/05/2017    CR 0.84 10/03/2018     (A) 10/03/2018    GALO 8.0 (L) 10/05/2017    ALBUMIN 3.2 (L) 10/05/2017    PROTTOTAL 6.2 (L) 10/05/2017    ALT 28 10/05/2017    AST 31 10/05/2017    ALKPHOS 68 10/05/2017    BILITOTAL 0.4 10/05/2017    PTT 38 (H) 06/06/2011    INR 0.99 06/06/2011    TSH 1.81 10/05/2017    HCG Negative 10/05/2017    HCGS Negative 06/06/2011       Preop Vitals  BP Readings from Last 3 Encounters:   03/29/19 (!) 150/91   03/08/19 (!) 136/91   03/06/19 140/82    Pulse Readings from Last 3 Encounters:   03/29/19 68   03/08/19 81   03/06/19 99      Resp Readings from Last 3 Encounters:   03/29/19 14   03/08/19 14   02/15/19 16    SpO2 Readings from Last 3 Encounters:   03/29/19 94%   03/08/19 97%   03/06/19 93%      Temp Readings from Last 1 Encounters:   03/29/19 36.3  C (97.4  F) (Temporal)    Ht Readings from Last 1 Encounters:   10/16/17 1.676 m (5' 6\")      Wt Readings from Last 1 Encounters:   10/16/17 86.2 kg (190 lb)    Estimated body mass index is 30.67 kg/m  as calculated from the following:    Height as of 10/16/17: 1.676 m (5' 6\").    Weight as of 10/16/17: 86.2 kg (190 lb).     LDA:            Assessment:   ASA SCORE: 2       Documentation: H&P complete; Preop Testing complete; Consents complete   Proceeding: Proceed without further delay  Tobacco Use:  NO Active use of Tobacco/UNKNOWN Tobacco use status     Plan:   Anes. Type:  General      Induction:  IV (Standard)   Airway: Native Airway   Access/Monitoring: PIV   Maintenance: Balanced   Emergence: Procedure Site   Logistics: Same Day Surgery     Postop Pain/Sedation Strategy:  Standard-Options: Opioids PRN     PONV Management:  Adult Risk Factors: Female, Non-Smoker, Postop Opioids     CONSENT: Direct conversation   Plan and risks discussed with: Patient   Blood Products: Consent Deferred (Minimal Blood Loss)                             Reese Matt MD  "

## 2019-04-26 ENCOUNTER — HOSPITAL ENCOUNTER (OUTPATIENT)
Dept: BEHAVIORAL HEALTH | Facility: CLINIC | Age: 44
End: 2019-04-26
Attending: PSYCHIATRY & NEUROLOGY
Payer: COMMERCIAL

## 2019-04-26 ENCOUNTER — ANESTHESIA (OUTPATIENT)
Dept: BEHAVIORAL HEALTH | Facility: CLINIC | Age: 44
End: 2019-04-26
Payer: COMMERCIAL

## 2019-04-26 ENCOUNTER — ANESTHESIA EVENT (OUTPATIENT)
Dept: BEHAVIORAL HEALTH | Facility: CLINIC | Age: 44
End: 2019-04-26

## 2019-04-26 VITALS
SYSTOLIC BLOOD PRESSURE: 133 MMHG | RESPIRATION RATE: 17 BRPM | TEMPERATURE: 98.2 F | OXYGEN SATURATION: 95 % | HEART RATE: 69 BPM | DIASTOLIC BLOOD PRESSURE: 85 MMHG

## 2019-04-26 DIAGNOSIS — F33.2 MAJOR DEPRESSIVE DISORDER, RECURRENT SEVERE WITHOUT PSYCHOTIC FEATURES (H): Primary | ICD-10-CM

## 2019-04-26 PROCEDURE — 25000125 ZZHC RX 250: Performed by: ANESTHESIOLOGY

## 2019-04-26 PROCEDURE — 25000128 H RX IP 250 OP 636: Performed by: PSYCHIATRY & NEUROLOGY

## 2019-04-26 PROCEDURE — 25800030 ZZH RX IP 258 OP 636: Performed by: PSYCHIATRY & NEUROLOGY

## 2019-04-26 PROCEDURE — 25000128 H RX IP 250 OP 636: Performed by: ANESTHESIOLOGY

## 2019-04-26 PROCEDURE — 37000008 ZZH ANESTHESIA TECHNICAL FEE, 1ST 30 MIN

## 2019-04-26 PROCEDURE — 90870 ELECTROCONVULSIVE THERAPY: CPT

## 2019-04-26 RX ORDER — ONDANSETRON 2 MG/ML
8 INJECTION INTRAMUSCULAR; INTRAVENOUS ONCE
Status: COMPLETED | OUTPATIENT
Start: 2019-04-26 | End: 2019-04-26

## 2019-04-26 RX ORDER — KETOROLAC TROMETHAMINE 30 MG/ML
30 INJECTION, SOLUTION INTRAMUSCULAR; INTRAVENOUS ONCE
Status: COMPLETED | OUTPATIENT
Start: 2019-04-26 | End: 2019-04-26

## 2019-04-26 RX ORDER — ONDANSETRON 2 MG/ML
8 INJECTION INTRAMUSCULAR; INTRAVENOUS ONCE
Status: CANCELLED
Start: 2019-04-26

## 2019-04-26 RX ORDER — MORPHINE SULFATE 4 MG/ML
6 INJECTION, SOLUTION INTRAMUSCULAR; INTRAVENOUS ONCE
Status: CANCELLED
Start: 2019-04-26

## 2019-04-26 RX ORDER — MORPHINE SULFATE 4 MG/ML
6 INJECTION, SOLUTION INTRAMUSCULAR; INTRAVENOUS ONCE
Status: COMPLETED | OUTPATIENT
Start: 2019-04-26 | End: 2019-04-26

## 2019-04-26 RX ORDER — KETOROLAC TROMETHAMINE 30 MG/ML
30 INJECTION, SOLUTION INTRAMUSCULAR; INTRAVENOUS ONCE
Status: CANCELLED
Start: 2019-04-26

## 2019-04-26 RX ADMIN — ONDANSETRON 8 MG: 2 INJECTION INTRAMUSCULAR; INTRAVENOUS at 08:19

## 2019-04-26 RX ADMIN — SODIUM CHLORIDE, POTASSIUM CHLORIDE, SODIUM LACTATE AND CALCIUM CHLORIDE 1000 ML: 600; 310; 30; 20 INJECTION, SOLUTION INTRAVENOUS at 08:19

## 2019-04-26 RX ADMIN — MORPHINE SULFATE 6 MG: 4 INJECTION, SOLUTION INTRAMUSCULAR; INTRAVENOUS at 08:40

## 2019-04-26 RX ADMIN — Medication 70 MG: at 08:32

## 2019-04-26 RX ADMIN — KETOROLAC TROMETHAMINE 30 MG: 30 INJECTION, SOLUTION INTRAMUSCULAR at 08:20

## 2019-04-26 RX ADMIN — METHOHEXITAL SODIUM 80 MG: 500 INJECTION, POWDER, LYOPHILIZED, FOR SOLUTION INTRAMUSCULAR; INTRAVENOUS; RECTAL at 08:32

## 2019-04-26 NOTE — IP AVS SNAPSHOT
MRN:[id number]                      After Visit Summary   4/26/2019    Mindi Eastman    MRN: [id number]           Visit Information        Provider Department      4/26/2019  7:45 AM Umang Kennedy MD [facility]           Review of your medicines      UNREVIEWED medicines. Ask your doctor about these medicines       Dose / Directions   sulfamethoxazole-trimethoprim 800-160 MG tablet  Commonly known as:  BACTRIM DS/SEPTRA DS  Used for:  Cutaneous abscess of right axilla  Ask about: Should I take this medication?      Dose:  1 tablet  Take 1 tablet by mouth 2 times daily for 10 days  Quantity:  20 tablet  Refills:  0        CONTINUE these medicines which have NOT CHANGED       Dose / Directions   * acetaminophen 325 MG tablet  Commonly known as:  TYLENOL  Used for:  Acute non intractable tension-type headache      Dose:  975 mg  Take 3 tablets (975 mg) by mouth every 6 hours as needed for mild pain or other (Give before coming down for ECT)  Quantity:  100 tablet  Refills:  1     * acetaminophen 500 MG tablet  Commonly known as:  TYLENOL  Used for:  Major depressive disorder, recurrent severe without psychotic features (H)      Dose:  500 mg  Take 1 tablet (500 mg) by mouth 3 times daily as needed for mild pain  Quantity:  100 tablet  Refills:  1     ADDERALL XR PO      Dose:  60 mg  Take 60 mg by mouth daily  Refills:  0     FLUoxetine 20 MG capsule  Commonly known as:  PROzac      Dose:  20 mg  Take 20 mg by mouth daily  Refills:  0     * ibuprofen 600 MG tablet  Commonly known as:  ADVIL/MOTRIN  Used for:  Acute non intractable tension-type headache      Dose:  600 mg  Take 1 tablet (600 mg) by mouth every 6 hours as needed for moderate pain  Quantity:  120 tablet  Refills:  1     * ibuprofen 800 MG tablet  Commonly known as:  ADVIL/MOTRIN  Used for:  Cutaneous abscess of right axilla      Dose:  800 mg  Take 1 tablet (800 mg) by mouth every 8 hours as needed for  moderate pain  Quantity:  40 tablet  Refills:  0     loratadine 10 MG tablet  Commonly known as:  CLARITIN      Dose:  10 mg  Take 10 mg by mouth daily  Refills:  0     LORazepam 1 MG tablet  Commonly known as:  ATIVAN  Used for:  SHANICE (generalized anxiety disorder)      Dose:  1 mg  Take 1 tablet (1 mg) by mouth 2 times daily  Quantity:  60 tablet  Refills:  0     lurasidone 40 MG Tabs tablet  Commonly known as:  LATUDA  Used for:  Bipolar I disorder (H), Major depressive disorder, recurrent severe without psychotic features (H)      Dose:  40 mg  Take 1 tablet (40 mg) by mouth At Bedtime  Quantity:  30 tablet  Refills:  1     nicotine 21 MG/24HR 24 hr patch  Commonly known as:  NICODERM CQ  Used for:  Tobacco use      Dose:  1 patch  Place 1 patch onto the skin daily  Quantity:  30 patch  Refills:  1     ondansetron 4 MG tablet  Commonly known as:  ZOFRAN  Used for:  Major depressive disorder, recurrent severe without psychotic features (H), Projectile vomiting with nausea      Dose:  4 mg  Take 1 tablet (4 mg) by mouth every 8 hours as needed for nausea  Quantity:  30 tablet  Refills:  0     QUEtiapine 100 MG tablet  Commonly known as:  SEROquel  Used for:  Bipolar I disorder (H), Major depressive disorder, recurrent severe without psychotic features (H)      Dose:  100 mg  Take 1 tablet (100 mg) by mouth At Bedtime  Quantity:  30 tablet  Refills:  1     zolpidem 10 MG tablet  Commonly known as:  AMBIEN  Used for:  Insomnia due to other mental disorder      Dose:  10 mg  Take 1 tablet (10 mg) by mouth At Bedtime  Quantity:  30 tablet  Refills:  0         * This list has 4 medication(s) that are the same as other medications prescribed for you. Read the directions carefully, and ask your doctor or other care provider to review them with you.                  Protect others around you: Learn how to safely use, store and throw away your medicines at www.disposemymeds.org.       Follow-ups after your visit       Your  next 10 appointments already scheduled    May 17, 2019  8:00 AM CDT  Electroconvulsive Therapy with Umang Kennedy MD  [facility] ([facility]) [address]  [facility] [id number] [phone]         Care Instructions       Further instructions from your care team       ECT Discharge Instructions      During your ECT series:      Do not drive or work heavy equipment until 7 days after your last treatment.    Do not drink alcohol or use street drugs (illicit drugs) while you are having treatments.    Do not make important decisions, including legal decisions.    After each treatment:      Get plenty of rest. A responsible adult must stay with your for at least 6 hours.    Avoid heavy or risky activities for 24 hours.    If you feel light-headed, sit for a few minutes before standing. Have someone help you get up.    If you have nausea (feel sick to your stomach): Drink only clear liquids such as apple juice, ginger ale, broth or 7UP, Be sure to drink plenty of liquids. Move to a normal diet as you feel able.     If you received Toradol, wait 6 hours before taking ibuprofen.    Call your doctor if:     You have a fever over 100F (37.7 C) (taken under the tongue), or a fever that last more than 24 hours.    Your IV site is red, swollen, very painful or is getting more tender.    You have nausea that gets very bad or does not improve.      If you have any symptoms after ECT, tell our staff before your next treatment.    The ECT Department can be reached at [phone].  The ECT Department is open Mondays, Wednesdays and Fridays from 7:00 AM to 2:00 PM.    To speak to a doctor, call: Dr. Kennedy Office # [phone] and Fax # [phone]    Other: You had Toradol at 8:20 AM which is an NSAID medication. Do not take any ibuprofen, motrin, aspirin or any other NSAID medication until after 2:20 PM. Questions, check with your physician or  "pharmacist.    Please take Extra Strength Tylenol 1000mg by mouth at home before coming to [facility].    Transported by: Mother, Ana      _________________________________________________  ________________________  Patient/Responsible party Signature      Date          ________________________________________________   ________________________  Nurse Signature        Date    Additional Information About Your Visit       MyChart Information    [facility] lets you send messages to your doctor, view your test results, renew your prescriptions, schedule appointments and more. To sign up, go to [email or web address] . Click on \"Log in\" on the left side of the screen, which will take you to the Welcome page. Then click on \"Sign up Now\" on the right side of the page.     You will be asked to enter the access code listed below, as well as some personal information. Please follow the directions to create your username and password.     Your access code is: [email or web address]  Expires: 2019  9:17 AM     Your access code will  in 60 days. If you need help or a new code, please call your [facility] clinic or [phone].       Care EveryWhere ID    This is your Care EveryWhere ID. This could be used by other organizations to access your [facility] medical records  [id number]       Your Vitals Were     Blood Pressure   130/82    Pulse   69    Temperature   96.7  F (35.9  C) (Tympanic)    Respirations   16    Pulse Oximetry   95%          Primary Care Provider Office Phone # Fax #    Robyn Smiley [phone]      Equal Access to Services    [place] ED[place]DS[place] : Evelia Irby, debbie henley, yamileth ortiz. Zaira [facility] [phone].    ATENCIÓN: Si habla español, tiene a martínez disposición servicios gratuitos de asistencia lingüística. Llame al [phone].    We comply with applicable federal civil rights laws and [place] laws. We do not " discriminate on the basis of race, color, national origin, age, disability, sex, sexual orientation, or gender identity.           Thank you!    Thank you for choosing [facility] for your care. Our goal is always to provide you with excellent care. Hearing back from our patients is one way we can continue to improve our services. Please take a few minutes to complete the written survey that you may receive in the mail after you visit with us. Thank you!            Medication List      Medications          Morning Afternoon Evening Bedtime As Needed    * acetaminophen 325 MG tablet  Also known as:  TYLENOL  INSTRUCTIONS:  Take 3 tablets (975 mg) by mouth every 6 hours as needed for mild pain or other (Give before coming down for ECT)                     * acetaminophen 500 MG tablet  Also known as:  TYLENOL  INSTRUCTIONS:  Take 1 tablet (500 mg) by mouth 3 times daily as needed for mild pain                     ADDERALL XR PO  INSTRUCTIONS:  Take 60 mg by mouth daily                     FLUoxetine 20 MG capsule  Also known as:  PROzac  INSTRUCTIONS:  Take 20 mg by mouth daily                     * ibuprofen 600 MG tablet  Also known as:  ADVIL/MOTRIN  INSTRUCTIONS:  Take 1 tablet (600 mg) by mouth every 6 hours as needed for moderate pain                     * ibuprofen 800 MG tablet  Also known as:  ADVIL/MOTRIN  INSTRUCTIONS:  Take 1 tablet (800 mg) by mouth every 8 hours as needed for moderate pain                     loratadine 10 MG tablet  Also known as:  CLARITIN  INSTRUCTIONS:  Take 10 mg by mouth daily                     LORazepam 1 MG tablet  Also known as:  ATIVAN  INSTRUCTIONS:  Take 1 tablet (1 mg) by mouth 2 times daily                     lurasidone 40 MG Tabs tablet  Also known as:  LATUDA  INSTRUCTIONS:  Take 1 tablet (40 mg) by mouth At Bedtime                     nicotine 21 MG/24HR 24 hr patch  Also known as:  NICODERM CQ  INSTRUCTIONS:  Place 1 patch onto the skin daily                      ondansetron 4 MG tablet  Also known as:  ZOFRAN  INSTRUCTIONS:  Take 1 tablet (4 mg) by mouth every 8 hours as needed for nausea  LAST TAKEN:  Ask your nurse or doctor                     QUEtiapine 100 MG tablet  Also known as:  SEROquel  INSTRUCTIONS:  Take 1 tablet (100 mg) by mouth At Bedtime                     zolpidem 10 MG tablet  Also known as:  AMBIEN  INSTRUCTIONS:  Take 1 tablet (10 mg) by mouth At Bedtime                        * This list has 4 medication(s) that are the same as other medications prescribed for you. Read the directions carefully, and ask your doctor or other care provider to review them with you.            ASK your doctor about these medications          Morning Afternoon Evening Bedtime As Needed    sulfamethoxazole-trimethoprim 800-160 MG tablet  Also known as:  BACTRIM DS/SEPTRA DS  INSTRUCTIONS:  Take 1 tablet by mouth 2 times daily for 10 days  Ask about: Should I take this medication?

## 2019-04-26 NOTE — PROGRESS NOTES
Patient meets recovery room discharge criteria. Pt discharged from recovery room via wheelchair to discharge room at  0920.  Ok to discharge pt per [facility].

## 2019-04-26 NOTE — IP AVS SNAPSHOT
[facility]  [facility] [address] [address]  Phone:  [phone]                                    After Visit Summary   4/26/2019    Mindi Eastman    MRN: [id number]           After Visit Summary Signature Page    I have received my discharge instructions, and my questions have been answered. I have discussed any challenges I see with this plan with the nurse or doctor.    ..........................................................................................................................................  Patient/Patient Representative Signature      ..........................................................................................................................................  Patient Representative Print Name and Relationship to Patient    ..................................................               ................................................  Date                                   Time    ..........................................................................................................................................  Reviewed by Signature/Title    ...................................................              ..............................................  Date                                               Time          22EPIC Rev 08/18

## 2019-04-26 NOTE — ANESTHESIA POSTPROCEDURE EVALUATION
Anesthesia POST Procedure Evaluation    Patient: Mindi Eastman   MRN:     [id number] Gender:   female   Age:    43 year old :      1975        Preoperative Diagnosis: * No pre-op diagnosis entered *   * No procedures listed *   Postop Comments: No value filed.       Anesthesia Type:  General  No value filed.    Reportable Event: NO     PAIN: Uncomplicated   Sign Out status: Comfortable, Well controlled pain     PONV: No PONV   Sign Out status:  No Nausea or Vomiting     Neuro/Psych: Uneventful perioperative course   Sign Out Status: Preoperative baseline; Age appropriate mentation     Airway/Resp.: Uneventful perioperative course   Sign Out Status: Non labored breathing, age appropriate RR; Resp. Status within EXPECTED Parameters     CV: Uneventful perioperative course   Sign Out status: Appropriate BP and perfusion indices; Appropriate HR/Rhythm     Disposition:   Sign Out in:  PACU  Disposition:  Phase II; Home  Recovery Course: Uneventful  Follow-Up: Not required           Last Anesthesia Record Vitals:  CRNA VITALS  2019 0814 - 2019 0914      2019             Resp Rate (set):  8          Last PACU Vitals:  No vitals data found for the desired time range.        Electronically Signed By: Lupe Chaney MD, 2019, 9:30 AM

## 2019-04-26 NOTE — ANESTHESIA PREPROCEDURE EVALUATION
Anesthesia Pre-Procedure Evaluation    Patient: Mindi Eastman   MRN:     [id number] Gender:   female   Age:    43 year old :      1975        Preoperative Diagnosis: * No pre-op diagnosis entered *   * No procedures listed *     Past Medical History:   Diagnosis Date     ADHD      Anxiety      Bipolar disorder (H)      Depressive disorder      OCD (obsessive compulsive disorder)      ROXANNE (obstructive sleep apnea)     On CPAP     Seasonal allergies       Past Surgical History:   Procedure Laterality Date     APPENDECTOMY       CARPAL TUNNEL RELEASE RT/LT Bilateral      CHOLECYSTECTOMY       EXTRACTION(S) DENTAL      [place] Teeth     HC KNEE SCOPE,MED/LAT MENISCUS REPAIR Right     X 2     TONSILLECTOMY            Anesthesia Evaluation     . Pt has had prior anesthetic. Type: General    No history of anesthetic complications          ROS/MED HX    ENT/Pulmonary:     (+)sleep apnea, uses CPAP , . .    Neurologic:  - neg neurologic ROS     Cardiovascular:  - neg cardiovascular ROS       METS/Exercise Tolerance:     Hematologic:  - neg hematologic  ROS       Musculoskeletal:  - neg musculoskeletal ROS       GI/Hepatic:  - neg GI/hepatic ROS       Renal/Genitourinary:  - ROS Renal section negative       Endo:  - neg endo ROS       Psychiatric:     (+) psychiatric history depression      Infectious Disease:  - neg infectious disease ROS       Malignancy:         Other:                         PHYSICAL EXAM:   Mental Status/Neuro: A/A/O   Airway: Facies: Feasible  Mallampati: I  Mouth/Opening: Full  TM distance: > 6 cm  Neck ROM: Full   Respiratory: Auscultation: CTAB     Resp. Rate: Normal     Resp. Effort: Normal      CV: Rhythm: Regular  Rate: Age appropriate  Heart: Normal Sounds   Comments:      Dental: Normal                  Lab Results   Component Value Date    WBC 6.3 10/05/2017    HGB 11.7 10/05/2017    HCT 35.8 10/05/2017     10/05/2017     10/05/2017    POTASSIUM 4.5 10/03/2018     "CHLORIDE 108 10/05/2017    CO2 28 10/05/2017    BUN 8 10/05/2017    CR 0.84 10/03/2018     (A) 10/03/2018    GALO 8.0 (L) 10/05/2017    ALBUMIN 3.2 (L) 10/05/2017    PROTTOTAL 6.2 (L) 10/05/2017    ALT 28 10/05/2017    AST 31 10/05/2017    ALKPHOS 68 10/05/2017    BILITOTAL 0.4 10/05/2017    PTT 38 (H) 06/06/2011    INR 0.99 06/06/2011    TSH 1.81 10/05/2017    HCG Negative 10/05/2017    HCGS Negative 06/06/2011       Preop Vitals  BP Readings from Last 3 Encounters:   04/26/19 138/79   04/12/19 113/61   03/29/19 (!) 150/91    Pulse Readings from Last 3 Encounters:   04/26/19 69   04/12/19 73   03/29/19 68      Resp Readings from Last 3 Encounters:   04/26/19 16   04/12/19 18   03/29/19 14    SpO2 Readings from Last 3 Encounters:   04/26/19 96%   04/12/19 93%   03/29/19 94%      Temp Readings from Last 1 Encounters:   04/26/19 36.3  C (97.3  F) (Tympanic)    Ht Readings from Last 1 Encounters:   10/16/17 1.676 m (5' 6\")      Wt Readings from Last 1 Encounters:   10/16/17 86.2 kg (190 lb)    Estimated body mass index is 30.67 kg/m  as calculated from the following:    Height as of 10/16/17: 1.676 m (5' 6\").    Weight as of 10/16/17: 86.2 kg (190 lb).     LDA:  Peripheral IV 04/26/19 Right Hand (Active)   Number of days: 0            Assessment:   ASA SCORE: 2       Documentation: H&P complete; Preop Testing complete; Consents complete   Proceeding: Proceed without further delay  Tobacco Use:  NO Active use of Tobacco/UNKNOWN Tobacco use status     Plan:   Anes. Type:  General      Induction:  IV (Standard)   Airway: Native Airway   Access/Monitoring: PIV   Maintenance: Balanced   Emergence: Procedure Site   Logistics: Same Day Surgery     Postop Pain/Sedation Strategy:  Standard-Options: Opioids PRN     PONV Management:  Adult Risk Factors: Female, Non-Smoker, Postop Opioids     CONSENT: Direct conversation   Plan and risks discussed with: Patient   Blood Products: Consent Deferred (Minimal Blood Loss) "                             Lupe Chaney MD

## 2019-04-26 NOTE — DISCHARGE INSTRUCTIONS
ECT Discharge Instructions      During your ECT series:      Do not drive or work heavy equipment until 7 days after your last treatment.    Do not drink alcohol or use street drugs (illicit drugs) while you are having treatments.    Do not make important decisions, including legal decisions.    After each treatment:      Get plenty of rest. A responsible adult must stay with your for at least 6 hours.    Avoid heavy or risky activities for 24 hours.    If you feel light-headed, sit for a few minutes before standing. Have someone help you get up.    If you have nausea (feel sick to your stomach): Drink only clear liquids such as apple juice, ginger ale, broth or 7UP, Be sure to drink plenty of liquids. Move to a normal diet as you feel able.     If you received Toradol, wait 6 hours before taking ibuprofen.    Call your doctor if:     You have a fever over 100F (37.7 C) (taken under the tongue), or a fever that last more than 24 hours.    Your IV site is red, swollen, very painful or is getting more tender.    You have nausea that gets very bad or does not improve.      If you have any symptoms after ECT, tell our staff before your next treatment.    The ECT Department can be reached at [phone].  The ECT Department is open Mondays, Wednesdays and Fridays from 7:00 AM to 2:00 PM.    To speak to a doctor, call: Dr. Kennedy Office # [phone] and Fax # [phone]    Other: You had Toradol at 8:20 AM which is an NSAID medication. Do not take any ibuprofen, motrin, aspirin or any other NSAID medication until after 2:20 PM. Questions, check with your physician or pharmacist.    Please take Extra Strength Tylenol 1000mg by mouth at home before coming to ECT.    Transported by: Mother, Ana      _________________________________________________  ________________________  Patient/Responsible party Signature      Date          ________________________________________________   ________________________  Nurse  Signature        Date

## 2019-04-26 NOTE — PROCEDURES
Procedure/Surgery Information   [facility], [place]    Bedside Procedure Note  Date of Service (when I performed the procedure): 04/26/2019    Mindi Eastman is a 42 year old female patient.  1. Major depressive disorder, recurrent severe without psychotic features (H)      Past Medical History:   Diagnosis Date     ADHD      Anxiety      Bipolar disorder (H)      Depressive disorder      OCD (obsessive compulsive disorder)      ROXANNE (obstructive sleep apnea)     On CPAP     Seasonal allergies      Temp: 97.3  F (36.3  C) Temp src: Tympanic BP: 138/79 Pulse: 69   Resp: 16 SpO2: 96 % O2 Device: None (Room air)      Procedures     [facility], [place]   ECT Procedure Note   04/26/2019       Mindi Eastman [id number]   42 year old 1975     Patient Status: Outpatient    Is this the first in a series of 12 treatments?  No    History and Physical: Reviewed in medical record    Consent: Informed consent was signed by: patient    Date Consent Signed: 1/25/19    Allergies   Allergen Reactions     Darvocet [Propoxyphene N-Apap]        Weight:  0 lbs 0 oz    Patient Preparations: Glasses/Contacts removed         Indications for ECT:   Medications ineffective, Psychotherapies ineffective and treatment resistant depression.         Clinical Narrative:   Patient is continuing ECT for depression.  NPO after 2400.  Alert and Oriented x 3.  No problems with the last ECT which was 2 weeks ago.  No med changes or health problems.   She wants to go out to a 3 week interval.  She's doing well on Prozac.             Diagnosis:      Major depressive disorder, recurrent severe without psychotic features (F33.2)         Pause for the Cause:     Right patient Yes   Right procedure/laterality settings: Yes          Intra-Procedure Documentation:       ECT #: 6 of 12   Treatment number this series:0 18   Total treatment number: 29     Type of ECT:  Right, unilateral  ultrabrief    ECT Medications:    Tylenol:  1000mg po at home  Toradol: 30mg  Zofran: 8mg   Brevital: 80mg  Succinyl Choline: 70mg  Morphine 6 mg in treatment room for headache   Lactated Ringers 1 liter      ECT Strip Summary:   Energy Level: 10 percent   Motor Seizure Duration:  55 seconds  EEG Seizure Duration:   79 seconds    Complications: No    Plan:    Take Extra Strength Tylenol 1000mg po at home before coming to ECT.    Next maintenance ECT is in 3 weeks on 5/17/19      Umang Kennedy MD

## 2019-05-17 ENCOUNTER — ANESTHESIA (OUTPATIENT)
Dept: BEHAVIORAL HEALTH | Facility: CLINIC | Age: 44
End: 2019-05-17
Payer: COMMERCIAL

## 2019-05-17 ENCOUNTER — ANESTHESIA EVENT (OUTPATIENT)
Dept: BEHAVIORAL HEALTH | Facility: CLINIC | Age: 44
End: 2019-05-17

## 2019-05-17 ENCOUNTER — HOSPITAL ENCOUNTER (OUTPATIENT)
Dept: BEHAVIORAL HEALTH | Facility: CLINIC | Age: 44
End: 2019-05-17
Attending: PSYCHIATRY & NEUROLOGY
Payer: COMMERCIAL

## 2019-05-17 VITALS
DIASTOLIC BLOOD PRESSURE: 84 MMHG | OXYGEN SATURATION: 92 % | TEMPERATURE: 96.4 F | RESPIRATION RATE: 16 BRPM | HEART RATE: 62 BPM | SYSTOLIC BLOOD PRESSURE: 151 MMHG

## 2019-05-17 DIAGNOSIS — F33.2 MAJOR DEPRESSIVE DISORDER, RECURRENT SEVERE WITHOUT PSYCHOTIC FEATURES (H): Primary | ICD-10-CM

## 2019-05-17 PROCEDURE — 37000008 ZZH ANESTHESIA TECHNICAL FEE, 1ST 30 MIN

## 2019-05-17 PROCEDURE — 90870 ELECTROCONVULSIVE THERAPY: CPT

## 2019-05-17 PROCEDURE — 25000128 H RX IP 250 OP 636: Performed by: ANESTHESIOLOGY

## 2019-05-17 PROCEDURE — 25000128 H RX IP 250 OP 636: Performed by: PSYCHIATRY & NEUROLOGY

## 2019-05-17 PROCEDURE — 25800030 ZZH RX IP 258 OP 636: Performed by: PSYCHIATRY & NEUROLOGY

## 2019-05-17 PROCEDURE — 25000125 ZZHC RX 250: Performed by: ANESTHESIOLOGY

## 2019-05-17 RX ORDER — MORPHINE SULFATE 4 MG/ML
6 INJECTION, SOLUTION INTRAMUSCULAR; INTRAVENOUS ONCE
Status: CANCELLED
Start: 2019-05-17

## 2019-05-17 RX ORDER — MORPHINE SULFATE 4 MG/ML
6 INJECTION, SOLUTION INTRAMUSCULAR; INTRAVENOUS ONCE
Status: COMPLETED | OUTPATIENT
Start: 2019-05-17 | End: 2019-05-17

## 2019-05-17 RX ORDER — ONDANSETRON 2 MG/ML
8 INJECTION INTRAMUSCULAR; INTRAVENOUS ONCE
Status: CANCELLED
Start: 2019-05-17

## 2019-05-17 RX ORDER — ONDANSETRON 2 MG/ML
8 INJECTION INTRAMUSCULAR; INTRAVENOUS ONCE
Status: COMPLETED | OUTPATIENT
Start: 2019-05-17 | End: 2019-05-17

## 2019-05-17 RX ORDER — KETOROLAC TROMETHAMINE 30 MG/ML
30 INJECTION, SOLUTION INTRAMUSCULAR; INTRAVENOUS ONCE
Status: COMPLETED | OUTPATIENT
Start: 2019-05-17 | End: 2019-05-17

## 2019-05-17 RX ORDER — KETOROLAC TROMETHAMINE 30 MG/ML
30 INJECTION, SOLUTION INTRAMUSCULAR; INTRAVENOUS ONCE
Status: CANCELLED
Start: 2019-05-17

## 2019-05-17 RX ADMIN — KETOROLAC TROMETHAMINE 30 MG: 30 INJECTION, SOLUTION INTRAMUSCULAR; INTRAVENOUS at 09:01

## 2019-05-17 RX ADMIN — ONDANSETRON 8 MG: 2 INJECTION INTRAMUSCULAR; INTRAVENOUS at 09:03

## 2019-05-17 RX ADMIN — METHOHEXITAL SODIUM 80 MG: 500 INJECTION, POWDER, LYOPHILIZED, FOR SOLUTION INTRAMUSCULAR; INTRAVENOUS; RECTAL at 09:09

## 2019-05-17 RX ADMIN — Medication 70 MG: at 09:09

## 2019-05-17 RX ADMIN — SODIUM CHLORIDE, POTASSIUM CHLORIDE, SODIUM LACTATE AND CALCIUM CHLORIDE 1000 ML: 600; 310; 30; 20 INJECTION, SOLUTION INTRAVENOUS at 09:00

## 2019-05-17 RX ADMIN — MORPHINE SULFATE 6 MG: 4 INJECTION, SOLUTION INTRAMUSCULAR; INTRAVENOUS at 09:12

## 2019-05-17 NOTE — ANESTHESIA POSTPROCEDURE EVALUATION
Anesthesia POST Procedure Evaluation    Patient: Mindi Eastman   MRN:     [id number] Gender:   female   Age:    43 year old :      1975        Preoperative Diagnosis: * No pre-op diagnosis entered *   * No procedures listed *   Postop Comments: No value filed.       Anesthesia Type:  General  No value filed.    Reportable Event: NO     PAIN: Uncomplicated   Sign Out status: Comfortable, Well controlled pain     PONV: No PONV   Sign Out status:  No Nausea or Vomiting     Neuro/Psych: Uneventful perioperative course   Sign Out Status: Preoperative baseline; Age appropriate mentation     Airway/Resp.: Uneventful perioperative course   Sign Out Status: Non labored breathing, age appropriate RR; Resp. Status within EXPECTED Parameters     CV: Uneventful perioperative course   Sign Out status: Appropriate BP and perfusion indices; Appropriate HR/Rhythm     Disposition:   Sign Out in:  PACU  Disposition:  Phase II; Home  Recovery Course: Uneventful  Follow-Up: Not required           Last Anesthesia Record Vitals:  CRNA VITALS  2019 0851 - 2019 0951      2019             Resp Rate (set):  8          Last PACU Vitals:  No vitals data found for the desired time range.        Electronically Signed By: Herminia Law MD, May 17, 2019, 2:26 PM

## 2019-05-17 NOTE — PROCEDURES
Procedure/Surgery Information   [facility], [place]    Bedside Procedure Note  Date of Service (when I performed the procedure): 05/17/2019    Mindi Eastman is a 42 year old female patient.  1. Major depressive disorder, recurrent severe without psychotic features (H)      Past Medical History:   Diagnosis Date     ADHD      Anxiety      Bipolar disorder (H)      Depressive disorder      OCD (obsessive compulsive disorder)      ROXANNE (obstructive sleep apnea)     On CPAP     Seasonal allergies      Temp: 97  F (36.1  C)   BP: 144/52 Pulse: 68   Resp: 16 SpO2: 98 %        Procedures     [facility], [place]   ECT Procedure Note   05/17/2019       Mindi Eastman [id number]   42 year old 1975     Patient Status: Outpatient    Is this the first in a series of 12 treatments?  No    History and Physical: Reviewed in medical record    Consent: Informed consent was signed by: patient    Date Consent Signed: 1/25/19    Allergies   Allergen Reactions     Darvocet [Propoxyphene N-Apap]        Weight:  0 lbs 0 oz    Patient Preparations: Glasses/Contacts removed         Indications for ECT:   Medications ineffective, Psychotherapies ineffective and treatment resistant depression.         Clinical Narrative:   Patient is continuing ECT for depression.  NPO after 2400.  Alert and Oriented x 3.  No problems with the last ECT which was 3 weeks ago.  Mood's good.  No med changes or health problems.   She's been laying softball.  No injuries.            Diagnosis:      Major depressive disorder, recurrent severe without psychotic features (F33.2)         Pause for the Cause:     Right patient Yes   Right procedure/laterality settings: Yes          Intra-Procedure Documentation:       ECT #: 7 of 12   Treatment number this series:0 19   Total treatment number: 30     Type of ECT:  Right, unilateral ultrabrief    ECT Medications:    Tylenol:  1000mg po at  home  Toradol: 30mg  Zofran: 8mg   Brevital: 80mg  Succinyl Choline: 70mg  Morphine 6 mg in treatment room for headache   Lactated Ringers 1 liter      ECT Strip Summary:   Energy Level: 10 percent   Motor Seizure Duration:  105 seconds  EEG Seizure Duration:   151 seconds    Complications: No    Plan:    Take Extra Strength Tylenol 1000mg po at home before coming to ECT.    Next maintenance ECT is in 3 weeks on 6/7/19      Umang Kennedy MD

## 2019-05-17 NOTE — IP AVS SNAPSHOT
MRN:[id number]                      After Visit Summary   5/17/2019    Mindi Eastman    MRN: [id number]           Visit Information        Provider Department      5/17/2019  8:00 AM Umang Kennedy MD [facility]           Review of your medicines      UNREVIEWED medicines. Ask your doctor about these medicines       Dose / Directions   sulfamethoxazole-trimethoprim 800-160 MG tablet  Commonly known as:  BACTRIM DS/SEPTRA DS  Used for:  Cutaneous abscess of right axilla  Ask about: Should I take this medication?      Dose:  1 tablet  Take 1 tablet by mouth 2 times daily for 10 days  Quantity:  20 tablet  Refills:  0        CONTINUE these medicines which have NOT CHANGED       Dose / Directions   * acetaminophen 325 MG tablet  Commonly known as:  TYLENOL  Used for:  Acute non intractable tension-type headache      Dose:  975 mg  Take 3 tablets (975 mg) by mouth every 6 hours as needed for mild pain or other (Give before coming down for ECT)  Quantity:  100 tablet  Refills:  1     * acetaminophen 500 MG tablet  Commonly known as:  TYLENOL  Used for:  Major depressive disorder, recurrent severe without psychotic features (H)      Dose:  500 mg  Take 1 tablet (500 mg) by mouth 3 times daily as needed for mild pain  Quantity:  100 tablet  Refills:  1     ADDERALL XR PO      Dose:  60 mg  Take 60 mg by mouth daily  Refills:  0     FLUoxetine 20 MG capsule  Commonly known as:  PROzac      Dose:  20 mg  Take 20 mg by mouth daily  Refills:  0     * ibuprofen 600 MG tablet  Commonly known as:  ADVIL/MOTRIN  Used for:  Acute non intractable tension-type headache      Dose:  600 mg  Take 1 tablet (600 mg) by mouth every 6 hours as needed for moderate pain  Quantity:  120 tablet  Refills:  1     * ibuprofen 800 MG tablet  Commonly known as:  ADVIL/MOTRIN  Used for:  Cutaneous abscess of right axilla      Dose:  800 mg  Take 1 tablet (800 mg) by mouth every 8 hours as needed for  moderate pain  Quantity:  40 tablet  Refills:  0     loratadine 10 MG tablet  Commonly known as:  CLARITIN      Dose:  10 mg  Take 10 mg by mouth daily  Refills:  0     LORazepam 1 MG tablet  Commonly known as:  ATIVAN  Used for:  SHANICE (generalized anxiety disorder)      Dose:  1 mg  Take 1 tablet (1 mg) by mouth 2 times daily  Quantity:  60 tablet  Refills:  0     lurasidone 40 MG Tabs tablet  Commonly known as:  LATUDA  Used for:  Bipolar I disorder (H), Major depressive disorder, recurrent severe without psychotic features (H)      Dose:  40 mg  Take 1 tablet (40 mg) by mouth At Bedtime  Quantity:  30 tablet  Refills:  1     nicotine 21 MG/24HR 24 hr patch  Commonly known as:  NICODERM CQ  Used for:  Tobacco use      Dose:  1 patch  Place 1 patch onto the skin daily  Quantity:  30 patch  Refills:  1     ondansetron 4 MG tablet  Commonly known as:  ZOFRAN  Used for:  Major depressive disorder, recurrent severe without psychotic features (H), Projectile vomiting with nausea      Dose:  4 mg  Take 1 tablet (4 mg) by mouth every 8 hours as needed for nausea  Quantity:  30 tablet  Refills:  0     QUEtiapine 100 MG tablet  Commonly known as:  SEROquel  Used for:  Bipolar I disorder (H), Major depressive disorder, recurrent severe without psychotic features (H)      Dose:  100 mg  Take 1 tablet (100 mg) by mouth At Bedtime  Quantity:  30 tablet  Refills:  1     zolpidem 10 MG tablet  Commonly known as:  AMBIEN  Used for:  Insomnia due to other mental disorder      Dose:  10 mg  Take 1 tablet (10 mg) by mouth At Bedtime  Quantity:  30 tablet  Refills:  0         * This list has 4 medication(s) that are the same as other medications prescribed for you. Read the directions carefully, and ask your doctor or other care provider to review them with you.                  Protect others around you: Learn how to safely use, store and throw away your medicines at www.disposemymeds.org.       Follow-ups after your visit       Your  next 10 appointments already scheduled    Jun 07, 2019  7:00 AM CDT  Electroconvulsive Therapy with Umang Kennedy MD  [facility] ([facility]) [address]  [facility] [id number] [phone]         Care Instructions       Further instructions from your care team       ECT Discharge Instructions      During your ECT series:      Do not drive or work heavy equipment until 7 days after your last treatment.    Do not drink alcohol or use street drugs (illicit drugs) while you are having treatments.    Do not make important decisions, including legal decisions.    During your ECT maintenance:    -Do not drive for 24 hours after treatment.  If you will be having more than one treatment within a week, no driving until 7 days after your last ECT treatment.        After each treatment:      Get plenty of rest. A responsible adult must stay with your for at least 6 hours.    Avoid heavy or risky activities for 24 hours.    If you feel light-headed, sit for a few minutes before standing. Have someone help you get up.    If you have nausea (feel sick to your stomach): Drink only clear liquids such as apple juice, ginger ale, broth or 7UP, Be sure to drink plenty of liquids. Move to a normal diet as you feel able.     If you received Toradol, wait 6 hours before taking ibuprofen.    Call your doctor if:     You have a fever over 100F (37.7 C) (taken under the tongue), or a fever that last more than 24 hours.    Your IV site is red, swollen, very painful or is getting more tender.    You have nausea that gets very bad or does not improve.      If you have any symptoms after ECT, tell our staff before your next treatment.    The ECT Department can be reached at [phone].  The ECT Department is open Mondays, Wednesdays and Fridays from 7:00 AM to 2:00 PM.    To speak to a doctor, call: Dr. Kennedy's office # [phone] Fax # [phone]    Today you  "received the following medications:      Morphine 6mg    Toradol 30mg at 9a. Toradol is an NSAID. Do not have other NSAID containing products until 3p. Additional NSAID containing products include: Josué, Aleve, Ibuprofen, Aleve, Aspirin, Advil, Naproxen, etc.  If you have any questions, please contact your primary care physician or a pharmacist.      Zofran 8mg at 9a & 1L of fluids (lactated ringers) for nausea    Transported by: Ana Mother    Additional Information About Your Visit       MyChart Information    [facility] lets you send messages to your doctor, view your test results, renew your prescriptions, schedule appointments and more. To sign up, go to [email or web address] . Click on \"Log in\" on the left side of the screen, which will take you to the Welcome page. Then click on \"Sign up Now\" on the right side of the page.     You will be asked to enter the access code listed below, as well as some personal information. Please follow the directions to create your username and password.     Your access code is: [email or web address]  Expires: 2019  9:17 AM     Your access code will  in 60 days. If you need help or a new code, please call your [facility] clinic or [phone].       Care EveryWhere ID    This is your Care EveryWhere ID. This could be used by other organizations to access your [facility] medical records  [id number]       Your Vitals Were     Blood Pressure   118/77    Pulse   68    Temperature   97.9  F (36.6  C) (Temporal)    Respirations   16    Pulse Oximetry   97%          Primary Care Provider Office Phone # Fax #    Robyn Smiley [phone]      Equal Access to Services    [facility]: Hadii aad leslie hadasho Soakinali, waaxda luqadaha, qaybta kaalmada adeshirazyaselvin, yamileth diehl . So [facility] [phone].    ATENCIÓN: Si habla español, tiene a martínez disposición servicios gratuitos de asistencia lingüística. Llame al [phone].    We comply " with applicable federal civil rights laws and [place] laws. We do not discriminate on the basis of race, color, national origin, age, disability, sex, sexual orientation, or gender identity.           Thank you!    Thank you for choosing [facility] for your care. Our goal is always to provide you with excellent care. Hearing back from our patients is one way we can continue to improve our services. Please take a few minutes to complete the written survey that you may receive in the mail after you visit with us. Thank you!            Medication List      Medications          Morning Afternoon Evening Bedtime As Needed    * acetaminophen 325 MG tablet  Also known as:  TYLENOL  INSTRUCTIONS:  Take 3 tablets (975 mg) by mouth every 6 hours as needed for mild pain or other (Give before coming down for ECT)                     * acetaminophen 500 MG tablet  Also known as:  TYLENOL  INSTRUCTIONS:  Take 1 tablet (500 mg) by mouth 3 times daily as needed for mild pain                     ADDERALL XR PO  INSTRUCTIONS:  Take 60 mg by mouth daily                     FLUoxetine 20 MG capsule  Also known as:  PROzac  INSTRUCTIONS:  Take 20 mg by mouth daily                     * ibuprofen 600 MG tablet  Also known as:  ADVIL/MOTRIN  INSTRUCTIONS:  Take 1 tablet (600 mg) by mouth every 6 hours as needed for moderate pain                     * ibuprofen 800 MG tablet  Also known as:  ADVIL/MOTRIN  INSTRUCTIONS:  Take 1 tablet (800 mg) by mouth every 8 hours as needed for moderate pain                     loratadine 10 MG tablet  Also known as:  CLARITIN  INSTRUCTIONS:  Take 10 mg by mouth daily                     LORazepam 1 MG tablet  Also known as:  ATIVAN  INSTRUCTIONS:  Take 1 tablet (1 mg) by mouth 2 times daily                     lurasidone 40 MG Tabs tablet  Also known as:  LATUDA  INSTRUCTIONS:  Take 1 tablet (40 mg) by mouth At Bedtime                     nicotine 21 MG/24HR 24 hr patch  Also known as:  NICODERM  CQ  INSTRUCTIONS:  Place 1 patch onto the skin daily                     ondansetron 4 MG tablet  Also known as:  ZOFRAN  INSTRUCTIONS:  Take 1 tablet (4 mg) by mouth every 8 hours as needed for nausea  LAST TAKEN:  Ask your nurse or doctor                     QUEtiapine 100 MG tablet  Also known as:  SEROquel  INSTRUCTIONS:  Take 1 tablet (100 mg) by mouth At Bedtime                     zolpidem 10 MG tablet  Also known as:  AMBIEN  INSTRUCTIONS:  Take 1 tablet (10 mg) by mouth At Bedtime                        * This list has 4 medication(s) that are the same as other medications prescribed for you. Read the directions carefully, and ask your doctor or other care provider to review them with you.            ASK your doctor about these medications          Morning Afternoon Evening Bedtime As Needed    sulfamethoxazole-trimethoprim 800-160 MG tablet  Also known as:  BACTRIM DS/SEPTRA DS  INSTRUCTIONS:  Take 1 tablet by mouth 2 times daily for 10 days  Ask about: Should I take this medication?

## 2019-05-17 NOTE — DISCHARGE INSTRUCTIONS
ECT Discharge Instructions      During your ECT series:      Do not drive or work heavy equipment until 7 days after your last treatment.    Do not drink alcohol or use street drugs (illicit drugs) while you are having treatments.    Do not make important decisions, including legal decisions.    During your ECT maintenance:    -Do not drive for 24 hours after treatment.  If you will be having more than one treatment within a week, no driving until 7 days after your last ECT treatment.        After each treatment:      Get plenty of rest. A responsible adult must stay with your for at least 6 hours.    Avoid heavy or risky activities for 24 hours.    If you feel light-headed, sit for a few minutes before standing. Have someone help you get up.    If you have nausea (feel sick to your stomach): Drink only clear liquids such as apple juice, ginger ale, broth or 7UP, Be sure to drink plenty of liquids. Move to a normal diet as you feel able.     If you received Toradol, wait 6 hours before taking ibuprofen.    Call your doctor if:     You have a fever over 100F (37.7 C) (taken under the tongue), or a fever that last more than 24 hours.    Your IV site is red, swollen, very painful or is getting more tender.    You have nausea that gets very bad or does not improve.      If you have any symptoms after ECT, tell our staff before your next treatment.    The ECT Department can be reached at [phone].  The ECT Department is open Mondays, Wednesdays and Fridays from 7:00 AM to 2:00 PM.    To speak to a doctor, call: Dr. Kennedy's office # [phone] Fax # [phone]    Today you received the following medications:      Morphine 6mg    Toradol 30mg at 9a. Toradol is an NSAID. Do not have other NSAID containing products until 3p. Additional NSAID containing products include: Josué, Aleve, Ibuprofen, Aleve, Aspirin, Advil, Naproxen, etc.  If you have any questions, please contact your primary care physician or a pharmacist.       Zofran 8mg at 9a & 1L of fluids (lactated ringers) for nausea    Transported by: Ana Mother

## 2019-05-17 NOTE — IP AVS SNAPSHOT
[facility]  [facility] [address] [address]  Phone:  [phone]                                    After Visit Summary   5/17/2019    Mindi Eastman    MRN: [id number]           After Visit Summary Signature Page    I have received my discharge instructions, and my questions have been answered. I have discussed any challenges I see with this plan with the nurse or doctor.    ..........................................................................................................................................  Patient/Patient Representative Signature      ..........................................................................................................................................  Patient Representative Print Name and Relationship to Patient    ..................................................               ................................................  Date                                   Time    ..........................................................................................................................................  Reviewed by Signature/Title    ...................................................              ..............................................  Date                                               Time          22EPIC Rev 08/18

## 2019-05-17 NOTE — ANESTHESIA PREPROCEDURE EVALUATION
Anesthesia Pre-Procedure Evaluation    Patient: Mindi Eastman   MRN:     [id number] Gender:   female   Age:    43 year old :      1975        Preoperative Diagnosis: * No pre-op diagnosis entered *   * No procedures listed *     Past Medical History:   Diagnosis Date     ADHD      Anxiety      Bipolar disorder (H)      Depressive disorder      OCD (obsessive compulsive disorder)      ROXANNE (obstructive sleep apnea)     On CPAP     Seasonal allergies       Past Surgical History:   Procedure Laterality Date     APPENDECTOMY       CARPAL TUNNEL RELEASE RT/LT Bilateral      CHOLECYSTECTOMY       EXTRACTION(S) DENTAL      [place] Teeth     HC KNEE SCOPE,MED/LAT MENISCUS REPAIR Right     X 2     TONSILLECTOMY            Anesthesia Evaluation     . Pt has had prior anesthetic. Type: General    No history of anesthetic complications          ROS/MED HX    ENT/Pulmonary:     (+)sleep apnea, uses CPAP , . .    Neurologic:  - neg neurologic ROS     Cardiovascular:  - neg cardiovascular ROS       METS/Exercise Tolerance:     Hematologic:  - neg hematologic  ROS       Musculoskeletal:  - neg musculoskeletal ROS       GI/Hepatic:  - neg GI/hepatic ROS       Renal/Genitourinary:  - ROS Renal section negative       Endo:  - neg endo ROS       Psychiatric:     (+) psychiatric history depression      Infectious Disease:  - neg infectious disease ROS       Malignancy:         Other:                         PHYSICAL EXAM:   Mental Status/Neuro: A/A/O   Airway: Facies: Feasible  Mallampati: I  Mouth/Opening: Full  TM distance: > 6 cm  Neck ROM: Full   Respiratory: Auscultation: CTAB     Resp. Rate: Normal     Resp. Effort: Normal      CV: Rhythm: Regular  Rate: Age appropriate  Heart: Normal Sounds   Comments:      Dental: Normal                  Lab Results   Component Value Date    WBC 6.3 10/05/2017    HGB 11.7 10/05/2017    HCT 35.8 10/05/2017     10/05/2017     10/05/2017    POTASSIUM 4.5 10/03/2018     "CHLORIDE 108 10/05/2017    CO2 28 10/05/2017    BUN 8 10/05/2017    CR 0.84 10/03/2018     (A) 10/03/2018    GALO 8.0 (L) 10/05/2017    ALBUMIN 3.2 (L) 10/05/2017    PROTTOTAL 6.2 (L) 10/05/2017    ALT 28 10/05/2017    AST 31 10/05/2017    ALKPHOS 68 10/05/2017    BILITOTAL 0.4 10/05/2017    PTT 38 (H) 06/06/2011    INR 0.99 06/06/2011    TSH 1.81 10/05/2017    HCG Negative 10/05/2017    HCGS Negative 06/06/2011       Preop Vitals  BP Readings from Last 3 Encounters:   05/17/19 144/52   04/26/19 133/85   04/12/19 113/61    Pulse Readings from Last 3 Encounters:   05/17/19 68   04/26/19 69   04/12/19 73      Resp Readings from Last 3 Encounters:   05/17/19 16   04/26/19 17   04/12/19 18    SpO2 Readings from Last 3 Encounters:   05/17/19 98%   04/26/19 95%   04/12/19 93%      Temp Readings from Last 1 Encounters:   05/17/19 36.1  C (97  F)    Ht Readings from Last 1 Encounters:   10/16/17 1.676 m (5' 6\")      Wt Readings from Last 1 Encounters:   10/16/17 86.2 kg (190 lb)    Estimated body mass index is 30.67 kg/m  as calculated from the following:    Height as of 10/16/17: 1.676 m (5' 6\").    Weight as of 10/16/17: 86.2 kg (190 lb).     LDA:  Peripheral IV 05/17/19 Right Hand (Active)   Number of days: 0            Assessment:   ASA SCORE: 2       Documentation: H&P complete; Preop Testing complete; Consents complete   Proceeding: Proceed without further delay  Tobacco Use:  NO Active use of Tobacco/UNKNOWN Tobacco use status     Plan:   Anes. Type:  General      Induction:  IV (Standard)   Airway: Native Airway   Access/Monitoring: PIV   Maintenance: Balanced   Emergence: Procedure Site   Logistics: Same Day Surgery     Postop Pain/Sedation Strategy:  Standard-Options: Opioids PRN     PONV Management:  Adult Risk Factors: Female, Non-Smoker, Postop Opioids     CONSENT: Direct conversation   Plan and risks discussed with: Patient   Blood Products: Consent Deferred (Minimal Blood Loss)                     "         Herminia Law MD

## 2019-06-07 ENCOUNTER — ANESTHESIA (OUTPATIENT)
Dept: BEHAVIORAL HEALTH | Facility: CLINIC | Age: 44
End: 2019-06-07
Payer: COMMERCIAL

## 2019-06-07 ENCOUNTER — HOSPITAL ENCOUNTER (OUTPATIENT)
Dept: BEHAVIORAL HEALTH | Facility: CLINIC | Age: 44
End: 2019-06-07
Attending: PSYCHIATRY & NEUROLOGY
Payer: COMMERCIAL

## 2019-06-07 ENCOUNTER — ANESTHESIA EVENT (OUTPATIENT)
Dept: BEHAVIORAL HEALTH | Facility: CLINIC | Age: 44
End: 2019-06-07

## 2019-06-07 VITALS
HEART RATE: 75 BPM | OXYGEN SATURATION: 99 % | DIASTOLIC BLOOD PRESSURE: 89 MMHG | TEMPERATURE: 97.4 F | SYSTOLIC BLOOD PRESSURE: 127 MMHG | RESPIRATION RATE: 16 BRPM

## 2019-06-07 DIAGNOSIS — F33.2 MAJOR DEPRESSIVE DISORDER, RECURRENT SEVERE WITHOUT PSYCHOTIC FEATURES (H): Primary | ICD-10-CM

## 2019-06-07 PROCEDURE — 25000125 ZZHC RX 250: Performed by: ANESTHESIOLOGY

## 2019-06-07 PROCEDURE — 90870 ELECTROCONVULSIVE THERAPY: CPT

## 2019-06-07 PROCEDURE — 90870 ELECTROCONVULSIVE THERAPY: CPT | Performed by: PSYCHIATRY & NEUROLOGY

## 2019-06-07 PROCEDURE — 37000008 ZZH ANESTHESIA TECHNICAL FEE, 1ST 30 MIN

## 2019-06-07 PROCEDURE — 25000128 H RX IP 250 OP 636: Performed by: ANESTHESIOLOGY

## 2019-06-07 PROCEDURE — 25800030 ZZH RX IP 258 OP 636: Performed by: PSYCHIATRY & NEUROLOGY

## 2019-06-07 PROCEDURE — 25000128 H RX IP 250 OP 636: Performed by: PSYCHIATRY & NEUROLOGY

## 2019-06-07 RX ORDER — MORPHINE SULFATE 4 MG/ML
6 INJECTION, SOLUTION INTRAMUSCULAR; INTRAVENOUS ONCE
Status: COMPLETED | OUTPATIENT
Start: 2019-06-07 | End: 2019-06-07

## 2019-06-07 RX ORDER — KETOROLAC TROMETHAMINE 30 MG/ML
30 INJECTION, SOLUTION INTRAMUSCULAR; INTRAVENOUS ONCE
Status: COMPLETED | OUTPATIENT
Start: 2019-06-07 | End: 2019-06-07

## 2019-06-07 RX ORDER — KETOROLAC TROMETHAMINE 30 MG/ML
30 INJECTION, SOLUTION INTRAMUSCULAR; INTRAVENOUS ONCE
Status: CANCELLED
Start: 2019-06-07

## 2019-06-07 RX ORDER — ONDANSETRON 2 MG/ML
8 INJECTION INTRAMUSCULAR; INTRAVENOUS ONCE
Status: COMPLETED | OUTPATIENT
Start: 2019-06-07 | End: 2019-06-07

## 2019-06-07 RX ORDER — MORPHINE SULFATE 4 MG/ML
6 INJECTION, SOLUTION INTRAMUSCULAR; INTRAVENOUS ONCE
Status: CANCELLED
Start: 2019-06-07

## 2019-06-07 RX ORDER — ONDANSETRON 2 MG/ML
8 INJECTION INTRAMUSCULAR; INTRAVENOUS ONCE
Status: CANCELLED
Start: 2019-06-07

## 2019-06-07 RX ADMIN — METHOHEXITAL SODIUM 80 MG: 500 INJECTION, POWDER, LYOPHILIZED, FOR SOLUTION INTRAMUSCULAR; INTRAVENOUS; RECTAL at 07:31

## 2019-06-07 RX ADMIN — ONDANSETRON 8 MG: 2 INJECTION INTRAMUSCULAR; INTRAVENOUS at 07:25

## 2019-06-07 RX ADMIN — MORPHINE SULFATE 6 MG: 4 INJECTION, SOLUTION INTRAMUSCULAR; INTRAVENOUS at 07:29

## 2019-06-07 RX ADMIN — SODIUM CHLORIDE, POTASSIUM CHLORIDE, SODIUM LACTATE AND CALCIUM CHLORIDE 1000 ML: 600; 310; 30; 20 INJECTION, SOLUTION INTRAVENOUS at 07:25

## 2019-06-07 RX ADMIN — Medication 70 MG: at 07:31

## 2019-06-07 RX ADMIN — KETOROLAC TROMETHAMINE 30 MG: 30 INJECTION, SOLUTION INTRAMUSCULAR; INTRAVENOUS at 07:25

## 2019-06-07 NOTE — DISCHARGE INSTRUCTIONS
ECT Discharge Instructions      During your ECT series:      Do not drive for 24 hours after treatment. If you will have more than one treatment within a week, no driving until 7 days after your last ECT treatment.     Do not drink alcohol or use street drugs (illicit drugs) while you are having treatments.    Do not make important decisions, including legal decisions.    After each treatment:      Get plenty of rest. A responsible adult must stay with your for at least 6 hours.    Avoid heavy or risky activities for 24 hours.    If you feel light-headed, sit for a few minutes before standing. Have someone help you get up.    If you have nausea (feel sick to your stomach): Drink only clear liquids such as apple juice, ginger ale, broth or 7UP, Be sure to drink plenty of liquids. Move to a normal diet as you feel able.     If you received Toradol, wait 6 hours before taking ibuprofen.    Call your doctor if:     You have a fever over 100F (37.7 C) (taken under the tongue), or a fever that last more than 24 hours.    Your IV site is red, swollen, very painful or is getting more tender.    You have nausea that gets very bad or does not improve.      If you have any symptoms after ECT, tell our staff before your next treatment.    The ECT Department can be reached at [phone].  The ECT Department is open Mondays, Wednesdays and Fridays from 7:00 AM to 2:00 PM.      To speak to a doctor, call: Dr. Kennedy at [phone]      Other:     You had Toradol at 7:25am. Which is an NSAID medication. Do not take any Ibuprofen, Excedrin, Motrin, Aleve, Advil, Asprin, or any other NSAID medication until after 1:25pm . Questions, check with your physician or pharmacist    Take Extra Strength Tylenol 1000mg po at home before coming to ECT.

## 2019-06-07 NOTE — ANESTHESIA POSTPROCEDURE EVALUATION
Anesthesia POST Procedure Evaluation    Patient: Mindi Eastman   MRN:     [id number] Gender:   female   Age:    43 year old :      1975        Preoperative Diagnosis: * No pre-op diagnosis entered *   * No procedures listed *   Postop Comments: No value filed.       Anesthesia Type:  General  No value filed.    Reportable Event: NO     PAIN: Uncomplicated   Sign Out status: Comfortable, Well controlled pain     PONV: No PONV   Sign Out status:  No Nausea or Vomiting     Neuro/Psych: Uneventful perioperative course   Sign Out Status: Preoperative baseline; Age appropriate mentation     Airway/Resp.: Uneventful perioperative course   Sign Out Status: Non labored breathing, age appropriate RR; Resp. Status within EXPECTED Parameters     CV: Uneventful perioperative course   Sign Out status: Appropriate BP and perfusion indices; Appropriate HR/Rhythm     Disposition:   Sign Out in:  PACU  Disposition:  Phase II; Home  Recovery Course: Uneventful  Follow-Up: Not required           Last Anesthesia Record Vitals:  CRNA VITALS  2019 0711 - 2019 0807      2019             NIBP:  133/84    Pulse:  70          Last PACU Vitals:  Vitals Value Taken Time   /89 2019  8:00 AM   Temp     Pulse     Resp 16 2019  8:00 AM   SpO2     Temp src     NIBP     Pulse     SpO2     Resp     Temp     Ht Rate     Temp 2           Electronically Signed By: Herminia Law MD, 2019, 8:07 AM

## 2019-06-07 NOTE — ANESTHESIA PREPROCEDURE EVALUATION
Anesthesia Pre-Procedure Evaluation    Patient: Mindi Eastman   MRN:     [id number] Gender:   female   Age:    43 year old :      1975        Preoperative Diagnosis: * No pre-op diagnosis entered *   * No procedures listed *     Past Medical History:   Diagnosis Date     ADHD      Anxiety      Bipolar disorder (H)      Depressive disorder      OCD (obsessive compulsive disorder)      ROXANNE (obstructive sleep apnea)     On CPAP     Seasonal allergies       Past Surgical History:   Procedure Laterality Date     APPENDECTOMY       CARPAL TUNNEL RELEASE RT/LT Bilateral      CHOLECYSTECTOMY       EXTRACTION(S) DENTAL      [place] Teeth     HC KNEE SCOPE,MED/LAT MENISCUS REPAIR Right     X 2     TONSILLECTOMY            Anesthesia Evaluation     . Pt has had prior anesthetic. Type: General    No history of anesthetic complications          ROS/MED HX    ENT/Pulmonary:     (+)sleep apnea, uses CPAP , . .    Neurologic:  - neg neurologic ROS     Cardiovascular:  - neg cardiovascular ROS       METS/Exercise Tolerance:     Hematologic:  - neg hematologic  ROS       Musculoskeletal:  - neg musculoskeletal ROS       GI/Hepatic:  - neg GI/hepatic ROS       Renal/Genitourinary:  - ROS Renal section negative       Endo:  - neg endo ROS       Psychiatric:     (+) psychiatric history depression      Infectious Disease:  - neg infectious disease ROS       Malignancy:         Other:                         PHYSICAL EXAM:   Mental Status/Neuro: A/A/O   Airway: Facies: Feasible  Mallampati: I  Mouth/Opening: Full  TM distance: > 6 cm  Neck ROM: Full   Respiratory: Auscultation: CTAB     Resp. Rate: Normal     Resp. Effort: Normal      CV: Rhythm: Regular  Rate: Age appropriate  Heart: Normal Sounds   Comments:      Dental: Normal                  Lab Results   Component Value Date    WBC 6.3 10/05/2017    HGB 11.7 10/05/2017    HCT 35.8 10/05/2017     10/05/2017     10/05/2017    POTASSIUM 4.5 10/03/2018     "CHLORIDE 108 10/05/2017    CO2 28 10/05/2017    BUN 8 10/05/2017    CR 0.84 10/03/2018     (A) 10/03/2018    GALO 8.0 (L) 10/05/2017    ALBUMIN 3.2 (L) 10/05/2017    PROTTOTAL 6.2 (L) 10/05/2017    ALT 28 10/05/2017    AST 31 10/05/2017    ALKPHOS 68 10/05/2017    BILITOTAL 0.4 10/05/2017    PTT 38 (H) 06/06/2011    INR 0.99 06/06/2011    TSH 1.81 10/05/2017    HCG Negative 10/05/2017    HCGS Negative 06/06/2011       Preop Vitals  BP Readings from Last 3 Encounters:   06/07/19 124/78   05/17/19 151/84   04/26/19 133/85    Pulse Readings from Last 3 Encounters:   06/07/19 63   05/17/19 62   04/26/19 69      Resp Readings from Last 3 Encounters:   06/07/19 16   05/17/19 16   04/26/19 17    SpO2 Readings from Last 3 Encounters:   06/07/19 97%   05/17/19 92%   04/26/19 95%      Temp Readings from Last 1 Encounters:   06/07/19 36.4  C (97.5  F) (Tympanic)    Ht Readings from Last 1 Encounters:   10/16/17 1.676 m (5' 6\")      Wt Readings from Last 1 Encounters:   10/16/17 86.2 kg (190 lb)    Estimated body mass index is 30.67 kg/m  as calculated from the following:    Height as of 10/16/17: 1.676 m (5' 6\").    Weight as of 10/16/17: 86.2 kg (190 lb).     LDA:            Assessment:   ASA SCORE: 2       Documentation: H&P complete; Preop Testing complete; Consents complete   Proceeding: Proceed without further delay  Tobacco Use:  NO Active use of Tobacco/UNKNOWN Tobacco use status     Plan:   Anes. Type:  General      Induction:  IV (Standard)   Airway: Native Airway   Access/Monitoring: PIV   Maintenance: Balanced   Emergence: Procedure Site   Logistics: Same Day Surgery     Postop Pain/Sedation Strategy:  Standard-Options: Opioids PRN     PONV Management:  Adult Risk Factors: Female, Non-Smoker, Postop Opioids     CONSENT: Direct conversation   Plan and risks discussed with: Patient   Blood Products: Consent Deferred (Minimal Blood Loss)                             Herminia Law MD  "

## 2019-06-07 NOTE — PROCEDURES
Procedure/Surgery Information   [facility], [place]    Bedside Procedure Note  Date of Service (when I performed the procedure): 06/07/2019    Mindi Eastman is a 42 year old female patient.  1. Major depressive disorder, recurrent severe without psychotic features (H)      Past Medical History:   Diagnosis Date     ADHD      Anxiety      Bipolar disorder (H)      Depressive disorder      OCD (obsessive compulsive disorder)      ROXANNE (obstructive sleep apnea)     On CPAP     Seasonal allergies      Temp: 97.5  F (36.4  C) Temp src: Tympanic BP: 124/78 Pulse: 63   Resp: 16 SpO2: 97 % O2 Device: None (Room air)      Procedures     [facility], [place]   ECT Procedure Note   06/07/2019       Mindi Eastman [id number]   42 year old 1975     Patient Status: Outpatient    Is this the first in a series of 12 treatments?  No    History and Physical: Reviewed in medical record    Consent: Informed consent was signed by: patient    Date Consent Signed: 1/25/19    Allergies   Allergen Reactions     Darvocet [Propoxyphene N-Apap]        Weight:  0 lbs 0 oz    Patient Preparations: Glasses/Contacts removed         Indications for ECT:   Medications ineffective, Psychotherapies ineffective and treatment resistant depression.         Clinical Narrative:   Patient is continuing ECT for depression.  NPO after 2400.  Alert and Oriented x 3.  No problems with the last ECT which was 3 weeks ago.  Mood's good.  No med changes or health problems.   She'd like to continue at current rate.          Diagnosis:      Major depressive disorder, recurrent severe without psychotic features (F33.2)         Pause for the Cause:     Right patient Yes   Right procedure/laterality settings: Yes          Intra-Procedure Documentation:       ECT #: 8 of 12   Treatment number this series:0 20   Total treatment number: 31     Type of ECT:  Right, unilateral ultrabrief    ECT Medications:     Tylenol:  1000mg po at home  Toradol: 30mg  Zofran: 8mg   Brevital: 80mg  Succinyl Choline: 70mg  Morphine 6 mg in treatment room for headache   Lactated Ringers 1 liter      ECT Strip Summary:   Energy Level: 10 percent   Motor Seizure Duration: 51 seconds  EEG Seizure Duration: 128 seconds    Complications: No    Plan:    Take Extra Strength Tylenol 1000mg po at home before coming to ECT.    Next maintenance ECT is in 3 weeks on 6/28/19      Alvarez Solares MD

## 2019-06-07 NOTE — ADDENDUM NOTE
Addendum  created 06/07/19 1038 by Germán Morrissey APRN CRNA    Intraprocedure Flowsheets edited

## 2019-06-07 NOTE — IP AVS SNAPSHOT
[facility]  [facility] [address] [address]  Phone:  [phone]                                    After Visit Summary   6/7/2019    Mindi Eastman    MRN: [id number]           After Visit Summary Signature Page    I have received my discharge instructions, and my questions have been answered. I have discussed any challenges I see with this plan with the nurse or doctor.    ..........................................................................................................................................  Patient/Patient Representative Signature      ..........................................................................................................................................  Patient Representative Print Name and Relationship to Patient    ..................................................               ................................................  Date                                   Time    ..........................................................................................................................................  Reviewed by Signature/Title    ...................................................              ..............................................  Date                                               Time          22EPIC Rev 08/18

## 2019-06-16 ENCOUNTER — HOSPITAL ENCOUNTER (INPATIENT)
Facility: CLINIC | Age: 44
LOS: 4 days | Discharge: HOME | End: 2019-06-20
Attending: PSYCHIATRY & NEUROLOGY | Admitting: PSYCHIATRY & NEUROLOGY
Payer: COMMERCIAL

## 2019-06-16 DIAGNOSIS — F10.920 ALCOHOL INTOXICATION, UNCOMPLICATED (H): ICD-10-CM

## 2019-06-16 DIAGNOSIS — R45.851 SUICIDAL IDEATION: ICD-10-CM

## 2019-06-16 DIAGNOSIS — F33.2 MAJOR DEPRESSIVE DISORDER, RECURRENT SEVERE WITHOUT PSYCHOTIC FEATURES (H): Primary | ICD-10-CM

## 2019-06-16 DIAGNOSIS — F10.129 ALCOHOL ABUSE WITH INTOXICATION (H): ICD-10-CM

## 2019-06-16 DIAGNOSIS — F41.8 DEPRESSION WITH ANXIETY: ICD-10-CM

## 2019-06-16 LAB
ALCOHOL BREATH TEST: 0.1 (ref 0–0.01)
ALCOHOL BREATH TEST: 0.15 (ref 0–0.01)
AMPHETAMINES UR QL SCN: POSITIVE
BARBITURATES UR QL: NEGATIVE
BENZODIAZ UR QL: NEGATIVE
CANNABINOIDS UR QL SCN: POSITIVE
COCAINE UR QL: NEGATIVE
ETHANOL UR QL SCN: POSITIVE
HCG UR QL: NEGATIVE
OPIATES UR QL SCN: NEGATIVE

## 2019-06-16 PROCEDURE — 82075 ASSAY OF BREATH ETHANOL: CPT | Performed by: PSYCHIATRY & NEUROLOGY

## 2019-06-16 PROCEDURE — 80307 DRUG TEST PRSMV CHEM ANLYZR: CPT | Performed by: PSYCHIATRY & NEUROLOGY

## 2019-06-16 PROCEDURE — 25000131 ZZH RX MED GY IP 250 OP 636 PS 637

## 2019-06-16 PROCEDURE — 99285 EMERGENCY DEPT VISIT HI MDM: CPT | Mod: 25 | Performed by: PSYCHIATRY & NEUROLOGY

## 2019-06-16 PROCEDURE — 80320 DRUG SCREEN QUANTALCOHOLS: CPT | Performed by: PSYCHIATRY & NEUROLOGY

## 2019-06-16 PROCEDURE — 25000132 ZZH RX MED GY IP 250 OP 250 PS 637: Performed by: PSYCHIATRY & NEUROLOGY

## 2019-06-16 PROCEDURE — 25000132 ZZH RX MED GY IP 250 OP 250 PS 637

## 2019-06-16 PROCEDURE — 81025 URINE PREGNANCY TEST: CPT | Performed by: PSYCHIATRY & NEUROLOGY

## 2019-06-16 PROCEDURE — 82075 ASSAY OF BREATH ETHANOL: CPT | Mod: 91 | Performed by: PSYCHIATRY & NEUROLOGY

## 2019-06-16 PROCEDURE — 90791 PSYCH DIAGNOSTIC EVALUATION: CPT

## 2019-06-16 PROCEDURE — 12400001 ZZH R&B MH UMMC

## 2019-06-16 PROCEDURE — 99285 EMERGENCY DEPT VISIT HI MDM: CPT | Mod: Z6 | Performed by: PSYCHIATRY & NEUROLOGY

## 2019-06-16 RX ORDER — ACETAMINOPHEN 500 MG
1000 TABLET ORAL PRN
COMMUNITY
End: 2023-01-01

## 2019-06-16 RX ORDER — QUETIAPINE FUMARATE 100 MG/1
100 TABLET, FILM COATED ORAL AT BEDTIME
Status: DISCONTINUED | OUTPATIENT
Start: 2019-06-16 | End: 2019-06-20 | Stop reason: HOSPADM

## 2019-06-16 RX ORDER — HYDROXYZINE HYDROCHLORIDE 25 MG/1
25 TABLET, FILM COATED ORAL EVERY 4 HOURS PRN
Status: DISCONTINUED | OUTPATIENT
Start: 2019-06-16 | End: 2019-06-20 | Stop reason: HOSPADM

## 2019-06-16 RX ORDER — DEXTROAMPHETAMINE SACCHARATE, AMPHETAMINE ASPARTATE, DEXTROAMPHETAMINE SULFATE AND AMPHETAMINE SULFATE 5; 5; 5; 5 MG/1; MG/1; MG/1; MG/1
20 TABLET ORAL 3 TIMES DAILY
COMMUNITY
End: 2019-10-11

## 2019-06-16 RX ORDER — LORAZEPAM 1 MG/1
1 TABLET ORAL 2 TIMES DAILY PRN
Status: DISCONTINUED | OUTPATIENT
Start: 2019-06-16 | End: 2019-06-20 | Stop reason: HOSPADM

## 2019-06-16 RX ORDER — ZOLPIDEM TARTRATE 5 MG/1
10 TABLET ORAL
Status: DISCONTINUED | OUTPATIENT
Start: 2019-06-16 | End: 2019-06-20 | Stop reason: HOSPADM

## 2019-06-16 RX ORDER — DEXTROAMPHETAMINE SACCHARATE, AMPHETAMINE ASPARTATE, DEXTROAMPHETAMINE SULFATE AND AMPHETAMINE SULFATE 5; 5; 5; 5 MG/1; MG/1; MG/1; MG/1
20 TABLET ORAL 3 TIMES DAILY
Status: DISCONTINUED | OUTPATIENT
Start: 2019-06-17 | End: 2019-06-20 | Stop reason: HOSPADM

## 2019-06-16 RX ORDER — ACETAMINOPHEN 325 MG/1
650 TABLET ORAL EVERY 4 HOURS PRN
Status: DISCONTINUED | OUTPATIENT
Start: 2019-06-16 | End: 2019-06-20 | Stop reason: HOSPADM

## 2019-06-16 RX ORDER — ALUMINA, MAGNESIA, AND SIMETHICONE 2400; 2400; 240 MG/30ML; MG/30ML; MG/30ML
30 SUSPENSION ORAL EVERY 4 HOURS PRN
Status: DISCONTINUED | OUTPATIENT
Start: 2019-06-16 | End: 2019-06-20 | Stop reason: HOSPADM

## 2019-06-16 RX ORDER — NICOTINE 21 MG/24HR
1 PATCH, TRANSDERMAL 24 HOURS TRANSDERMAL DAILY
Status: DISCONTINUED | OUTPATIENT
Start: 2019-06-17 | End: 2019-06-20 | Stop reason: HOSPADM

## 2019-06-16 RX ORDER — ONDANSETRON 4 MG/1
4 TABLET, FILM COATED ORAL EVERY 8 HOURS PRN
Status: DISCONTINUED | OUTPATIENT
Start: 2019-06-16 | End: 2019-06-20 | Stop reason: HOSPADM

## 2019-06-16 RX ORDER — IBUPROFEN 600 MG/1
600 TABLET, FILM COATED ORAL ONCE
Status: COMPLETED | OUTPATIENT
Start: 2019-06-16 | End: 2019-06-16

## 2019-06-16 RX ORDER — IBUPROFEN 600 MG/1
600 TABLET, FILM COATED ORAL EVERY 6 HOURS PRN
Status: DISCONTINUED | OUTPATIENT
Start: 2019-06-16 | End: 2019-06-20 | Stop reason: HOSPADM

## 2019-06-16 RX ORDER — LURASIDONE HYDROCHLORIDE 60 MG/1
80 TABLET, FILM COATED ORAL
COMMUNITY
End: 2020-02-14

## 2019-06-16 RX ORDER — LORAZEPAM 1 MG/1
1-4 TABLET ORAL EVERY 30 MIN PRN
Status: DISCONTINUED | OUTPATIENT
Start: 2019-06-16 | End: 2019-06-20 | Stop reason: HOSPADM

## 2019-06-16 RX ORDER — NICOTINE 21 MG/24HR
1 PATCH, TRANSDERMAL 24 HOURS TRANSDERMAL ONCE
Status: COMPLETED | OUTPATIENT
Start: 2019-06-16 | End: 2019-06-16

## 2019-06-16 RX ADMIN — ACETAMINOPHEN 650 MG: 325 TABLET, FILM COATED ORAL at 21:34

## 2019-06-16 RX ADMIN — LORAZEPAM 1 MG: 1 TABLET ORAL at 23:05

## 2019-06-16 RX ADMIN — LURASIDONE HYDROCHLORIDE 60 MG: 40 TABLET, FILM COATED ORAL at 21:33

## 2019-06-16 RX ADMIN — ONDANSETRON HYDROCHLORIDE 4 MG: 4 TABLET, FILM COATED ORAL at 23:00

## 2019-06-16 RX ADMIN — NICOTINE 1 PATCH: 21 PATCH, EXTENDED RELEASE TRANSDERMAL at 18:40

## 2019-06-16 RX ADMIN — IBUPROFEN 600 MG: 600 TABLET ORAL at 16:29

## 2019-06-16 RX ADMIN — LORAZEPAM 1 MG: 1 TABLET ORAL at 21:33

## 2019-06-16 RX ADMIN — QUETIAPINE FUMARATE 100 MG: 100 TABLET ORAL at 21:33

## 2019-06-16 ASSESSMENT — ENCOUNTER SYMPTOMS
DYSPHORIC MOOD: 1
FEVER: 0
CHEST TIGHTNESS: 0
HALLUCINATIONS: 0
DIZZINESS: 0
NERVOUS/ANXIOUS: 1
DECREASED CONCENTRATION: 1
BACK PAIN: 0
SLEEP DISTURBANCE: 1
SHORTNESS OF BREATH: 0
ABDOMINAL PAIN: 0

## 2019-06-16 ASSESSMENT — ACTIVITIES OF DAILY LIVING (ADL)
TRANSFERRING: 0-->INDEPENDENT
BATHING: 0-->INDEPENDENT
DRESS: 0-->INDEPENDENT
AMBULATION: 0-->INDEPENDENT
RETIRED_COMMUNICATION: 0-->UNDERSTANDS/COMMUNICATES WITHOUT DIFFICULTY
TOILETING: 0-->INDEPENDENT
FALL_HISTORY_WITHIN_LAST_SIX_MONTHS: YES
COGNITION: 0 - NO COGNITION ISSUES REPORTED
RETIRED_EATING: 0-->INDEPENDENT
SWALLOWING: 0-->SWALLOWS FOODS/LIQUIDS WITHOUT DIFFICULTY

## 2019-06-16 ASSESSMENT — MIFFLIN-ST. JEOR: SCORE: 1622.96

## 2019-06-16 NOTE — ED NOTES
Bed: ED16A  Expected date:   Expected time:   Means of arrival:   Comments:  [address], 44 yo F, SI, on a hold, ETA 6215

## 2019-06-16 NOTE — ED NOTES
Pt c/o Ibuprofen not helping with her LO. Dr. Wheeler notified. Per Dr. Wheeler, pt to drink more water to help with HA. PT informed and given more water.Pt has no other complaints at this time.

## 2019-06-16 NOTE — ED PROVIDER NOTES
[facility] EMERGENCY DEPARTMENT ([facility])    6/16/19        History     Chief Complaint   Patient presents with     Suicidal     Pt was playing softball with her friends.  Talked to her mother on the phone and after that became upset and friends called 911.  Pt has Hx of bipolar.     The history is provided by the patient and medical records.     Mindi Eastman is a 43 year old female with a past medical history significant for ADHD, MDD, and bipolar 1 disorder who presents here to the Emergency Department via EMS due to suicidal ideations. Patient reports that she was at a softball game earlier today and told her friend about her SI and her friend called 911. Patient reports that her mother was texting her during the game and was saying she was disappointed in her because she wasn't spending time with her step father. Patient does report drinking 6 beers during her softball game.  This is not the norm for her. Denies other drug use. Patient notes that she has been having increased trouble sleeping and racing thoughts the past week. Patient states she has a plan to shoot herself with her 22 that she keeps under her bed. Patient reports that she lives at home with her dog and works 50 hours a week. Patient currently receives ECT maintenance with her last being 6/7/2019 and her next is scheduled for 6/28/2019.  Patient currently receives outpatient psychiatry and therapy but has not seen her therapist in several months.    Please see the 's assessment in [facility] from today (6/16/19) for further details.    I have reviewed the Medications, Allergies, Past Medical and Surgical History, and Social History in the [employer] system.    Past Medical History:   Diagnosis Date     ADHD      Anxiety      Bipolar disorder (H)      Depressive disorder      OCD (obsessive compulsive disorder)      ROXANNE (obstructive sleep apnea)     On CPAP     Seasonal allergies        Past Surgical History:   Procedure Laterality  Date     APPENDECTOMY       CARPAL TUNNEL RELEASE RT/LT Bilateral      CHOLECYSTECTOMY       EXTRACTION(S) DENTAL      [place] Teeth     HC KNEE SCOPE,MED/LAT MENISCUS REPAIR Right     X 2     TONSILLECTOMY         History reviewed. No pertinent family history.    Social History     Tobacco Use     Smoking status: Former Smoker     Types: Cigarettes     Last attempt to quit: 2010     Years since quittin.3     Smokeless tobacco: Never Used     Tobacco comment: e cig    Substance Use Topics     Alcohol use: Yes     Comment: occ       Current Facility-Administered Medications   Medication     ibuprofen (ADVIL/MOTRIN) tablet 600 mg     Current Outpatient Medications   Medication     acetaminophen (TYLENOL) 325 MG tablet     Amphetamine-Dextroamphetamine (ADDERALL XR PO)     FLUoxetine (PROZAC) 20 MG capsule     LORazepam (ATIVAN) 1 MG tablet     lurasidone (LATUDA) 40 MG TABS tablet     ondansetron (ZOFRAN) 4 MG tablet     QUEtiapine (SEROQUEL) 100 MG tablet     zolpidem (AMBIEN) 10 MG tablet     acetaminophen (TYLENOL) 500 MG tablet     ibuprofen (ADVIL/MOTRIN) 600 MG tablet     ibuprofen (ADVIL/MOTRIN) 800 MG tablet     loratadine (CLARITIN) 10 MG tablet     nicotine (NICODERM CQ) 21 MG/24HR 24 hr patch        Allergies   Allergen Reactions     Darvocet [Propoxyphene N-Apap]          Review of Systems   Constitutional: Negative for fever.   Eyes: Negative for visual disturbance.   Respiratory: Negative for chest tightness and shortness of breath.    Cardiovascular: Negative for chest pain.   Gastrointestinal: Negative for abdominal pain.   Musculoskeletal: Negative for back pain.   Neurological: Negative for dizziness.   Psychiatric/Behavioral: Positive for decreased concentration, dysphoric mood, sleep disturbance and suicidal ideas. Negative for hallucinations and self-injury. The patient is nervous/anxious.    All other systems reviewed and are negative.      Physical Exam   BP: (!) 144/96  Pulse: 90  Temp:  96.5  F (35.8  C)  Resp: 16  Weight: 94.8 kg (209 lb 1 oz)  SpO2: 97 %      Physical Exam   Constitutional: She is oriented to person, place, and time. She appears well-developed and well-nourished.   Cardiovascular: Normal rate, regular rhythm and normal heart sounds.   Pulmonary/Chest: Effort normal and breath sounds normal. No respiratory distress.   Neurological: She is alert and oriented to person, place, and time.   Psychiatric: Her speech is normal and behavior is normal. Judgment normal. Her mood appears anxious. She is not actively hallucinating. Thought content is not paranoid and not delusional. Cognition and memory are normal. She exhibits a depressed mood. She expresses suicidal ideation. She expresses no homicidal ideation. She expresses suicidal plans. She expresses no homicidal plans.   Mindi is a 42 y/o female who looks her age.  She is well groomed with good eye contact.   Nursing note and vitals reviewed.      ED Course        Procedures               Labs Ordered and Resulted from Time of ED Arrival Up to the Time of Departure from the ED   ALCOHOL BREATH TEST POCT - Abnormal; Notable for the following components:       Result Value    Alcohol Breath Test 0.148 (*)     All other components within normal limits   DRUG ABUSE SCREEN 6 CHEM DEP URINE ([facility])   HCG QUALITATIVE URINE            Assessments & Plan (with Medical Decision Making)   Mindi will be admitted due to her worsening depression with suicidal thoughts with a plan and means.  She was given some time to sober up to see if this was the cause of the suicidal thoughts. She was still suicidal. She will go to station 20 under Dr. Johnson.    I have reviewed the nursing notes.    I have reviewed the findings, diagnosis, plan and need for follow up with the patient.       Medication List      There are no discharge medications for this visit.         Final diagnoses:   None     IAkira, am serving as a trained medical scribe to  document services personally performed by Jeet Wheeler MD, based on the provider's statements to me.   I, Jeet Wheeler MD, was physically present and have reviewed and verified the accuracy of this note documented by Akira Daily.    6/16/2019   [facility], [place], EMERGENCY DEPARTMENT     Chemo hWeeler MD  06/16/19 1922

## 2019-06-16 NOTE — ED NOTES
I have performed an in person assessment of the patient. Based on this assessment the patient no longer requires a one on one attendant at this point in time.    Chemo Wheeler MD  4:10 PM  June 16, 2019         Chemo Wheeler MD  06/16/19 0412

## 2019-06-16 NOTE — PHARMACY-ADMISSION MEDICATION HISTORY
Admission medication history for the June 16, 2019 admission has been completed by pharmacy.     Interview sources:  pharmacy fill records and the patient    Reliability of source: good, pt report consistent with pharmacy data    Medication compliance: good    Current Outpatient Pharmacy: [facility] in [place]    Changes made to PTA medication list:  Added: none  Deleted: ibuprofen prn and loratadine 10mg daily (pt reports not taking)  Changed:   1) APAP 500mg PO TID PRN pain --> 1000mg as needed prior to ECT   2) Adderall XR 60mg daily --> Adderall 20mg PO TID (per refill data)     Additional medication history information:   [place]  Reviewed:  1) Adderall 20mg tablets dispensed on 6-13-19 for quantity #90 tablets (30 day supply)   2) Zolpidem 10mg tablets dispensed on 5-20-19 for quantity #30 tablets (30 day supply)   3) Lorazepam 1mg tablets dispensed on 5-20-19 for quantity #60 tablets (30 day supply)     Prior to Admission Medication List:  Prior to Admission Medications   Prescriptions Last Dose Informant  Taking?   FLUoxetine (PROZAC) 20 MG capsule 6/16/2019 Pharmacy  Yes   Sig: Take 20 mg by mouth daily   LORazepam (ATIVAN) 1 MG tablet 6/15/2019 Pharmacy  Yes   Sig: Take 1 mg by mouth 2 times daily as needed for anxiety    QUEtiapine (SEROQUEL) 100 MG tablet 6/15/2019 Pharmacy  Yes   Sig: Take 1 tablet (100 mg) by mouth At Bedtime   acetaminophen (TYLENOL) 500 MG tablet Past Month Self  Yes   Sig: Take 1,000 mg by mouth as needed (headache) Prior to ECT   amphetamine-dextroamphetamine (ADDERALL) 20 MG tablet 6/16/2019 at x2 Pharmacy  Yes   Sig: Take 20 mg by mouth 3 times daily   lurasidone (LATUDA) 60 MG TABS tablet 6/16/2019 Pharmacy  Yes   Sig: Take 60 mg by mouth daily with food   nicotine (NICODERM CQ) 21 MG/24HR 24 hr patch 6/15/2019 Self  Yes   Sig: Place 1 patch onto the skin daily   ondansetron (ZOFRAN) 4 MG tablet 6/15/2019 Self  Yes   Sig: Take 1 tablet (4 mg) by mouth every 8 hours as needed for  nausea   zolpidem (AMBIEN) 10 MG tablet 6/15/2019 Pharmacy  Yes   Sig: Take 1 tablet (10 mg) by mouth At Bedtime      Facility-Administered Medications: None     Time spent: 30 minutes    Medication history completed by:   Natalie Weaver, PharmD, BCPP  Behavioral Health Pharmacist  [facility]  Available daily from 1-9 PM: phone [phone], ascom *[phone], pager [phone]

## 2019-06-17 LAB
ALBUMIN SERPL-MCNC: 4 G/DL (ref 3.4–5)
ALP SERPL-CCNC: 86 U/L (ref 40–150)
ALT SERPL W P-5'-P-CCNC: 21 U/L (ref 0–50)
ANION GAP SERPL CALCULATED.3IONS-SCNC: 9 MMOL/L (ref 3–14)
AST SERPL W P-5'-P-CCNC: 23 U/L (ref 0–45)
BILIRUB SERPL-MCNC: 0.7 MG/DL (ref 0.2–1.3)
BUN SERPL-MCNC: 9 MG/DL (ref 7–30)
CALCIUM SERPL-MCNC: 8.4 MG/DL (ref 8.5–10.1)
CHLORIDE SERPL-SCNC: 103 MMOL/L (ref 94–109)
CHOLEST SERPL-MCNC: 220 MG/DL
CO2 SERPL-SCNC: 26 MMOL/L (ref 20–32)
CREAT SERPL-MCNC: 0.84 MG/DL (ref 0.52–1.04)
DEPRECATED CALCIDIOL+CALCIFEROL SERPL-MC: 44 UG/L (ref 20–75)
ERYTHROCYTE [DISTWIDTH] IN BLOOD BY AUTOMATED COUNT: 13.3 % (ref 10–15)
GFR SERPL CREATININE-BSD FRML MDRD: 85 ML/MIN/{1.73_M2}
GLUCOSE SERPL-MCNC: 112 MG/DL (ref 70–99)
HCT VFR BLD AUTO: 40.8 % (ref 35–47)
HDLC SERPL-MCNC: 107 MG/DL
HGB BLD-MCNC: 13.5 G/DL (ref 11.7–15.7)
LDLC SERPL CALC-MCNC: 98 MG/DL
MCH RBC QN AUTO: 30.3 PG (ref 26.5–33)
MCHC RBC AUTO-ENTMCNC: 33.1 G/DL (ref 31.5–36.5)
MCV RBC AUTO: 92 FL (ref 78–100)
NONHDLC SERPL-MCNC: 113 MG/DL
PLATELET # BLD AUTO: 406 10E9/L (ref 150–450)
POTASSIUM SERPL-SCNC: 3.7 MMOL/L (ref 3.4–5.3)
PROT SERPL-MCNC: 7.4 G/DL (ref 6.8–8.8)
RBC # BLD AUTO: 4.45 10E12/L (ref 3.8–5.2)
SODIUM SERPL-SCNC: 138 MMOL/L (ref 133–144)
TRIGL SERPL-MCNC: 76 MG/DL
TSH SERPL DL<=0.005 MIU/L-ACNC: 2.23 MU/L (ref 0.4–4)
WBC # BLD AUTO: 11.2 10E9/L (ref 4–11)

## 2019-06-17 PROCEDURE — 99223 1ST HOSP IP/OBS HIGH 75: CPT | Mod: AI | Performed by: PSYCHIATRY & NEUROLOGY

## 2019-06-17 PROCEDURE — 84443 ASSAY THYROID STIM HORMONE: CPT

## 2019-06-17 PROCEDURE — 25000132 ZZH RX MED GY IP 250 OP 250 PS 637

## 2019-06-17 PROCEDURE — 80061 LIPID PANEL: CPT

## 2019-06-17 PROCEDURE — 82306 VITAMIN D 25 HYDROXY: CPT

## 2019-06-17 PROCEDURE — H2032 ACTIVITY THERAPY, PER 15 MIN: HCPCS

## 2019-06-17 PROCEDURE — 36415 COLL VENOUS BLD VENIPUNCTURE: CPT

## 2019-06-17 PROCEDURE — 25000132 ZZH RX MED GY IP 250 OP 250 PS 637: Performed by: STUDENT IN AN ORGANIZED HEALTH CARE EDUCATION/TRAINING PROGRAM

## 2019-06-17 PROCEDURE — 85027 COMPLETE CBC AUTOMATED: CPT

## 2019-06-17 PROCEDURE — 80053 COMPREHEN METABOLIC PANEL: CPT

## 2019-06-17 PROCEDURE — 12400001 ZZH R&B MH UMMC

## 2019-06-17 RX ADMIN — DEXTROAMPHETAMINE SACCHARATE, AMPHETAMINE ASPARTATE, DEXTROAMPHETAMINE SULFATE AND AMPHETAMINE SULFATE 20 MG: 5; 5; 5; 5 TABLET ORAL at 13:48

## 2019-06-17 RX ADMIN — FLUOXETINE 20 MG: 20 CAPSULE ORAL at 10:11

## 2019-06-17 RX ADMIN — QUETIAPINE FUMARATE 100 MG: 100 TABLET ORAL at 20:33

## 2019-06-17 RX ADMIN — HYDROXYZINE HYDROCHLORIDE 25 MG: 25 TABLET ORAL at 17:01

## 2019-06-17 RX ADMIN — ZOLPIDEM TARTRATE 10 MG: 5 TABLET, FILM COATED ORAL at 20:34

## 2019-06-17 RX ADMIN — DEXTROAMPHETAMINE SACCHARATE, AMPHETAMINE ASPARTATE, DEXTROAMPHETAMINE SULFATE AND AMPHETAMINE SULFATE 20 MG: 5; 5; 5; 5 TABLET ORAL at 12:18

## 2019-06-17 RX ADMIN — LURASIDONE HYDROCHLORIDE 60 MG: 40 TABLET, FILM COATED ORAL at 17:52

## 2019-06-17 RX ADMIN — DEXTROAMPHETAMINE SACCHARATE, AMPHETAMINE ASPARTATE, DEXTROAMPHETAMINE SULFATE AND AMPHETAMINE SULFATE 20 MG: 5; 5; 5; 5 TABLET ORAL at 08:21

## 2019-06-17 RX ADMIN — NICOTINE 1 PATCH: 21 PATCH, EXTENDED RELEASE TRANSDERMAL at 08:21

## 2019-06-17 RX ADMIN — FLUOXETINE 20 MG: 20 CAPSULE ORAL at 08:21

## 2019-06-17 ASSESSMENT — ACTIVITIES OF DAILY LIVING (ADL)
DRESS: INDEPENDENT
HYGIENE/GROOMING: INDEPENDENT
ORAL_HYGIENE: INDEPENDENT

## 2019-06-17 NOTE — H&P
"  -----------------------------------------------------------------------------------------------------------  Psychiatry History & Physical      Mindi Eastman MRN# [id number]   Age: 43 year old YOB: 1975     Date of Admission: 6/16/2019     Interviewed at 8:00 PM          Contacts:   Primary Outpatient Psychiatrist: Jarvis Hernandez CNP, [facility]  Primary Physician:  Robyn Smiley  Therapist: Feli Garcia PsyD, [facility]       Chief Complaint:   \"My mom guilt tripping me\"         History of Present Illness:     Mindi Eastman is a 44 yo female with a history of MDD, recurrent, severe vs Bipolar disorder, and ADHD, who presents with SI with a plan to shoot herself.     The patient stated that she was feeling well and at her baseline up until today. She was playing softball, which she does every week, when she began texting with her mother. She felt she was being shamed by her mother for spending the day playing softball instead of visiting her step-father on Father's day. Her thoughts were in a downward spiral, and she felt intense shame, disappointment, and guilt over the things her mother texted her. Per chart review, her step-father is currently going through chemotherapy.     She has chronic, passive SI at baseline. While she does not have a suicide plan at baseline, she knows that her go-to plan would be to use a gun or a knife. Her suicide thoughts intensified significantly. A teammate sensed that she was not doing well, and she asked if the patient could be safe at home. She responded \"I don't know\", which to the patient was a cry for help. She was then transported to the ED. The patient stated that she has a 22 caliber gun under her bed at home, but she stated using this was a never a consideration for suicide (\"it's more like a BB gun\"). When her suicide thoughts become intense, she has thoughts of buying a " "handgun.     She noted that she drank 6 beers on the day of admission, but she stated this is the typical amount of alcohol she consumes while playing softball (other places in the chart indicate this is more alcohol than her norm). Breathalyzer was 0.148 upon initial presentation to the ED.     The patient first noted depressive symptoms in high school, and she was diagnosed with Bipolar disorder in her 20s. When asked about elisha, she reported 4-5 day periods of feeling \"cranky\", sleeping poorly but feeling tired, racing thoughts, and having low energy. These periods self-resolve in 4-5 days. She denied period of decreased need for sleep, increased energy, elevated mood, or impulsivity. She had difficulty differentiating her depressive episodes from her manic episodes, other having racing thoughts with elisha. She stated that her mood episodes are typically not in reaction to stressors.     She is currently receiving maintenance ECT every 3 weeks. She has ongoing short term memory difficulties from the treatment, but she has found it to be very helpful for her mood. She feels that things were going relatively well prior to today, and she believes she needs a safe place to be for a few days, rather than a medication change.          Psychiatric Review of Systems:   Depression:  Reports: depressed mood, suicidal ideation,  guilt, hopelessness, helplessness  Elisha:   Denies: sleeplessness, impulsiveness (spending, driving recklessly, change in sexual activity), increased goal-directed activities, pressured speech, grandiose delusions.  Psychosis:   Denies: visual hallucinations, auditory hallucinations, paranoia, grandiosity  Anxiety:   Reports: worries  PTSD:   Denies: History of trauma or abuse       Medical Review of Systems:   The Review of Systems is negative other than noted in the HPI         Psychiatric History:     Prior Diagnoses: MDD, severe, recurrent, Bipolar disorder, ADHD    Past Hospitalizations: " 03/2011 at [facility], 10/2017 at [facility]    Prior use of Psychotropic Medication: Celexa (up to 40 mg), Buspar, Lexapro (up to 20 mg), Latuda, Xanax, Ambien, Ativan, Seroquel, Adderall    ECT/TMS:   - Acute series in 10/2017  - Started maintenace ECT in 05/2018 after mood dipped  - Currently receiving maintenance RUL ultrabrief ECT every 3 weeks, total of 31 treatments thus far. Last received ECT on 06/07/2019, next treatment scheduled for  06/28/2019     Court Commitment: None    Suicide attempts: Denied    Self-injurious behavior: Denied           Substance Use History:     Nicotine: Vapes    Alcohol: Stated she drinks once/week while playing softball, will have 6 beers           Cannabis: Smokes marijuana ~once/month    Others: None         Past Medical History:     Past Medical History:   Diagnosis Date     ADHD      Anxiety      Bipolar disorder (H)      Depressive disorder      OCD (obsessive compulsive disorder)      ROXANNE (obstructive sleep apnea)     On CPAP     Seasonal allergies      Past Surgical History:   Procedure Laterality Date     APPENDECTOMY       CARPAL TUNNEL RELEASE RT/LT Bilateral      CHOLECYSTECTOMY       EXTRACTION(S) DENTAL      Gamaliel Teeth     HC KNEE SCOPE,MED/LAT MENISCUS REPAIR Right     X 2     TONSILLECTOMY       - ROXANNE         Allergies:      Allergies   Allergen Reactions     Darvocet [Propoxyphene N-Apap]           Medications:     - Prozac 20 mg daily  - Ativan 1 mg BID prn   * Stated she uses this nightly  - Quetiapine 100 mg HS  - Tylenol 1,000 mg prn before ECT  - Adderall 20 mg TID  - Latuda 60 mg daily  - Nicotine patch 21 mg/24 hours  - Zofran 4 mg prn (for ECT)  - Ambien 10 mg HS         Social History:     Upbringing: Raised in the [place] primarily by her mother and grandfather. Step-father came into her life when patient as 18. No relationship with biological father. Stated she had a good childhood.     Family/Relationships: Close with her mother and step-father. Was  " one year ago, but she is still on good terms with her ex-wife. Has many close friends.     Living Situation: Lives alone in [place] with a cat and dog.     Education: Graduated high school    Occupation:  at [employer] for the last 4 years. Prior to this, was working at [employer] for 15 years before she was laid off.     Abuse/Trauma: Denied         Family History:     - Cousin with Schizophrenia         Labs:     Alcohol breath test 0.148-->0.099  Urine HCG negative  UDS positive for amphetamines, Cannabinoids, and Ethanol         Psychiatric Examination:     BP (!) 144/96   Pulse 90   Temp 96.5  F (35.8  C) (Oral)   Resp 16   Wt 94.8 kg (209 lb 1 oz)   SpO2 97%   BMI 33.74 kg/m      Appearance:  awake, alert and adequately groomed, wearing softball uniform  Attitude:  cooperative  Eye Contact:  good  Mood:  \"depressed\"  Affect:  mood congruent, dysthymic, but reactive  Speech:  clear, coherent and normal prosody  Psychomotor Behavior:  no evidence of tardive dyskinesia, dystonia, or tics  Thought Process:  linear and goal oriented  Associations:  no loose associations  Thought Content: SI with a plan. No evidence of AH/VH or psychotic thought.   Insight:  good  Judgment:  fair  Oriented to: Grossly intact  Attention Span and Concentration:  intact  Recent and Remote Memory:  intact  Language:  english with appropriate syntax and vocabulary  Fund of Knowledge: appropriate  Muscle Strength and Tone: Grossly normal  Gait and Station: Did not assess         Physical Exam:     See ED assessment note by Dr. Wheeler on 06/16/2019          Assessment     Mindi Eastman is a 44 yo female with a history of MDD, recurrent, severe vs Bipolar disorder, and ADHD, who presents with SI with a plan to shoot herself. The patient reported being relatively euthymic and at her baseline up until today. She described intense shame and feeling guilt from her mother over " playing softball instead of visiting her step-father for Father's day. Presentation is more consistent with an adjustment disorder or emotional dysregulation due to an acute stressor rather an a depressive episode. She reported maintaining relative mood stability on her current medication regimen and with ECT. Her previous diagnosis of Bipolar disorder is unclear, and she denied any periods of discrete amira. This could warrant further evaluation.     Reason for inpatient hospitalization is warranted due to SI with a plan. Disposition pending clinical stabilization, medication optimization, and formulation of safe discharge plan.        Plan   Admit to Unit 20 with Attending Physician Dr. Johnson. To be staffed by Dr. Johnson in the AM.     Principal psychiatric diagnosis:   # Adjustment disorder with depressed mood    Secondary psychiatric diagnoses:   # MDD, recurrent, severe vs Bipolar disorder    Medications:   New:  - Hydroxyzine 25 mg q4 hours prn for anxiety  - MSSA with ativan    Continued:  - Fluoxetine 20 mg daily  - Ativan 1 mg BID prn  - Seroquel 100 mg HS  - Adderall 20 mg TID  - Latuda 60 mg HS  - Ambien 10 mg HS    Held: None    Laboratory/Imaging: TSH, CBC, CMP, lipid panel    Relevant psychosocial stressors: Recent interpersonal difficulties with her mother    Legal Status: Voluntary    Safety Assessment:    - Status 15 minute checks  - Suicide precautions      - Patient will be treated in therapeutic milieu with appropriate individual and group therapies.  - medications as above    Medical diagnoses:      # ROXANNE  - Home CPAP       Dispo: unknown at this time; pending medication management and clinical stabilization    --------------------------------------------------------  Akira Almanza MD  Psychiatry PGY-2    Attestation:  Attestation:  For attending attestation statement, see progress note dated June 17, 2019. Antonio Perdomo MD

## 2019-06-17 NOTE — PROGRESS NOTES
INITIAL PSYCHOSOCIAL ASSESSMENT   I have reviewed the chart met with the patient, and developed Care Plan.  Information for assessment was obtained from: Patient/patient chart    Presenting Problem:   Patient is a 42 yo female with a history of MDD and ADHD, who presents with SI with a plan to shoot herself.   The patient stated that she was feeling well and at her baseline up until today. She was playing softball, which she does every week, when she began texting with her mother. She felt she was being shamed by her mother for spending the day playing softball instead of visiting her step-father on Father's day. Her thoughts were in a downward spiral, and she felt intense shame, disappointment, and guilt over the things her mother texted her.  Of note, Patient's  step-father is currently going through chemotherapy.   The following areas have been assessed:  History of Mental Health and Chemical Dependency:  This is patient's 3rd psych admission with previous in 2011 and 2017.  Patient has long history of depression and currently is receiving maintenance ECT.  Patient denies any h/o suicide attempts, SIB    Patient reports that she drinks ~ 1x/wk (when playing baseball)  She reports smoking MJ ~ 1x/mth.  She denies any h/o problematic use/CD treatment.    Family Description (Constellation, Family Psychiatric History):   Patient was born/raised in [place] by her mother and grandfather.  She is an only child..  Patient has no contact with bio father. Mom remarried when patient was 14 yo.    Patient is  x 1 year - was  1.5 years.. No children.  Patient identifies as lesbian    There is no known family h/o mental illness/substance dependence    Significant Life Events (Illness, Abuse, Trauma, Death):  Patient denies any trauma history.   Father not involved in patient's life.    Living Situation:     Patient lives alone in [place]    Educational Background:   Graduated HS, AA in criminal  justice.    Occupational History:   Patient works FT as a  at [employer].    Financial Status:    No immediate concerns    Legal Issues:    Denies    Ethnic/Cultural Issues:  No concerns identified    Spiritual Orientation:  Undesignated                         Service History:   N/A    Social Functioning (organization, interests):  Patient enjoys working on mechanics. Reports good support system, close family                                                        Current Treatment Providers are:  Outpatient Psychiatrist: Jarvis Hernandez CNP, [facility]  Primary Physician: Robyn Smiley  Therapist: Feli Garcia PsyD, [facility]    Social Service Assessment/Plan:  Patient will have ongoing psychiatric assessment.  Medications will be reviewed and adjusted per MD as indicated.  Outpatient providers will be contacted for care coordination.  Hospital staff will provide a safe environment and a therapeutic milieu. Patient will be encouraged to participate in unit groups and activities.   CTC will continue to assess needs and  ensure appropriate follow up care is in place.

## 2019-06-17 NOTE — PLAN OF CARE
Problem: General Plan of Care (Inpatient Behavioral)  Goal: Individualization/Patient Specific Goal (IP Behavioral)  Description  The patient and/or their representative will achieve their patient-specific goals related to the plan of care.    The patient-specific goals include:  Flowsheets (Taken 6/17/2019 1109)  Patient Strengths: Steady employment;Involved in community;Stable and supportive family;Stable housing;Utilized support systems;Financial stability;Has vocational interests i.e., hobbies;Engagement in hobbies, sports, arts, clubs  Note:   Patient's goals:  Absence of SI- safe with self    Plan for admission:  1. Stabilization of mood disorder symptoms  2. Absence of SI- safe with self  3. Medication management per MD's  4. Coordination of care with outpatient providers, family  5. Psychiatric follow up care in place

## 2019-06-17 NOTE — PLAN OF CARE
BEHAVIORAL TEAM DISCUSSION    Participants:  Dr. Perdomo, Dr. Sagastume, Dr. Domenic Reynolds.Ynes Carrizales MA.LP, Med students, Pharmacy    Continued Stay Criteria/Rationale:   Suicidal ideation    Medical/Physical:  ROXANNE    Precautions:   Behavioral Orders   Procedures     Code 1 - Restrict to Unit     Routine Programming     As clinically indicated     Status 15     Every 15 minutes.     Suicide precautions     Patients on Suicide Precautions should have a Combination Diet ordered that includes a Diet selection(s) AND a Behavioral Tray selection for Safe Tray - with utensils, or Safe Tray - NO utensils       Plan:  Patient will have ongoing psychiatric assessment.  Medications will be reviewed and adjusted per MD as indicated.  Outpatient providers will be contacted for care coordination.  Hospital staff will provide a safe environment and a therapeutic milieu. Patient will be encouraged to participate in unit groups and activities.   CTC will continue to assess needs and  ensure appropriate follow up care is in place.         Rationale for change in precautions or plan:   No change in plan/precautions

## 2019-06-17 NOTE — PROGRESS NOTES
"    ----------------------------------------------------------------------------------------------------------  [facility], [place]   Psychiatric Progress Note     Interim History:   The patient's care was discussed with the treatment team and chart notes were reviewed.     Sleep: 6.75 hours sleep  Scheduled meds: adherent  PRN meds: Ativan x2, Tylenol x1, Zofran x1    Per Staff report:  Continued to endorse some SI and anxiety.     Patient interview:  Angely was interviewed in the conference room on station 20 today.  She reports that she is feeling \"stressed\" today.  We discussed the events that lead to this hospitalization in detail. She reports that she has been feeling \"great\" since she was discharged from her most recent hospitalization in 2017.  The events yesterday prompted her to feeling anxious, more depressed, and have suicidal thoughts.  She reports she is still feeling suicidal today, but less so than yesterday.   She is interested in increasing her fluoxetine to 40mg.      The risks, benefits, alternatives and side effects of any medication changes have been discussed and are understood by the patient and other caregivers.    Psychiatric Review of systems:     The Review of Systems is negative other than noted in the HPI         Psychiatric Examination:   /84 (BP Location: Right arm)   Pulse 72   Temp 98.3  F (36.8  C) (Oral)   Resp 16   Ht 1.676 m (5' 6\")   Wt 94.1 kg (207 lb 8 oz)   SpO2 97%   BMI 33.49 kg/m    Weight is 207 lbs 8 oz  Body mass index is 33.49 kg/m .    Appearance:  awake, alert, adequately groomed, dressed in hospital scrubs and appeared as age stated  Attitude:  cooperative, tearful at times  Eye Contact:  fair  Mood:  \"stressed\"  Affect:  appropriate and in normal range and mood congruent  Speech:  clear, coherent  Psychomotor Behavior:  no evidence of tardive dyskinesia, dystonia, or tics  Thought Process:  logical, linear and goal " oriented  Associations:  no loose associations  Thought Content:  passive suicidal ideation present, no auditory hallucinations present and no visual hallucinations present  Insight:  good  Judgment:  intact  Oriented to:  time, person, and place  Attention Span and Concentration:  intact  Recent and Remote Memory:  intact  Language: Able to name objects, Able to repeat phrases and Able to read and write  Fund of Knowledge: appropriate  Muscle Strength and Tone: normal  Gait and Station: Normal     Assessment    Diagnostic Impression:Mindi Eastman is a 42 yo female with a history of MDD, recurrent, severe vs Bipolar disorder, and ADHD, who presents with SI with a plan to shoot herself. The patient reported being relatively euthymic and at her baseline up until today. She described intense shame and feeling guilt from her mother over playing softball instead of visiting her step-father for Father's day. Presentation is more consistent with an adjustment disorder or emotional dysregulation due to an acute stressor rather an a depressive episode. She reported maintaining relative mood stability on her current medication regimen and with ECT. Her previous diagnosis of Bipolar disorder is unclear, and she denied any periods of discrete amira. This could warrant further evaluation.      Reason for inpatient hospitalization is warranted due to SI with a plan. Disposition pending clinical stabilization, medication optimization, and formulation of safe discharge plan.    Hospital course: Mindi Eastman was admitted to station 20 as a voluntary patient under the care of Dr. Antonio Perdomo.  Her fluoxetine was increased to 40mg as per plan of her outpatient provider and to target her depressive symptoms.       Medical course: Mindi Eastman was medically cleared by the ED prior to admission to the unit.      Plan     Today's Changes:   - Increase Fluoxetine to 40mg daily     Principal psychiatric diagnosis:   #  Adjustment disorder with depressed mood     Secondary psychiatric diagnoses:   # MDD, recurrent, severe vs Bipolar disorder      Medications:   Modify:  - Increase Fluoxetine to 40mg daily     Continue:  - Hydroxyzine 25 mg q4 hours prn for anxiety  - MSSA with ativan  - Ativan 1 mg BID prn  - Seroquel 100 mg HS  - Adderall 20 mg TID  - Latuda 60 mg HS  - Ambien 10 mg HS        Consults:  None      Plan:   - Patient will be treated in therapeutic milieu with appropriate individual and group therapies as described.  The patient requires continued inpatient hospitalization and disposition is pending due to medication optimization, inpatient stabilization, and appropriate discharge planning.     Legal Status: Voluntary    Safety Assessment:   Behavioral Orders   Procedures     Code 1 - Restrict to Unit     Routine Programming     As clinically indicated     Status 15     Every 15 minutes.     Suicide precautions     Patients on Suicide Precautions should have a Combination Diet ordered that includes a Diet selection(s) AND a Behavioral Tray selection for Safe Tray - with utensils, or Safe Tray - NO utensils           This patient was seen and discussed with my attending physician.   Dr. Any Sagastume DO, MARNI, MS  PGY-1 Psychiatry Resident Physician       Attestation:  I, Antonio Perdomo, have personally performed an examination of this patient and I have reviewed the resident's documentation.  I have edited the note to reflect all relevant changes.  I have discussed this patient with the house staff on 6/17/2019.  I agree with resident findings and plan in today's note and yesterdays resident H&P.  I have reviewed all vitals and laboratory findings.      I certifiy that the inpatient services were ordered in accordance with the Medicare regulations governing the order. This includes certification that hospital inpatient services are reasonable and necessary and in the case of services not specified as inpatient-only  under 42 .22(n), that they are appropriately provided as inpatient services in accordance with the 2-midnight benchmark under 42 .3(e).     The reason for inpatient status is Suicidal Ideation and/or Behavior.    Antonio Perdomo M.D.,Ph.D.

## 2019-06-17 NOTE — ED NOTES
ED to Behavioral Floor Handoff    SITUATION  Mindi Eastman is a 43 year old female who speaks English and lives in a home alone The patient arrived in the ED by ambulance from home with a complaint of Suicidal (Pt was playing softball with her friends.  Talked to her mother on the phone and after that became upset and friends called 911.  Pt has Hx of bipolar.)  .The patient's current symptoms started/worsened 1 day(s) ago and during this time the symptoms have increased.   In the ED, pt was diagnosed with   Final diagnoses:   Depression with anxiety   Alcohol intoxication, uncomplicated (H)        Initial vitals were: BP: (!) 144/96  Pulse: 90  Temp: 96.5  F (35.8  C)  Resp: 16  Weight: 94.8 kg (209 lb 1 oz)  SpO2: 97 %   --------  Is the patient diabetic? No   If yes, last blood glucose? --     If yes, was this treated in the ED? --  --------  Is the patient inebriated (ETOH) Yes or Impaired on other substances? No  MSSA done? N/A  Last MSSA score: --    Were withdrawal symptoms treated? N/A  Does the patient have a seizure history? No. If yes, date of most recent seizure--  --------  Is the patient patient experiencing suicidal ideation? reports the following suicide factors: Pt notes she had a conversation with her mom on the phione today and became suicidal.     Homicidal ideation? denies current or recent homicidal ideation or behaviors.    Self-injurious behavior/urges? denies current or recent self injurious behavior or ideation.  ------  Was pt aggressive in the ED No  Was a code called No  Is the pt now cooperative? Yes  -------  Meds given in ED:   Medications   ibuprofen (ADVIL/MOTRIN) tablet 600 mg (600 mg Oral Given 6/16/19 1629)   nicotine (NICODERM CQ) 21 MG/24HR 24 hr patch 1 patch (1 patch Transdermal Given 6/16/19 1840)      Family present during ED course? No  Family currently present? No    BACKGROUND  Does the patient have a cognitive impairment or developmental disability? No  Allergies:    Allergies   Allergen Reactions     Darvocet [Propoxyphene N-Apap]    .   Social demographics are   Social History     Socioeconomic History     Marital status:      Spouse name: None     Number of children: None     Years of education: None     Highest education level: None   Occupational History     None   Social Needs     Financial resource strain: None     Food insecurity:     Worry: None     Inability: None     Transportation needs:     Medical: None     Non-medical: None   Tobacco Use     Smoking status: Former Smoker     Types: Cigarettes     Last attempt to quit: 2010     Years since quittin.3     Smokeless tobacco: Never Used     Tobacco comment: e cig    Substance and Sexual Activity     Alcohol use: Yes     Comment: occ     Drug use: No     Sexual activity: Yes     Partners: Female   Lifestyle     Physical activity:     Days per week: None     Minutes per session: None     Stress: None   Relationships     Social connections:     Talks on phone: None     Gets together: None     Attends Tenriism service: None     Active member of club or organization: None     Attends meetings of clubs or organizations: None     Relationship status: None     Intimate partner violence:     Fear of current or ex partner: None     Emotionally abused: None     Physically abused: None     Forced sexual activity: None   Other Topics Concern     Parent/sibling w/ CABG, MI or angioplasty before 65F 55M? Not Asked   Social History Narrative     None        ASSESSMENT  Labs results   Labs Ordered and Resulted from Time of ED Arrival Up to the Time of Departure from the ED   DRUG ABUSE SCREEN 6 CHEM DEP URINE ([facility]) - Abnormal; Notable for the following components:       Result Value    Amphetamine Qual Urine Positive (*)     Cannabinoids Qual Urine Positive (*)     Ethanol Qual Urine Positive (*)     All other components within normal limits   ALCOHOL BREATH TEST POCT - Abnormal; Notable for the following  components:    Alcohol Breath Test 0.148 (*)     All other components within normal limits   HCG QUALITATIVE URINE      Imaging Studies: No results found for this or any previous visit (from the past 24 hour(s)).   Most recent vital signs BP (!) 144/96   Pulse 90   Temp 96.5  F (35.8  C) (Oral)   Resp 16   Wt 94.8 kg (209 lb 1 oz)   SpO2 97%   BMI 33.74 kg/m     Abnormal labs/tests/findings requiring intervention:---   Pain control: fair  Nausea control: pt had none    RECOMMENDATION  Are any infection precautions needed (MRSA, VRE, etc.)? No If yes, what infection? --  ---  Does the patient have mobility issues? independently. If yes, what device does the pt use? ---  ---  Is patient on 72 hour hold or commitment? No If on 72 hour hold, have hold and rights been given to patient? N/A  Are admitting orders written if after 10 p.m. ?N/A  Tasks needing to be completed:---     ELÍAS GARNICA   [facility]-- [phone] [place] [facility]   [phone] [facility]

## 2019-06-17 NOTE — PROGRESS NOTES
Pt was admitted to unit for having SI after argument with Mom.  She denied SI a time of admission. She did report feeling anxious 8/10. She was given prn ativan for anxiety . She did have emesis x2. She had Zofran  Prne nbegan to feel sick. She scored MSSA 7 and 6. She was not feeling well so writer was unable to complete the entire admission process.

## 2019-06-18 PROCEDURE — 99232 SBSQ HOSP IP/OBS MODERATE 35: CPT | Mod: GC | Performed by: PSYCHIATRY & NEUROLOGY

## 2019-06-18 PROCEDURE — G0177 OPPS/PHP; TRAIN & EDUC SERV: HCPCS

## 2019-06-18 PROCEDURE — 25000132 ZZH RX MED GY IP 250 OP 250 PS 637: Performed by: STUDENT IN AN ORGANIZED HEALTH CARE EDUCATION/TRAINING PROGRAM

## 2019-06-18 PROCEDURE — 25000132 ZZH RX MED GY IP 250 OP 250 PS 637

## 2019-06-18 PROCEDURE — H2032 ACTIVITY THERAPY, PER 15 MIN: HCPCS

## 2019-06-18 PROCEDURE — 12400001 ZZH R&B MH UMMC

## 2019-06-18 RX ORDER — FLUOXETINE 40 MG/1
40 CAPSULE ORAL DAILY
Qty: 30 CAPSULE | Refills: 0 | Status: SHIPPED | OUTPATIENT
Start: 2019-06-19 | End: 2019-06-20

## 2019-06-18 RX ADMIN — DEXTROAMPHETAMINE SACCHARATE, AMPHETAMINE ASPARTATE, DEXTROAMPHETAMINE SULFATE AND AMPHETAMINE SULFATE 20 MG: 5; 5; 5; 5 TABLET ORAL at 12:03

## 2019-06-18 RX ADMIN — NICOTINE 1 PATCH: 21 PATCH, EXTENDED RELEASE TRANSDERMAL at 07:53

## 2019-06-18 RX ADMIN — LURASIDONE HYDROCHLORIDE 60 MG: 40 TABLET, FILM COATED ORAL at 18:50

## 2019-06-18 RX ADMIN — DEXTROAMPHETAMINE SACCHARATE, AMPHETAMINE ASPARTATE, DEXTROAMPHETAMINE SULFATE AND AMPHETAMINE SULFATE 20 MG: 5; 5; 5; 5 TABLET ORAL at 13:14

## 2019-06-18 RX ADMIN — QUETIAPINE FUMARATE 100 MG: 100 TABLET ORAL at 20:10

## 2019-06-18 RX ADMIN — DEXTROAMPHETAMINE SACCHARATE, AMPHETAMINE ASPARTATE, DEXTROAMPHETAMINE SULFATE AND AMPHETAMINE SULFATE 20 MG: 5; 5; 5; 5 TABLET ORAL at 07:53

## 2019-06-18 RX ADMIN — ZOLPIDEM TARTRATE 10 MG: 5 TABLET, FILM COATED ORAL at 21:29

## 2019-06-18 RX ADMIN — LORAZEPAM 1 MG: 1 TABLET ORAL at 17:54

## 2019-06-18 RX ADMIN — FLUOXETINE 40 MG: 20 CAPSULE ORAL at 07:53

## 2019-06-18 ASSESSMENT — ACTIVITIES OF DAILY LIVING (ADL)
DRESS: SCRUBS (BEHAVIORAL HEALTH);INDEPENDENT
HYGIENE/GROOMING: INDEPENDENT
HYGIENE/GROOMING: INDEPENDENT
ORAL_HYGIENE: INDEPENDENT
ORAL_HYGIENE: INDEPENDENT
LAUNDRY: UNABLE TO COMPLETE
DRESS: SCRUBS (BEHAVIORAL HEALTH)

## 2019-06-18 ASSESSMENT — MIFFLIN-ST. JEOR: SCORE: 1607.09

## 2019-06-18 NOTE — PROGRESS NOTES
"    ----------------------------------------------------------------------------------------------------------  [facility], [place]   Psychiatric Progress Note     Interim History:   The patient's care was discussed with the treatment team and chart notes were reviewed.     Sleep: 7 hours   Scheduled meds: adherent  PRN meds: Hydroxyzine x 1    Per Staff report:  Continued to endorse some anxiety. Staff did not report suicidal ideations, self-harm thoughts, auditory or visual hallucinations.     Patient interview:  Angely was interviewed in the conference room on station 20 today.  She reports that she is feeling much better today and says her mood as \"good\". She still reports ongoing thoughts of self doubt and negativity and has had some difficulty distracting these thoughts. She also states her anxiety has been a little worse since being in the hospital. She did attend OT group and found this enjoyable. Angely is motivated to get home and back to her dog, when she is feeling better. She does not report any suicidal ideations. She will work on follow up appointments with her outpatient providers.   She has not had any nausea or other GI symptoms.     The risks, benefits, alternatives and side effects of any medication changes have been discussed and are understood by the patient and other caregivers.    Psychiatric Review of systems:     The Review of Systems is negative other than noted in the HPI         Psychiatric Examination:   /79 (BP Location: Left arm)   Pulse 69   Temp 98.3  F (36.8  C) (Oral)   Resp 16   Ht 1.676 m (5' 6\")   Wt 94.1 kg (207 lb 8 oz)   SpO2 97%   BMI 33.49 kg/m    Weight is 207 lbs 8 oz  Body mass index is 33.49 kg/m .    Appearance:  awake, alert, adequately groomed, dressed in hospital scrubs and appeared as age stated  Attitude:  cooperative  Eye Contact:  good  Mood:  \"good\"  Affect:  appropriate and in normal range and mood congruent  Speech:  " clear, coherent  Psychomotor Behavior:  no evidence of tardive dyskinesia, dystonia, or tics  Thought Process:  logical, linear and goal oriented  Associations:  no loose associations  Thought Content: no suicidal ideations, no auditory hallucinations or visual hallucinations.   Insight:  good  Judgment:  intact  Oriented to:  time, person, and place  Attention Span and Concentration:  intact  Recent and Remote Memory:  intact  Language: Able to name objects, Able to repeat phrases and Able to read and write  Fund of Knowledge: appropriate  Muscle Strength and Tone: normal  Gait and Station: Normal     Assessment    Diagnostic Impression: Mindi Eastman is a 42 yo female with a history of MDD, recurrent, severe vs Bipolar disorder, and ADHD, who presents with SI with a plan to shoot herself. The patient reported being relatively euthymic and at her baseline up until today. She described intense shame and feeling guilt from her mother over playing softball instead of visiting her step-father for Father's day. Presentation is more consistent with an adjustment disorder or emotional dysregulation due to an acute stressor rather an a depressive episode. She reported maintaining relative mood stability on her current medication regimen and with ECT. Her previous diagnosis of Bipolar disorder is unclear, and she denied any periods of discrete amira. This could warrant further evaluation.      Reason for inpatient hospitalization is warranted due to SI with a plan. Disposition pending clinical stabilization, medication optimization, and formulation of safe discharge plan.    Hospital course: Mindi Eastman was admitted to station 20 as a voluntary patient under the care of Dr. Antonio Perdomo.  Her fluoxetine was increased to 40mg as per plan of her outpatient provider and to target her depressive symptoms.       Medical course: Mindi Eastman was medically cleared by the ED prior to admission to the unit.      Plan      Principal psychiatric diagnosis:   # Adjustment disorder with depressed mood     Secondary psychiatric diagnoses:   # MDD, recurrent, severe vs Bipolar disorder    Medications:   Continue:  -Fluoxetine 40 mg daily  - Hydroxyzine 25 mg q4 hours prn for anxiety  - MSSA with ativan  - Ativan 1 mg BID prn  - Seroquel 100 mg HS  - Adderall 20 mg TID  - Latuda 60 mg HS  - Ambien 10 mg HS    Consults:  None    Plan:   - Patient will be treated in therapeutic milieu with appropriate individual and group therapies as described.  The patient requires continued inpatient hospitalization and disposition is pending due to medication optimization, inpatient stabilization, and appropriate discharge planning.     Legal Status: Voluntary    Safety Assessment:   Behavioral Orders   Procedures     Code 1 - Restrict to Unit     Routine Programming     As clinically indicated     Status 15     Every 15 minutes.     Suicide precautions     Patients on Suicide Precautions should have a Combination Diet ordered that includes a Diet selection(s) AND a Behavioral Tray selection for Safe Tray - with utensils, or Safe Tray - NO utensils         Thelma Orr MS3     I was present with the medical student who participated in the service and in the documentation of the note. I have verified the history and personally performed the physical exam and medical decision making. I agree with the assessment and plan of care as documented in the note.     This patient was seen and discussed with my attending physician.   Dr. Any Sagastume DO, MARNI, MS  PGY-1 Psychiatry Resident Physician       Attestation:  This patient has been seen and evaluated by me, Antonio Perdomo.  I have discussed this patient with the house staff team including the resident and medical student and I agree with the findings and plan in this note.    I have reviewed today's vital signs, medications, labs and imaging. Antonio Perdomo M.D., Ph.D.

## 2019-06-18 NOTE — PROGRESS NOTES
Pt s mood was calm and had neutral affect. She was out watched TV in the lounge. She denied having suicidal ideation or self-harm thoughts. She denied having auditory or visual hallucination. She told this writer that she was anxious and requested Ativan for her anxiety. Her MSSA was 4. She participated  in music group this evening.

## 2019-06-18 NOTE — DISCHARGE SUMMARY
"    ----------------------------------------------------------------------------------------------------------  [facility]  Discharge Summary      Mindi Eastman MRN# [id number]   Age: 43 year old YOB: 1975     Date of Admission:  6/16/2019  Date of Discharge:  6/20/2019  Admitting Physician:  Libby Espinoza MD  Discharge Physician:  Dr. Antonio Perdomo         Event Leading to Hospitalization:   The following is taken from H&P dated 06/16/19 by Dr. Akira Almanza:     \"Mindi Eastman is a 44 yo female with a history of MDD, recurrent, severe vs Bipolar disorder, and ADHD, who presents with SI with a plan to shoot herself.      The patient stated that she was feeling well and at her baseline up until today. She was playing softball, which she does every week, when she began texting with her mother. She felt she was being shamed by her mother for spending the day playing softball instead of visiting her step-father on Father's day. Her thoughts were in a downward spiral, and she felt intense shame, disappointment, and guilt over the things her mother texted her. Per chart review, her step-father is currently going through chemotherapy.      She has chronic, passive SI at baseline. While she does not have a suicide plan at baseline, she knows that her go-to plan would be to use a gun or a knife. Her suicide thoughts intensified significantly. A teammate sensed that she was not doing well, and she asked if the patient could be safe at home. She responded \"I don't know\", which to the patient was a cry for help. She was then transported to the ED. The patient stated that she has a 22 caliber gun under her bed at home, but she stated using this was a never a consideration for suicide (\"it's more like a BB gun\"). When her suicide thoughts become intense, she has thoughts of buying a handgun.      She noted that she drank 6 beers on the day of admission, but " "she stated this is the typical amount of alcohol she consumes while playing softball (other places in the chart indicate this is more alcohol than her norm). Breathalyzer was 0.148 upon initial presentation to the ED.      The patient first noted depressive symptoms in high school, and she was diagnosed with Bipolar disorder in her 20s. When asked about amira, she reported 4-5 day periods of feeling \"cranky\", sleeping poorly but feeling tired, racing thoughts, and having low energy. These periods self-resolve in 4-5 days. She denied period of decreased need for sleep, increased energy, elevated mood, or impulsivity. She had difficulty differentiating her depressive episodes from her manic episodes, other having racing thoughts with amira. She stated that her mood episodes are typically not in reaction to stressors.      She is currently receiving maintenance ECT every 3 weeks. She has ongoing short term memory difficulties from the treatment, but she has found it to be very helpful for her mood. She feels that things were going relatively well prior to today, and she believes she needs a safe place to be for a few days, rather than a medication change. \"          See Admission note by Dr. Akira Almanza dated 06/16/19 for additional details.          Diagnoses:     Principal psychiatric diagnosis:   # Adjustment disorder with depressed mood     Secondary psychiatric diagnoses:   # MDD, recurrent, severe vs Bipolar disorder           Labs:     Please see appendix at the end of this document for full list of lab results obtained during this admission.          Consults:     No consultations were requested during this admission         Hospital Course:   Diagnostic Impression:Mindi Eastman is a 44 yo female with a history of MDD, recurrent, severe vs Bipolar disorder, and ADHD, who presents with SI with a plan to shoot herself. The patient reported being relatively euthymic and at her baseline up until day of " admission. She described intense shame and feeling guilt from her mother over playing softball instead of visiting her step-father for Father's day. Presentation is more consistent with an adjustment disorder or emotional dysregulation due to an acute stressor rather an a depressive episode. She reported maintaining relative mood stability on her current medication regimen and with ECT. Her previous diagnosis of Bipolar disorder is unclear, and she denied any periods of discrete amira. This could warrant further evaluation.        Hospital course: Mindi Eastman was admitted to station 20 as a voluntary patient under the care of Dr. Antonio Perdomo.  Her fluoxetine was increased to 40mg as per plan of her outpatient provider and to target her depressive symptoms.   Throughout her stay she reported elevation of her mood and denied SI. She had no side effects from the increased Fluoxetine dose.  Angely did report she had a gun under her bed at home, this was removed by her friend, Christie, prior to discharge.      Medical course: Mindi Eastman was medically cleared by the ED prior to admission to the unit.       Mindi Eastman was discharged to home. At the time of discharge Mindi Eastman was determined to not be a danger to herself or others.    Today Mindi Eastman denies SI, SIB, HI. In addition, Mindi Eastman has notable risk factors for self-harm, including anxiety. However, risk is mitigated by ability to volunteer a safety plan, history of seeking help when needed and removal of gun from her home.Additional steps taken to minimize risk include: increased antidepressant. Therefore, based on all available evidence including the factors cited above, Mindi Eastman does not appear to be at imminent risk for self-harm, and is appropriate for outpatient level of care.     This document serves as a transfer of care to Mindi Eastman's outpatient providers.         Discharge Medications:   Medications  changed during this admission:  - Fluoxetine 40mg capsule daily    Continue the following PTA medications  - Adderall tablet 20mg TID for ADHD  - Latuda 60mg daily   - Nicotine patch   - Seroquel 100mg at bedtime   - Ativan 1mg tablet BID PRN for anxiety   - Zofran tablet 4mg [id number] PRN for nausea  - Zolpidem tablet 10mg at bedtime PRN for insomnia          Psychiatric Examination:     Appearance:  awake, alert, adequately groomed and appeared as age stated  Attitude:  cooperative  Eye Contact:  good  Mood:  better  Affect:  appropriate and in normal range  Speech:  clear, coherent  Psychomotor Behavior:  no evidence of tardive dyskinesia, dystonia, or tics  Thought Process:  logical, linear and goal oriented  Associations:  no loose associations  Thought Content:  no evidence of suicidal ideation or homicidal ideation, no auditory hallucinations present and no visual hallucinations present  Insight:  good  Judgment:  intact  Oriented to:  time, person, and place  Attention Span and Concentration:  intact  Recent and Remote Memory:  intact  Language: Able to name objects, Able to repeat phrases and Able to read and write  Fund of Knowledge: appropriate  Muscle Strength and Tone: normal  Gait and Station: Normal         Discharge Plan:     Psychiatry Follow-up:   Appointment: Therapist: Feli Garcia PsyD 6/26/19 at 12:00pm  [facility]: [address], [place] [address] [phone]     Appointment: Psychiatry: Jarvis Hernandez CNP: Patient is on a cancellation list. Must call same day for appointment.  [facility]: [address], [place], [place] [address] [phone]    This patient was seen and discussed with my attending physician.  Dr. Any Sagastume DO, MARNI, MS  PGY-1 Psychiatry Resident Physician       Attestation:    The patient has been seen and evaluated by me,  Antonio Perdomo. I have examined the patient today and reviewed the  discharge plan with the resident and medical student. I agree with the final assessment and plan, as noted in the discharge summary. I have reviewed today's vital signs, medications, labs and imaging.  Total time discharge plannin minutes  Antonio Perdomo M.D.,Ph.D.

## 2019-06-18 NOTE — PROGRESS NOTES
06/18/19 1506   Behavioral Health   Hallucinations denies / not responding to hallucinations   Thinking poor concentration   Orientation person: oriented;place: oriented;date: oriented;time: oriented   Memory baseline memory   Insight insight appropriate to situation   Judgement impaired   Eye Contact at examiner   Affect full range affect   Mood mood is calm   Physical Appearance/Attire attire appropriate to age and situation   Hygiene well groomed   Suicidality other (see comments)  (denies)   1. Wish to be Dead No   Wish to be Dead Description denies   2. Non-Specific Active Suicidal Thoughts  No   Non-Specific Active Suicidal Thought Description denies   Psycho Education   Type of Intervention 1:1 intervention   Response participates, initiates socially appropriate   Hours 0.5   Treatment Detail check-in   Activities of Daily Living   Hygiene/Grooming independent   Oral Hygiene independent   Dress scrubs (behavioral health);independent   Laundry unable to complete   Room Organization independent     Pt. Has been out in the milieu and attending/participating in groups. Pt. Has been social with staff and peers. Pt. States she believes she is doing much better than she was yesterday and definitely much better than she was doing on Sunday. Pt. Denies SI/SIB at this time and denies wishing to be dead. None observed.

## 2019-06-18 NOTE — PROGRESS NOTES
06/18/19 1512   General Information   Date Initially Attended OT 06/18/19     Attended 2/2 occupational therapy groups offered this date. Overall congruent-pleasant affect and full engagement.        Pt's first attendance in Occupational Therapy Clinic. Pt Response: I to initiate, gather materials, sequence and adjust to workspace demands as needed, demonstrating great focus, planning, and problem solving. Able to ask for assistance as needed and appeared comfortable interacting with peers and staff.     Leisure exploration and participation group offered for increased intrinsic motivation to engage in social, non-obligatory occupations via a group game. Structured group was used to promote positive milieu interaction. Pt Response: Full participation with great sustained attention and acceptance of game rules. Reported interests in recreational sports leagues (hockey and softball), desire to increase assertive communication, and stressful relationship with mother however has supportive friends.     OT staff will met with pt to review the role of occupational therapy and explained the value of having them involved in their treatment plan including options to meet current needs/self-identified goals. Pt was given a self-assessment to inform OT initial assessment.

## 2019-06-19 PROCEDURE — 25000132 ZZH RX MED GY IP 250 OP 250 PS 637: Performed by: STUDENT IN AN ORGANIZED HEALTH CARE EDUCATION/TRAINING PROGRAM

## 2019-06-19 PROCEDURE — 25000132 ZZH RX MED GY IP 250 OP 250 PS 637

## 2019-06-19 PROCEDURE — 99232 SBSQ HOSP IP/OBS MODERATE 35: CPT | Mod: GC | Performed by: PSYCHIATRY & NEUROLOGY

## 2019-06-19 PROCEDURE — 12400001 ZZH R&B MH UMMC

## 2019-06-19 RX ADMIN — FLUOXETINE 40 MG: 20 CAPSULE ORAL at 08:55

## 2019-06-19 RX ADMIN — QUETIAPINE FUMARATE 100 MG: 100 TABLET ORAL at 18:46

## 2019-06-19 RX ADMIN — LORAZEPAM 1 MG: 1 TABLET ORAL at 13:20

## 2019-06-19 RX ADMIN — NICOTINE 1 PATCH: 21 PATCH, EXTENDED RELEASE TRANSDERMAL at 08:56

## 2019-06-19 RX ADMIN — LURASIDONE HYDROCHLORIDE 60 MG: 40 TABLET, FILM COATED ORAL at 17:48

## 2019-06-19 RX ADMIN — DEXTROAMPHETAMINE SACCHARATE, AMPHETAMINE ASPARTATE, DEXTROAMPHETAMINE SULFATE AND AMPHETAMINE SULFATE 20 MG: 5; 5; 5; 5 TABLET ORAL at 12:12

## 2019-06-19 RX ADMIN — DEXTROAMPHETAMINE SACCHARATE, AMPHETAMINE ASPARTATE, DEXTROAMPHETAMINE SULFATE AND AMPHETAMINE SULFATE 20 MG: 5; 5; 5; 5 TABLET ORAL at 13:20

## 2019-06-19 RX ADMIN — DEXTROAMPHETAMINE SACCHARATE, AMPHETAMINE ASPARTATE, DEXTROAMPHETAMINE SULFATE AND AMPHETAMINE SULFATE 20 MG: 5; 5; 5; 5 TABLET ORAL at 08:55

## 2019-06-19 RX ADMIN — ZOLPIDEM TARTRATE 10 MG: 5 TABLET, FILM COATED ORAL at 19:54

## 2019-06-19 RX ADMIN — LORAZEPAM 1 MG: 1 TABLET ORAL at 16:31

## 2019-06-19 ASSESSMENT — ACTIVITIES OF DAILY LIVING (ADL)
DRESS: INDEPENDENT
ORAL_HYGIENE: INDEPENDENT
HYGIENE/GROOMING: INDEPENDENT
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
## 2019-06-19 NOTE — PROGRESS NOTES
Participated in Music Therapy group with focus on mood elevation, validation and decreasing anxiety and improved group cohesiveness. Engaged and cooperative in music listening interventions.   Showed progress in session goals.     Did not choose any music, but sang along to some of the songs others chose.
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
## 2019-06-19 NOTE — PLAN OF CARE
"Nursing Assessment  Problem: Depression  Goal: Improved Mood  Outcome: Improving  Note:   Reports feeling \"mellow\" this evening. Denies feeling anxious or depressed.      Problem: Suicidal Behavior  Goal: Suicidal Behavior is Absent or Managed  Outcome: Improving  Note:   Denies having suicidal ideation or thoughts of hurting herself.      Problem: General Plan of Care (Inpatient Behavioral)  Goal: Individualization/Patient Specific Goal (IP Behavioral)  Description  The patient and/or their representative will achieve their patient-specific goals related to the plan of care.    The patient-specific goals include:  Outcome: Improving  Note:   She takes her medications as prescribed. Is cooperative with staff. Spends time out in the lounge with peers and attends groups. Affect is full range.     "

## 2019-06-19 NOTE — PROGRESS NOTES
06/19/19 1528   Behavioral Health   Hallucinations denies / not responding to hallucinations   Thinking intact   Orientation person: oriented;place: oriented;date: oriented   Memory baseline memory   Insight admits / accepts   Judgement impaired   Eye Contact at examiner   Affect full range affect   Mood mood is calm;other (see comments)  (anxious in morning)   Physical Appearance/Attire attire appropriate to age and situation   Hygiene well groomed   Suicidality other (see comments)  (denies)   1. Wish to be Dead No   2. Non-Specific Active Suicidal Thoughts  No   Self Injury other (see comment)  (denies)   Activity other (see comment)  (social with peers)   Speech clear;coherent   Medication Sensitivity no observed side effects   Psychomotor / Gait balanced;steady   Psycho Education   Type of Intervention 1:1 intervention   Response participates, initiates socially appropriate   Hours 0.5   Treatment Detail   (check in)   Activities of Daily Living   Hygiene/Grooming independent   Oral Hygiene independent   Dress independent   Room Organization independent   The patient was out of her room and social with peers this shift.  The patient reports that she is planning to discharge tomorrow and feels ready to go.  The patient was anxious in the morning, but made a phone call to family that went well and relieved most of her anxious feelings.  The has a good discharge plan in places and had a great shift.  The patient denies SI and SiB.

## 2019-06-20 VITALS
TEMPERATURE: 96.8 F | DIASTOLIC BLOOD PRESSURE: 82 MMHG | SYSTOLIC BLOOD PRESSURE: 130 MMHG | HEART RATE: 67 BPM | RESPIRATION RATE: 16 BRPM | WEIGHT: 204.5 LBS | HEIGHT: 66 IN | OXYGEN SATURATION: 99 % | BODY MASS INDEX: 32.87 KG/M2

## 2019-06-20 PROCEDURE — 25000132 ZZH RX MED GY IP 250 OP 250 PS 637

## 2019-06-20 PROCEDURE — 99238 HOSP IP/OBS DSCHRG MGMT 30/<: CPT | Mod: GC | Performed by: PSYCHIATRY & NEUROLOGY

## 2019-06-20 PROCEDURE — G0177 OPPS/PHP; TRAIN & EDUC SERV: HCPCS

## 2019-06-20 PROCEDURE — 25000132 ZZH RX MED GY IP 250 OP 250 PS 637: Performed by: STUDENT IN AN ORGANIZED HEALTH CARE EDUCATION/TRAINING PROGRAM

## 2019-06-20 RX ORDER — FLUOXETINE 40 MG/1
40 CAPSULE ORAL DAILY
Qty: 30 CAPSULE | Refills: 0 | Status: SHIPPED | OUTPATIENT
Start: 2019-06-20 | End: 2022-10-18

## 2019-06-20 RX ADMIN — DEXTROAMPHETAMINE SACCHARATE, AMPHETAMINE ASPARTATE, DEXTROAMPHETAMINE SULFATE AND AMPHETAMINE SULFATE 20 MG: 5; 5; 5; 5 TABLET ORAL at 08:33

## 2019-06-20 RX ADMIN — DEXTROAMPHETAMINE SACCHARATE, AMPHETAMINE ASPARTATE, DEXTROAMPHETAMINE SULFATE AND AMPHETAMINE SULFATE 20 MG: 5; 5; 5; 5 TABLET ORAL at 15:07

## 2019-06-20 RX ADMIN — FLUOXETINE 40 MG: 20 CAPSULE ORAL at 08:33

## 2019-06-20 RX ADMIN — LORAZEPAM 1 MG: 1 TABLET ORAL at 10:54

## 2019-06-20 RX ADMIN — DEXTROAMPHETAMINE SACCHARATE, AMPHETAMINE ASPARTATE, DEXTROAMPHETAMINE SULFATE AND AMPHETAMINE SULFATE 20 MG: 5; 5; 5; 5 TABLET ORAL at 12:26

## 2019-06-20 RX ADMIN — NICOTINE 1 PATCH: 21 PATCH, EXTENDED RELEASE TRANSDERMAL at 08:33

## 2019-06-20 ASSESSMENT — ACTIVITIES OF DAILY LIVING (ADL)
DRESS: INDEPENDENT
LAUNDRY: WITH SUPERVISION
HYGIENE/GROOMING: INDEPENDENT
ORAL_HYGIENE: INDEPENDENT

## 2019-06-20 ASSESSMENT — MIFFLIN-ST. JEOR: SCORE: 1609.36

## 2019-06-20 NOTE — PROGRESS NOTES
06/20/19 1500   Occupational Therapy   Type of Intervention structured groups   Response Initiates, socially acceptable   Hours 2     Attended 2/2 occupational therapy groups offered this date. Overall congruent-bright affect and full engagement.

## 2019-06-20 NOTE — PROGRESS NOTES
PATIENT BELONGINGS:  Samsung phone, sunglasses, necklace (gold), wallet, E-cig, shorts, jersey baseball cap, shoes    BELONGINGS SENT TO SECURITY:  None    A               Admission:  I am responsible for any personal items that are not sent to the safe or pharmacy.  [facility] is not responsible for loss, theft or damage of any property in my possession.    Signature:  _________________________________ Date: _______  Time: _____                                              Staff Signature:  ____________________________ Date: ________  Time: _____      2nd Staff person, if patient is unable/unwilling to sign:    Signature: ________________________________ Date: ________  Time: _____     Discharge:  [facility] has returned all of my personal belongings:    Signature: _________________________________ Date: ________  Time: _____                                          Staff Signature:  ____________________________ Date: ________  Time: _____

## 2019-06-20 NOTE — PROGRESS NOTES
"Pt denies SI/SIB or hallucinations. Pt will  her meds at [facility] in [place]. Pt educated on her AVS. Pt states coping skills as deep breathing and listening to music, and that her support system consists of her Mom, friends, and her dog. Pt has been bright, pleasant, and cooperative. Pt's friend \"Christie\" will pick pt up to bring her home.   "

## 2019-06-20 NOTE — PROGRESS NOTES
06/19/19 1430   Psycho Education   Type of Intervention structured groups   Response participates, initiates socially appropriate   Hours 1   Treatment Detail   (Mental Health Education Group)     Patient participated in a psycho-education group on mental health. Patient participated in discussion on mental health diagnoses, acceptance and management of one's mental health diagnoses. Patient completed a mental health maintenance plan that addressed triggers, warning signs, self-care, coping strategies, and when to seek increased intervention.     Concepcion Rothman, MONSE, LADC

## 2019-06-20 NOTE — PROGRESS NOTES
Patient visible, active and social with peers this evening. Appetite good. Pleasant and approachable. Anticipating discharge tomorrow.         06/19/19 1858   Behavioral Health   Hallucinations denies / not responding to hallucinations   Thinking intact   Orientation person: oriented;place: oriented;date: oriented;time: oriented   Memory baseline memory   Insight admits / accepts;insight appropriate to situation   Judgement impaired   Affect blunted, flat   Mood depressed   Physical Appearance/Attire attire appropriate to age and situation   1. Wish to be Dead No   Activity other (see comment)   Speech coherent;clear

## 2019-06-28 ENCOUNTER — ANESTHESIA (OUTPATIENT)
Dept: BEHAVIORAL HEALTH | Facility: CLINIC | Age: 44
End: 2019-06-28

## 2019-06-28 ENCOUNTER — HOSPITAL ENCOUNTER (OUTPATIENT)
Dept: BEHAVIORAL HEALTH | Facility: CLINIC | Age: 44
End: 2019-06-28
Attending: PSYCHIATRY & NEUROLOGY
Payer: COMMERCIAL

## 2019-06-28 ENCOUNTER — ANESTHESIA EVENT (OUTPATIENT)
Dept: BEHAVIORAL HEALTH | Facility: CLINIC | Age: 44
End: 2019-06-28

## 2019-06-28 VITALS
DIASTOLIC BLOOD PRESSURE: 70 MMHG | RESPIRATION RATE: 16 BRPM | OXYGEN SATURATION: 96 % | HEART RATE: 72 BPM | SYSTOLIC BLOOD PRESSURE: 126 MMHG | TEMPERATURE: 97.7 F

## 2019-06-28 DIAGNOSIS — F33.2 MAJOR DEPRESSIVE DISORDER, RECURRENT SEVERE WITHOUT PSYCHOTIC FEATURES (H): Primary | ICD-10-CM

## 2019-06-28 PROCEDURE — 90870 ELECTROCONVULSIVE THERAPY: CPT

## 2019-06-28 PROCEDURE — 25000128 H RX IP 250 OP 636: Performed by: PSYCHIATRY & NEUROLOGY

## 2019-06-28 PROCEDURE — 25800030 ZZH RX IP 258 OP 636: Performed by: PSYCHIATRY & NEUROLOGY

## 2019-06-28 PROCEDURE — 37000008 ZZH ANESTHESIA TECHNICAL FEE, 1ST 30 MIN

## 2019-06-28 PROCEDURE — 25000125 ZZHC RX 250: Performed by: ANESTHESIOLOGY

## 2019-06-28 PROCEDURE — 25000128 H RX IP 250 OP 636: Performed by: ANESTHESIOLOGY

## 2019-06-28 RX ORDER — ONDANSETRON 2 MG/ML
8 INJECTION INTRAMUSCULAR; INTRAVENOUS ONCE
Status: CANCELLED
Start: 2019-06-28

## 2019-06-28 RX ORDER — KETOROLAC TROMETHAMINE 30 MG/ML
30 INJECTION, SOLUTION INTRAMUSCULAR; INTRAVENOUS ONCE
Status: CANCELLED
Start: 2019-06-28

## 2019-06-28 RX ORDER — KETOROLAC TROMETHAMINE 30 MG/ML
30 INJECTION, SOLUTION INTRAMUSCULAR; INTRAVENOUS ONCE
Status: COMPLETED | OUTPATIENT
Start: 2019-06-28 | End: 2019-06-28

## 2019-06-28 RX ORDER — MORPHINE SULFATE 4 MG/ML
6 INJECTION, SOLUTION INTRAMUSCULAR; INTRAVENOUS ONCE
Status: CANCELLED
Start: 2019-06-28

## 2019-06-28 RX ORDER — ETOMIDATE 2 MG/ML
INJECTION INTRAVENOUS PRN
Status: DISCONTINUED | OUTPATIENT
Start: 2019-06-28 | End: 2019-06-28

## 2019-06-28 RX ORDER — ONDANSETRON 2 MG/ML
8 INJECTION INTRAMUSCULAR; INTRAVENOUS ONCE
Status: COMPLETED | OUTPATIENT
Start: 2019-06-28 | End: 2019-06-28

## 2019-06-28 RX ORDER — MORPHINE SULFATE 4 MG/ML
6 INJECTION, SOLUTION INTRAMUSCULAR; INTRAVENOUS ONCE
Status: COMPLETED | OUTPATIENT
Start: 2019-06-28 | End: 2019-06-28

## 2019-06-28 RX ADMIN — Medication 70 MG: at 07:59

## 2019-06-28 RX ADMIN — SODIUM CHLORIDE, POTASSIUM CHLORIDE, SODIUM LACTATE AND CALCIUM CHLORIDE 1000 ML: 600; 310; 30; 20 INJECTION, SOLUTION INTRAVENOUS at 07:52

## 2019-06-28 RX ADMIN — ETOMIDATE 80 MG: 2 INJECTION INTRAVENOUS at 07:59

## 2019-06-28 RX ADMIN — MORPHINE SULFATE 6 MG: 4 INJECTION, SOLUTION INTRAMUSCULAR; INTRAVENOUS at 08:06

## 2019-06-28 RX ADMIN — ONDANSETRON 8 MG: 2 INJECTION INTRAMUSCULAR; INTRAVENOUS at 07:56

## 2019-06-28 RX ADMIN — KETOROLAC TROMETHAMINE 30 MG: 30 INJECTION, SOLUTION INTRAMUSCULAR at 07:53

## 2019-06-28 NOTE — PROCEDURES
Procedure/Surgery Information   [facility], [place]    Bedside Procedure Note  Date of Service (when I performed the procedure): 06/28/2019    Mindi Eastman is a 42 year old female patient.  1. Major depressive disorder, recurrent severe without psychotic features (H)      Past Medical History:   Diagnosis Date     ADHD      Anxiety      Bipolar disorder (H)      Depressive disorder      OCD (obsessive compulsive disorder)      ROXANNE (obstructive sleep apnea)     On CPAP     Seasonal allergies      Temp: 97.4  F (36.3  C) Temp src: Tympanic BP: 125/76 Pulse: 72   Resp: 16 SpO2: 97 % O2 Device: None (Room air)      Procedures     [facility], [place]   ECT Procedure Note   06/28/2019       Mindi Eastman [id number]   42 year old 1975     Patient Status: Outpatient    Is this the first in a series of 12 treatments?  No    History and Physical: Reviewed in medical record    Consent: Informed consent was signed by: patient    Date Consent Signed: 1/25/19    Allergies   Allergen Reactions     Darvocet [Propoxyphene N-Apap]        Weight:  0 lbs 0 oz    Patient Preparations: Glasses/Contacts removed         Indications for ECT:   Medications ineffective, Psychotherapies ineffective and treatment resistant depression.         Clinical Narrative:   Patient is continuing ECT for depression.  NPO after 2400.  Alert and Oriented x 3.  No problems with the last ECT which was 3 weeks ago.  Pt was on psych last week and had increase in Prozac.  She'd like to continue at current rate of treatment.           Diagnosis:      Major depressive disorder, recurrent severe without psychotic features (F33.2)         Pause for the Cause:     Right patient Yes   Right procedure/laterality settings: Yes          Intra-Procedure Documentation:       ECT #: 9 of 12   Treatment number this series:0 21   Total treatment number: 32     Type of ECT:  Right, unilateral  ultrabrief    ECT Medications:    Tylenol:  1000mg po at home  Toradol: 30mg  Zofran: 8mg   Brevital: 80mg  Succinyl Choline: 70mg  Morphine 6 mg in treatment room for headache   Lactated Ringers 1 liter      ECT Strip Summary:   Energy Level: 10 percent   Motor Seizure Duration:  100 seconds  EEG Seizure Duration:   100 seconds    Complications: No    Plan:    Take Extra Strength Tylenol 1000mg po at home before coming to ECT.    Next maintenance ECT is in 3 weeks on 7/19/19      Umang Kennedy MD

## 2019-06-28 NOTE — ANESTHESIA POSTPROCEDURE EVALUATION
Anesthesia POST Procedure Evaluation    Patient: Mindi Eastman   MRN:     [id number] Gender:   female   Age:    43 year old :      1975        Preoperative Diagnosis: * No pre-op diagnosis entered *   * No procedures listed *   Postop Comments: No value filed.       Anesthesia Type:  General  No value filed.    Reportable Event: NO     PAIN: Uncomplicated   Sign Out status: Comfortable, Well controlled pain     PONV: No PONV   Sign Out status:  No Nausea or Vomiting     Neuro/Psych: Uneventful perioperative course   Sign Out Status: Preoperative baseline; Age appropriate mentation     Airway/Resp.: Uneventful perioperative course   Sign Out Status: Non labored breathing, age appropriate RR; Resp. Status within EXPECTED Parameters     CV: Uneventful perioperative course   Sign Out status: Appropriate BP and perfusion indices; Appropriate HR/Rhythm     Disposition:   Sign Out in:  PACU  Disposition:  Phase II; Home  Recovery Course: Uneventful  Follow-Up: Not required           Last Anesthesia Record Vitals:  CRNA VITALS  2019 0740 - 2019 0817      2019             Resp Rate (set):  8          Last PACU Vitals:  Vitals Value Taken Time   /74 2019  8:09 AM   Temp 36.8  C (98.3  F) 2019  8:09 AM   Pulse 77 2019  8:09 AM   Resp 14 2019  8:09 AM   SpO2 94 % 2019  8:09 AM   Temp src     NIBP 123/71 2019  8:08 AM   Pulse 77 2019  8:09 AM   SpO2 99 % 2019  8:10 AM   Resp     Temp     Ht Rate 77 2019  8:09 AM   Temp 2           Electronically Signed By: Angie Castillo MD, 2019, 8:17 AM

## 2019-06-28 NOTE — DISCHARGE INSTRUCTIONS
ECT Discharge Instructions      During your ECT series:      Do not drive or work heavy equipment until 7 days after your last treatment.    Do not drink alcohol or use street drugs (illicit drugs) while you are having treatments.    Do not make important decisions, including legal decisions.    After each treatment:      Get plenty of rest. A responsible adult must stay with your for at least 6 hours.    Avoid heavy or risky activities for 24 hours.    If you feel light-headed, sit for a few minutes before standing. Have someone help you get up.    If you have nausea (feel sick to your stomach): Drink only clear liquids such as apple juice, ginger ale, broth or 7UP, Be sure to drink plenty of liquids. Move to a normal diet as you feel able.     If you received Toradol, wait 6 hours before taking ibuprofen.    Call your doctor if:     You have a fever over 100F (37.7 C) (taken under the tongue), or a fever that last more than 24 hours.    Your IV site is red, swollen, very painful or is getting more tender.    You have nausea that gets very bad or does not improve.      If you have any symptoms after ECT, tell our staff before your next treatment.    The ECT Department can be reached at [phone].  The ECT Department is open Mondays, Wednesdays and Fridays from 7:00 AM to 2:00 PM.         To speak to a doctor, call: Dr. Kennedy's clinic [phone], Office [phone], Fax [phone]    Plan: Take Extra Strength Tylenol 1000mg with small sip of water at home before coming to ECT.    Other: Today you have received IV Toradol 30 mg at 7:53 AM for headache/pain.  Toradol is an NSAID. Do no take any NSAIDs  including ibuprofen, Naproxen Sodium, Asprin, Advil, Motrin, Aleve, Josué, Excedrin with Aspirin, etc, Do not take any of these medications until 6 hours after above time (1:53 PM).  You also received Zofran, 8 mg IV for nausea at 7:56 AM, and Morphine Sulfate, 6 mg IV at 8:06 for headache. Additionally, you  received Lactated Ringers IV solution at 8 AM for hydration. If you have any questions please contact your physician, or pharmacist.    Transported by: Cassie Leal

## 2019-06-28 NOTE — ANESTHESIA PREPROCEDURE EVALUATION
Anesthesia Pre-Procedure Evaluation    Patient: Mindi Eastman   MRN:     [id number] Gender:   female   Age:    43 year old :      1975        Preoperative Diagnosis: * No pre-op diagnosis entered *   * No procedures listed *     Past Medical History:   Diagnosis Date     ADHD      Anxiety      Bipolar disorder (H)      Depressive disorder      OCD (obsessive compulsive disorder)      ROXANNE (obstructive sleep apnea)     On CPAP     Seasonal allergies       Past Surgical History:   Procedure Laterality Date     APPENDECTOMY       CARPAL TUNNEL RELEASE RT/LT Bilateral      CHOLECYSTECTOMY       EXTRACTION(S) DENTAL      [place] Teeth     HC KNEE SCOPE,MED/LAT MENISCUS REPAIR Right     X 2     TONSILLECTOMY            Anesthesia Evaluation     . Pt has had prior anesthetic. Type: General    No history of anesthetic complications          ROS/MED HX    ENT/Pulmonary:     (+)sleep apnea, uses CPAP , . .    Neurologic:  - neg neurologic ROS     Cardiovascular:  - neg cardiovascular ROS       METS/Exercise Tolerance:     Hematologic:  - neg hematologic  ROS       Musculoskeletal:  - neg musculoskeletal ROS       GI/Hepatic:  - neg GI/hepatic ROS       Renal/Genitourinary:  - ROS Renal section negative       Endo:  - neg endo ROS       Psychiatric:     (+) psychiatric history depression      Infectious Disease:  - neg infectious disease ROS       Malignancy:         Other:                         PHYSICAL EXAM:   Mental Status/Neuro: A/A/O   Airway: Facies: Feasible  Mallampati: I  Mouth/Opening: Full  TM distance: > 6 cm  Neck ROM: Full   Respiratory: Auscultation: CTAB     Resp. Rate: Normal     Resp. Effort: Normal      CV: Rhythm: Regular  Rate: Age appropriate  Heart: Normal Sounds   Comments:      Dental: Normal                  Lab Results   Component Value Date    WBC 11.2 (H) 2019    HGB 13.5 2019    HCT 40.8 2019     2019     2019    POTASSIUM 3.7 2019     "CHLORIDE 103 06/17/2019    CO2 26 06/17/2019    BUN 9 06/17/2019    CR 0.84 06/17/2019     (H) 06/17/2019    GALO 8.4 (L) 06/17/2019    ALBUMIN 4.0 06/17/2019    PROTTOTAL 7.4 06/17/2019    ALT 21 06/17/2019    AST 23 06/17/2019    ALKPHOS 86 06/17/2019    BILITOTAL 0.7 06/17/2019    PTT 38 (H) 06/06/2011    INR 0.99 06/06/2011    TSH 2.23 06/17/2019    HCG Negative 06/16/2019    HCGS Negative 06/06/2011       Preop Vitals  BP Readings from Last 3 Encounters:   06/28/19 125/76   06/20/19 130/82   06/07/19 127/89    Pulse Readings from Last 3 Encounters:   06/28/19 72   06/20/19 67   06/07/19 75      Resp Readings from Last 3 Encounters:   06/28/19 16   06/18/19 16   06/07/19 16    SpO2 Readings from Last 3 Encounters:   06/28/19 97%   06/20/19 99%   06/07/19 99%      Temp Readings from Last 1 Encounters:   06/28/19 36.3  C (97.4  F) (Tympanic)    Ht Readings from Last 1 Encounters:   06/16/19 1.676 m (5' 6\")      Wt Readings from Last 1 Encounters:   06/20/19 92.8 kg (204 lb 8 oz)    Estimated body mass index is 33.01 kg/m  as calculated from the following:    Height as of 6/16/19: 1.676 m (5' 6\").    Weight as of 6/20/19: 92.8 kg (204 lb 8 oz).     LDA:            Assessment:   ASA SCORE: 2       Documentation: H&P complete; Preop Testing complete; Consents complete   Proceeding: Proceed without further delay  Tobacco Use:  NO Active use of Tobacco/UNKNOWN Tobacco use status     Plan:   Anes. Type:  General      Induction:  IV (Standard)   Airway: Native Airway   Access/Monitoring: PIV   Maintenance: Balanced   Emergence: Procedure Site   Logistics: Same Day Surgery     Postop Pain/Sedation Strategy:  Standard-Options: Opioids PRN     PONV Management:  Adult Risk Factors: Female, Non-Smoker, Postop Opioids     CONSENT: Direct conversation   Plan and risks discussed with: Patient   Blood Products: Consent Deferred (Minimal Blood Loss)                             Angie Castillo MD  "

## 2019-06-28 NOTE — IP AVS SNAPSHOT
[facility]  [facility] [address] [address]  Phone:  [phone]                                    After Visit Summary   6/28/2019    Mindi Eastman    MRN: [id number]           After Visit Summary Signature Page    I have received my discharge instructions, and my questions have been answered. I have discussed any challenges I see with this plan with the nurse or doctor.    ..........................................................................................................................................  Patient/Patient Representative Signature      ..........................................................................................................................................  Patient Representative Print Name and Relationship to Patient    ..................................................               ................................................  Date                                   Time    ..........................................................................................................................................  Reviewed by Signature/Title    ...................................................              ..............................................  Date                                               Time          22EPIC Rev 08/18

## 2019-07-19 ENCOUNTER — ANESTHESIA (OUTPATIENT)
Dept: BEHAVIORAL HEALTH | Facility: CLINIC | Age: 44
End: 2019-07-19

## 2019-07-19 ENCOUNTER — HOSPITAL ENCOUNTER (OUTPATIENT)
Dept: BEHAVIORAL HEALTH | Facility: CLINIC | Age: 44
End: 2019-07-19
Attending: PSYCHIATRY & NEUROLOGY
Payer: COMMERCIAL

## 2019-07-19 ENCOUNTER — ANESTHESIA EVENT (OUTPATIENT)
Dept: BEHAVIORAL HEALTH | Facility: CLINIC | Age: 44
End: 2019-07-19

## 2019-07-19 VITALS
TEMPERATURE: 97.1 F | DIASTOLIC BLOOD PRESSURE: 81 MMHG | OXYGEN SATURATION: 93 % | HEART RATE: 64 BPM | RESPIRATION RATE: 16 BRPM | SYSTOLIC BLOOD PRESSURE: 127 MMHG

## 2019-07-19 DIAGNOSIS — F33.2 MAJOR DEPRESSIVE DISORDER, RECURRENT SEVERE WITHOUT PSYCHOTIC FEATURES (H): Primary | ICD-10-CM

## 2019-07-19 PROCEDURE — 25000128 H RX IP 250 OP 636: Performed by: PSYCHIATRY & NEUROLOGY

## 2019-07-19 PROCEDURE — 25800030 ZZH RX IP 258 OP 636: Performed by: PSYCHIATRY & NEUROLOGY

## 2019-07-19 PROCEDURE — 25000128 H RX IP 250 OP 636: Performed by: STUDENT IN AN ORGANIZED HEALTH CARE EDUCATION/TRAINING PROGRAM

## 2019-07-19 PROCEDURE — 37000008 ZZH ANESTHESIA TECHNICAL FEE, 1ST 30 MIN

## 2019-07-19 PROCEDURE — 90870 ELECTROCONVULSIVE THERAPY: CPT

## 2019-07-19 PROCEDURE — 25000125 ZZHC RX 250: Performed by: STUDENT IN AN ORGANIZED HEALTH CARE EDUCATION/TRAINING PROGRAM

## 2019-07-19 RX ORDER — ONDANSETRON 2 MG/ML
8 INJECTION INTRAMUSCULAR; INTRAVENOUS ONCE
Status: COMPLETED | OUTPATIENT
Start: 2019-07-19 | End: 2019-07-19

## 2019-07-19 RX ORDER — MORPHINE SULFATE 4 MG/ML
6 INJECTION, SOLUTION INTRAMUSCULAR; INTRAVENOUS ONCE
Status: CANCELLED
Start: 2019-07-19

## 2019-07-19 RX ORDER — ONDANSETRON 2 MG/ML
8 INJECTION INTRAMUSCULAR; INTRAVENOUS ONCE
Status: CANCELLED
Start: 2019-07-19

## 2019-07-19 RX ORDER — MORPHINE SULFATE 4 MG/ML
6 INJECTION, SOLUTION INTRAMUSCULAR; INTRAVENOUS ONCE
Status: COMPLETED | OUTPATIENT
Start: 2019-07-19 | End: 2019-07-19

## 2019-07-19 RX ORDER — KETOROLAC TROMETHAMINE 30 MG/ML
30 INJECTION, SOLUTION INTRAMUSCULAR; INTRAVENOUS ONCE
Status: COMPLETED | OUTPATIENT
Start: 2019-07-19 | End: 2019-07-19

## 2019-07-19 RX ORDER — KETOROLAC TROMETHAMINE 30 MG/ML
30 INJECTION, SOLUTION INTRAMUSCULAR; INTRAVENOUS ONCE
Status: CANCELLED
Start: 2019-07-19

## 2019-07-19 RX ADMIN — MORPHINE SULFATE 6 MG: 4 INJECTION INTRAVENOUS at 07:40

## 2019-07-19 RX ADMIN — KETOROLAC TROMETHAMINE 30 MG: 30 INJECTION, SOLUTION INTRAMUSCULAR; INTRAVENOUS at 07:29

## 2019-07-19 RX ADMIN — METHOHEXITAL SODIUM 80 MG: 500 INJECTION, POWDER, LYOPHILIZED, FOR SOLUTION INTRAMUSCULAR; INTRAVENOUS; RECTAL at 07:33

## 2019-07-19 RX ADMIN — SODIUM CHLORIDE, POTASSIUM CHLORIDE, SODIUM LACTATE AND CALCIUM CHLORIDE 1000 ML: 600; 310; 30; 20 INJECTION, SOLUTION INTRAVENOUS at 07:27

## 2019-07-19 RX ADMIN — ONDANSETRON 8 MG: 2 INJECTION INTRAMUSCULAR; INTRAVENOUS at 07:27

## 2019-07-19 RX ADMIN — Medication 70 MG: at 07:33

## 2019-07-19 NOTE — PROCEDURES
Procedure/Surgery Information   [facility], [place]    Bedside Procedure Note  Date of Service (when I performed the procedure): 07/19/2019    Mindi Eastman is a 42 year old female patient.  1. Major depressive disorder, recurrent severe without psychotic features (H)      Past Medical History:   Diagnosis Date     ADHD      Anxiety      Bipolar disorder (H)      Depressive disorder      OCD (obsessive compulsive disorder)      ROXANNE (obstructive sleep apnea)     On CPAP     Seasonal allergies      Temp: 97.4  F (36.3  C) Temp src: Tympanic BP: 142/80 Pulse: 56   Resp: 16 SpO2: 98 % O2 Device: None (Room air)      Procedures     [facility], [place]   ECT Procedure Note   07/19/2019       Mindi Eastman [id number]   42 year old 1975     Patient Status: Outpatient    Is this the first in a series of 12 treatments?  No    History and Physical: Reviewed in medical record    Consent: Informed consent was signed by: patient    Date Consent Signed: 1/25/19    Allergies   Allergen Reactions     Darvocet [Propoxyphene N-Apap]        Weight:  0 lbs 0 oz    Patient Preparations: Glasses/Contacts removed         Indications for ECT:   Medications ineffective, Psychotherapies ineffective and treatment resistant depression.         Clinical Narrative:   Patient is continuing ECT for depression.  NPO after 2400.  Alert and Oriented x 3.  No problems with the last ECT which was 3 weeks ago.  Mood is stable.  She feels the 3 week time interval seems good.            Diagnosis:      Major depressive disorder, recurrent severe without psychotic features (F33.2)         Pause for the Cause:     Right patient Yes   Right procedure/laterality settings: Yes          Intra-Procedure Documentation:       ECT #: 10 of 12   Treatment number this series:0 22   Total treatment number: 33     Type of ECT:  Right, unilateral ultrabrief    ECT Medications:    Tylenol:  1000mg po  at home  Toradol: 30mg  Zofran: 8mg   Brevital: 80mg  Succinyl Choline: 70mg  Morphine 6 mg in treatment room for headache   Lactated Ringers 1 liter      ECT Strip Summary:   Energy Level: 10 percent   Motor Seizure Duration:  57 seconds  EEG Seizure Duration:   93 seconds    Complications: No    Plan:    Take Extra Strength Tylenol 1000mg po at home before coming to ECT.    Next maintenance ECT is in 3 weeks on 8/9/19      Umang Kennedy MD

## 2019-07-19 NOTE — ANESTHESIA POSTPROCEDURE EVALUATION
Anesthesia POST Procedure Evaluation    Patient: Mindi Eastman   MRN:     [id number] Gender:   female   Age:    43 year old :      1975        Preoperative Diagnosis: * No pre-op diagnosis entered *   * No procedures listed *   Postop Comments: No value filed.       Anesthesia Type:  General  No value filed.    Reportable Event: NO     PAIN: Uncomplicated   Sign Out status: Comfortable, Well controlled pain     PONV: No PONV   Sign Out status:  No Nausea or Vomiting     Neuro/Psych: Uneventful perioperative course   Sign Out Status: Preoperative baseline; Age appropriate mentation     Airway/Resp.: Uneventful perioperative course   Sign Out Status: Non labored breathing, age appropriate RR; Resp. Status within EXPECTED Parameters     CV: Uneventful perioperative course   Sign Out status: Appropriate BP and perfusion indices; Appropriate HR/Rhythm     Disposition:   Sign Out in:  PACU  Disposition:  Phase II; Home  Recovery Course: Uneventful  Follow-Up: Not required           Last Anesthesia Record Vitals:  CRNA VITALS  2019 0712 - 2019 0758      2019             Resp Rate (set):  8          Last PACU Vitals:  No vitals data found for the desired time range.        Electronically Signed By: Leonel Vergara MD, 2019, 7:58 AM

## 2019-07-19 NOTE — IP AVS SNAPSHOT
MRN:[id number]                      After Visit Summary   7/19/2019    Mindi Eastman    MRN: [id number]           Visit Information        Provider Department      7/19/2019  6:45 AM Umang Kennedy MD [facility]           Review of your medicines      CONTINUE these medicines which have NOT CHANGED       Dose / Directions   acetaminophen 500 MG tablet  Commonly known as:  TYLENOL      Dose:  1000 mg  Take 1,000 mg by mouth as needed (headache) Prior to ECT  Refills:  0     amphetamine-dextroamphetamine 20 MG tablet  Commonly known as:  ADDERALL      Dose:  20 mg  Take 20 mg by mouth 3 times daily  Refills:  0     FLUoxetine 40 MG capsule  Commonly known as:  PROzac  Used for:  Major depressive disorder, recurrent severe without psychotic features (H)      Dose:  40 mg  Take 1 capsule (40 mg) by mouth daily  Quantity:  30 capsule  Refills:  0     LORazepam 1 MG tablet  Commonly known as:  ATIVAN  Used for:  SHANICE (generalized anxiety disorder)      Dose:  1 mg  Take 1 mg by mouth 2 times daily as needed for anxiety  Quantity:  60 tablet  Refills:  0     lurasidone 60 MG Tabs tablet  Commonly known as:  LATUDA      Dose:  60 mg  Take 60 mg by mouth daily with food  Refills:  0     nicotine 21 MG/24HR 24 hr patch  Commonly known as:  NICODERM CQ  Used for:  Tobacco use      Dose:  1 patch  Place 1 patch onto the skin daily  Quantity:  30 patch  Refills:  1     ondansetron 4 MG tablet  Commonly known as:  ZOFRAN  Used for:  Major depressive disorder, recurrent severe without psychotic features (H), Projectile vomiting with nausea      Dose:  4 mg  Take 1 tablet (4 mg) by mouth every 8 hours as needed for nausea  Quantity:  30 tablet  Refills:  0     QUEtiapine 100 MG tablet  Commonly known as:  SEROquel  Used for:  Bipolar I disorder (H), Major depressive disorder, recurrent severe without psychotic features (H)      Dose:  100 mg  Take 1 tablet (100 mg) by mouth At  Bedtime  Quantity:  30 tablet  Refills:  1     zolpidem 10 MG tablet  Commonly known as:  AMBIEN  Used for:  Insomnia due to other mental disorder      Dose:  10 mg  Take 1 tablet (10 mg) by mouth At Bedtime  Quantity:  30 tablet  Refills:  0              Protect others around you: Learn how to safely use, store and throw away your medicines at www.disposemymeds.org.       Follow-ups after your visit       Your next 10 appointments already scheduled    Aug 09, 2019  6:45 AM CDT  Electroconvulsive Therapy with Umang Kennedy MD  [facility] ([facility]) [address]  [facility] [address]  [phone]         Care Instructions       Further instructions from your care team       ECT Discharge Instructions      During your ECT series:      Do not drive or work heavy equipment until 7 days after your last treatment.    Do not drink alcohol or use street drugs (illicit drugs) while you are having treatments.    Do not make important decisions, including legal decisions.    During your ECT maintenance:    -Do not drive for 24 hours after treatment.  If you will be having more than one treatment within a week, no driving until 7 days after your last ECT treatment.        After each treatment:      Get plenty of rest. A responsible adult must stay with your for at least 6 hours.    Avoid heavy or risky activities for 24 hours.    If you feel light-headed, sit for a few minutes before standing. Have someone help you get up.    If you have nausea (feel sick to your stomach): Drink only clear liquids such as apple juice, ginger ale, broth or 7UP, Be sure to drink plenty of liquids. Move to a normal diet as you feel able.     If you received Toradol, wait 6 hours before taking ibuprofen.    Call your doctor if:     You have a fever over 100F (37.7 C) (taken under the tongue), or a fever that last more than 24 hours.    Your IV site is red, swollen, very  "painful or is getting more tender.    You have nausea that gets very bad or does not improve.      If you have any symptoms after ECT, tell our staff before your next treatment.    The ECT Department can be reached at [phone].  The ECT Department is open ,  and  from 7:00 AM to 2:00 PM.    To speak to a doctor, call: Dr. Kennedy's office # [phone] Fax # [phone]    Today you received the following IV medications:    Zofran 8mg at 730a for nausea    1000mL of fluids (lactated ringers)    Morphine 6mg at 740a    Toradol 30mg today at 730am.  Toradol is an NSAID.  Do not take any NSAID's including: Ibuprofen, Naproxen, Aspirin, Advil, Motrin, Aleve, Josué, etc., until 6 hours after the above time (130p).  If you have any questions, contact your physician or pharmacist.      Transported by: amena Montalvo    Additional Information About Your Visit       [employer] Information    [employer] lets you send messages to your doctor, view your test results, renew your prescriptions, schedule appointments and more. To sign up, go to [email or web address] . Click on \"Log in\" on the left side of the screen, which will take you to the Welcome page. Then click on \"Sign up Now\" on the right side of the page.     You will be asked to enter the access code listed below, as well as some personal information. Please follow the directions to create your username and password.     Your access code is: [email or web address]  Expires: 2019  8:32 AM     Your access code will  in 60 days. If you need help or a new code, please call your [facility] clinic or [phone].       Care EveryWhere ID    This is your Care EveryWhere ID. This could be used by other organizations to access your [facility] medical records  [id number]       Your Vitals Were     Blood Pressure   127/81    Pulse   64    Temperature   97.1  F (36.2  C) (Temporal)    Respirations   16    Pulse Oximetry   93%          Primary Care Provider " Office Phone # Fax #    Robyn Smiley [phone]      Equal Access to Services    ROXANNA DEBO : Hadii tonia nelson gaurang Salgadoali, debbie phylliszacharyha, dimitri covington, yamileth darlynin hayaan tianshiraz peters. So [facility] [phone].    ATENCIÓN: Si habla español, tiene a martínez disposición servicios gratuitos de asistencia lingüística. Llame al [phone].    We comply with applicable federal civil rights laws and [place] laws. We do not discriminate on the basis of race, color, national origin, age, disability, sex, sexual orientation, or gender identity.           Thank you!    Thank you for choosing [facility] for your care. Our goal is always to provide you with excellent care. Hearing back from our patients is one way we can continue to improve our services. Please take a few minutes to complete the written survey that you may receive in the mail after you visit with us. Thank you!            Medication List      Medications       Morning Afternoon Evening Bedtime As Needed   acetaminophen 500 MG tablet  Also known as:  TYLENOL  INSTRUCTIONS:  Take 1,000 mg by mouth as needed (headache) Prior to ECT                    amphetamine-dextroamphetamine 20 MG tablet  Also known as:  ADDERALL  INSTRUCTIONS:  Take 20 mg by mouth 3 times daily                    FLUoxetine 40 MG capsule  Also known as:  PROzac  INSTRUCTIONS:  Take 1 capsule (40 mg) by mouth daily                    LORazepam 1 MG tablet  Also known as:  ATIVAN  INSTRUCTIONS:  Take 1 mg by mouth 2 times daily as needed for anxiety                    lurasidone 60 MG Tabs tablet  Also known as:  LATUDA  INSTRUCTIONS:  Take 60 mg by mouth daily with food                    nicotine 21 MG/24HR 24 hr patch  Also known as:  NICODERM CQ  INSTRUCTIONS:  Place 1 patch onto the skin daily                    ondansetron 4 MG tablet  Also known as:  ZOFRAN  INSTRUCTIONS:  Take 1 tablet (4 mg) by mouth every 8 hours as needed for nausea  LAST  TAKEN:  Ask your nurse or doctor                    QUEtiapine 100 MG tablet  Also known as:  SEROquel  INSTRUCTIONS:  Take 1 tablet (100 mg) by mouth At Bedtime                    zolpidem 10 MG tablet  Also known as:  AMBIEN  INSTRUCTIONS:  Take 1 tablet (10 mg) by mouth At Bedtime

## 2019-07-19 NOTE — ANESTHESIA PREPROCEDURE EVALUATION
Anesthesia Pre-Procedure Evaluation    Patient: Mindi Eastman   MRN:     [id number] Gender:   female   Age:    43 year old :      1975        Preoperative Diagnosis: * No pre-op diagnosis entered *   * No procedures listed *     Past Medical History:   Diagnosis Date     ADHD      Anxiety      Bipolar disorder (H)      Depressive disorder      OCD (obsessive compulsive disorder)      ROXANNE (obstructive sleep apnea)     On CPAP     Seasonal allergies       Past Surgical History:   Procedure Laterality Date     APPENDECTOMY       CARPAL TUNNEL RELEASE RT/LT Bilateral      CHOLECYSTECTOMY       EXTRACTION(S) DENTAL      [place] Teeth     HC KNEE SCOPE,MED/LAT MENISCUS REPAIR Right     X 2     TONSILLECTOMY            Anesthesia Evaluation     . Pt has had prior anesthetic. Type: General    No history of anesthetic complications          ROS/MED HX    ENT/Pulmonary:     (+)sleep apnea, uses CPAP , . .    Neurologic:  - neg neurologic ROS     Cardiovascular:  - neg cardiovascular ROS       METS/Exercise Tolerance:     Hematologic:  - neg hematologic  ROS       Musculoskeletal:  - neg musculoskeletal ROS       GI/Hepatic:  - neg GI/hepatic ROS       Renal/Genitourinary:  - ROS Renal section negative       Endo:  - neg endo ROS       Psychiatric:     (+) psychiatric history depression      Infectious Disease:  - neg infectious disease ROS       Malignancy:         Other:                         PHYSICAL EXAM:   Mental Status/Neuro: A/A/O   Airway: Facies: Feasible  Mallampati: I  Mouth/Opening: Full  TM distance: > 6 cm  Neck ROM: Full   Respiratory: Auscultation: CTAB     Resp. Rate: Normal     Resp. Effort: Normal      CV: Rhythm: Regular  Rate: Age appropriate  Heart: Normal Sounds   Comments:      Dental: Normal                  Lab Results   Component Value Date    WBC 11.2 (H) 2019    HGB 13.5 2019    HCT 40.8 2019     2019     2019    POTASSIUM 3.7 2019     "CHLORIDE 103 06/17/2019    CO2 26 06/17/2019    BUN 9 06/17/2019    CR 0.84 06/17/2019     (H) 06/17/2019    GALO 8.4 (L) 06/17/2019    ALBUMIN 4.0 06/17/2019    PROTTOTAL 7.4 06/17/2019    ALT 21 06/17/2019    AST 23 06/17/2019    ALKPHOS 86 06/17/2019    BILITOTAL 0.7 06/17/2019    PTT 38 (H) 06/06/2011    INR 0.99 06/06/2011    TSH 2.23 06/17/2019    HCG Negative 06/16/2019    HCGS Negative 06/06/2011       Preop Vitals  BP Readings from Last 3 Encounters:   07/19/19 142/80   06/28/19 126/70   06/20/19 130/82    Pulse Readings from Last 3 Encounters:   07/19/19 56   06/28/19 72   06/20/19 67      Resp Readings from Last 3 Encounters:   07/19/19 16   06/28/19 16   06/18/19 16    SpO2 Readings from Last 3 Encounters:   07/19/19 98%   06/28/19 96%   06/20/19 99%      Temp Readings from Last 1 Encounters:   07/19/19 36.3  C (97.4  F) (Tympanic)    Ht Readings from Last 1 Encounters:   06/16/19 1.676 m (5' 6\")      Wt Readings from Last 1 Encounters:   06/20/19 92.8 kg (204 lb 8 oz)    Estimated body mass index is 33.01 kg/m  as calculated from the following:    Height as of 6/16/19: 1.676 m (5' 6\").    Weight as of 6/20/19: 92.8 kg (204 lb 8 oz).     LDA:  Peripheral IV 06/28/19 Right Hand (Active)   Number of days: 21       Peripheral IV 07/19/19 Right Hand (Active)   Number of days: 0            Assessment:   ASA SCORE: 2       Documentation: H&P complete; Preop Testing complete; Consents complete   Proceeding: Proceed without further delay  Tobacco Use:  NO Active use of Tobacco/UNKNOWN Tobacco use status     Plan:   Anes. Type:  General      Induction:  IV (Standard)   Airway: Native Airway   Access/Monitoring: PIV   Maintenance: Balanced   Emergence: Procedure Site   Logistics: Same Day Surgery     Postop Pain/Sedation Strategy:  Standard-Options: Opioids PRN     PONV Management:  Adult Risk Factors: Female, Non-Smoker, Postop Opioids     CONSENT: Direct conversation   Plan and risks discussed with: " Patient   Blood Products: Consent Deferred (Minimal Blood Loss)                             Leonel Vergara MD

## 2019-07-19 NOTE — DISCHARGE INSTRUCTIONS
ECT Discharge Instructions      During your ECT series:      Do not drive or work heavy equipment until 7 days after your last treatment.    Do not drink alcohol or use street drugs (illicit drugs) while you are having treatments.    Do not make important decisions, including legal decisions.    During your ECT maintenance:    -Do not drive for 24 hours after treatment.  If you will be having more than one treatment within a week, no driving until 7 days after your last ECT treatment.        After each treatment:      Get plenty of rest. A responsible adult must stay with your for at least 6 hours.    Avoid heavy or risky activities for 24 hours.    If you feel light-headed, sit for a few minutes before standing. Have someone help you get up.    If you have nausea (feel sick to your stomach): Drink only clear liquids such as apple juice, ginger ale, broth or 7UP, Be sure to drink plenty of liquids. Move to a normal diet as you feel able.     If you received Toradol, wait 6 hours before taking ibuprofen.    Call your doctor if:     You have a fever over 100F (37.7 C) (taken under the tongue), or a fever that last more than 24 hours.    Your IV site is red, swollen, very painful or is getting more tender.    You have nausea that gets very bad or does not improve.      If you have any symptoms after ECT, tell our staff before your next treatment.    The ECT Department can be reached at [phone].  The ECT Department is open Mondays, Wednesdays and Fridays from 7:00 AM to 2:00 PM.    To speak to a doctor, call: Dr. Kennedy's office # [phone] Fax # [phone]    Today you received the following IV medications:    Zofran 8mg at 730a for nausea    1000mL of fluids (lactated ringers)    Morphine 6mg at 740a    Toradol 30mg today at 730am.  Toradol is an NSAID.  Do not take any NSAID's including: Ibuprofen, Naproxen, Aspirin, Advil, Motrin, Aleve, Josué, etc., until 6 hours after the above time (130p).  If you have  any questions, contact your physician or pharmacist.      Transported by: amena Montalvo

## 2019-07-19 NOTE — IP AVS SNAPSHOT
[facility]  [facility] [address] [address]  Phone:  [phone]                                    After Visit Summary   7/19/2019    Mindi Eastman    MRN: [id number]           After Visit Summary Signature Page    I have received my discharge instructions, and my questions have been answered. I have discussed any challenges I see with this plan with the nurse or doctor.    ..........................................................................................................................................  Patient/Patient Representative Signature      ..........................................................................................................................................  Patient Representative Print Name and Relationship to Patient    ..................................................               ................................................  Date                                   Time    ..........................................................................................................................................  Reviewed by Signature/Title    ...................................................              ..............................................  Date                                               Time          22EPIC Rev 08/18

## 2019-08-09 ENCOUNTER — ANESTHESIA (OUTPATIENT)
Dept: BEHAVIORAL HEALTH | Facility: CLINIC | Age: 44
End: 2019-08-09
Payer: COMMERCIAL

## 2019-08-09 ENCOUNTER — HOSPITAL ENCOUNTER (OUTPATIENT)
Dept: BEHAVIORAL HEALTH | Facility: CLINIC | Age: 44
End: 2019-08-09
Attending: PSYCHIATRY & NEUROLOGY
Payer: COMMERCIAL

## 2019-08-09 ENCOUNTER — ANESTHESIA EVENT (OUTPATIENT)
Dept: BEHAVIORAL HEALTH | Facility: CLINIC | Age: 44
End: 2019-08-09

## 2019-08-09 VITALS
DIASTOLIC BLOOD PRESSURE: 68 MMHG | RESPIRATION RATE: 16 BRPM | SYSTOLIC BLOOD PRESSURE: 121 MMHG | TEMPERATURE: 98.2 F | OXYGEN SATURATION: 93 % | HEART RATE: 72 BPM

## 2019-08-09 DIAGNOSIS — F33.2 MAJOR DEPRESSIVE DISORDER, RECURRENT SEVERE WITHOUT PSYCHOTIC FEATURES (H): Primary | ICD-10-CM

## 2019-08-09 PROCEDURE — 25800030 ZZH RX IP 258 OP 636: Performed by: PSYCHIATRY & NEUROLOGY

## 2019-08-09 PROCEDURE — 25000125 ZZHC RX 250: Performed by: ANESTHESIOLOGY

## 2019-08-09 PROCEDURE — 25000128 H RX IP 250 OP 636: Performed by: ANESTHESIOLOGY

## 2019-08-09 PROCEDURE — 37000008 ZZH ANESTHESIA TECHNICAL FEE, 1ST 30 MIN

## 2019-08-09 PROCEDURE — 90870 ELECTROCONVULSIVE THERAPY: CPT

## 2019-08-09 PROCEDURE — 25000128 H RX IP 250 OP 636: Performed by: PSYCHIATRY & NEUROLOGY

## 2019-08-09 RX ORDER — ONDANSETRON 2 MG/ML
8 INJECTION INTRAMUSCULAR; INTRAVENOUS ONCE
Status: COMPLETED | OUTPATIENT
Start: 2019-08-09 | End: 2019-08-09

## 2019-08-09 RX ORDER — KETOROLAC TROMETHAMINE 30 MG/ML
30 INJECTION, SOLUTION INTRAMUSCULAR; INTRAVENOUS ONCE
Status: COMPLETED | OUTPATIENT
Start: 2019-08-09 | End: 2019-08-09

## 2019-08-09 RX ORDER — ONDANSETRON 2 MG/ML
8 INJECTION INTRAMUSCULAR; INTRAVENOUS ONCE
Status: CANCELLED
Start: 2019-08-09

## 2019-08-09 RX ORDER — KETOROLAC TROMETHAMINE 30 MG/ML
30 INJECTION, SOLUTION INTRAMUSCULAR; INTRAVENOUS ONCE
Status: CANCELLED
Start: 2019-08-09

## 2019-08-09 RX ORDER — MORPHINE SULFATE 4 MG/ML
6 INJECTION, SOLUTION INTRAMUSCULAR; INTRAVENOUS ONCE
Status: CANCELLED
Start: 2019-08-09

## 2019-08-09 RX ORDER — MORPHINE SULFATE 4 MG/ML
6 INJECTION, SOLUTION INTRAMUSCULAR; INTRAVENOUS ONCE
Status: COMPLETED | OUTPATIENT
Start: 2019-08-09 | End: 2019-08-09

## 2019-08-09 RX ADMIN — KETOROLAC TROMETHAMINE 30 MG: 30 INJECTION, SOLUTION INTRAMUSCULAR at 07:19

## 2019-08-09 RX ADMIN — Medication 70 MG: at 07:39

## 2019-08-09 RX ADMIN — ONDANSETRON 8 MG: 2 INJECTION INTRAMUSCULAR; INTRAVENOUS at 07:19

## 2019-08-09 RX ADMIN — SODIUM CHLORIDE, POTASSIUM CHLORIDE, SODIUM LACTATE AND CALCIUM CHLORIDE 1000 ML: 600; 310; 30; 20 INJECTION, SOLUTION INTRAVENOUS at 07:20

## 2019-08-09 RX ADMIN — METHOHEXITAL SODIUM 80 MG: 500 INJECTION, POWDER, LYOPHILIZED, FOR SOLUTION INTRAMUSCULAR; INTRAVENOUS; RECTAL at 07:39

## 2019-08-09 RX ADMIN — MORPHINE SULFATE 6 MG: 4 INJECTION INTRAVENOUS at 07:45

## 2019-08-09 NOTE — ANESTHESIA PREPROCEDURE EVALUATION
Anesthesia Pre-Procedure Evaluation    Patient: Mindi Eastman   MRN:     [id number] Gender:   female   Age:    43 year old :      1975        Preoperative Diagnosis: * No pre-op diagnosis entered *   * No procedures listed *     Past Medical History:   Diagnosis Date     ADHD      Anxiety      Bipolar disorder (H)      Depressive disorder      OCD (obsessive compulsive disorder)      ROXANNE (obstructive sleep apnea)     On CPAP     Seasonal allergies       Past Surgical History:   Procedure Laterality Date     APPENDECTOMY       CARPAL TUNNEL RELEASE RT/LT Bilateral      CHOLECYSTECTOMY       EXTRACTION(S) DENTAL      [place] Teeth     HC KNEE SCOPE,MED/LAT MENISCUS REPAIR Right     X 2     TONSILLECTOMY            Anesthesia Evaluation     . Pt has had prior anesthetic. Type: General    No history of anesthetic complications          ROS/MED HX    ENT/Pulmonary:     (+)sleep apnea, uses CPAP , . .    Neurologic:  - neg neurologic ROS     Cardiovascular:  - neg cardiovascular ROS   (+) ----. : . . . :. . Previous cardiac testing date:results:date: results:ECG reviewed date: results: date: results:          METS/Exercise Tolerance:  >4 METS   Hematologic:  - neg hematologic  ROS       Musculoskeletal:  - neg musculoskeletal ROS       GI/Hepatic:  - neg GI/hepatic ROS       Renal/Genitourinary:  - ROS Renal section negative       Endo:  - neg endo ROS       Psychiatric:     (+) psychiatric history depression      Infectious Disease:  - neg infectious disease ROS       Malignancy:         Other:                         PHYSICAL EXAM:   Mental Status/Neuro: A/A/O   Airway: Facies: Feasible  Mallampati: I  Mouth/Opening: Full  TM distance: > 6 cm  Neck ROM: Full   Respiratory: Auscultation: CTAB     Resp. Rate: Normal     Resp. Effort: Normal      CV: Rhythm: Regular  Rate: Age appropriate  Heart: Normal Sounds   Comments:                      Lab Results   Component Value Date    WBC 11.2 (H) 2019    HGB  "13.5 06/17/2019    HCT 40.8 06/17/2019     06/17/2019     06/17/2019    POTASSIUM 3.7 06/17/2019    CHLORIDE 103 06/17/2019    CO2 26 06/17/2019    BUN 9 06/17/2019    CR 0.84 06/17/2019     (H) 06/17/2019    GALO 8.4 (L) 06/17/2019    ALBUMIN 4.0 06/17/2019    PROTTOTAL 7.4 06/17/2019    ALT 21 06/17/2019    AST 23 06/17/2019    ALKPHOS 86 06/17/2019    BILITOTAL 0.7 06/17/2019    PTT 38 (H) 06/06/2011    INR 0.99 06/06/2011    TSH 2.23 06/17/2019    HCG Negative 06/16/2019    HCGS Negative 06/06/2011       Preop Vitals  BP Readings from Last 3 Encounters:   07/19/19 127/81   06/28/19 126/70   06/20/19 130/82    Pulse Readings from Last 3 Encounters:   07/19/19 64   06/28/19 72   06/20/19 67      Resp Readings from Last 3 Encounters:   07/19/19 16   06/28/19 16   06/18/19 16    SpO2 Readings from Last 3 Encounters:   07/19/19 93%   06/28/19 96%   06/20/19 99%      Temp Readings from Last 1 Encounters:   07/19/19 36.2  C (97.1  F) (Temporal)    Ht Readings from Last 1 Encounters:   06/16/19 1.676 m (5' 6\")      Wt Readings from Last 1 Encounters:   06/20/19 92.8 kg (204 lb 8 oz)    Estimated body mass index is 33.01 kg/m  as calculated from the following:    Height as of 6/16/19: 1.676 m (5' 6\").    Weight as of 6/20/19: 92.8 kg (204 lb 8 oz).     LDA:  Peripheral IV 06/28/19 Right Hand (Active)   Number of days: 42            Assessment: Elective due to   ASA SCORE: 2    H&P: History and physical reviewed and following examination; no interval change.        - H&P complete; Preop Testing complete; Consents complete  Smoking Status:  NO Active use of Tobacco/UNKNOWN Tobacco use status   NPO Status: > 6 hours since completed Solid Foods     Plan:   Anes. Type:  General (General)   Pre-Medication: None   Induction:  IV (Standard)   Airway: Mask   Access/Monitoring: PIV   Maintenance: Balanced     Postop Plan:   Postop Pain: None  Postop Sedation/Airway: Not planned  Disposition: Outpatient     PONV " Management:   Adult Risk Factors: Female, Non-Smoker   Prevention:, No Volatiles     CONSENT: Direct conversation   Plan and risks discussed with: Patient   Blood Products: N/a                             Jamel Franks DO

## 2019-08-09 NOTE — PROCEDURES
Procedure/Surgery Information   [facility], [place]    Bedside Procedure Note  Date of Service (when I performed the procedure): 08/09/2019    Mindi Eastman is a 42 year old female patient.  1. Major depressive disorder, recurrent severe without psychotic features (H)      Past Medical History:   Diagnosis Date     ADHD      Anxiety      Bipolar disorder (H)      Depressive disorder      OCD (obsessive compulsive disorder)      ROXANNE (obstructive sleep apnea)     On CPAP     Seasonal allergies                           Procedures     [facility], [place]   ECT Procedure Note   08/09/2019       Mindi Eastman [id number]   42 year old 1975     Patient Status: Outpatient    Is this the first in a series of 12 treatments?  No    History and Physical: Reviewed in medical record    Consent: Informed consent was signed by: patient    Date Consent Signed: 1/25/19    Allergies   Allergen Reactions     Darvocet [Propoxyphene N-Apap]        Weight:  0 lbs 0 oz    Patient Preparations: Glasses/Contacts removed    /72   Pulse 72   Temp 97.4  F (36.3  C) (Tympanic)   Resp 16   SpO2 96%          Indications for ECT:   Medications ineffective, Psychotherapies ineffective and treatment resistant depression.         Clinical Narrative:   Patient is continuing ECT for depression.  NPO after 2400.  Alert and Oriented x 3.  No problems with the last ECT which was 3 weeks ago.  Mood is stable.  She feels the 3 week time interval seems good.            Diagnosis:      Major depressive disorder, recurrent severe without psychotic features (F33.2)         Pause for the Cause:     Right patient Yes   Right procedure/laterality settings: Yes          Intra-Procedure Documentation:       ECT #: 11 of 12   Treatment number this series:0 23   Total treatment number: 34     Type of ECT:  Right, unilateral ultrabrief    ECT Medications:    Tylenol:  1000mg po at  home  Lactated Ringers 1 liter   Toradol: 30mg  Zofran: 8mg   Brevital: 80mg  Succinyl Choline: 70mg  Morphine 6 mg in treatment room for headache       ECT Strip Summary:   Energy Level: 10 percent   Motor Seizure Duration:  34 seconds  EEG Seizure Duration:   56 seconds    Complications: No    Plan:    Take Extra Strength Tylenol 1000mg po at home before coming to ECT.    Next maintenance ECT is in 3 weeks on 8/30/19  Pt might be starting Lithium with her outpatient psychiatrist.        Umang Kennedy MD

## 2019-08-09 NOTE — ANESTHESIA POSTPROCEDURE EVALUATION
Anesthesia POST Procedure Evaluation    Patient: Mindi Eastman   MRN:     [id number] Gender:   female   Age:    43 year old :      1975        Preoperative Diagnosis: * No pre-op diagnosis entered *   * No procedures listed *   Postop Comments: No value filed.       Anesthesia Type:  General  General    Reportable Event: NO     PAIN: Uncomplicated   Sign Out status: Comfortable, Well controlled pain     PONV: No PONV   Sign Out status:  No Nausea or Vomiting     Neuro/Psych: Uneventful perioperative course   Sign Out Status: Preoperative baseline; Age appropriate mentation     Airway/Resp.: Uneventful perioperative course   Sign Out Status: Non labored breathing, age appropriate RR; Resp. Status within EXPECTED Parameters     CV: Uneventful perioperative course   Sign Out status: Appropriate BP and perfusion indices; Appropriate HR/Rhythm     Disposition:   Sign Out in:  PACU  Disposition:  Phase II; Home  Recovery Course: Uneventful  Follow-Up: Not required           Last Anesthesia Record Vitals:  CRNA VITALS  2019 0717 - 2019 0810      2019             Resp Rate (set):  8          Last PACU Vitals:  Vitals Value Taken Time   /76 2019  8:00 AM   Temp     Pulse 82 2019  8:00 AM   Resp 16 2019  8:00 AM   SpO2 92 % 2019  8:00 AM   Temp src     NIBP     Pulse     SpO2     Resp     Temp     Ht Rate     Temp 2           Electronically Signed By: Jamel Franks DO, 2019, 8:10 AM

## 2019-08-09 NOTE — DISCHARGE INSTRUCTIONS
ECT Discharge Instructions      During your ECT series:      Do not drive or work heavy equipment until 7 days after your last treatment.    Do not drink alcohol or use street drugs (illicit drugs) while you are having treatments.    Do not make important decisions, including legal decisions.    After each treatment:      Get plenty of rest. A responsible adult must stay with your for at least 6 hours.    Avoid heavy or risky activities for 24 hours.    If you feel light-headed, sit for a few minutes before standing. Have someone help you get up.    If you have nausea (feel sick to your stomach): Drink only clear liquids such as apple juice, ginger ale, broth or 7UP, Be sure to drink plenty of liquids. Move to a normal diet as you feel able.     If you received Toradol, wait 6 hours before taking ibuprofen.    Call your doctor if:     You have a fever over 100F (37.7 C) (taken under the tongue), or a fever that last more than 24 hours.    Your IV site is red, swollen, very painful or is getting more tender.    You have nausea that gets very bad or does not improve.      If you have any symptoms after ECT, tell our staff before your next treatment.    The ECT Department can be reached at [phone].  The ECT Department is open Mondays, Wednesdays and Fridays from 7:00 AM to 2:00 PM.      To speak to a doctor, call: Dr. Kennedy's clinic [phone], Office [phone], Fax [phone]    Plan: Take Extra Strength Tylenol 1000mg with sip of water at home before coming to ECT.      Other: Today you have received IV Toradol 30 mg at 7:20 AM for headache/pain.  Toradol is an NSAID. Do no take any NSAIDs  including ibuprofen, Naproxen Sodium, Asprin, Advil, Motrin, Aleve, Josué, Excedrin with Aspirin, etc, Do not take any of these medications until 6 hours after above time (1:20 PM). You also received Zofran 8 mg IV for nausea and Lactated Ringers IV solution for hydration. If you have any questions please contact your  physician, or pharmacist.    Transported by: Mel Soriano

## 2019-08-09 NOTE — IP AVS SNAPSHOT
[facility]  [facility] [address] [address]  Phone:  [phone]                                    After Visit Summary   8/9/2019    Mindi Eastman    MRN: [id number]           After Visit Summary Signature Page    I have received my discharge instructions, and my questions have been answered. I have discussed any challenges I see with this plan with the nurse or doctor.    ..........................................................................................................................................  Patient/Patient Representative Signature      ..........................................................................................................................................  Patient Representative Print Name and Relationship to Patient    ..................................................               ................................................  Date                                   Time    ..........................................................................................................................................  Reviewed by Signature/Title    ...................................................              ..............................................  Date                                               Time          22EPIC Rev 08/18

## 2019-08-30 ENCOUNTER — ANESTHESIA (OUTPATIENT)
Dept: BEHAVIORAL HEALTH | Facility: CLINIC | Age: 44
End: 2019-08-30

## 2019-08-30 ENCOUNTER — HOSPITAL ENCOUNTER (OUTPATIENT)
Dept: BEHAVIORAL HEALTH | Facility: CLINIC | Age: 44
Discharge: HOME | End: 2019-08-30
Attending: PSYCHIATRY & NEUROLOGY | Admitting: PSYCHIATRY & NEUROLOGY
Payer: COMMERCIAL

## 2019-08-30 ENCOUNTER — ANESTHESIA EVENT (OUTPATIENT)
Dept: BEHAVIORAL HEALTH | Facility: CLINIC | Age: 44
End: 2019-08-30

## 2019-08-30 VITALS
DIASTOLIC BLOOD PRESSURE: 74 MMHG | RESPIRATION RATE: 16 BRPM | HEART RATE: 91 BPM | OXYGEN SATURATION: 99 % | SYSTOLIC BLOOD PRESSURE: 130 MMHG | TEMPERATURE: 98 F

## 2019-08-30 DIAGNOSIS — F33.2 MAJOR DEPRESSIVE DISORDER, RECURRENT SEVERE WITHOUT PSYCHOTIC FEATURES (H): Primary | ICD-10-CM

## 2019-08-30 PROCEDURE — 90870 ELECTROCONVULSIVE THERAPY: CPT

## 2019-08-30 PROCEDURE — 25800030 ZZH RX IP 258 OP 636: Performed by: PSYCHIATRY & NEUROLOGY

## 2019-08-30 PROCEDURE — 37000008 ZZH ANESTHESIA TECHNICAL FEE, 1ST 30 MIN

## 2019-08-30 PROCEDURE — 25000128 H RX IP 250 OP 636: Performed by: ANESTHESIOLOGY

## 2019-08-30 PROCEDURE — 25000125 ZZHC RX 250: Performed by: ANESTHESIOLOGY

## 2019-08-30 PROCEDURE — 25000128 H RX IP 250 OP 636: Performed by: PSYCHIATRY & NEUROLOGY

## 2019-08-30 RX ORDER — ONDANSETRON 2 MG/ML
8 INJECTION INTRAMUSCULAR; INTRAVENOUS ONCE
Status: CANCELLED
Start: 2019-08-30

## 2019-08-30 RX ORDER — ONDANSETRON 2 MG/ML
8 INJECTION INTRAMUSCULAR; INTRAVENOUS ONCE
Status: COMPLETED | OUTPATIENT
Start: 2019-08-30 | End: 2019-08-30

## 2019-08-30 RX ORDER — FENTANYL CITRATE 50 UG/ML
25-50 INJECTION, SOLUTION INTRAMUSCULAR; INTRAVENOUS
Status: DISCONTINUED | OUTPATIENT
Start: 2019-08-30 | End: 2019-08-31 | Stop reason: HOSPADM

## 2019-08-30 RX ORDER — ONDANSETRON 4 MG/1
4 TABLET, ORALLY DISINTEGRATING ORAL EVERY 30 MIN PRN
Status: DISCONTINUED | OUTPATIENT
Start: 2019-08-30 | End: 2019-08-31 | Stop reason: HOSPADM

## 2019-08-30 RX ORDER — MORPHINE SULFATE 4 MG/ML
6 INJECTION, SOLUTION INTRAMUSCULAR; INTRAVENOUS ONCE
Status: COMPLETED | OUTPATIENT
Start: 2019-08-30 | End: 2019-08-30

## 2019-08-30 RX ORDER — KETOROLAC TROMETHAMINE 30 MG/ML
30 INJECTION, SOLUTION INTRAMUSCULAR; INTRAVENOUS ONCE
Status: COMPLETED | OUTPATIENT
Start: 2019-08-30 | End: 2019-08-30

## 2019-08-30 RX ORDER — SODIUM CHLORIDE, SODIUM LACTATE, POTASSIUM CHLORIDE, CALCIUM CHLORIDE 600; 310; 30; 20 MG/100ML; MG/100ML; MG/100ML; MG/100ML
INJECTION, SOLUTION INTRAVENOUS CONTINUOUS
Status: DISCONTINUED | OUTPATIENT
Start: 2019-08-30 | End: 2019-08-31 | Stop reason: HOSPADM

## 2019-08-30 RX ORDER — NALOXONE HYDROCHLORIDE 0.4 MG/ML
.1-.4 INJECTION, SOLUTION INTRAMUSCULAR; INTRAVENOUS; SUBCUTANEOUS
Status: DISCONTINUED | OUTPATIENT
Start: 2019-08-30 | End: 2019-08-31 | Stop reason: HOSPADM

## 2019-08-30 RX ORDER — MORPHINE SULFATE 4 MG/ML
6 INJECTION, SOLUTION INTRAMUSCULAR; INTRAVENOUS ONCE
Status: CANCELLED
Start: 2019-08-30

## 2019-08-30 RX ORDER — ONDANSETRON 2 MG/ML
4 INJECTION INTRAMUSCULAR; INTRAVENOUS EVERY 30 MIN PRN
Status: DISCONTINUED | OUTPATIENT
Start: 2019-08-30 | End: 2019-08-31 | Stop reason: HOSPADM

## 2019-08-30 RX ORDER — KETOROLAC TROMETHAMINE 30 MG/ML
30 INJECTION, SOLUTION INTRAMUSCULAR; INTRAVENOUS ONCE
Status: CANCELLED
Start: 2019-08-30

## 2019-08-30 RX ADMIN — METHOHEXITAL SODIUM 80 MG: 500 INJECTION, POWDER, LYOPHILIZED, FOR SOLUTION INTRAMUSCULAR; INTRAVENOUS; RECTAL at 07:38

## 2019-08-30 RX ADMIN — MORPHINE SULFATE 6 MG: 4 INJECTION INTRAVENOUS at 07:46

## 2019-08-30 RX ADMIN — SODIUM CHLORIDE, POTASSIUM CHLORIDE, SODIUM LACTATE AND CALCIUM CHLORIDE 1000 ML: 600; 310; 30; 20 INJECTION, SOLUTION INTRAVENOUS at 07:32

## 2019-08-30 RX ADMIN — KETOROLAC TROMETHAMINE 30 MG: 30 INJECTION, SOLUTION INTRAMUSCULAR at 07:32

## 2019-08-30 RX ADMIN — ONDANSETRON 8 MG: 2 INJECTION INTRAMUSCULAR; INTRAVENOUS at 07:32

## 2019-08-30 RX ADMIN — Medication 70 MG: at 07:40

## 2019-08-30 NOTE — ANESTHESIA PREPROCEDURE EVALUATION
Anesthesia Pre-Procedure Evaluation    Patient: Mindi Eastman   MRN:     [id number] Gender:   female   Age:    43 year old :      1975        Preoperative Diagnosis: * No pre-op diagnosis entered *   * No procedures listed *     Past Medical History:   Diagnosis Date     ADHD      Anxiety      Bipolar disorder (H)      Depressive disorder      OCD (obsessive compulsive disorder)      ROXANNE (obstructive sleep apnea)     On CPAP     Seasonal allergies       Past Surgical History:   Procedure Laterality Date     APPENDECTOMY       CARPAL TUNNEL RELEASE RT/LT Bilateral      CHOLECYSTECTOMY       EXTRACTION(S) DENTAL      [place] Teeth     HC KNEE SCOPE,MED/LAT MENISCUS REPAIR Right     X 2     TONSILLECTOMY                     PHYSICAL EXAM:   Mental Status/Neuro: A/A/O   Airway: Facies: Feasible  Mallampati: III  Mouth/Opening: Full  TM distance: > 6 cm  Neck ROM: Full   Respiratory: Auscultation: CTAB     Resp. Rate: Normal     Resp. Effort: Normal      CV: Rhythm: Regular  Rate: Age appropriate  Heart: Normal Sounds  Edema: None   Comments:      Dental: Normal Dentition                LABS:  CBC:   Lab Results   Component Value Date    WBC 11.2 (H) 2019    WBC 6.3 10/05/2017    HGB 13.5 2019    HGB 11.7 10/05/2017    HCT 40.8 2019    HCT 35.8 10/05/2017     2019     10/05/2017     BMP:   Lab Results   Component Value Date     2019     10/05/2017    POTASSIUM 3.7 2019    POTASSIUM 4.5 10/03/2018    CHLORIDE 103 2019    CHLORIDE 108 10/05/2017    CO2 26 2019    CO2 28 10/05/2017    BUN 9 2019    BUN 8 10/05/2017    CR 0.84 2019    CR 0.84 10/03/2018     (H) 2019     (A) 10/03/2018     COAGS:   Lab Results   Component Value Date    PTT 38 (H) 2011    INR 0.99 2011     POC:   Lab Results   Component Value Date    HCG Negative 2019    HCGS Negative 2011     OTHER:   Lab Results  "  Component Value Date    GALO 8.4 (L) 06/17/2019    ALBUMIN 4.0 06/17/2019    PROTTOTAL 7.4 06/17/2019    ALT 21 06/17/2019    AST 23 06/17/2019    ALKPHOS 86 06/17/2019    BILITOTAL 0.7 06/17/2019    TSH 2.23 06/17/2019        Preop Vitals    BP Readings from Last 3 Encounters:   08/30/19 132/52   08/09/19 121/68   07/19/19 127/81    Pulse Readings from Last 3 Encounters:   08/30/19 74   08/09/19 72   07/19/19 64      Resp Readings from Last 3 Encounters:   08/30/19 16   08/09/19 16   07/19/19 16    SpO2 Readings from Last 3 Encounters:   08/30/19 100%   08/09/19 93%   07/19/19 93%      Temp Readings from Last 1 Encounters:   08/30/19 36.6  C (97.8  F)    Ht Readings from Last 1 Encounters:   06/16/19 1.676 m (5' 6\")      Wt Readings from Last 1 Encounters:   06/20/19 92.8 kg (204 lb 8 oz)    Estimated body mass index is 33.01 kg/m  as calculated from the following:    Height as of 6/16/19: 1.676 m (5' 6\").    Weight as of 6/20/19: 92.8 kg (204 lb 8 oz).     LDA:  Peripheral IV 06/28/19 Right Hand (Active)   Number of days: 63       Peripheral IV 08/30/19 Right;Posterior Hand (Active)   Site Assessment WDL 8/30/2019  7:30 AM   Line Status Infusing 8/30/2019  7:30 AM   Dressing Intervention New dressing  8/30/2019  7:30 AM   Number of days: 0        Assessment:   ASA SCORE: 2            Plan:   Anes. Type:  General   Pre-Medication: None   Induction:  IV (Standard)   Airway: Mask   Access/Monitoring: PIV   Maintenance: Inhalational     Postop Plan:   Postop Pain: Opioids  Postop Sedation/Airway: Not planned     PONV Management:   Adult Risk Factors: Female, Postop Opioids                   Farzad Britton, DO  "

## 2019-08-30 NOTE — DISCHARGE INSTRUCTIONS
ECT Discharge Instructions    After each treatment:      Get plenty of rest. A responsible adult must stay with your for at least 6 hours.    Avoid heavy or risky activities for 24 hours.    If you feel light-headed, sit for a few minutes before standing. Have someone help you get up.    If you have nausea (feel sick to your stomach): Drink only clear liquids such as apple juice, ginger ale, broth or 7UP, Be sure to drink plenty of liquids. Move to a normal diet as you feel able.     If you received Toradol, wait 6 hours before taking ibuprofen.    Call your doctor if:     You have a fever over 100F (37.7 C) (taken under the tongue), or a fever that last more than 24 hours.    Your IV site is red, swollen, very painful or is getting more tender.    You have nausea that gets very bad or does not improve.      If you have any symptoms after ECT, tell our staff before your next treatment.    The ECT Department can be reached at [phone].  The ECT Department is open Mondays, Wednesdays and Fridays from 7:00 AM to 2:00 PM.    To speak to a doctor, call: Dr. Kennedy's office number [phone], or his RN's number [phone]    Medications: During your treatment you received:     - IV fluids (Lactated Ringers) 1000 mL at 7:30 am     - IV Toradol 30 mg mg at 7:30 am     - IV Zofran 8 mg at 7:30 am     - IV Morphine 6 mg at 7:45 am    Other: Take Extra Strength Tylenol 1000mg po at home before coming to ECT.      Transported by: Christie

## 2019-08-30 NOTE — ANESTHESIA POSTPROCEDURE EVALUATION
Anesthesia POST Procedure Evaluation    Patient: Mindi Eastman   MRN:     [id number] Gender:   female   Age:    43 year old :      1975        Preoperative Diagnosis: * No pre-op diagnosis entered *   * No procedures listed *   Postop Comments: No value filed.       Anesthesia Type:  Not documented  General    Reportable Event: NO     PAIN: Uncomplicated   Sign Out status: Comfortable, Well controlled pain     PONV: No PONV   Sign Out status:  No Nausea or Vomiting     Neuro/Psych: Uneventful perioperative course   Sign Out Status: Preoperative baseline; Age appropriate mentation     Airway/Resp.: Uneventful perioperative course   Sign Out Status: Non labored breathing, age appropriate RR; Resp. Status within EXPECTED Parameters     CV: Uneventful perioperative course   Sign Out status: Appropriate BP and perfusion indices; Appropriate HR/Rhythm     Disposition:   Sign Out in:  PACU  Disposition:  Phase II; Home  Recovery Course: Uneventful  Follow-Up: Not required           Last Anesthesia Record Vitals:  CRNA VITALS  2019 0718 - 2019 0759      2019             Resp Rate (set):  8          Last PACU Vitals:  Vitals Value Taken Time   /87 2019  7:46 AM   Temp 36.6  C (97.9  F) 2019  7:46 AM   Pulse 80 2019  7:46 AM   Resp 18 2019  7:46 AM   SpO2 99 % 2019  7:46 AM   Temp src     NIBP     Pulse 82 2019  7:48 AM   SpO2 100 % 2019  7:48 AM   Resp     Temp     Ht Rate 72 2019  7:46 AM   Temp 2           Electronically Signed By: Farzad Britton DO, 2019, 7:59 AM

## 2019-08-30 NOTE — PROCEDURES
Procedure/Surgery Information   [facility], [place]    Bedside Procedure Note  Date of Service (when I performed the procedure): 08/30/2019    Mindi Eastman is a 42 year old female patient.  1. Major depressive disorder, recurrent severe without psychotic features (H)      Past Medical History:   Diagnosis Date     ADHD      Anxiety      Bipolar disorder (H)      Depressive disorder      OCD (obsessive compulsive disorder)      ROXANNE (obstructive sleep apnea)     On CPAP     Seasonal allergies      Temp: 97.8  F (36.6  C)   BP: 132/52 Pulse: 74   Resp: 16 SpO2: 100 %        Procedures     [facility], [place]   ECT Procedure Note   08/30/2019       Mindi Eastman [id number]   42 year old 1975     Patient Status: Outpatient    Is this the first in a series of 12 treatments?  No    History and Physical: Reviewed in medical record    Consent: Informed consent was signed by: patient    Date Consent Signed: 1/25/19    Allergies   Allergen Reactions     Darvocet [Propoxyphene N-Apap]        Weight:  0 lbs 0 oz    Patient Preparations: Glasses/Contacts removed    /52   Pulse 74   Temp 97.8  F (36.6  C)   Resp 16   SpO2 100%          Indications for ECT:   Medications ineffective, Psychotherapies ineffective and treatment resistant depression.         Clinical Narrative:   Patient is continuing ECT for depression.  NPO after 2400.  Alert and Oriented x 3.  No problems with the last ECT which was 3 weeks ago.  Mood is stable.  She feels the 3 week time interval seems good.            Diagnosis:      Major depressive disorder, recurrent severe without psychotic features (F33.2)         Pause for the Cause:     Right patient Yes   Right procedure/laterality settings: Yes          Intra-Procedure Documentation:       ECT #: 12 of 12   Treatment number this series:0 24   Total treatment number: 35     Type of ECT:  Right, unilateral ultrabrief    ECT  Medications:    Tylenol:  1000mg po at home  Lactated Ringers 1 liter   Toradol: 30mg  Zofran: 8mg   Brevital: 80mg  Succinyl Choline: 70mg  Morphine 6 mg in treatment room for headache       ECT Strip Summary:   Energy Level: 10 percent   Motor Seizure Duration:  82 seconds  EEG Seizure Duration:   120 seconds    Complications: No    Plan:    Take Extra Strength Tylenol 1000mg po at home before coming to ECT.    Next maintenance ECT is in 3 weeks on 9/20/19  Pt might be starting Lithium with her outpatient psychiatrist.        Umang Kennedy MD

## 2019-08-30 NOTE — IP AVS SNAPSHOT
[facility]  [facility] [address] [address]  Phone:  [phone]                                    After Visit Summary   8/30/2019    Mindi Eastman    MRN: [id number]           After Visit Summary Signature Page    I have received my discharge instructions, and my questions have been answered. I have discussed any challenges I see with this plan with the nurse or doctor.    ..........................................................................................................................................  Patient/Patient Representative Signature      ..........................................................................................................................................  Patient Representative Print Name and Relationship to Patient    ..................................................               ................................................  Date                                   Time    ..........................................................................................................................................  Reviewed by Signature/Title    ...................................................              ..............................................  Date                                               Time          22EPIC Rev 08/18

## 2019-09-20 ENCOUNTER — HOSPITAL ENCOUNTER (OUTPATIENT)
Dept: BEHAVIORAL HEALTH | Facility: CLINIC | Age: 44
Discharge: HOME | End: 2019-09-20
Attending: PSYCHIATRY & NEUROLOGY | Admitting: PSYCHIATRY & NEUROLOGY
Payer: COMMERCIAL

## 2019-09-20 ENCOUNTER — ANESTHESIA (OUTPATIENT)
Dept: BEHAVIORAL HEALTH | Facility: CLINIC | Age: 44
End: 2019-09-20

## 2019-09-20 ENCOUNTER — ANESTHESIA EVENT (OUTPATIENT)
Dept: BEHAVIORAL HEALTH | Facility: CLINIC | Age: 44
End: 2019-09-20

## 2019-09-20 VITALS
OXYGEN SATURATION: 95 % | TEMPERATURE: 97 F | SYSTOLIC BLOOD PRESSURE: 112 MMHG | RESPIRATION RATE: 14 BRPM | HEART RATE: 76 BPM | DIASTOLIC BLOOD PRESSURE: 68 MMHG

## 2019-09-20 DIAGNOSIS — F33.2 MAJOR DEPRESSIVE DISORDER, RECURRENT SEVERE WITHOUT PSYCHOTIC FEATURES (H): Primary | ICD-10-CM

## 2019-09-20 PROCEDURE — 37000008 ZZH ANESTHESIA TECHNICAL FEE, 1ST 30 MIN

## 2019-09-20 PROCEDURE — 25800030 ZZH RX IP 258 OP 636: Performed by: PSYCHIATRY & NEUROLOGY

## 2019-09-20 PROCEDURE — 25000128 H RX IP 250 OP 636: Performed by: PSYCHIATRY & NEUROLOGY

## 2019-09-20 PROCEDURE — 90870 ELECTROCONVULSIVE THERAPY: CPT

## 2019-09-20 PROCEDURE — 25000128 H RX IP 250 OP 636: Performed by: ANESTHESIOLOGY

## 2019-09-20 PROCEDURE — 25000125 ZZHC RX 250: Performed by: ANESTHESIOLOGY

## 2019-09-20 RX ORDER — KETOROLAC TROMETHAMINE 30 MG/ML
30 INJECTION, SOLUTION INTRAMUSCULAR; INTRAVENOUS ONCE
Status: COMPLETED | OUTPATIENT
Start: 2019-09-20 | End: 2019-09-20

## 2019-09-20 RX ORDER — KETOROLAC TROMETHAMINE 30 MG/ML
30 INJECTION, SOLUTION INTRAMUSCULAR; INTRAVENOUS ONCE
Status: CANCELLED
Start: 2019-09-20

## 2019-09-20 RX ORDER — MORPHINE SULFATE 4 MG/ML
6 INJECTION, SOLUTION INTRAMUSCULAR; INTRAVENOUS ONCE
Status: CANCELLED
Start: 2019-09-20

## 2019-09-20 RX ORDER — ONDANSETRON 2 MG/ML
8 INJECTION INTRAMUSCULAR; INTRAVENOUS ONCE
Status: COMPLETED | OUTPATIENT
Start: 2019-09-20 | End: 2019-09-20

## 2019-09-20 RX ORDER — NALOXONE HYDROCHLORIDE 0.4 MG/ML
.1-.4 INJECTION, SOLUTION INTRAMUSCULAR; INTRAVENOUS; SUBCUTANEOUS
Status: DISCONTINUED | OUTPATIENT
Start: 2019-09-20 | End: 2019-09-21 | Stop reason: HOSPADM

## 2019-09-20 RX ORDER — FENTANYL CITRATE 50 UG/ML
25-50 INJECTION, SOLUTION INTRAMUSCULAR; INTRAVENOUS
Status: DISCONTINUED | OUTPATIENT
Start: 2019-09-20 | End: 2019-09-21 | Stop reason: HOSPADM

## 2019-09-20 RX ORDER — ONDANSETRON 2 MG/ML
8 INJECTION INTRAMUSCULAR; INTRAVENOUS ONCE
Status: CANCELLED
Start: 2019-09-20

## 2019-09-20 RX ORDER — ONDANSETRON 2 MG/ML
4 INJECTION INTRAMUSCULAR; INTRAVENOUS EVERY 30 MIN PRN
Status: DISCONTINUED | OUTPATIENT
Start: 2019-09-20 | End: 2019-09-21 | Stop reason: HOSPADM

## 2019-09-20 RX ORDER — SODIUM CHLORIDE, SODIUM LACTATE, POTASSIUM CHLORIDE, CALCIUM CHLORIDE 600; 310; 30; 20 MG/100ML; MG/100ML; MG/100ML; MG/100ML
INJECTION, SOLUTION INTRAVENOUS CONTINUOUS
Status: DISCONTINUED | OUTPATIENT
Start: 2019-09-20 | End: 2019-09-21 | Stop reason: HOSPADM

## 2019-09-20 RX ORDER — ONDANSETRON 4 MG/1
4 TABLET, ORALLY DISINTEGRATING ORAL EVERY 30 MIN PRN
Status: DISCONTINUED | OUTPATIENT
Start: 2019-09-20 | End: 2019-09-21 | Stop reason: HOSPADM

## 2019-09-20 RX ORDER — LABETALOL 20 MG/4 ML (5 MG/ML) INTRAVENOUS SYRINGE
10
Status: DISCONTINUED | OUTPATIENT
Start: 2019-09-20 | End: 2019-09-21 | Stop reason: HOSPADM

## 2019-09-20 RX ORDER — MORPHINE SULFATE 4 MG/ML
6 INJECTION, SOLUTION INTRAMUSCULAR; INTRAVENOUS ONCE
Status: COMPLETED | OUTPATIENT
Start: 2019-09-20 | End: 2019-09-20

## 2019-09-20 RX ADMIN — SODIUM CHLORIDE, POTASSIUM CHLORIDE, SODIUM LACTATE AND CALCIUM CHLORIDE 1000 ML: 600; 310; 30; 20 INJECTION, SOLUTION INTRAVENOUS at 07:33

## 2019-09-20 RX ADMIN — Medication 70 MG: at 07:38

## 2019-09-20 RX ADMIN — ONDANSETRON 8 MG: 2 INJECTION INTRAMUSCULAR; INTRAVENOUS at 07:31

## 2019-09-20 RX ADMIN — MORPHINE SULFATE 6 MG: 4 INJECTION INTRAVENOUS at 07:43

## 2019-09-20 RX ADMIN — MIDAZOLAM 2 MG: 1 INJECTION INTRAMUSCULAR; INTRAVENOUS at 07:46

## 2019-09-20 RX ADMIN — METHOHEXITAL SODIUM 80 MG: 500 INJECTION, POWDER, LYOPHILIZED, FOR SOLUTION INTRAMUSCULAR; INTRAVENOUS; RECTAL at 07:38

## 2019-09-20 RX ADMIN — KETOROLAC TROMETHAMINE 30 MG: 30 INJECTION, SOLUTION INTRAMUSCULAR; INTRAVENOUS at 07:32

## 2019-09-20 NOTE — IP AVS SNAPSHOT
[facility]  [facility] [address] [address]  Phone:  [phone]                                    After Visit Summary   9/20/2019    Mindi Eastman    MRN: [id number]           After Visit Summary Signature Page    I have received my discharge instructions, and my questions have been answered. I have discussed any challenges I see with this plan with the nurse or doctor.    ..........................................................................................................................................  Patient/Patient Representative Signature      ..........................................................................................................................................  Patient Representative Print Name and Relationship to Patient    ..................................................               ................................................  Date                                   Time    ..........................................................................................................................................  Reviewed by Signature/Title    ...................................................              ..............................................  Date                                               Time          22EPIC Rev 08/18

## 2019-09-20 NOTE — PROCEDURES
Procedure/Surgery Information   [facility], [place]    Bedside Procedure Note  Date of Service (when I performed the procedure): 09/20/2019    Mindi Eastman is a 42 year old female patient.  1. Major depressive disorder, recurrent severe without psychotic features (H)      Past Medical History:   Diagnosis Date     ADHD      Anxiety      Bipolar disorder (H)      Depressive disorder      OCD (obsessive compulsive disorder)      ROXANNE (obstructive sleep apnea)     On CPAP     Seasonal allergies      Temp: 97.7  F (36.5  C)   BP: (!) 140/81 Pulse: 71   Resp: 16 SpO2: 97 %        Procedures     [facility], [place]   ECT Procedure Note   09/20/2019       Mindi Eastman [id number]   42 year old 1975     Patient Status: Outpatient    Is this the first in a series of 12 treatments?  No    History and Physical: Reviewed in medical record    Consent: Informed consent was signed by: patient    Date Consent Signed: 9/20/19    Allergies   Allergen Reactions     Darvocet [Propoxyphene N-Apap]        Weight:  0 lbs 0 oz    Patient Preparations: Glasses/Contacts removed    BP (!) 140/81   Pulse 71   Temp 97.7  F (36.5  C)   Resp 16   SpO2 97%          Indications for ECT:   Medications ineffective, Psychotherapies ineffective and treatment resistant depression.         Clinical Narrative:   Patient is continuing ECT for depression.  NPO after 2400.  Alert and Oriented x 3.  No problems with the last ECT which was 3 weeks ago.  Mood is stable.  She feels the 3 week time interval seems good.   Going to a head shaving party for her dad this weekend.           Diagnosis:      Major depressive disorder, recurrent severe without psychotic features (F33.2)         Pause for the Cause:     Right patient Yes   Right procedure/laterality settings: Yes          Intra-Procedure Documentation:       ECT #: 1 of 12   Treatment number this series:0 25   Total treatment  number: 36     Type of ECT:  Right, unilateral ultrabrief    ECT Medications:    Tylenol:  1000mg po at home   Lactated Ringers 1 liter   Toradol: 30mg  Zofran: 8mg   Brevital: 80mg  Succinyl Choline: 70mg  Morphine 6 mg in treatment room for headache       ECT Strip Summary:   Energy Level: 10 percent   Motor Seizure Duration:  70 seconds  EEG Seizure Duration:   70 seconds    Complications: No    Plan:    Pt will need a new ECT annual workup before next ECT.   Take Extra Strength Tylenol 1000mg po at home before coming to ECT.    Next maintenance ECT is in 3 weeks on 10/11/19      Umang Kennedy MD

## 2019-09-20 NOTE — ANESTHESIA POSTPROCEDURE EVALUATION
Anesthesia POST Procedure Evaluation    Patient: Mindi Eastman   MRN:     [id number] Gender:   female   Age:    43 year old :      1975        Preoperative Diagnosis: * No pre-op diagnosis entered *   * No procedures listed *   Postop Comments: No value filed.       Anesthesia Type:  Not documented  General    Reportable Event: NO     PAIN: Uncomplicated   Sign Out status: Comfortable, Well controlled pain     PONV: No PONV   Sign Out status:  No Nausea or Vomiting     Neuro/Psych: Uneventful perioperative course   Sign Out Status: Preoperative baseline; Age appropriate mentation     Airway/Resp.: Uneventful perioperative course   Sign Out Status: Non labored breathing, age appropriate RR; Resp. Status within EXPECTED Parameters     CV: Uneventful perioperative course   Sign Out status: Appropriate BP and perfusion indices; Appropriate HR/Rhythm     Disposition:   Sign Out in:  PACU  Disposition:  Phase II; Home  Recovery Course: Uneventful  Follow-Up: Not required           Last Anesthesia Record Vitals:  CRNA VITALS  2019 0716 - 2019 0747      2019             Pulse:  96    Ht Rate:  95    SpO2:  100 %    Resp Rate (set):  8          Last PACU Vitals:  Vitals Value Taken Time   BP     Temp     Pulse     Resp     SpO2     Temp src     NIBP 144/92 2019  7:44 AM   Pulse 96 2019  7:46 AM   SpO2 100 % 2019  7:46 AM   Resp     Temp     Ht Rate 95 2019  7:46 AM   Temp 2           Electronically Signed By: Farzad Britton DO, 2019, 7:47 AM

## 2019-09-20 NOTE — DISCHARGE INSTRUCTIONS
ECT Discharge Instructions      During your ECT series:      Do not drive for 24 hours after treatment. If you will have more than one treatment within a week, no driving until 7 days after your last ECT treatment.     Do not drink alcohol or use street drugs (illicit drugs) while you are having treatments.    Do not make important decisions, including legal decisions.    After each treatment:      Get plenty of rest. A responsible adult must stay with your for at least 6 hours.    Avoid heavy or risky activities for 24 hours.    If you feel light-headed, sit for a few minutes before standing. Have someone help you get up.    If you have nausea (feel sick to your stomach): Drink only clear liquids such as apple juice, ginger ale, broth or 7UP, Be sure to drink plenty of liquids. Move to a normal diet as you feel able.     If you received Toradol, wait 6 hours before taking ibuprofen.    Call your doctor if:     You have a fever over 100F (37.7 C) (taken under the tongue), or a fever that last more than 24 hours.    Your IV site is red, swollen, very painful or is getting more tender.    You have nausea that gets very bad or does not improve.      If you have any symptoms after ECT, tell our staff before your next treatment.    The ECT Department can be reached at [phone].  The ECT Department is open Mondays, Wednesdays and Fridays from 7:00 AM to 2:00 PM.      To speak to a doctor, call:Dr. Kennedy at [phone]      Other:     Take Extra Strength Tylenol 1000mg po at home before coming to ECT.  Please bring updated medication list to your next ECT appointment.

## 2019-09-20 NOTE — ANESTHESIA PREPROCEDURE EVALUATION
Anesthesia Pre-Procedure Evaluation    Patient: Mindi Eastman   MRN:     [id number] Gender:   female   Age:    43 year old :      1975        Preoperative Diagnosis: * No pre-op diagnosis entered *   * No procedures listed *     Past Medical History:   Diagnosis Date     ADHD      Anxiety      Bipolar disorder (H)      Depressive disorder      OCD (obsessive compulsive disorder)      ROXANNE (obstructive sleep apnea)     On CPAP     Seasonal allergies       Past Surgical History:   Procedure Laterality Date     APPENDECTOMY       CARPAL TUNNEL RELEASE RT/LT Bilateral      CHOLECYSTECTOMY       EXTRACTION(S) DENTAL      [place] Teeth     HC KNEE SCOPE,MED/LAT MENISCUS REPAIR Right     X 2     TONSILLECTOMY                     PHYSICAL EXAM:   Mental Status/Neuro:    Airway: Facies: Feasible  Mallampati: III  TM distance: > 6 cm  Neck ROM: Full   Respiratory: Auscultation: CTAB     Resp. Rate: Normal     Resp. Effort: Normal      CV: Rhythm: Regular  Rate: Age appropriate  Heart: Normal Sounds  Edema: None   Comments:      Dental: Details                  LABS:  CBC:   Lab Results   Component Value Date    WBC 11.2 (H) 2019    WBC 6.3 10/05/2017    HGB 13.5 2019    HGB 11.7 10/05/2017    HCT 40.8 2019    HCT 35.8 10/05/2017     2019     10/05/2017     BMP:   Lab Results   Component Value Date     2019     10/05/2017    POTASSIUM 3.7 2019    POTASSIUM 4.5 10/03/2018    CHLORIDE 103 2019    CHLORIDE 108 10/05/2017    CO2 26 2019    CO2 28 10/05/2017    BUN 9 2019    BUN 8 10/05/2017    CR 0.84 2019    CR 0.84 10/03/2018     (H) 2019     (A) 10/03/2018     COAGS:   Lab Results   Component Value Date    PTT 38 (H) 2011    INR 0.99 2011     POC:   Lab Results   Component Value Date    HCG Negative 2019    HCGS Negative 2011     OTHER:   Lab Results   Component Value Date    GALO 8.4 (L)  "06/17/2019    ALBUMIN 4.0 06/17/2019    PROTTOTAL 7.4 06/17/2019    ALT 21 06/17/2019    AST 23 06/17/2019    ALKPHOS 86 06/17/2019    BILITOTAL 0.7 06/17/2019    TSH 2.23 06/17/2019        Preop Vitals    BP Readings from Last 3 Encounters:   09/20/19 (!) 140/81   08/30/19 130/74   08/09/19 121/68    Pulse Readings from Last 3 Encounters:   09/20/19 71   08/30/19 91   08/09/19 72      Resp Readings from Last 3 Encounters:   09/20/19 16   08/30/19 16   08/09/19 16    SpO2 Readings from Last 3 Encounters:   09/20/19 97%   08/30/19 99%   08/09/19 93%      Temp Readings from Last 1 Encounters:   09/20/19 36.5  C (97.7  F)    Ht Readings from Last 1 Encounters:   06/16/19 1.676 m (5' 6\")      Wt Readings from Last 1 Encounters:   06/20/19 92.8 kg (204 lb 8 oz)    Estimated body mass index is 33.01 kg/m  as calculated from the following:    Height as of 6/16/19: 1.676 m (5' 6\").    Weight as of 6/20/19: 92.8 kg (204 lb 8 oz).     LDA:  Peripheral IV 09/20/19 Right;Posterior Hand (Active)   Site Assessment WDL 9/20/2019  7:27 AM   Line Status Infusing 9/20/2019  7:27 AM   Dressing Intervention New dressing  9/20/2019  7:27 AM   Number of days: 0        Assessment:   ASA SCORE: 2            Plan:   Anes. Type:  General   Pre-Medication: None   Induction:  IV (Standard)   Airway: Mask   Access/Monitoring: PIV   Maintenance: Inhalational     Postop Plan:   Postop Pain: Opioids  Postop Sedation/Airway: Not planned     PONV Management:   Adult Risk Factors: Female, Postop Opioids                   Farzad Britton,   "

## 2019-10-10 ENCOUNTER — TRANSFERRED RECORDS (OUTPATIENT)
Dept: HEALTH INFORMATION MANAGEMENT | Facility: CLINIC | Age: 44
End: 2019-10-10

## 2019-10-11 ENCOUNTER — ANESTHESIA (OUTPATIENT)
Dept: BEHAVIORAL HEALTH | Facility: CLINIC | Age: 44
End: 2019-10-11

## 2019-10-11 ENCOUNTER — HOSPITAL ENCOUNTER (OUTPATIENT)
Dept: BEHAVIORAL HEALTH | Facility: CLINIC | Age: 44
Discharge: HOME | End: 2019-10-11
Attending: PSYCHIATRY & NEUROLOGY | Admitting: PSYCHIATRY & NEUROLOGY
Payer: COMMERCIAL

## 2019-10-11 ENCOUNTER — ANESTHESIA EVENT (OUTPATIENT)
Dept: BEHAVIORAL HEALTH | Facility: CLINIC | Age: 44
End: 2019-10-11

## 2019-10-11 VITALS
SYSTOLIC BLOOD PRESSURE: 152 MMHG | DIASTOLIC BLOOD PRESSURE: 88 MMHG | RESPIRATION RATE: 20 BRPM | OXYGEN SATURATION: 97 % | HEART RATE: 62 BPM | TEMPERATURE: 97.4 F

## 2019-10-11 DIAGNOSIS — F33.2 MAJOR DEPRESSIVE DISORDER, RECURRENT SEVERE WITHOUT PSYCHOTIC FEATURES (H): Primary | ICD-10-CM

## 2019-10-11 PROCEDURE — 25000125 ZZHC RX 250: Performed by: ANESTHESIOLOGY

## 2019-10-11 PROCEDURE — 37000008 ZZH ANESTHESIA TECHNICAL FEE, 1ST 30 MIN

## 2019-10-11 PROCEDURE — 25000128 H RX IP 250 OP 636: Performed by: PSYCHIATRY & NEUROLOGY

## 2019-10-11 PROCEDURE — 90870 ELECTROCONVULSIVE THERAPY: CPT

## 2019-10-11 PROCEDURE — 25000128 H RX IP 250 OP 636: Performed by: ANESTHESIOLOGY

## 2019-10-11 PROCEDURE — 25800030 ZZH RX IP 258 OP 636: Performed by: PSYCHIATRY & NEUROLOGY

## 2019-10-11 RX ORDER — NALOXONE HYDROCHLORIDE 0.4 MG/ML
.1-.4 INJECTION, SOLUTION INTRAMUSCULAR; INTRAVENOUS; SUBCUTANEOUS
Status: DISCONTINUED | OUTPATIENT
Start: 2019-10-11 | End: 2019-10-12 | Stop reason: HOSPADM

## 2019-10-11 RX ORDER — SODIUM CHLORIDE, SODIUM LACTATE, POTASSIUM CHLORIDE, CALCIUM CHLORIDE 600; 310; 30; 20 MG/100ML; MG/100ML; MG/100ML; MG/100ML
INJECTION, SOLUTION INTRAVENOUS CONTINUOUS
Status: DISCONTINUED | OUTPATIENT
Start: 2019-10-11 | End: 2019-10-12 | Stop reason: HOSPADM

## 2019-10-11 RX ORDER — KETOROLAC TROMETHAMINE 30 MG/ML
30 INJECTION, SOLUTION INTRAMUSCULAR; INTRAVENOUS ONCE
Status: CANCELLED
Start: 2019-10-11

## 2019-10-11 RX ORDER — KETOROLAC TROMETHAMINE 30 MG/ML
30 INJECTION, SOLUTION INTRAMUSCULAR; INTRAVENOUS ONCE
Status: COMPLETED | OUTPATIENT
Start: 2019-10-11 | End: 2019-10-11

## 2019-10-11 RX ORDER — TRAZODONE HYDROCHLORIDE 50 MG/1
50-100 TABLET, FILM COATED ORAL AT BEDTIME
COMMUNITY
End: 2022-10-18

## 2019-10-11 RX ORDER — ONDANSETRON 2 MG/ML
8 INJECTION INTRAMUSCULAR; INTRAVENOUS ONCE
Status: CANCELLED
Start: 2019-10-11

## 2019-10-11 RX ORDER — MORPHINE SULFATE 4 MG/ML
6 INJECTION, SOLUTION INTRAMUSCULAR; INTRAVENOUS ONCE
Status: CANCELLED
Start: 2019-10-11

## 2019-10-11 RX ORDER — ATOMOXETINE 80 MG/1
80 CAPSULE ORAL DAILY
COMMUNITY
End: 2022-10-20

## 2019-10-11 RX ORDER — FENTANYL CITRATE 50 UG/ML
25-50 INJECTION, SOLUTION INTRAMUSCULAR; INTRAVENOUS
Status: DISCONTINUED | OUTPATIENT
Start: 2019-10-11 | End: 2019-10-12 | Stop reason: HOSPADM

## 2019-10-11 RX ORDER — CLONIDINE HYDROCHLORIDE 0.1 MG/1
0.1 TABLET ORAL 2 TIMES DAILY
COMMUNITY
End: 2022-10-20

## 2019-10-11 RX ORDER — ONDANSETRON 2 MG/ML
8 INJECTION INTRAMUSCULAR; INTRAVENOUS ONCE
Status: COMPLETED | OUTPATIENT
Start: 2019-10-11 | End: 2019-10-11

## 2019-10-11 RX ORDER — ONDANSETRON 2 MG/ML
4 INJECTION INTRAMUSCULAR; INTRAVENOUS EVERY 30 MIN PRN
Status: DISCONTINUED | OUTPATIENT
Start: 2019-10-11 | End: 2019-10-12 | Stop reason: HOSPADM

## 2019-10-11 RX ORDER — ONDANSETRON 4 MG/1
4 TABLET, ORALLY DISINTEGRATING ORAL EVERY 30 MIN PRN
Status: DISCONTINUED | OUTPATIENT
Start: 2019-10-11 | End: 2019-10-12 | Stop reason: HOSPADM

## 2019-10-11 RX ORDER — MORPHINE SULFATE 4 MG/ML
6 INJECTION, SOLUTION INTRAMUSCULAR; INTRAVENOUS ONCE
Status: COMPLETED | OUTPATIENT
Start: 2019-10-11 | End: 2019-10-11

## 2019-10-11 RX ADMIN — METHOHEXITAL SODIUM 80 MG: 500 INJECTION, POWDER, LYOPHILIZED, FOR SOLUTION INTRAMUSCULAR; INTRAVENOUS; RECTAL at 07:41

## 2019-10-11 RX ADMIN — Medication 70 MG: at 07:41

## 2019-10-11 RX ADMIN — SODIUM CHLORIDE, POTASSIUM CHLORIDE, SODIUM LACTATE AND CALCIUM CHLORIDE 1000 ML: 600; 310; 30; 20 INJECTION, SOLUTION INTRAVENOUS at 07:31

## 2019-10-11 RX ADMIN — KETOROLAC TROMETHAMINE 30 MG: 30 INJECTION, SOLUTION INTRAMUSCULAR at 07:34

## 2019-10-11 RX ADMIN — ONDANSETRON 8 MG: 2 INJECTION INTRAMUSCULAR; INTRAVENOUS at 07:33

## 2019-10-11 RX ADMIN — MORPHINE SULFATE 6 MG: 4 INJECTION INTRAVENOUS at 07:45

## 2019-10-11 NOTE — DISCHARGE INSTRUCTIONS
ECT Discharge Instructions      During your ECT series:      Do not drive or work heavy equipment until 7 days after your last treatment.    Do not drink alcohol or use street drugs (illicit drugs) while you are having treatments.    Do not make important decisions, including legal decisions.    After each treatment:      Get plenty of rest. A responsible adult must stay with your for at least 6 hours.    Do not drive for 24 hours after ECT treatment. If you have more than one treatment within a week, no driving until 7 days after your last ECT treatment.    Avoid heavy or risky activities for 24 hours.    If you feel light-headed, sit for a few minutes before standing. Have someone help you get up.    If you have nausea (feel sick to your stomach): Drink only clear liquids such as apple juice, ginger ale, broth or 7UP, Be sure to drink plenty of liquids. Move to a normal diet as you feel able.     If you received Toradol, wait 6 hours before taking ibuprofen.    Call your doctor if:     You have a fever over 100F (37.7 C) (taken under the tongue), or a fever that last more than 24 hours.    Your IV site is red, swollen, very painful or is getting more tender.    You have nausea that gets very bad or does not improve.      If you have any symptoms after ECT, tell our staff before your next treatment.    The ECT Department can be reached at [phone].  The ECT Department is open Mondays, Wednesdays and Fridays from 7:00 AM to 2:00 PM.     To speak to a doctor, call: Dr. Kennedy [phone]    Instructions: Take Extra Strength Tylenol 1000mg po at home before coming to ECT.      Other: You have received Toradol today at 7:34 AM. Toradol is an NSAID.  Do not take any NSAID's including: Ibuprofen, Naproxen Sodium, Asprin , Advil, Motrin, Aleve, Josué, etc., until six hours after the above time which would be 1:34 AM. If you have any questions check back with your Physician, or pharmacist.   You were given morphine 6 mg  at 7:45 for pain.  You were given zofran 8 mg to prevent nausea.    Transported by: Christie      _________________________________________________  ________________________  Patient/Responsible party Signature      Date          ________________________________________________   ________________________  Nurse Signature        Date

## 2019-10-11 NOTE — ANESTHESIA POSTPROCEDURE EVALUATION
Anesthesia POST Procedure Evaluation    Patient: Mindi Eastman   MRN:     [id number] Gender:   female   Age:    44 year old :      1975        Preoperative Diagnosis: * No pre-op diagnosis entered *   * No procedures listed *   Postop Comments: No value filed.       Anesthesia Type:  Not documented  General    Reportable Event: NO     PAIN: Uncomplicated   Sign Out status: Comfortable, Well controlled pain     PONV: No PONV   Sign Out status:  No Nausea or Vomiting     Neuro/Psych: Uneventful perioperative course   Sign Out Status: Preoperative baseline; Age appropriate mentation     Airway/Resp.: Uneventful perioperative course   Sign Out Status: Non labored breathing, age appropriate RR; Resp. Status within EXPECTED Parameters     CV: Uneventful perioperative course   Sign Out status: Appropriate BP and perfusion indices; Appropriate HR/Rhythm     Disposition:   Sign Out in:  PACU  Disposition:  Phase II; Home  Recovery Course: Uneventful  Follow-Up: Not required           Last Anesthesia Record Vitals:  CRNA VITALS  10/11/2019 0717 - 10/11/2019 0748      10/11/2019             Pulse:  69    Ht Rate:  64    SpO2:  100 %    Resp Rate (set):  8          Last PACU Vitals:  Vitals Value Taken Time   BP     Temp     Pulse     Resp     SpO2     Temp src     NIBP 160/93 10/11/2019  7:46 AM   Pulse 69 10/11/2019  7:47 AM   SpO2 100 % 10/11/2019  7:47 AM   Resp     Temp     Ht Rate 64 10/11/2019  7:47 AM   Temp 2           Electronically Signed By: Farzad Britton DO, 2019, 7:48 AM

## 2019-10-11 NOTE — PROCEDURES
Procedure/Surgery Information   [facility], [place]    Bedside Procedure Note  Date of Service (when I performed the procedure): 10/11/2019    Mindi Eastman is a 42 year old female patient.  1. Major depressive disorder, recurrent severe without psychotic features (H)      Past Medical History:   Diagnosis Date     ADHD      Anxiety      Bipolar disorder (H)      Depressive disorder      OCD (obsessive compulsive disorder)      ROXANNE (obstructive sleep apnea)     On CPAP     Seasonal allergies      Temp: 97.9  F (36.6  C)   BP: (!) 149/77 Pulse: 62   Resp: 16 SpO2: 99 %        Procedures     [facility], [place]   ECT Procedure Note   10/11/2019       Mindi Eastman [id number]   42 year old 1975     Patient Status: Outpatient    Is this the first in a series of 12 treatments?  No    History and Physical: Reviewed in medical record    Consent: Informed consent was signed by: patient    Date Consent Signed: 9/20/19    Allergies   Allergen Reactions     Darvocet [Propoxyphene N-Apap]        Weight:  0 lbs 0 oz    Patient Preparations: Glasses/Contacts removed    BP (!) 149/77   Pulse 62   Temp 97.9  F (36.6  C)   Resp 16   SpO2 99%          Indications for ECT:   Medications ineffective, Psychotherapies ineffective and treatment resistant depression.         Clinical Narrative:   Patient is continuing ECT for depression.  NPO after 2400.  Alert and Oriented x 3.  No problems with the last ECT which was 3 weeks ago.  Mood is stable.  She feels the 3 week time interval seems good.   Going to a head shaving party for her dad this weekend.           Diagnosis:      Major depressive disorder, recurrent severe without psychotic features (F33.2)         Pause for the Cause:     Right patient Yes   Right procedure/laterality settings: Yes          Intra-Procedure Documentation:       ECT #: 2 of 12   Treatment number this series:0 26   Total treatment  number: 37     Type of ECT:  Right, unilateral ultrabrief    ECT Medications:    Tylenol:  1000mg po at home   Lactated Ringers 1 liter   Toradol: 30mg  Zofran: 8mg   Brevital: 80mg  Succinyl Choline: 70mg  Morphine 6 mg in treatment room for headache       ECT Strip Summary:   Energy Level: 10 percent   Motor Seizure Duration:  93 seconds  EEG Seizure Duration:   116 seconds    Complications: No    Plan:    Take Extra Strength Tylenol 1000mg po at home before coming to ECT.    Next maintenance ECT is in 3 weeks on 11/1/19      Umang Kennedy MD

## 2019-10-11 NOTE — IP AVS SNAPSHOT
MRN:[id number]                      After Visit Summary   10/11/2019    Mindi Eastman    MRN: [id number]           Visit Information        Provider Department      10/11/2019  6:45 AM Umang Kennedy MD [facility]           Review of your medicines      CONTINUE these medicines which have NOT CHANGED       Dose / Directions   acetaminophen 500 MG tablet  Commonly known as:  TYLENOL      Dose:  1,000 mg  Take 1,000 mg by mouth as needed (headache) Prior to ECT  Refills:  0     atomoxetine 80 MG capsule  Commonly known as:  STRATTERA      Dose:  80 mg  Take 80 mg by mouth daily  Refills:  0     cloNIDine 0.1 MG tablet  Commonly known as:  CATAPRES      Dose:  0.1 mg  Take 0.1 mg by mouth 2 times daily  Refills:  0     FLUoxetine 40 MG capsule  Commonly known as:  PROzac  Used for:  Major depressive disorder, recurrent severe without psychotic features (H)      Dose:  40 mg  Take 1 capsule (40 mg) by mouth daily  Quantity:  30 capsule  Refills:  0     LORazepam 1 MG tablet  Commonly known as:  ATIVAN  Used for:  SHANICE (generalized anxiety disorder)      Dose:  1 mg  Take 1 mg by mouth 2 times daily as needed for anxiety  Quantity:  60 tablet  Refills:  0     lurasidone 60 MG Tabs tablet  Commonly known as:  LATUDA      Dose:  80 mg  Take 80 mg by mouth daily with food  Refills:  0     ondansetron 4 MG tablet  Commonly known as:  ZOFRAN  Used for:  Major depressive disorder, recurrent severe without psychotic features (H), Projectile vomiting with nausea      Dose:  4 mg  Take 1 tablet (4 mg) by mouth every 8 hours as needed for nausea  Quantity:  30 tablet  Refills:  0     QUEtiapine 100 MG tablet  Commonly known as:  SEROquel  Used for:  Bipolar I disorder (H), Major depressive disorder, recurrent severe without psychotic features (H)      Dose:  100 mg  Take 1 tablet (100 mg) by mouth At Bedtime  Quantity:  30 tablet  Refills:  1     traZODone 50 MG tablet  Commonly known  as:  DESYREL      Dose:   mg  Take  mg by mouth At Bedtime  Refills:  0              Protect others around you: Learn how to safely use, store and throw away your medicines at www.disposemymeds.org.       Follow-ups after your visit       Your next 10 appointments already scheduled    Nov 01, 2019  7:15 AM CDT  Electroconvulsive Therapy with Umang Kennedy MD  [facility] ([facility]) [address]  [facility] [place] [address] [phone]         Care Instructions       Further instructions from your care team       ECT Discharge Instructions      During your ECT series:      Do not drive or work heavy equipment until 7 days after your last treatment.    Do not drink alcohol or use street drugs (illicit drugs) while you are having treatments.    Do not make important decisions, including legal decisions.    After each treatment:      Get plenty of rest. A responsible adult must stay with your for at least 6 hours.    Do not drive for 24 hours after ECT treatment. If you have more than one treatment within a week, no driving until 7 days after your last ECT treatment.    Avoid heavy or risky activities for 24 hours.    If you feel light-headed, sit for a few minutes before standing. Have someone help you get up.    If you have nausea (feel sick to your stomach): Drink only clear liquids such as apple juice, ginger ale, broth or 7UP, Be sure to drink plenty of liquids. Move to a normal diet as you feel able.     If you received Toradol, wait 6 hours before taking ibuprofen.    Call your doctor if:     You have a fever over 100F (37.7 C) (taken under the tongue), or a fever that last more than 24 hours.    Your IV site is red, swollen, very painful or is getting more tender.    You have nausea that gets very bad or does not improve.      If you have any symptoms after ECT, tell our staff before your next treatment.    The ECT Department can be  "reached at [phone].  The ECT Department is open ,  and  from 7:00 AM to 2:00 PM.     To speak to a doctor, call: Dr. Kennedy [phone]    Instructions: Take Extra Strength Tylenol 1000mg po at home before coming to ECT.      Other: You have received Toradol today at 7:34 AM. Toradol is an NSAID.  Do not take any NSAID's including: Ibuprofen, Naproxen Sodium, Asprin , Advil, Motrin, Aleve, Josué, etc., until six hours after the above time which would be 1:34 AM. If you have any questions check back with your Physician, or pharmacist.   You were given morphine 6 mg at 7:45 for pain.  You were given zofran 8 mg to prevent nausea.    Transported by: Christie      _________________________________________________  ________________________  Patient/Responsible party Signature      Date          ________________________________________________   ________________________  Nurse Signature        Date    Additional Information About Your Visit       MyChart Information    [employer] lets you send messages to your doctor, view your test results, renew your prescriptions, schedule appointments and more. To sign up, go to [email or web address] . Click on \"Log in\" on the left side of the screen, which will take you to the Welcome page. Then click on \"Sign up Now\" on the right side of the page.     You will be asked to enter the access code listed below, as well as some personal information. Please follow the directions to create your username and password.     Your access code is: [email or web address]  Expires: 10/29/2019  8:12 AM     Your access code will  in 60 days. If you need help or a new code, please call your [place] clinic or [phone].       Care EveryWhere ID    This is your Care EveryWhere ID. This could be used by other organizations to access your [place] medical records  [id number]       Your Vitals Were  Most recent update: 10/11/2019  8:25 AM    Blood Pressure   138/87 (BP " Location: Right arm)    Pulse   62    Temperature   97.3  F (36.3  C) (Temporal)    Respirations   16    Pulse Oximetry   95%          Primary Care Provider Office Phone # Fax #    Robyn Smiley [phone]      Equal Access to Services    [facility]NA [facility] : Hadii aad ku hadasho Soomaali, waaxda luqadaha, qaybta kaalmada adeegyada, waxriccardo darlynin hayaan adeshiraz carmentram . So [facility] [phone].    ATENCIÓN: Si habla español, tiene a martínez disposición servicios gratuitos de asistencia lingüística. Llame al [phone].    We comply with applicable federal civil rights laws and [place] laws. We do not discriminate on the basis of race, color, national origin, age, disability, sex, sexual orientation, or gender identity.           Thank you!    Thank you for choosing [facility] for your care. Our goal is always to provide you with excellent care. Hearing back from our patients is one way we can continue to improve our services. Please take a few minutes to complete the written survey that you may receive in the mail after you visit with us. Thank you!            Medication List      Medications       Morning Afternoon Evening Bedtime As Needed   acetaminophen 500 MG tablet  Also known as:  TYLENOL  INSTRUCTIONS:  Take 1,000 mg by mouth as needed (headache) Prior to ECT                    atomoxetine 80 MG capsule  Also known as:  STRATTERA  INSTRUCTIONS:  Take 80 mg by mouth daily                    cloNIDine 0.1 MG tablet  Also known as:  CATAPRES  INSTRUCTIONS:  Take 0.1 mg by mouth 2 times daily                    FLUoxetine 40 MG capsule  Also known as:  PROzac  INSTRUCTIONS:  Take 1 capsule (40 mg) by mouth daily                    LORazepam 1 MG tablet  Also known as:  ATIVAN  INSTRUCTIONS:  Take 1 mg by mouth 2 times daily as needed for anxiety                    lurasidone 60 MG Tabs tablet  Also known as:  LATUDA  INSTRUCTIONS:  Take 80 mg by mouth daily with food                    ondansetron  4 MG tablet  Also known as:  ZOFRAN  INSTRUCTIONS:  Take 1 tablet (4 mg) by mouth every 8 hours as needed for nausea  LAST TAKEN:  Ask your nurse or doctor                    QUEtiapine 100 MG tablet  Also known as:  SEROquel  INSTRUCTIONS:  Take 1 tablet (100 mg) by mouth At Bedtime                    traZODone 50 MG tablet  Also known as:  DESYREL  INSTRUCTIONS:  Take  mg by mouth At Bedtime

## 2019-10-11 NOTE — ANESTHESIA PREPROCEDURE EVALUATION
Anesthesia Pre-Procedure Evaluation    Patient: Mindi Eastman   MRN:     [id number] Gender:   female   Age:    44 year old :      1975        Preoperative Diagnosis: * No pre-op diagnosis entered *   * No procedures listed *     Past Medical History:   Diagnosis Date     ADHD      Anxiety      Bipolar disorder (H)      Depressive disorder      OCD (obsessive compulsive disorder)      ROXANNE (obstructive sleep apnea)     On CPAP     Seasonal allergies       Past Surgical History:   Procedure Laterality Date     APPENDECTOMY       CARPAL TUNNEL RELEASE RT/LT Bilateral      CHOLECYSTECTOMY       EXTRACTION(S) DENTAL      Aneta Teeth     HC KNEE SCOPE,MED/LAT MENISCUS REPAIR Right     X 2     TONSILLECTOMY                     PHYSICAL EXAM:   Mental Status/Neuro:    Airway: Facies: Challenging  Mallampati: IV  Mouth/Opening: Limited  TM distance: > 6 cm  Neck ROM: Full   Respiratory: Auscultation: CTAB     Resp. Rate: Normal     Resp. Effort: Normal      CV: Rhythm: Regular  Rate: Age appropriate  Heart: Normal Sounds  Edema: None   Comments:                      LABS:  CBC:   Lab Results   Component Value Date    WBC 11.2 (H) 2019    WBC 6.3 10/05/2017    HGB 13.5 2019    HGB 11.7 10/05/2017    HCT 40.8 2019    HCT 35.8 10/05/2017     2019     10/05/2017     BMP:   Lab Results   Component Value Date     2019     10/05/2017    POTASSIUM 3.7 2019    POTASSIUM 4.5 10/03/2018    CHLORIDE 103 2019    CHLORIDE 108 10/05/2017    CO2 26 2019    CO2 28 10/05/2017    BUN 9 2019    BUN 8 10/05/2017    CR 0.84 2019    CR 0.84 10/03/2018     (H) 2019     (A) 10/03/2018     COAGS:   Lab Results   Component Value Date    PTT 38 (H) 2011    INR 0.99 2011     POC:   Lab Results   Component Value Date    HCG Negative 2019    HCGS Negative 2011     OTHER:   Lab Results   Component Value Date    GALO  "8.4 (L) 06/17/2019    ALBUMIN 4.0 06/17/2019    PROTTOTAL 7.4 06/17/2019    ALT 21 06/17/2019    AST 23 06/17/2019    ALKPHOS 86 06/17/2019    BILITOTAL 0.7 06/17/2019    TSH 2.23 06/17/2019        Preop Vitals    BP Readings from Last 3 Encounters:   10/11/19 (!) 149/77   09/20/19 112/68   08/30/19 130/74    Pulse Readings from Last 3 Encounters:   10/11/19 62   09/20/19 76   08/30/19 91      Resp Readings from Last 3 Encounters:   10/11/19 16   09/20/19 14   08/30/19 16    SpO2 Readings from Last 3 Encounters:   10/11/19 99%   09/20/19 95%   08/30/19 99%      Temp Readings from Last 1 Encounters:   10/11/19 36.6  C (97.9  F)    Ht Readings from Last 1 Encounters:   06/16/19 1.676 m (5' 6\")      Wt Readings from Last 1 Encounters:   06/20/19 92.8 kg (204 lb 8 oz)    Estimated body mass index is 33.01 kg/m  as calculated from the following:    Height as of 6/16/19: 1.676 m (5' 6\").    Weight as of 6/20/19: 92.8 kg (204 lb 8 oz).     LDA:  Peripheral IV 10/11/19 Left;Posterior Hand (Active)   Site Assessment WDL 10/11/2019  7:31 AM   Line Status Infusing 10/11/2019  7:31 AM   Number of days: 0        Assessment:   ASA SCORE: 3            Plan:   Anes. Type:  General   Pre-Medication: None   Induction:  IV (Standard)   Airway: Mask   Access/Monitoring: PIV   Maintenance: Inhalational     Postop Plan:   Postop Pain: Opioids  Postop Sedation/Airway: Not planned     PONV Management:   Adult Risk Factors: Female, Postop Opioids                   Farzad Britton,   "

## 2019-10-11 NOTE — IP AVS SNAPSHOT
[facility]  [facility] [address] [address]  Phone:  [phone]                                    After Visit Summary   10/11/2019    Mindi Eastman    MRN: [id number]           After Visit Summary Signature Page    I have received my discharge instructions, and my questions have been answered. I have discussed any challenges I see with this plan with the nurse or doctor.    ..........................................................................................................................................  Patient/Patient Representative Signature      ..........................................................................................................................................  Patient Representative Print Name and Relationship to Patient    ..................................................               ................................................  Date                                   Time    ..........................................................................................................................................  Reviewed by Signature/Title    ...................................................              ..............................................  Date                                               Time          22EPIC Rev 08/18

## 2019-10-11 NOTE — PROGRESS NOTES
Patient meets recovery room discharge criteria. Pt discharged from recovery room via wheelchair to discharge room at  0845.

## 2019-11-01 ENCOUNTER — ANESTHESIA EVENT (OUTPATIENT)
Dept: BEHAVIORAL HEALTH | Facility: CLINIC | Age: 44
End: 2019-11-01

## 2019-11-01 ENCOUNTER — ANESTHESIA (OUTPATIENT)
Dept: BEHAVIORAL HEALTH | Facility: CLINIC | Age: 44
End: 2019-11-01

## 2019-11-01 ENCOUNTER — HOSPITAL ENCOUNTER (OUTPATIENT)
Dept: BEHAVIORAL HEALTH | Facility: CLINIC | Age: 44
Discharge: HOME | End: 2019-11-01
Attending: PSYCHIATRY & NEUROLOGY | Admitting: PSYCHIATRY & NEUROLOGY
Payer: COMMERCIAL

## 2019-11-01 VITALS
DIASTOLIC BLOOD PRESSURE: 78 MMHG | TEMPERATURE: 97.6 F | OXYGEN SATURATION: 93 % | SYSTOLIC BLOOD PRESSURE: 143 MMHG | RESPIRATION RATE: 14 BRPM

## 2019-11-01 DIAGNOSIS — F33.2 MAJOR DEPRESSIVE DISORDER, RECURRENT SEVERE WITHOUT PSYCHOTIC FEATURES (H): Primary | ICD-10-CM

## 2019-11-01 PROCEDURE — 25000128 H RX IP 250 OP 636: Performed by: ANESTHESIOLOGY

## 2019-11-01 PROCEDURE — 90870 ELECTROCONVULSIVE THERAPY: CPT

## 2019-11-01 PROCEDURE — 37000008 ZZH ANESTHESIA TECHNICAL FEE, 1ST 30 MIN

## 2019-11-01 PROCEDURE — 25800030 ZZH RX IP 258 OP 636: Performed by: PSYCHIATRY & NEUROLOGY

## 2019-11-01 PROCEDURE — 25000128 H RX IP 250 OP 636: Performed by: PSYCHIATRY & NEUROLOGY

## 2019-11-01 PROCEDURE — 25000125 ZZHC RX 250: Performed by: ANESTHESIOLOGY

## 2019-11-01 RX ORDER — MORPHINE SULFATE 4 MG/ML
6 INJECTION, SOLUTION INTRAMUSCULAR; INTRAVENOUS ONCE
Status: COMPLETED | OUTPATIENT
Start: 2019-11-01 | End: 2019-11-01

## 2019-11-01 RX ORDER — SODIUM CHLORIDE, SODIUM LACTATE, POTASSIUM CHLORIDE, CALCIUM CHLORIDE 600; 310; 30; 20 MG/100ML; MG/100ML; MG/100ML; MG/100ML
INJECTION, SOLUTION INTRAVENOUS CONTINUOUS
Status: DISCONTINUED | OUTPATIENT
Start: 2019-11-01 | End: 2019-11-02 | Stop reason: HOSPADM

## 2019-11-01 RX ORDER — ONDANSETRON 2 MG/ML
8 INJECTION INTRAMUSCULAR; INTRAVENOUS ONCE
Status: COMPLETED | OUTPATIENT
Start: 2019-11-01 | End: 2019-11-01

## 2019-11-01 RX ORDER — KETOROLAC TROMETHAMINE 30 MG/ML
30 INJECTION, SOLUTION INTRAMUSCULAR; INTRAVENOUS ONCE
Status: CANCELLED
Start: 2019-11-01

## 2019-11-01 RX ORDER — ONDANSETRON 2 MG/ML
4 INJECTION INTRAMUSCULAR; INTRAVENOUS EVERY 30 MIN PRN
Status: DISCONTINUED | OUTPATIENT
Start: 2019-11-01 | End: 2019-11-02 | Stop reason: HOSPADM

## 2019-11-01 RX ORDER — FENTANYL CITRATE 50 UG/ML
25-50 INJECTION, SOLUTION INTRAMUSCULAR; INTRAVENOUS
Status: DISCONTINUED | OUTPATIENT
Start: 2019-11-01 | End: 2019-11-02 | Stop reason: HOSPADM

## 2019-11-01 RX ORDER — ONDANSETRON 4 MG/1
4 TABLET, ORALLY DISINTEGRATING ORAL EVERY 30 MIN PRN
Status: DISCONTINUED | OUTPATIENT
Start: 2019-11-01 | End: 2019-11-02 | Stop reason: HOSPADM

## 2019-11-01 RX ORDER — NALOXONE HYDROCHLORIDE 0.4 MG/ML
.1-.4 INJECTION, SOLUTION INTRAMUSCULAR; INTRAVENOUS; SUBCUTANEOUS
Status: DISCONTINUED | OUTPATIENT
Start: 2019-11-01 | End: 2019-11-02 | Stop reason: HOSPADM

## 2019-11-01 RX ORDER — KETOROLAC TROMETHAMINE 30 MG/ML
30 INJECTION, SOLUTION INTRAMUSCULAR; INTRAVENOUS ONCE
Status: COMPLETED | OUTPATIENT
Start: 2019-11-01 | End: 2019-11-01

## 2019-11-01 RX ORDER — MORPHINE SULFATE 4 MG/ML
6 INJECTION, SOLUTION INTRAMUSCULAR; INTRAVENOUS ONCE
Status: CANCELLED
Start: 2019-11-01

## 2019-11-01 RX ORDER — ONDANSETRON 2 MG/ML
8 INJECTION INTRAMUSCULAR; INTRAVENOUS ONCE
Status: CANCELLED
Start: 2019-11-01

## 2019-11-01 RX ADMIN — KETOROLAC TROMETHAMINE 30 MG: 30 INJECTION, SOLUTION INTRAMUSCULAR at 07:37

## 2019-11-01 RX ADMIN — MORPHINE SULFATE 6 MG: 4 INJECTION INTRAVENOUS at 07:58

## 2019-11-01 RX ADMIN — SODIUM CHLORIDE, POTASSIUM CHLORIDE, SODIUM LACTATE AND CALCIUM CHLORIDE 1000 ML: 600; 310; 30; 20 INJECTION, SOLUTION INTRAVENOUS at 07:34

## 2019-11-01 RX ADMIN — Medication 70 MG: at 07:52

## 2019-11-01 RX ADMIN — METHOHEXITAL SODIUM 80 MG: 500 INJECTION, POWDER, LYOPHILIZED, FOR SOLUTION INTRAMUSCULAR; INTRAVENOUS; RECTAL at 07:52

## 2019-11-01 RX ADMIN — ONDANSETRON 8 MG: 2 INJECTION INTRAMUSCULAR; INTRAVENOUS at 07:34

## 2019-11-01 NOTE — PROGRESS NOTES
Pt A&O and VSS. Dr. VICENTE HSIEH gave ok to discharge pt. Pt transferred to discharge room. Report given.

## 2019-11-01 NOTE — PROCEDURES
Procedure/Surgery Information   [facility], [place]    Bedside Procedure Note  Date of Service (when I performed the procedure): 11/01/2019    Mindi Eastman is a 42 year old female patient.  1. Major depressive disorder, recurrent severe without psychotic features (H)      Past Medical History:   Diagnosis Date     ADHD      Anxiety      Bipolar disorder (H)      Depressive disorder      OCD (obsessive compulsive disorder)      ROXANNE (obstructive sleep apnea)     On CPAP     Seasonal allergies                           Procedures     [facility], [place]   ECT Procedure Note   11/01/2019       Mindi Eastman [id number]   42 year old 1975     Patient Status: Outpatient    Is this the first in a series of 12 treatments?  No    History and Physical: Reviewed in medical record    Consent: Informed consent was signed by: patient    Date Consent Signed: 9/20/19    Allergies   Allergen Reactions     Darvocet [Propoxyphene N-Apap]        Weight:  0 lbs 0 oz    Patient Preparations: Glasses/Contacts removed    There were no vitals taken for this visit.         Indications for ECT:   Medications ineffective, Psychotherapies ineffective and treatment resistant depression.         Clinical Narrative:   Patient is continuing ECT for depression.  NPO after 2400.  Alert and Oriented x 3.  No problems with the last ECT which was 3 weeks ago.   Mood dropped off sometime since last ECT but then picked up again the last few days.           Diagnosis:      Major depressive disorder, recurrent severe without psychotic features (F33.2)         Pause for the Cause:     Right patient Yes   Right procedure/laterality settings: Yes          Intra-Procedure Documentation:       ECT #: 3 of 12   Treatment number this series:0 27   Total treatment number: 38     Type of ECT:  Right, unilateral ultrabrief    ECT Medications:    Tylenol:  1000mg po at home   Lactated Ringers 1  liter   Toradol: 30mg  Zofran: 8mg   Brevital: 80mg  Succinyl Choline: 70mg  Morphine 6 mg in treatment room for headache       ECT Strip Summary:   Energy Level: 8 percent   Motor Seizure Duration:  89 seconds  EEG Seizure Duration:   112 seconds    Complications: No    Plan:    Take Extra Strength Tylenol 1000mg po at home before coming to ECT.    Next maintenance ECT is in 3 weeks on 11/22/19      Umang Kennedy MD

## 2019-11-01 NOTE — DISCHARGE INSTRUCTIONS
ECT Discharge Instructions      During your ECT series:      Do not drive or work heavy equipment until 7 days after your last treatment.    Do not drink alcohol or use street drugs (illicit drugs) while you are having treatments.    Do not make important decisions, including legal decisions.    After each treatment:      Get plenty of rest. A responsible adult must stay with your for at least 6 hours.    Avoid heavy or risky activities for 24 hours.    If you feel light-headed, sit for a few minutes before standing. Have someone help you get up.    If you have nausea (feel sick to your stomach): Drink only clear liquids such as apple juice, ginger ale, broth or 7UP, Be sure to drink plenty of liquids. Move to a normal diet as you feel able.     If you received Toradol, wait 6 hours before taking ibuprofen.    Call your doctor if:     You have a fever over 100F (37.7 C) (taken under the tongue), or a fever that last more than 24 hours.    Your IV site is red, swollen, very painful or is getting more tender.    You have nausea that gets very bad or does not improve.      If you have any symptoms after ECT, tell our staff before your next treatment.    The ECT Department can be reached at [phone].  The ECT Department is open Mondays, Wednesdays and Fridays from 7:00 AM to 2:00 PM.      To speak to a doctor, call: Dr. Kennedy Office # [phone] and Fax # [phone]    Other: You had Toradol at 7:40 AM which is an NSAID medication. Do not take any ibuprofen, motrin, aspirin or any other NSAID medication until after 1:40 PM. Questions, check with your physician or pharmacist.              Please take Extra Strength Tylenol 1000mg at home before coming to ECT.    Transported by: Christie      _________________________________________________  ________________________  Patient/Responsible party Signature      Date          ________________________________________________   ________________________  Nurse  Signature        Date

## 2019-11-01 NOTE — IP AVS SNAPSHOT
[facility]  [facility] [address] [address]  Phone:  [phone]                                    After Visit Summary   11/1/2019    Mindi Eastman    MRN: [id number]           After Visit Summary Signature Page    I have received my discharge instructions, and my questions have been answered. I have discussed any challenges I see with this plan with the nurse or doctor.    ..........................................................................................................................................  Patient/Patient Representative Signature      ..........................................................................................................................................  Patient Representative Print Name and Relationship to Patient    ..................................................               ................................................  Date                                   Time    ..........................................................................................................................................  Reviewed by Signature/Title    ...................................................              ..............................................  Date                                               Time          22EPIC Rev 08/18

## 2019-11-01 NOTE — IP AVS SNAPSHOT
MRN:[id number]                      After Visit Summary   11/1/2019    Mindi Eastman    MRN: [id number]           Visit Information        Provider Department      11/1/2019  7:15 AM Umang Kennedy MD [facility]           Review of your medicines      CONTINUE these medicines which have NOT CHANGED       Dose / Directions   acetaminophen 500 MG tablet  Commonly known as:  TYLENOL      Dose:  1,000 mg  Take 1,000 mg by mouth as needed (headache) Prior to ECT  Refills:  0     atomoxetine 80 MG capsule  Commonly known as:  STRATTERA      Dose:  80 mg  Take 80 mg by mouth daily  Refills:  0     cloNIDine 0.1 MG tablet  Commonly known as:  CATAPRES      Dose:  0.1 mg  Take 0.1 mg by mouth 2 times daily  Refills:  0     FLUoxetine 40 MG capsule  Commonly known as:  PROzac  Used for:  Major depressive disorder, recurrent severe without psychotic features (H)      Dose:  40 mg  Take 1 capsule (40 mg) by mouth daily  Quantity:  30 capsule  Refills:  0     LORazepam 1 MG tablet  Commonly known as:  ATIVAN  Used for:  SHANICE (generalized anxiety disorder)      Dose:  1 mg  Take 1 mg by mouth 2 times daily as needed for anxiety  Quantity:  60 tablet  Refills:  0     lurasidone 60 MG Tabs tablet  Commonly known as:  LATUDA      Dose:  80 mg  Take 80 mg by mouth daily with food  Refills:  0     ondansetron 4 MG tablet  Commonly known as:  ZOFRAN  Used for:  Major depressive disorder, recurrent severe without psychotic features (H), Projectile vomiting with nausea      Dose:  4 mg  Take 1 tablet (4 mg) by mouth every 8 hours as needed for nausea  Quantity:  30 tablet  Refills:  0     QUEtiapine 100 MG tablet  Commonly known as:  SEROquel  Used for:  Bipolar I disorder (H), Major depressive disorder, recurrent severe without psychotic features (H)      Dose:  100 mg  Take 1 tablet (100 mg) by mouth At Bedtime  Quantity:  30 tablet  Refills:  1     traZODone 50 MG tablet  Commonly known  as:  DESYREL      Dose:   mg  Take  mg by mouth At Bedtime  Refills:  0              Protect others around you: Learn how to safely use, store and throw away your medicines at www.disposemymeds.org.       Follow-ups after your visit       Your next 10 appointments already scheduled    Nov 22, 2019  7:30 AM CST  Electroconvulsive Therapy with Umang Kennedy MD  [facility] ([facility]) [address]  [facility] [place] [address] [phone]         Care Instructions       Further instructions from your care team       ECT Discharge Instructions      During your ECT series:      Do not drive or work heavy equipment until 7 days after your last treatment.    Do not drink alcohol or use street drugs (illicit drugs) while you are having treatments.    Do not make important decisions, including legal decisions.    After each treatment:      Get plenty of rest. A responsible adult must stay with your for at least 6 hours.    Avoid heavy or risky activities for 24 hours.    If you feel light-headed, sit for a few minutes before standing. Have someone help you get up.    If you have nausea (feel sick to your stomach): Drink only clear liquids such as apple juice, ginger ale, broth or 7UP, Be sure to drink plenty of liquids. Move to a normal diet as you feel able.     If you received Toradol, wait 6 hours before taking ibuprofen.    Call your doctor if:     You have a fever over 100F (37.7 C) (taken under the tongue), or a fever that last more than 24 hours.    Your IV site is red, swollen, very painful or is getting more tender.    You have nausea that gets very bad or does not improve.      If you have any symptoms after ECT, tell our staff before your next treatment.    The ECT Department can be reached at [phone].  The ECT Department is open Mondays, Wednesdays and Fridays from 7:00 AM to 2:00 PM.      To speak to a doctor, call: Dr. Kennedy  "Office # [phone] and Fax # [phone]    Other: You had Toradol at 7:40 AM which is an NSAID medication. Do not take any ibuprofen, motrin, aspirin or any other NSAID medication until after 1:40 PM. Questions, check with your physician or pharmacist.              Please take Extra Strength Tylenol 1000mg at home before coming to [facility].    Transported by: Christie      _________________________________________________  ________________________  Patient/Responsible party Signature      Date          ________________________________________________   ________________________  Nurse Signature        Date    Additional Information About Your Visit       MyChart Information    [employer] lets you send messages to your doctor, view your test results, renew your prescriptions, schedule appointments and more. To sign up, go to [email or web address] . Click on \"Log in\" on the left side of the screen, which will take you to the Welcome page. Then click on \"Sign up Now\" on the right side of the page.     You will be asked to enter the access code listed below, as well as some personal information. Please follow the directions to create your username and password.     Your access code is: [email or web address]  Expires: 2019  5:25 AM     Your access code will  in 60 days. If you need help or a new code, please call your [facility] clinic or [phone].       Care EveryWhere ID    This is your Care EveryWhere ID. This could be used by other organizations to access your [facility] medical records  [id number]       Your Vitals Were  Most recent update: 2019  8:05 AM    Blood Pressure   159/88      Temperature   97.7  F (36.5  C) (Temporal)    Respirations   14    Pulse Oximetry   92%           Primary Care Provider Office Phone # Fax #    Robyn Smiley [phone]      Equal Access to Services    [place] ALONSO: Evelia Irby, debbie henley, yamileth ortiz " stephanie overtonshiraz brainaftab la'aan ah. So [facility] [phone].    ATENCIÓN: Si habla malina, tiene a martínez disposición servicios gratuitos de asistencia lingüística. Arun al [phone].    We comply with applicable federal civil rights laws and [place] laws. We do not discriminate on the basis of race, color, national origin, age, disability, sex, sexual orientation, or gender identity.           Thank you!    Thank you for choosing [facility] for your care. Our goal is always to provide you with excellent care. Hearing back from our patients is one way we can continue to improve our services. Please take a few minutes to complete the written survey that you may receive in the mail after you visit with us. Thank you!            Medication List      Medications       Morning Afternoon Evening Bedtime As Needed   acetaminophen 500 MG tablet  Also known as:  TYLENOL  INSTRUCTIONS:  Take 1,000 mg by mouth as needed (headache) Prior to ECT                    atomoxetine 80 MG capsule  Also known as:  STRATTERA  INSTRUCTIONS:  Take 80 mg by mouth daily                    cloNIDine 0.1 MG tablet  Also known as:  CATAPRES  INSTRUCTIONS:  Take 0.1 mg by mouth 2 times daily                    FLUoxetine 40 MG capsule  Also known as:  PROzac  INSTRUCTIONS:  Take 1 capsule (40 mg) by mouth daily                    LORazepam 1 MG tablet  Also known as:  ATIVAN  INSTRUCTIONS:  Take 1 mg by mouth 2 times daily as needed for anxiety                    lurasidone 60 MG Tabs tablet  Also known as:  LATUDA  INSTRUCTIONS:  Take 80 mg by mouth daily with food                    ondansetron 4 MG tablet  Also known as:  ZOFRAN  INSTRUCTIONS:  Take 1 tablet (4 mg) by mouth every 8 hours as needed for nausea  LAST TAKEN:  Ask your nurse or doctor                    QUEtiapine 100 MG tablet  Also known as:  SEROquel  INSTRUCTIONS:  Take 1 tablet (100 mg) by mouth At Bedtime                    traZODone 50 MG tablet  Also known as:   DESYREL  INSTRUCTIONS:  Take  mg by mouth At Bedtime

## 2019-11-01 NOTE — ANESTHESIA POSTPROCEDURE EVALUATION
Anesthesia POST Procedure Evaluation    Patient: Mindi Eastman   MRN:     [id number] Gender:   female   Age:    44 year old :      1975        Preoperative Diagnosis: * No pre-op diagnosis entered *   * No procedures listed *   Postop Comments: No value filed.       Anesthesia Type:  Not documented  No value filed.    Reportable Event: NO     PAIN: Uncomplicated   Sign Out status: Comfortable, Well controlled pain     PONV: No PONV   Sign Out status:  No Nausea or Vomiting     Neuro/Psych: Uneventful perioperative course   Sign Out Status: Preoperative baseline; Age appropriate mentation     Airway/Resp.: Uneventful perioperative course   Sign Out Status: Non labored breathing, age appropriate RR; Resp. Status within EXPECTED Parameters     CV: Uneventful perioperative course   Sign Out status: Appropriate BP and perfusion indices; Appropriate HR/Rhythm     Disposition:   Sign Out in:  PACU  Disposition:  Phase II; Home  Recovery Course: Uneventful  Follow-Up: Not required           Last Anesthesia Record Vitals:  CRNA VITALS  2019 0728 - 2019 0759      2019             Pulse:  84    Ht Rate:  81    SpO2:  100 %    Resp Rate (set):  8          Last PACU Vitals:  Vitals Value Taken Time   BP     Temp     Pulse     Resp     SpO2     Temp src     NIBP 133/82 2019  7:56 AM   Pulse 84 2019  7:59 AM   SpO2 100 % 2019  7:59 AM   Resp     Temp     Ht Rate 81 2019  7:59 AM   Temp 2           Electronically Signed By: Farzad Britton DO, 2019, 7:59 AM

## 2019-11-01 NOTE — ANESTHESIA PREPROCEDURE EVALUATION
Anesthesia Pre-Procedure Evaluation    Patient: Mindi Eastman   MRN:     [id number] Gender:   female   Age:    44 year old :      1975        Preoperative Diagnosis: * No pre-op diagnosis entered *   * No procedures listed *     Past Medical History:   Diagnosis Date     ADHD      Anxiety      Bipolar disorder (H)      Depressive disorder      OCD (obsessive compulsive disorder)      ROXANNE (obstructive sleep apnea)     On CPAP     Seasonal allergies       Past Surgical History:   Procedure Laterality Date     APPENDECTOMY       CARPAL TUNNEL RELEASE RT/LT Bilateral      CHOLECYSTECTOMY       EXTRACTION(S) DENTAL      [facility] Teeth     HC KNEE SCOPE,MED/LAT MENISCUS REPAIR Right     X 2     TONSILLECTOMY                     PHYSICAL EXAM:   Mental Status/Neuro: A/A/O   Airway: Facies: Feasible  Mallampati: I  Mouth/Opening: Full  TM distance: > 6 cm  Neck ROM: Full   Respiratory: Auscultation: CTAB     Resp. Rate: Normal     Resp. Effort: Normal      CV: Rhythm: Regular  Rate: Age appropriate  Heart: Normal Sounds  Edema: None   Comments:      Dental: Normal Dentition                LABS:  CBC:   Lab Results   Component Value Date    WBC 11.2 (H) 2019    WBC 6.3 10/05/2017    HGB 13.5 2019    HGB 11.7 10/05/2017    HCT 40.8 2019    HCT 35.8 10/05/2017     2019     10/05/2017     BMP:   Lab Results   Component Value Date     2019     10/05/2017    POTASSIUM 3.7 2019    POTASSIUM 4.5 10/03/2018    CHLORIDE 103 2019    CHLORIDE 108 10/05/2017    CO2 26 2019    CO2 28 10/05/2017    BUN 9 2019    BUN 8 10/05/2017    CR 0.84 2019    CR 0.84 10/03/2018     (H) 2019     (A) 10/03/2018     COAGS:   Lab Results   Component Value Date    PTT 38 (H) 2011    INR 0.99 2011     POC:   Lab Results   Component Value Date    HCG Negative 2019    HCGS Negative 2011     OTHER:   Lab Results  "  Component Value Date    GALO 8.4 (L) 06/17/2019    ALBUMIN 4.0 06/17/2019    PROTTOTAL 7.4 06/17/2019    ALT 21 06/17/2019    AST 23 06/17/2019    ALKPHOS 86 06/17/2019    BILITOTAL 0.7 06/17/2019    TSH 2.23 06/17/2019        Preop Vitals    BP Readings from Last 3 Encounters:   11/01/19 118/71   10/11/19 (!) 152/88   09/20/19 112/68    Pulse Readings from Last 3 Encounters:   10/11/19 62   09/20/19 76   08/30/19 91      Resp Readings from Last 3 Encounters:   11/01/19 14   10/11/19 20   09/20/19 14    SpO2 Readings from Last 3 Encounters:   11/01/19 97%   10/11/19 97%   09/20/19 95%      Temp Readings from Last 1 Encounters:   11/01/19 35.9  C (96.7  F) (Temporal)    Ht Readings from Last 1 Encounters:   06/16/19 1.676 m (5' 6\")      Wt Readings from Last 1 Encounters:   06/20/19 92.8 kg (204 lb 8 oz)    Estimated body mass index is 33.01 kg/m  as calculated from the following:    Height as of 6/16/19: 1.676 m (5' 6\").    Weight as of 6/20/19: 92.8 kg (204 lb 8 oz).     LDA:  Peripheral IV 11/01/19 Right Hand (Active)   Site Assessment WDL 11/1/2019  7:20 AM   Line Status Infusing 11/1/2019  7:20 AM   Phlebitis Scale 0-->no symptoms 11/1/2019  7:20 AM   Infiltration Scale 0 11/1/2019  7:20 AM   Dressing Intervention New dressing  11/1/2019  7:20 AM   Number of days: 0        Assessment:   ASA SCORE: 3            Plan:   Anes. Type:  General   Pre-Medication: None   Induction:  IV (Standard)   Airway: Mask   Access/Monitoring: PIV   Maintenance: N/a     Postop Plan:   Postop Pain: Opioids  Postop Sedation/Airway: Not planned     PONV Management:   Adult Risk Factors: Female, Postop Opioids                   Farzad Britton,   "

## 2019-11-22 ENCOUNTER — HOSPITAL ENCOUNTER (OUTPATIENT)
Dept: BEHAVIORAL HEALTH | Facility: CLINIC | Age: 44
Discharge: HOME | End: 2019-11-22
Attending: PSYCHIATRY & NEUROLOGY | Admitting: PSYCHIATRY & NEUROLOGY
Payer: COMMERCIAL

## 2019-11-22 ENCOUNTER — ANESTHESIA (OUTPATIENT)
Dept: BEHAVIORAL HEALTH | Facility: CLINIC | Age: 44
End: 2019-11-22

## 2019-11-22 ENCOUNTER — ANESTHESIA EVENT (OUTPATIENT)
Dept: BEHAVIORAL HEALTH | Facility: CLINIC | Age: 44
End: 2019-11-22

## 2019-11-22 VITALS
SYSTOLIC BLOOD PRESSURE: 141 MMHG | TEMPERATURE: 97.9 F | OXYGEN SATURATION: 94 % | RESPIRATION RATE: 16 BRPM | DIASTOLIC BLOOD PRESSURE: 86 MMHG

## 2019-11-22 DIAGNOSIS — F33.2 MAJOR DEPRESSIVE DISORDER, RECURRENT SEVERE WITHOUT PSYCHOTIC FEATURES (H): Primary | ICD-10-CM

## 2019-11-22 PROCEDURE — 25000128 H RX IP 250 OP 636: Performed by: ANESTHESIOLOGY

## 2019-11-22 PROCEDURE — 25000128 H RX IP 250 OP 636: Performed by: PSYCHIATRY & NEUROLOGY

## 2019-11-22 PROCEDURE — 90870 ELECTROCONVULSIVE THERAPY: CPT

## 2019-11-22 PROCEDURE — 25000125 ZZHC RX 250: Performed by: ANESTHESIOLOGY

## 2019-11-22 PROCEDURE — 37000008 ZZH ANESTHESIA TECHNICAL FEE, 1ST 30 MIN

## 2019-11-22 RX ORDER — MORPHINE SULFATE 4 MG/ML
6 INJECTION, SOLUTION INTRAMUSCULAR; INTRAVENOUS ONCE
Status: COMPLETED | OUTPATIENT
Start: 2019-11-22 | End: 2019-11-22

## 2019-11-22 RX ORDER — KETOROLAC TROMETHAMINE 30 MG/ML
30 INJECTION, SOLUTION INTRAMUSCULAR; INTRAVENOUS ONCE
Status: COMPLETED | OUTPATIENT
Start: 2019-11-22 | End: 2019-11-22

## 2019-11-22 RX ORDER — ONDANSETRON 2 MG/ML
8 INJECTION INTRAMUSCULAR; INTRAVENOUS ONCE
Status: COMPLETED | OUTPATIENT
Start: 2019-11-22 | End: 2019-11-22

## 2019-11-22 RX ORDER — KETOROLAC TROMETHAMINE 30 MG/ML
30 INJECTION, SOLUTION INTRAMUSCULAR; INTRAVENOUS ONCE
Status: CANCELLED
Start: 2019-11-22

## 2019-11-22 RX ORDER — ONDANSETRON 2 MG/ML
8 INJECTION INTRAMUSCULAR; INTRAVENOUS ONCE
Status: CANCELLED
Start: 2019-11-22

## 2019-11-22 RX ORDER — FENTANYL CITRATE 50 UG/ML
25-50 INJECTION, SOLUTION INTRAMUSCULAR; INTRAVENOUS
Status: DISCONTINUED | OUTPATIENT
Start: 2019-11-22 | End: 2019-11-23 | Stop reason: HOSPADM

## 2019-11-22 RX ORDER — NALOXONE HYDROCHLORIDE 0.4 MG/ML
.1-.4 INJECTION, SOLUTION INTRAMUSCULAR; INTRAVENOUS; SUBCUTANEOUS
Status: DISCONTINUED | OUTPATIENT
Start: 2019-11-22 | End: 2019-11-23 | Stop reason: HOSPADM

## 2019-11-22 RX ORDER — ONDANSETRON 2 MG/ML
4 INJECTION INTRAMUSCULAR; INTRAVENOUS EVERY 30 MIN PRN
Status: DISCONTINUED | OUTPATIENT
Start: 2019-11-22 | End: 2019-11-23 | Stop reason: HOSPADM

## 2019-11-22 RX ORDER — ONDANSETRON 4 MG/1
4 TABLET, ORALLY DISINTEGRATING ORAL EVERY 30 MIN PRN
Status: DISCONTINUED | OUTPATIENT
Start: 2019-11-22 | End: 2019-11-23 | Stop reason: HOSPADM

## 2019-11-22 RX ORDER — SODIUM CHLORIDE, SODIUM LACTATE, POTASSIUM CHLORIDE, CALCIUM CHLORIDE 600; 310; 30; 20 MG/100ML; MG/100ML; MG/100ML; MG/100ML
INJECTION, SOLUTION INTRAVENOUS CONTINUOUS
Status: DISCONTINUED | OUTPATIENT
Start: 2019-11-22 | End: 2019-11-23 | Stop reason: HOSPADM

## 2019-11-22 RX ORDER — LABETALOL 20 MG/4 ML (5 MG/ML) INTRAVENOUS SYRINGE
10
Status: DISCONTINUED | OUTPATIENT
Start: 2019-11-22 | End: 2019-11-23 | Stop reason: HOSPADM

## 2019-11-22 RX ORDER — MORPHINE SULFATE 4 MG/ML
6 INJECTION, SOLUTION INTRAMUSCULAR; INTRAVENOUS ONCE
Status: CANCELLED
Start: 2019-11-22

## 2019-11-22 RX ADMIN — ONDANSETRON 8 MG: 2 INJECTION INTRAMUSCULAR; INTRAVENOUS at 08:08

## 2019-11-22 RX ADMIN — Medication 70 MG: at 08:18

## 2019-11-22 RX ADMIN — MORPHINE SULFATE 6 MG: 4 INJECTION INTRAVENOUS at 08:23

## 2019-11-22 RX ADMIN — KETOROLAC TROMETHAMINE 30 MG: 30 INJECTION, SOLUTION INTRAMUSCULAR at 08:12

## 2019-11-22 RX ADMIN — METHOHEXITAL SODIUM 80 MG: 500 INJECTION, POWDER, LYOPHILIZED, FOR SOLUTION INTRAMUSCULAR; INTRAVENOUS; RECTAL at 08:17

## 2019-11-22 NOTE — ANESTHESIA POSTPROCEDURE EVALUATION
Anesthesia POST Procedure Evaluation    Patient: Mindi Eastman   MRN:     [id number] Gender:   female   Age:    44 year old :      1975        Preoperative Diagnosis: * No pre-op diagnosis entered *   * No procedures listed *   Postop Comments: No value filed.       Anesthesia Type:  Not documented  General    JZG FV AN POST EVALUATION    Last Anesthesia Record Vitals:  CRNA VITALS  2019 0756 - 2019 0827      2019             Resp Rate (set):  8          Last PACU Vitals:  Vitals Value Taken Time   BP     Temp     Pulse     Resp     SpO2     Temp src     NIBP 148/93 2019  8:23 AM   Pulse 90 2019  8:26 AM   SpO2 99 % 2019  8:26 AM   Resp     Temp     Ht Rate 93 2019  8:26 AM   Temp 2           Electronically Signed By: Farzad Britton DO, 2019, 8:27 AM

## 2019-11-22 NOTE — PROCEDURES
Procedure/Surgery Information   [facility]    Bedside Procedure Note  Date of Service (when I performed the procedure): 11/22/2019    Mindi Eastman is a 42 year old female patient.  1. Major depressive disorder, recurrent severe without psychotic features (H)      Past Medical History:   Diagnosis Date     ADHD      Anxiety      Bipolar disorder (H)      Depressive disorder      OCD (obsessive compulsive disorder)      ROXANNE (obstructive sleep apnea)     On CPAP     Seasonal allergies      Temp: 98.3  F (36.8  C) Temp src: Tympanic BP: 126/78   Heart Rate: 68 Resp: 14 SpO2: 98 % O2 Device: None (Room air)      Procedures     [facility]   ECT Procedure Note   11/22/2019       Mindi Eastman [id number]   42 year old 1975     Patient Status: Outpatient    Is this the first in a series of 12 treatments?  No    History and Physical: Reviewed in medical record    Consent: Informed consent was signed by: patient    Date Consent Signed: 9/20/19    Allergies   Allergen Reactions     Darvocet [Propoxyphene N-Apap]        Weight:  0 lbs 0 oz    Patient Preparations: Glasses/Contacts removed    /78   Temp 98.3  F (36.8  C) (Tympanic)   Resp 14   SpO2 98%          Indications for ECT:   Medications ineffective, Psychotherapies ineffective and treatment resistant depression.         Clinical Narrative:   Patient is continuing ECT for depression.  NPO after 2400.  Alert and Oriented x 3.  No problems with the last ECT which was 3 weeks ago.   Mood is good last 3 days, better than it had been.  She feels the 3 week interval seems good for her.  No recent med changes.           Diagnosis:      Major depressive disorder, recurrent severe without psychotic features (F33.2)         Pause for the Cause:     Right patient Yes   Right procedure/laterality settings: Yes          Intra-Procedure Documentation:       ECT #: 4 of 12   Treatment  number this series: 28   Total treatment number: 39     Type of ECT:  Right, unilateral ultrabrief    ECT Medications:    Tylenol:  1000mg po at home   Lactated Ringers 1 liter   Toradol: 30mg  Zofran: 8mg   Brevital: 80mg  Succinyl Choline: 70mg  Morphine 6 mg in treatment room for headache       ECT Strip Summary:   Energy Level: 8 percent   Motor Seizure Duration:  45 seconds  EEG Seizure Duration:   65 seconds    Complications: No    Plan:    Take Extra Strength Tylenol 1000mg po at home before coming to ECT.    Next maintenance ECT is in 3 weeks on 12/13/19      Umang Kennedy MD

## 2019-11-22 NOTE — IP AVS SNAPSHOT
[facility]  [facility] [address] [address]  Phone:  [phone]                                    After Visit Summary   11/22/2019    Mindi Eastman    MRN: [id number]           After Visit Summary Signature Page    I have received my discharge instructions, and my questions have been answered. I have discussed any challenges I see with this plan with the nurse or doctor.    ..........................................................................................................................................  Patient/Patient Representative Signature      ..........................................................................................................................................  Patient Representative Print Name and Relationship to Patient    ..................................................               ................................................  Date                                   Time    ..........................................................................................................................................  Reviewed by Signature/Title    ...................................................              ..............................................  Date                                               Time          22EPIC Rev 08/18

## 2019-11-22 NOTE — IP AVS SNAPSHOT
MRN:[id number]                      After Visit Summary   11/22/2019    Mindi Eastman    MRN: [id number]           Visit Information        Provider Department      11/22/2019  7:30 AM Umang Kennedy MD [facility]           Review of your medicines      CONTINUE these medicines which have NOT CHANGED       Dose / Directions   acetaminophen 500 MG tablet  Commonly known as:  TYLENOL      Dose:  1,000 mg  Take 1,000 mg by mouth as needed (headache) Prior to ECT  Refills:  0     atomoxetine 80 MG capsule  Commonly known as:  STRATTERA      Dose:  80 mg  Take 80 mg by mouth daily  Refills:  0     cloNIDine 0.1 MG tablet  Commonly known as:  CATAPRES      Dose:  0.1 mg  Take 0.1 mg by mouth 2 times daily  Refills:  0     FLUoxetine 40 MG capsule  Commonly known as:  PROzac  Used for:  Major depressive disorder, recurrent severe without psychotic features (H)      Dose:  40 mg  Take 1 capsule (40 mg) by mouth daily  Quantity:  30 capsule  Refills:  0     LORazepam 1 MG tablet  Commonly known as:  ATIVAN  Used for:  SHANICE (generalized anxiety disorder)      Dose:  1 mg  Take 1 mg by mouth 2 times daily as needed for anxiety  Quantity:  60 tablet  Refills:  0     lurasidone 60 MG Tabs tablet  Commonly known as:  LATUDA      Dose:  80 mg  Take 80 mg by mouth daily with food  Refills:  0     ondansetron 4 MG tablet  Commonly known as:  ZOFRAN  Used for:  Major depressive disorder, recurrent severe without psychotic features (H), Projectile vomiting with nausea      Dose:  4 mg  Take 1 tablet (4 mg) by mouth every 8 hours as needed for nausea  Quantity:  30 tablet  Refills:  0     QUEtiapine 100 MG tablet  Commonly known as:  SEROquel  Used for:  Bipolar I disorder (H), Major depressive disorder, recurrent severe without psychotic features (H)      Dose:  100 mg  Take 1 tablet (100 mg) by mouth At Bedtime  Quantity:  30 tablet  Refills:  1     traZODone 50 MG tablet  Commonly known  as:  DESYREL      Dose:   mg  Take  mg by mouth At Bedtime  Refills:  0              Protect others around you: Learn how to safely use, store and throw away your medicines at www.disposemymeds.org.       Follow-ups after your visit       Your next 10 appointments already scheduled    Dec 13, 2019  7:30 AM CST  Electroconvulsive Therapy with Umang Kennedy MD  [facility] ([facility]) [address] [facility] [id number] [phone]         Care Instructions       Further instructions from your care team       ECT Discharge Instructions      During your ECT series:      Do not drive or work heavy equipment until 7 days after your last treatment.    Do not drink alcohol or use street drugs (illicit drugs) while you are having treatments.    Do not make important decisions, including legal decisions.    After each treatment:      Get plenty of rest. A responsible adult must stay with your for at least 6 hours.    Do not drive for 24 hours after ECT treatment. If you have more than one treatment within a week, no driving until 7 days after your last ECT treatment.    Avoid heavy or risky activities for 24 hours.    If you feel light-headed, sit for a few minutes before standing. Have someone help you get up.    If you have nausea (feel sick to your stomach): Drink only clear liquids such as apple juice, ginger ale, broth or 7UP, Be sure to drink plenty of liquids. Move to a normal diet as you feel able.     If you received Toradol, wait 6 hours before taking ibuprofen.    Call your doctor if:     You have a fever over 100F (37.7 C) (taken under the tongue), or a fever that last more than 24 hours.    Your IV site is red, swollen, very painful or is getting more tender.    You have nausea that gets very bad or does not improve.      If you have any symptoms after ECT, tell our staff before your next treatment.    The ECT Department  "can be reached at [phone].  The ECT Department is open ,  and  from 7:00 AM to 2:00 PM.     To speak to a doctor, call: Dr. Kennedy [phone]    Instructions: Take Extra Strength Tylenol 1000mg po at home before coming to ECT.      Other: You have received Toradol today at 8:12 AM. Toradol is an NSAID.  Do not take any NSAID's including: Ibuprofen, Naproxen Sodium, Asprin , Advil, Motrin, Aleve, Josué, etc., until six hours after the above time which would be 2:12 PM. If you have any questions check back with your Physician, or pharmacist.   You were given morphine 6 mg at 8:23 AM to prevent pain. You were given 8 mg of zofran at 8:08 AM to prevent nausea.    Transported by: De  [phone]      _________________________________________________  ________________________  Patient/Responsible party Signature      Date          ________________________________________________   ________________________  Nurse Signature        Date    Additional Information About Your Visit       MyChart Information    [employer] lets you send messages to your doctor, view your test results, renew your prescriptions, schedule appointments and more. To sign up, go to [email or web address] . Click on \"Log in\" on the left side of the screen, which will take you to the Welcome page. Then click on \"Sign up Now\" on the right side of the page.     You will be asked to enter the access code listed below, as well as some personal information. Please follow the directions to create your username and password.     Your access code is: [email or web address]  Expires: 2019  4:25 AM     Your access code will  in 60 days. If you need help or a new code, please call your [facility] clinic or [phone].       Care EveryWhere ID    This is your Care EveryWhere ID. This could be used by other organizations to access your [facility] medical records  [id number]       Your Vitals Were  Most recent update: 2019  " 8:44 AM    Blood Pressure   140/89   (BP Location: Left arm)    Temperature   97.3  F (36.3  C) (Temporal)    Respirations   16    Pulse Oximetry   97%           Primary Care Provider Office Phone # Fax #    Robyn Smiley [phone]      Equal Access to Services    [place] : Hadii aad ku hadasho Soomaali, waaxda luqadaha, qaybta kaalmada adeegyada, waxriccardo darlynin hayaan tianshiraz charlton layeisontram . So [facility] [phone].    ATENCIÓN: Si habla español, tiene a martínez disposición servicios gratuitos de asistencia lingüística. [facility] [phone].    We comply with applicable federal civil rights laws and [place] laws. We do not discriminate on the basis of race, color, national origin, age, disability, sex, sexual orientation, or gender identity.           Thank you!    Thank you for choosing [facility] for your care. Our goal is always to provide you with excellent care. Hearing back from our patients is one way we can continue to improve our services. Please take a few minutes to complete the written survey that you may receive in the mail after you visit with us. Thank you!            Medication List      Medications       Morning Afternoon Evening Bedtime As Needed   acetaminophen 500 MG tablet  Also known as:  TYLENOL  INSTRUCTIONS:  Take 1,000 mg by mouth as needed (headache) Prior to ECT                    atomoxetine 80 MG capsule  Also known as:  STRATTERA  INSTRUCTIONS:  Take 80 mg by mouth daily                    cloNIDine 0.1 MG tablet  Also known as:  CATAPRES  INSTRUCTIONS:  Take 0.1 mg by mouth 2 times daily                    FLUoxetine 40 MG capsule  Also known as:  PROzac  INSTRUCTIONS:  Take 1 capsule (40 mg) by mouth daily                    LORazepam 1 MG tablet  Also known as:  ATIVAN  INSTRUCTIONS:  Take 1 mg by mouth 2 times daily as needed for anxiety                    lurasidone 60 MG Tabs tablet  Also known as:  LATUDA  INSTRUCTIONS:  Take 80 mg by mouth daily with food                     ondansetron 4 MG tablet  Also known as:  ZOFRAN  INSTRUCTIONS:  Take 1 tablet (4 mg) by mouth every 8 hours as needed for nausea  LAST TAKEN:  Ask your nurse or doctor                    QUEtiapine 100 MG tablet  Also known as:  SEROquel  INSTRUCTIONS:  Take 1 tablet (100 mg) by mouth At Bedtime                    traZODone 50 MG tablet  Also known as:  DESYREL  INSTRUCTIONS:  Take  mg by mouth At Bedtime

## 2019-11-22 NOTE — ANESTHESIA PREPROCEDURE EVALUATION
Anesthesia Pre-Procedure Evaluation    Patient: Mindi Eastman   MRN:     [id number] Gender:   female   Age:    44 year old :      1975        Preoperative Diagnosis: * No pre-op diagnosis entered *   * No procedures listed *     Past Medical History:   Diagnosis Date     ADHD      Anxiety      Bipolar disorder (H)      Depressive disorder      OCD (obsessive compulsive disorder)      ROXANNE (obstructive sleep apnea)     On CPAP     Seasonal allergies       Past Surgical History:   Procedure Laterality Date     APPENDECTOMY       CARPAL TUNNEL RELEASE RT/LT Bilateral      CHOLECYSTECTOMY       EXTRACTION(S) DENTAL      [place] Teeth     HC KNEE SCOPE,MED/LAT MENISCUS REPAIR Right     X 2     TONSILLECTOMY                     PHYSICAL EXAM:   Mental Status/Neuro: A/A/O   Airway: Facies: Feasible  Mallampati: III  Mouth/Opening: Limited  TM distance: > 6 cm  Neck ROM: Full   Respiratory: Auscultation: CTAB     Resp. Rate: Normal     Resp. Effort: Normal      CV: Rhythm: Regular  Rate: Age appropriate  Heart: Normal Sounds  Edema: None   Comments:      Dental: Normal Dentition                LABS:  CBC:   Lab Results   Component Value Date    WBC 11.2 (H) 2019    WBC 6.3 10/05/2017    HGB 13.5 2019    HGB 11.7 10/05/2017    HCT 40.8 2019    HCT 35.8 10/05/2017     2019     10/05/2017     BMP:   Lab Results   Component Value Date     2019     10/05/2017    POTASSIUM 3.7 2019    POTASSIUM 4.5 10/03/2018    CHLORIDE 103 2019    CHLORIDE 108 10/05/2017    CO2 26 2019    CO2 28 10/05/2017    BUN 9 2019    BUN 8 10/05/2017    CR 0.84 2019    CR 0.84 10/03/2018     (H) 2019     (A) 10/03/2018     COAGS:   Lab Results   Component Value Date    PTT 38 (H) 2011    INR 0.99 2011     POC:   Lab Results   Component Value Date    HCG Negative 2019    HCGS Negative 2011     OTHER:   Lab Results  "  Component Value Date    GALO 8.4 (L) 06/17/2019    ALBUMIN 4.0 06/17/2019    PROTTOTAL 7.4 06/17/2019    ALT 21 06/17/2019    AST 23 06/17/2019    ALKPHOS 86 06/17/2019    BILITOTAL 0.7 06/17/2019    TSH 2.23 06/17/2019        Preop Vitals    BP Readings from Last 3 Encounters:   11/22/19 126/78   11/01/19 (!) 143/78   10/11/19 (!) 152/88    Pulse Readings from Last 3 Encounters:   10/11/19 62   09/20/19 76   08/30/19 91      Resp Readings from Last 3 Encounters:   11/22/19 14   11/01/19 14   10/11/19 20    SpO2 Readings from Last 3 Encounters:   11/22/19 98%   11/01/19 93%   10/11/19 97%      Temp Readings from Last 1 Encounters:   11/22/19 36.8  C (98.3  F) (Tympanic)    Ht Readings from Last 1 Encounters:   06/16/19 1.676 m (5' 6\")      Wt Readings from Last 1 Encounters:   06/20/19 92.8 kg (204 lb 8 oz)    Estimated body mass index is 33.01 kg/m  as calculated from the following:    Height as of 6/16/19: 1.676 m (5' 6\").    Weight as of 6/20/19: 92.8 kg (204 lb 8 oz).     LDA:        Assessment:   ASA SCORE: 3            Plan:   Anes. Type:  General   Pre-Medication: None   Induction:  IV (Standard)   Airway: Mask   Access/Monitoring: PIV   Maintenance: N/a     Postop Plan:   Postop Pain: Opioids  Postop Sedation/Airway: Not planned     PONV Management:   Adult Risk Factors: Female, Postop Opioids                   Farzad Britton,   "

## 2019-11-22 NOTE — ANESTHESIA POSTPROCEDURE EVALUATION
Anesthesia POST Procedure Evaluation    Patient: Mindi Eastman   MRN:     [id number] Gender:   female   Age:    44 year old :      1975        Preoperative Diagnosis: * No pre-op diagnosis entered *   * No procedures listed *   Postop Comments: No value filed.       Anesthesia Type:  Not documented  General    Reportable Event: NO     PAIN: Uncomplicated   Sign Out status: Comfortable, Well controlled pain     PONV: No PONV   Sign Out status:  No Nausea or Vomiting     Neuro/Psych: Uneventful perioperative course   Sign Out Status: Preoperative baseline; Age appropriate mentation     Airway/Resp.: Uneventful perioperative course   Sign Out Status: Non labored breathing, age appropriate RR; Resp. Status within EXPECTED Parameters     CV: Uneventful perioperative course   Sign Out status: Appropriate BP and perfusion indices; Appropriate HR/Rhythm     Disposition:   Sign Out in:  PACU  Disposition:  Phase II; Home  Recovery Course: Uneventful  Follow-Up: Not required           Last Anesthesia Record Vitals:  CRNA VITALS  2019 0756 - 2019 0856      2019             Resp Rate (set):  8          Last PACU Vitals:  No vitals data found for the desired time range.        Electronically Signed By: Farzad Britton DO, 2019, 9:24 AM

## 2019-11-22 NOTE — DISCHARGE INSTRUCTIONS
ECT Discharge Instructions      During your ECT series:      Do not drive or work heavy equipment until 7 days after your last treatment.    Do not drink alcohol or use street drugs (illicit drugs) while you are having treatments.    Do not make important decisions, including legal decisions.    After each treatment:      Get plenty of rest. A responsible adult must stay with your for at least 6 hours.    Do not drive for 24 hours after ECT treatment. If you have more than one treatment within a week, no driving until 7 days after your last ECT treatment.    Avoid heavy or risky activities for 24 hours.    If you feel light-headed, sit for a few minutes before standing. Have someone help you get up.    If you have nausea (feel sick to your stomach): Drink only clear liquids such as apple juice, ginger ale, broth or 7UP, Be sure to drink plenty of liquids. Move to a normal diet as you feel able.     If you received Toradol, wait 6 hours before taking ibuprofen.    Call your doctor if:     You have a fever over 100F (37.7 C) (taken under the tongue), or a fever that last more than 24 hours.    Your IV site is red, swollen, very painful or is getting more tender.    You have nausea that gets very bad or does not improve.      If you have any symptoms after ECT, tell our staff before your next treatment.    The ECT Department can be reached at [phone].  The ECT Department is open Mondays, Wednesdays and Fridays from 7:00 AM to 2:00 PM.     To speak to a doctor, call: Dr. Kennedy [phone]    Instructions: Take Extra Strength Tylenol 1000mg po at home before coming to ECT.      Other: You have received Toradol today at 8:12 AM. Toradol is an NSAID.  Do not take any NSAID's including: Ibuprofen, Naproxen Sodium, Asprin , Advil, Motrin, Aleve, Josué, etc., until six hours after the above time which would be 2:12 PM. If you have any questions check back with your Physician, or pharmacist.   You were given morphine 6 mg  at 8:23 AM to prevent pain. You were given 8 mg of zofran at 8:08 AM to prevent nausea.    Transported by: De  [phone]      _________________________________________________  ________________________  Patient/Responsible party Signature      Date          ________________________________________________   ________________________  Nurse Signature        Date

## 2019-11-22 NOTE — PROGRESS NOTES
Patient meets recovery room discharge criteria. Pt discharged from recovery room via wheelchair to discharge room at  0900.  Ok per МАРИЯ Valencia to discharge patient at 30 minutes post ECT.  Patient alert and oriented x 4, vss.

## 2019-12-13 ENCOUNTER — ANESTHESIA EVENT (OUTPATIENT)
Dept: BEHAVIORAL HEALTH | Facility: CLINIC | Age: 44
End: 2019-12-13

## 2019-12-13 ENCOUNTER — HOSPITAL ENCOUNTER (OUTPATIENT)
Dept: BEHAVIORAL HEALTH | Facility: CLINIC | Age: 44
Discharge: HOME | End: 2019-12-13
Attending: PSYCHIATRY & NEUROLOGY | Admitting: PSYCHIATRY & NEUROLOGY
Payer: COMMERCIAL

## 2019-12-13 ENCOUNTER — ANESTHESIA (OUTPATIENT)
Dept: BEHAVIORAL HEALTH | Facility: CLINIC | Age: 44
End: 2019-12-13
Payer: COMMERCIAL

## 2019-12-13 VITALS
OXYGEN SATURATION: 96 % | TEMPERATURE: 97.9 F | RESPIRATION RATE: 16 BRPM | SYSTOLIC BLOOD PRESSURE: 151 MMHG | DIASTOLIC BLOOD PRESSURE: 77 MMHG

## 2019-12-13 DIAGNOSIS — F33.2 MAJOR DEPRESSIVE DISORDER, RECURRENT SEVERE WITHOUT PSYCHOTIC FEATURES (H): Primary | ICD-10-CM

## 2019-12-13 PROCEDURE — 90870 ELECTROCONVULSIVE THERAPY: CPT | Performed by: PSYCHIATRY & NEUROLOGY

## 2019-12-13 PROCEDURE — 90870 ELECTROCONVULSIVE THERAPY: CPT

## 2019-12-13 PROCEDURE — 25000125 ZZHC RX 250: Performed by: ANESTHESIOLOGY

## 2019-12-13 PROCEDURE — 25800030 ZZH RX IP 258 OP 636: Performed by: PSYCHIATRY & NEUROLOGY

## 2019-12-13 PROCEDURE — 25000128 H RX IP 250 OP 636: Performed by: PSYCHIATRY & NEUROLOGY

## 2019-12-13 PROCEDURE — 37000008 ZZH ANESTHESIA TECHNICAL FEE, 1ST 30 MIN

## 2019-12-13 PROCEDURE — 25000128 H RX IP 250 OP 636: Performed by: ANESTHESIOLOGY

## 2019-12-13 RX ORDER — ONDANSETRON 4 MG/1
4 TABLET, ORALLY DISINTEGRATING ORAL EVERY 30 MIN PRN
Status: CANCELLED | OUTPATIENT
Start: 2019-12-13

## 2019-12-13 RX ORDER — NALOXONE HYDROCHLORIDE 0.4 MG/ML
.1-.4 INJECTION, SOLUTION INTRAMUSCULAR; INTRAVENOUS; SUBCUTANEOUS
Status: CANCELLED | OUTPATIENT
Start: 2019-12-13 | End: 2019-12-14

## 2019-12-13 RX ORDER — MORPHINE SULFATE 4 MG/ML
6 INJECTION, SOLUTION INTRAMUSCULAR; INTRAVENOUS ONCE
Status: CANCELLED
Start: 2019-12-13

## 2019-12-13 RX ORDER — ONDANSETRON 2 MG/ML
4 INJECTION INTRAMUSCULAR; INTRAVENOUS EVERY 30 MIN PRN
Status: CANCELLED | OUTPATIENT
Start: 2019-12-13

## 2019-12-13 RX ORDER — KETOROLAC TROMETHAMINE 30 MG/ML
30 INJECTION, SOLUTION INTRAMUSCULAR; INTRAVENOUS ONCE
Status: COMPLETED | OUTPATIENT
Start: 2019-12-13 | End: 2019-12-13

## 2019-12-13 RX ORDER — ONDANSETRON 2 MG/ML
8 INJECTION INTRAMUSCULAR; INTRAVENOUS ONCE
Status: CANCELLED
Start: 2019-12-13

## 2019-12-13 RX ORDER — ONDANSETRON 2 MG/ML
8 INJECTION INTRAMUSCULAR; INTRAVENOUS ONCE
Status: COMPLETED | OUTPATIENT
Start: 2019-12-13 | End: 2019-12-13

## 2019-12-13 RX ORDER — KETOROLAC TROMETHAMINE 30 MG/ML
30 INJECTION, SOLUTION INTRAMUSCULAR; INTRAVENOUS ONCE
Status: CANCELLED
Start: 2019-12-13

## 2019-12-13 RX ORDER — MORPHINE SULFATE 4 MG/ML
6 INJECTION, SOLUTION INTRAMUSCULAR; INTRAVENOUS ONCE
Status: COMPLETED | OUTPATIENT
Start: 2019-12-13 | End: 2019-12-13

## 2019-12-13 RX ORDER — LABETALOL 20 MG/4 ML (5 MG/ML) INTRAVENOUS SYRINGE
10
Status: CANCELLED | OUTPATIENT
Start: 2019-12-13

## 2019-12-13 RX ORDER — SODIUM CHLORIDE, SODIUM LACTATE, POTASSIUM CHLORIDE, CALCIUM CHLORIDE 600; 310; 30; 20 MG/100ML; MG/100ML; MG/100ML; MG/100ML
INJECTION, SOLUTION INTRAVENOUS CONTINUOUS
Status: CANCELLED | OUTPATIENT
Start: 2019-12-13

## 2019-12-13 RX ORDER — FENTANYL CITRATE 50 UG/ML
25-50 INJECTION, SOLUTION INTRAMUSCULAR; INTRAVENOUS
Status: CANCELLED | OUTPATIENT
Start: 2019-12-13

## 2019-12-13 RX ADMIN — KETOROLAC TROMETHAMINE 30 MG: 30 INJECTION, SOLUTION INTRAMUSCULAR; INTRAVENOUS at 07:38

## 2019-12-13 RX ADMIN — SODIUM CHLORIDE, POTASSIUM CHLORIDE, SODIUM LACTATE AND CALCIUM CHLORIDE 1000 ML: 600; 310; 30; 20 INJECTION, SOLUTION INTRAVENOUS at 07:42

## 2019-12-13 RX ADMIN — METHOHEXITAL SODIUM 80 MG: 500 INJECTION, POWDER, LYOPHILIZED, FOR SOLUTION INTRAMUSCULAR; INTRAVENOUS; RECTAL at 08:03

## 2019-12-13 RX ADMIN — ONDANSETRON 8 MG: 2 INJECTION INTRAMUSCULAR; INTRAVENOUS at 07:39

## 2019-12-13 RX ADMIN — MORPHINE SULFATE 6 MG: 4 INJECTION INTRAVENOUS at 08:11

## 2019-12-13 RX ADMIN — Medication 70 MG: at 08:05

## 2019-12-13 NOTE — ANESTHESIA POSTPROCEDURE EVALUATION
Anesthesia POST Procedure Evaluation    Patient: Mindi Eastman   MRN:     [id number] Gender:   female   Age:    44 year old :      1975        Preoperative Diagnosis: * No pre-op diagnosis entered *   * No procedures listed *   Postop Comments: No value filed.       Anesthesia Type:  Not documented  General    Reportable Event: NO     PAIN: Uncomplicated   Sign Out status: Comfortable, Well controlled pain     PONV: No PONV   Sign Out status:  No Nausea or Vomiting     Neuro/Psych: Uneventful perioperative course   Sign Out Status: Preoperative baseline; Age appropriate mentation     Airway/Resp.: Uneventful perioperative course   Sign Out Status: Non labored breathing, age appropriate RR; Resp. Status within EXPECTED Parameters     CV: Uneventful perioperative course   Sign Out status: Appropriate BP and perfusion indices; Appropriate HR/Rhythm     Disposition:   Sign Out in:  PACU  Disposition:  Phase II; Home  Recovery Course: Uneventful  Follow-Up: Not required           Last Anesthesia Record Vitals:  CRNA VITALS  2019 0742 - 2019 0816      2019             Pulse:  84    Ht Rate:  83    SpO2:  100 %    Resp Rate (set):  8          Last PACU Vitals:  Vitals Value Taken Time   BP     Temp     Pulse     Resp     SpO2     Temp src     NIBP 160/90 2019  8:10 AM   Pulse 84 2019  8:13 AM   SpO2 100 % 2019  8:13 AM   Resp     Temp     Ht Rate 83 2019  8:13 AM   Temp 2           Electronically Signed By: Farzad Britton DO, 2019, 8:16 AM

## 2019-12-13 NOTE — IP AVS SNAPSHOT
[facility]  [facility] [address] [address]  Phone:  [phone]                                    After Visit Summary   12/13/2019    Mindi Eastman    MRN: [id number]           After Visit Summary Signature Page    I have received my discharge instructions, and my questions have been answered. I have discussed any challenges I see with this plan with the nurse or doctor.    ..........................................................................................................................................  Patient/Patient Representative Signature      ..........................................................................................................................................  Patient Representative Print Name and Relationship to Patient    ..................................................               ................................................  Date                                   Time    ..........................................................................................................................................  Reviewed by Signature/Title    ...................................................              ..............................................  Date                                               Time          22EPIC Rev 08/18

## 2019-12-13 NOTE — ANESTHESIA PREPROCEDURE EVALUATION
Anesthesia Pre-Procedure Evaluation    Patient: Mindi Eastman   MRN:     [id number] Gender:   female   Age:    44 year old :      1975        Preoperative Diagnosis: * No surgery found *        Past Medical History:   Diagnosis Date     ADHD      Anxiety      Bipolar disorder (H)      Depressive disorder      OCD (obsessive compulsive disorder)      ROXANNE (obstructive sleep apnea)     On CPAP     Seasonal allergies       Past Surgical History:   Procedure Laterality Date     APPENDECTOMY       CARPAL TUNNEL RELEASE RT/LT Bilateral      CHOLECYSTECTOMY       EXTRACTION(S) DENTAL      [place] Teeth     HC KNEE SCOPE,MED/LAT MENISCUS REPAIR Right     X 2     TONSILLECTOMY                     PHYSICAL EXAM:   Mental Status/Neuro: A/A/O   Airway: Facies: Feasible  Mallampati: II  Mouth/Opening: Full  TM distance: > 6 cm  Neck ROM: Full   Respiratory: Auscultation: CTAB     Resp. Rate: Normal     Resp. Effort: Normal      CV: Rhythm: Regular  Rate: Age appropriate  Heart: Normal Sounds  Edema: None   Comments:      Dental: Normal Dentition                LABS:  CBC:   Lab Results   Component Value Date    WBC 11.2 (H) 2019    WBC 6.3 10/05/2017    HGB 13.5 2019    HGB 11.7 10/05/2017    HCT 40.8 2019    HCT 35.8 10/05/2017     2019     10/05/2017     BMP:   Lab Results   Component Value Date     2019     10/05/2017    POTASSIUM 3.7 2019    POTASSIUM 4.5 10/03/2018    CHLORIDE 103 2019    CHLORIDE 108 10/05/2017    CO2 26 2019    CO2 28 10/05/2017    BUN 9 2019    BUN 8 10/05/2017    CR 0.84 2019    CR 0.84 10/03/2018     (H) 2019     (A) 10/03/2018     COAGS:   Lab Results   Component Value Date    PTT 38 (H) 2011    INR 0.99 2011     POC:   Lab Results   Component Value Date    HCG Negative 2019    HCGS Negative 2011     OTHER:   Lab Results   Component Value Date    GALO 8.4 (L)  "06/17/2019    ALBUMIN 4.0 06/17/2019    PROTTOTAL 7.4 06/17/2019    ALT 21 06/17/2019    AST 23 06/17/2019    ALKPHOS 86 06/17/2019    BILITOTAL 0.7 06/17/2019    TSH 2.23 06/17/2019        Preop Vitals    BP Readings from Last 3 Encounters:   12/13/19 133/83   11/22/19 (!) 141/86   11/01/19 (!) 143/78    Pulse Readings from Last 3 Encounters:   10/11/19 62   09/20/19 76   08/30/19 91      Resp Readings from Last 3 Encounters:   12/13/19 16   11/22/19 16   11/01/19 14    SpO2 Readings from Last 3 Encounters:   12/13/19 98%   11/22/19 94%   11/01/19 93%      Temp Readings from Last 1 Encounters:   12/13/19 36.4  C (97.6  F) (Tympanic)    Ht Readings from Last 1 Encounters:   06/16/19 1.676 m (5' 6\")      Wt Readings from Last 1 Encounters:   06/20/19 92.8 kg (204 lb 8 oz)    Estimated body mass index is 33.01 kg/m  as calculated from the following:    Height as of 6/16/19: 1.676 m (5' 6\").    Weight as of 6/20/19: 92.8 kg (204 lb 8 oz).     LDA:  Peripheral IV 12/13/19 Right Hand (Active)   Number of days: 0        Assessment:   ASA SCORE: 3            Plan:   Anes. Type:  General   Pre-Medication: None   Induction:  IV (Standard)   Airway: Mask   Access/Monitoring: PIV   Maintenance: N/a     Postop Plan:   Postop Pain: Opioids  Postop Sedation/Airway: Not planned     PONV Management:   Adult Risk Factors: Female, Postop Opioids                   Farzad Britton DO  "

## 2019-12-13 NOTE — PROCEDURES
Procedure/Surgery Information   [facility]    Bedside Procedure Note  Date of Service (when I performed the procedure): 12/13/2019    Mindi Eastman is a 42 year old female patient.  1. Major depressive disorder, recurrent severe without psychotic features (H)      Past Medical History:   Diagnosis Date     ADHD      Anxiety      Bipolar disorder (H)      Depressive disorder      OCD (obsessive compulsive disorder)      ROXANNE (obstructive sleep apnea)     On CPAP     Seasonal allergies      Temp: 97.6  F (36.4  C) Temp src: Tympanic BP: 133/83   Heart Rate: 74 Resp: 16 SpO2: 98 % O2 Device: None (Room air)      Procedures     [facility]   ECT Procedure Note   12/13/2019       Mindi Eastman [id number]   42 year old 1975     Patient Status: Outpatient    Is this the first in a series of 12 treatments?  No    History and Physical: Reviewed in medical record    Consent: Informed consent was signed by: patient    Date Consent Signed: 9/20/19    Allergies   Allergen Reactions     Darvocet [Propoxyphene N-Apap]        Weight:  0 lbs 0 oz    Patient Preparations: Glasses/Contacts removed    /83   Temp 97.6  F (36.4  C) (Tympanic)   Resp 16   SpO2 98%          Indications for ECT:   Medications ineffective, Psychotherapies ineffective and treatment resistant depression.         Clinical Narrative:   Patient is continuing ECT for depression.  NPO after 2400.  Alert and Oriented x 3.  No problems with the last ECT which was 3 weeks ago.   This interval continues to work well with her. Mood is good. Would like to come back on 1/3/20.         Diagnosis:      Major depressive disorder, recurrent severe without psychotic features (F33.2)         Pause for the Cause:     Right patient Yes   Right procedure/laterality settings: Yes          Intra-Procedure Documentation:       ECT #: 5 of 12   Treatment number this series: 29   Total  treatment number: 40     Type of ECT:  Right, unilateral ultrabrief    ECT Medications:    Tylenol:  1000mg po at home   Lactated Ringers 1 liter   Toradol: 30mg  Zofran: 8mg   Brevital: 80mg  Succinyl Choline: 70mg  Morphine 6 mg in treatment room for headache       ECT Strip Summary:   Energy Level: 8 percent   Motor Seizure Duration: 36 seconds  EEG Seizure Duration: 72 seconds    Complications: No    Plan:    Take Extra Strength Tylenol 1000mg po at home before coming to ECT.    Next maintenance ECT is in 3 weeks on 1/3/20      Alvarez Solares MD

## 2019-12-13 NOTE — DISCHARGE INSTRUCTIONS
ECT Discharge Instructions      During your ECT series:      Do not drive or work heavy equipment until 7 days after your last treatment.    Do not drink alcohol or use street drugs (illicit drugs) while you are having treatments.    Do not make important decisions, including legal decisions.    After your maintenance ECT treatment:      Do not drive for 24 hours after treatment.  If you will be having more than one treatment witihin a week, no driving until 7 days after your last ECT treatment.         After each treatment:      Get plenty of rest. A responsible adult must stay with your for at least 6 hours.    Avoid heavy or risky activities for 24 hours.    If you feel light-headed, sit for a few minutes before standing. Have someone help you get up.    If you have nausea (feel sick to your stomach): Drink only clear liquids such as apple juice, ginger ale, broth or 7UP, Be sure to drink plenty of liquids. Move to a normal diet as you feel able.     If you received Toradol, wait 6 hours before taking ibuprofen.    Call your doctor if:     You have a fever over 100F (37.7 C) (taken under the tongue), or a fever that last more than 24 hours.    Your IV site is red, swollen, very painful or is getting more tender.    You have nausea that gets very bad or does not improve.      If you have any symptoms after ECT, tell our staff before your next treatment.    The ECT Department can be reached at [phone].  The ECT Department is open Mondays, Wednesdays and Fridays from 7:00 AM to 2:00 PM.    To speak to a doctor, call: Dr. Kennedy's office # [phone] Fax # [phone]    Today you received the following IV medications:    You have received Toradol 30mg today at 730a for pain/headaches.  Toradol is an NSAID.  Do not take any NSAID's including: Ibuprofen, Naproxen, Aspirin, Advil, Motrin, Aleve, Josué, etc., until 6 hours after the above time (130p).  If you have any questions, contact your physician or  pharmacist.      1000mL of lactated ringers for nausea/headache    Morphine 6mg for pain/headache at 811am    Zofran 8mg at 735am for nausea    Transported by: amena Lemus

## 2020-01-03 ENCOUNTER — ANESTHESIA (OUTPATIENT)
Dept: BEHAVIORAL HEALTH | Facility: CLINIC | Age: 45
End: 2020-01-03

## 2020-01-03 ENCOUNTER — ANESTHESIA EVENT (OUTPATIENT)
Dept: BEHAVIORAL HEALTH | Facility: CLINIC | Age: 45
End: 2020-01-03

## 2020-01-03 ENCOUNTER — HOSPITAL ENCOUNTER (OUTPATIENT)
Dept: BEHAVIORAL HEALTH | Facility: CLINIC | Age: 45
Discharge: HOME | End: 2020-01-03
Attending: PSYCHIATRY & NEUROLOGY | Admitting: PSYCHIATRY & NEUROLOGY
Payer: COMMERCIAL

## 2020-01-03 VITALS
SYSTOLIC BLOOD PRESSURE: 150 MMHG | TEMPERATURE: 97.2 F | RESPIRATION RATE: 16 BRPM | OXYGEN SATURATION: 93 % | HEART RATE: 74 BPM | DIASTOLIC BLOOD PRESSURE: 83 MMHG

## 2020-01-03 DIAGNOSIS — F33.2 MAJOR DEPRESSIVE DISORDER, RECURRENT SEVERE WITHOUT PSYCHOTIC FEATURES (H): Primary | ICD-10-CM

## 2020-01-03 PROCEDURE — 90870 ELECTROCONVULSIVE THERAPY: CPT

## 2020-01-03 PROCEDURE — 25800030 ZZH RX IP 258 OP 636: Performed by: PSYCHIATRY & NEUROLOGY

## 2020-01-03 PROCEDURE — 25000128 H RX IP 250 OP 636: Performed by: ANESTHESIOLOGY

## 2020-01-03 PROCEDURE — 25000125 ZZHC RX 250: Performed by: ANESTHESIOLOGY

## 2020-01-03 PROCEDURE — 25000128 H RX IP 250 OP 636: Performed by: PSYCHIATRY & NEUROLOGY

## 2020-01-03 PROCEDURE — 25000132 ZZH RX MED GY IP 250 OP 250 PS 637: Performed by: PSYCHIATRY & NEUROLOGY

## 2020-01-03 PROCEDURE — 37000008 ZZH ANESTHESIA TECHNICAL FEE, 1ST 30 MIN

## 2020-01-03 RX ORDER — NALOXONE HYDROCHLORIDE 0.4 MG/ML
.1-.4 INJECTION, SOLUTION INTRAMUSCULAR; INTRAVENOUS; SUBCUTANEOUS
Status: DISCONTINUED | OUTPATIENT
Start: 2020-01-03 | End: 2020-01-04 | Stop reason: HOSPADM

## 2020-01-03 RX ORDER — KETOROLAC TROMETHAMINE 30 MG/ML
30 INJECTION, SOLUTION INTRAMUSCULAR; INTRAVENOUS ONCE
Status: CANCELLED
Start: 2020-01-03

## 2020-01-03 RX ORDER — ONDANSETRON 4 MG/1
4 TABLET, ORALLY DISINTEGRATING ORAL EVERY 30 MIN PRN
Status: DISCONTINUED | OUTPATIENT
Start: 2020-01-03 | End: 2020-01-04 | Stop reason: HOSPADM

## 2020-01-03 RX ORDER — LABETALOL 20 MG/4 ML (5 MG/ML) INTRAVENOUS SYRINGE
10
Status: DISCONTINUED | OUTPATIENT
Start: 2020-01-03 | End: 2020-01-04 | Stop reason: HOSPADM

## 2020-01-03 RX ORDER — SODIUM CHLORIDE, SODIUM LACTATE, POTASSIUM CHLORIDE, CALCIUM CHLORIDE 600; 310; 30; 20 MG/100ML; MG/100ML; MG/100ML; MG/100ML
INJECTION, SOLUTION INTRAVENOUS CONTINUOUS
Status: DISCONTINUED | OUTPATIENT
Start: 2020-01-03 | End: 2020-01-04 | Stop reason: HOSPADM

## 2020-01-03 RX ORDER — MORPHINE SULFATE 4 MG/ML
6 INJECTION, SOLUTION INTRAMUSCULAR; INTRAVENOUS ONCE
Status: CANCELLED
Start: 2020-01-03

## 2020-01-03 RX ORDER — ACETAMINOPHEN 500 MG
1000 TABLET ORAL
Status: COMPLETED | OUTPATIENT
Start: 2020-01-03 | End: 2020-01-03

## 2020-01-03 RX ORDER — MEPERIDINE HYDROCHLORIDE 25 MG/ML
12.5 INJECTION INTRAMUSCULAR; INTRAVENOUS; SUBCUTANEOUS
Status: DISCONTINUED | OUTPATIENT
Start: 2020-01-03 | End: 2020-01-04 | Stop reason: HOSPADM

## 2020-01-03 RX ORDER — ONDANSETRON 2 MG/ML
4 INJECTION INTRAMUSCULAR; INTRAVENOUS EVERY 30 MIN PRN
Status: DISCONTINUED | OUTPATIENT
Start: 2020-01-03 | End: 2020-01-04 | Stop reason: HOSPADM

## 2020-01-03 RX ORDER — FENTANYL CITRATE 50 UG/ML
25-50 INJECTION, SOLUTION INTRAMUSCULAR; INTRAVENOUS
Status: DISCONTINUED | OUTPATIENT
Start: 2020-01-03 | End: 2020-01-04 | Stop reason: HOSPADM

## 2020-01-03 RX ORDER — ONDANSETRON 2 MG/ML
8 INJECTION INTRAMUSCULAR; INTRAVENOUS ONCE
Status: CANCELLED
Start: 2020-01-03

## 2020-01-03 RX ORDER — MORPHINE SULFATE 4 MG/ML
6 INJECTION, SOLUTION INTRAMUSCULAR; INTRAVENOUS ONCE
Status: COMPLETED | OUTPATIENT
Start: 2020-01-03 | End: 2020-01-03

## 2020-01-03 RX ORDER — KETOROLAC TROMETHAMINE 30 MG/ML
30 INJECTION, SOLUTION INTRAMUSCULAR; INTRAVENOUS ONCE
Status: COMPLETED | OUTPATIENT
Start: 2020-01-03 | End: 2020-01-03

## 2020-01-03 RX ORDER — ONDANSETRON 2 MG/ML
8 INJECTION INTRAMUSCULAR; INTRAVENOUS ONCE
Status: COMPLETED | OUTPATIENT
Start: 2020-01-03 | End: 2020-01-03

## 2020-01-03 RX ADMIN — ONDANSETRON 8 MG: 2 INJECTION INTRAMUSCULAR; INTRAVENOUS at 08:07

## 2020-01-03 RX ADMIN — MORPHINE SULFATE 6 MG: 4 INJECTION INTRAVENOUS at 08:18

## 2020-01-03 RX ADMIN — KETOROLAC TROMETHAMINE 30 MG: 30 INJECTION, SOLUTION INTRAMUSCULAR at 08:07

## 2020-01-03 RX ADMIN — METHOHEXITAL SODIUM 80 MG: 500 INJECTION, POWDER, LYOPHILIZED, FOR SOLUTION INTRAMUSCULAR; INTRAVENOUS; RECTAL at 08:14

## 2020-01-03 RX ADMIN — SODIUM CHLORIDE, POTASSIUM CHLORIDE, SODIUM LACTATE AND CALCIUM CHLORIDE 1000 ML: 600; 310; 30; 20 INJECTION, SOLUTION INTRAVENOUS at 08:06

## 2020-01-03 RX ADMIN — ACETAMINOPHEN 1000 MG: 500 TABLET ORAL at 07:41

## 2020-01-03 RX ADMIN — Medication 70 MG: at 08:14

## 2020-01-03 NOTE — ANESTHESIA PREPROCEDURE EVALUATION
Anesthesia Pre-Procedure Evaluation    Patient: Mindi Eastman   MRN:     [id number] Gender:   female   Age:    44 year old :      1975        Preoperative Diagnosis: * No surgery found *        Past Medical History:   Diagnosis Date     ADHD      Anxiety      Bipolar disorder (H)      Depressive disorder      OCD (obsessive compulsive disorder)      ROXANNE (obstructive sleep apnea)     On CPAP     Seasonal allergies       Past Surgical History:   Procedure Laterality Date     APPENDECTOMY       CARPAL TUNNEL RELEASE RT/LT Bilateral      CHOLECYSTECTOMY       EXTRACTION(S) DENTAL      [place] Teeth     HC KNEE SCOPE,MED/LAT MENISCUS REPAIR Right     X 2     TONSILLECTOMY                       PHYSICAL EXAM:   Mental Status/Neuro: A/A/O   Airway: Facies: Feasible  Mallampati: II  Mouth/Opening: Full  TM distance: > 6 cm  Neck ROM: Full   Respiratory: Auscultation: CTAB     Resp. Rate: Normal     Resp. Effort: Normal      CV: Rhythm: Regular  Rate: Age appropriate  Heart: Normal Sounds  Edema: None   Comments:      Dental: Normal Dentition                LABS:  CBC:   Lab Results   Component Value Date    WBC 11.2 (H) 2019    WBC 6.3 10/05/2017    HGB 13.5 2019    HGB 11.7 10/05/2017    HCT 40.8 2019    HCT 35.8 10/05/2017     2019     10/05/2017     BMP:   Lab Results   Component Value Date     2019     10/05/2017    POTASSIUM 3.7 2019    POTASSIUM 4.5 10/03/2018    CHLORIDE 103 2019    CHLORIDE 108 10/05/2017    CO2 26 2019    CO2 28 10/05/2017    BUN 9 2019    BUN 8 10/05/2017    CR 0.84 2019    CR 0.84 10/03/2018     (H) 2019     (A) 10/03/2018     COAGS:   Lab Results   Component Value Date    PTT 38 (H) 2011    INR 0.99 2011     POC:   Lab Results   Component Value Date    HCG Negative 2019    HCGS Negative 2011     OTHER:   Lab Results   Component Value Date    GALO 8.4 (L)  "06/17/2019    ALBUMIN 4.0 06/17/2019    PROTTOTAL 7.4 06/17/2019    ALT 21 06/17/2019    AST 23 06/17/2019    ALKPHOS 86 06/17/2019    BILITOTAL 0.7 06/17/2019    TSH 2.23 06/17/2019        Preop Vitals    BP Readings from Last 3 Encounters:   01/03/20 127/76   12/13/19 (!) 151/77   11/22/19 (!) 141/86    Pulse Readings from Last 3 Encounters:   01/03/20 74   10/11/19 62   09/20/19 76      Resp Readings from Last 3 Encounters:   01/03/20 18   12/13/19 16   11/22/19 16    SpO2 Readings from Last 3 Encounters:   01/03/20 97%   12/13/19 96%   11/22/19 94%      Temp Readings from Last 1 Encounters:   01/03/20 36.3  C (97.4  F) (Tympanic)    Ht Readings from Last 1 Encounters:   06/16/19 1.676 m (5' 6\")      Wt Readings from Last 1 Encounters:   06/20/19 92.8 kg (204 lb 8 oz)    Estimated body mass index is 33.01 kg/m  as calculated from the following:    Height as of 6/16/19: 1.676 m (5' 6\").    Weight as of 6/20/19: 92.8 kg (204 lb 8 oz).     LDA:  Peripheral IV 01/03/20 Right Hand (Active)   Number of days: 0        Assessment:   ASA SCORE: 3            Plan:   Anes. Type:  General   Pre-Medication: None   Induction:  IV (Standard)   Airway: Mask   Access/Monitoring: PIV   Maintenance: N/a     Postop Plan:   Postop Pain: Opioids  Postop Sedation/Airway: Not planned     PONV Management:   Adult Risk Factors: Female, Postop Opioids                       Thomas Clark MD  "

## 2020-01-03 NOTE — DISCHARGE INSTRUCTIONS
ECT Discharge Instructions      During your ECT series:      Do not drive or work heavy equipment until 7 days after your last treatment.    Do not drink alcohol or use street drugs (illicit drugs) while you are having treatments.    Do not make important decisions, including legal decisions.    After each treatment:      Get plenty of rest. A responsible adult must stay with your for at least 6 hours.    Avoid heavy or risky activities for 24 hours.    If you feel light-headed, sit for a few minutes before standing. Have someone help you get up.    If you have nausea (feel sick to your stomach): Drink only clear liquids such as apple juice, ginger ale, broth or 7UP, Be sure to drink plenty of liquids. Move to a normal diet as you feel able.     If you received Toradol, wait 6 hours before taking ibuprofen.    Call your doctor if:     You have a fever over 100F (37.7 C) (taken under the tongue), or a fever that last more than 24 hours.    Your IV site is red, swollen, very painful or is getting more tender.    You have nausea that gets very bad or does not improve.      If you have any symptoms after ECT, tell our staff before your next treatment.    The ECT Department can be reached at [phone].  The ECT Department is open Mondays, Wednesdays and Fridays from 7:00 AM to 2:00 PM.    Your next ECT treatment will be:     To speak to a doctor, call: Dr. Kennedy's office # [phone] Fax # [phone]    Today you received the following medications:      Tylenol 1000mg at 740am. You may have another dose at 140p.    Toradol 30mg today at 8:00am.  Toradol is an NSAID.  Do not take any NSAID's including: Ibuprofen, Naproxen, Aspirin, Advil, Motrin, Aleve, Josué, etc., until 6 hours after the above time (2p).  If you have any questions, contact your physician or pharmacist.      1000mL of fluids (lactated ringers)    IV morphine 6mg at 815am for pain/headaches    Zofran 8mg at 8am for nausea    Transported by: Allan  significant other

## 2020-01-03 NOTE — IP AVS SNAPSHOT
[facility]  [address] [place] [address]  Phone:  [phone]                                    After Visit Summary   1/3/2020    Mindi Eastman    MRN: [id number]           After Visit Summary Signature Page    I have received my discharge instructions, and my questions have been answered. I have discussed any challenges I see with this plan with the nurse or doctor.    ..........................................................................................................................................  Patient/Patient Representative Signature      ..........................................................................................................................................  Patient Representative Print Name and Relationship to Patient    ..................................................               ................................................  Date                                   Time    ..........................................................................................................................................  Reviewed by Signature/Title    ...................................................              ..............................................  Date                                               Time          22EPIC Rev 08/18

## 2020-01-03 NOTE — PROCEDURES
Procedure/Surgery Information   [facility], [place]    Bedside Procedure Note  Date of Service (when I performed the procedure): 01/03/2020    Mindi Eastman is a 42 year old female patient.  1. Major depressive disorder, recurrent severe without psychotic features (H)      Past Medical History:   Diagnosis Date     ADHD      Anxiety      Bipolar disorder (H)      Depressive disorder      OCD (obsessive compulsive disorder)      ROXANNE (obstructive sleep apnea)     On CPAP     Seasonal allergies                           Procedures     [facility], [place]   ECT Procedure Note   01/03/2020       Mindi Eastman [id number]   42 year old 1975     Patient Status: Outpatient    Is this the first in a series of 12 treatments?  No    History and Physical: Reviewed in medical record    Consent: Informed consent was signed by: patient    Date Consent Signed: 1/3/20    Allergies   Allergen Reactions     Darvocet [Propoxyphene N-Apap]        Weight:  0 lbs 0 oz    Patient Preparations: Glasses/Contacts removed    There were no vitals taken for this visit.         Indications for ECT:   Medications ineffective, Psychotherapies ineffective and treatment resistant depression.         Clinical Narrative:   Patient is continuing ECT for depression.  NPO after 2400.  Alert and Oriented x 3.  No problems with the last ECT which was 3 weeks ago.   This interval continues to work well with her. Mood is good.    She gets HA's the days after ECT but not in recovery.  She says they feel like migraines.           Diagnosis:      Major depressive disorder, recurrent severe without psychotic features (F33.2)         Pause for the Cause:     Right patient Yes   Right procedure/laterality settings: Yes          Intra-Procedure Documentation:       ECT #: 6 of 12   Treatment number this series: 30   Total treatment number: 41     Type of ECT:  Right, unilateral ultrabrief    ECT  Medications:    Tylenol:  1000mg po at home (forgot so got in assessment)  Lactated Ringers 1 liter   Toradol: 30mg  Zofran: 8mg   Brevital: 80mg  Succinyl Choline: 70mg  Morphine 6 mg in treatment room for headache       ECT Strip Summary:   Energy Level: 8 percent   Motor Seizure Duration: 46 seconds  EEG Seizure Duration:  68 seconds    Complications: No    Plan:    Take Extra Strength Tylenol 1000mg po at home before coming to ECT.    Next maintenance ECT is in 3 weeks on 1/24/20  Told her to address her HA's on non-ECT days with her primary MD.        Umang Kennedy MD

## 2020-01-03 NOTE — ANESTHESIA POSTPROCEDURE EVALUATION
Anesthesia POST Procedure Evaluation    Patient: Mindi Eastman   MRN:     [id number] Gender:   female   Age:    44 year old :      1975        Preoperative Diagnosis: * No pre-op diagnosis entered *   * No procedures listed *   Postop Comments: No value filed.       Anesthesia Type:  Not documented  General    Reportable Event: NO     PAIN: Uncomplicated   Sign Out status: Comfortable, Well controlled pain     PONV: No PONV   Sign Out status:  No Nausea or Vomiting     Neuro/Psych: Uneventful perioperative course   Sign Out Status: Preoperative baseline; Age appropriate mentation     Airway/Resp.: Uneventful perioperative course   Sign Out Status: Non labored breathing, age appropriate RR; Resp. Status within EXPECTED Parameters     CV: Uneventful perioperative course   Sign Out status: Appropriate BP and perfusion indices; Appropriate HR/Rhythm     Disposition:   Sign Out in:  PACU  Disposition:  Phase II; Home  Recovery Course: Uneventful  Follow-Up: Not required           Last Anesthesia Record Vitals:  CRNA VITALS  1/3/2020 0749 - 1/3/2020 0849      1/3/2020             Pulse:  84    Ht Rate:  83    SpO2:  100 %    Resp Rate (set):  8          Last PACU Vitals:  Vitals Value Taken Time   /83 1/3/2020  8:50 AM   Temp 36.2  C (97.2  F) 1/3/2020  8:50 AM   Pulse     Resp 16 1/3/2020  8:50 AM   SpO2 93 % 1/3/2020  8:50 AM   Temp src     NIBP     Pulse     SpO2     Resp     Temp     Ht Rate     Temp 2           Electronically Signed By: Thomas Clark MD, January 3, 2020, 8:53 AM

## 2020-01-03 NOTE — PROGRESS NOTES
Patient meets recovery room discharge criteria. Pt discharged from recovery room via wheelchair to discharge room at  0852.

## 2020-01-24 ENCOUNTER — ANESTHESIA (OUTPATIENT)
Dept: BEHAVIORAL HEALTH | Facility: CLINIC | Age: 45
End: 2020-01-24

## 2020-01-24 ENCOUNTER — HOSPITAL ENCOUNTER (OUTPATIENT)
Dept: BEHAVIORAL HEALTH | Facility: CLINIC | Age: 45
Discharge: HOME | End: 2020-01-24
Attending: PSYCHIATRY & NEUROLOGY | Admitting: PSYCHIATRY & NEUROLOGY
Payer: COMMERCIAL

## 2020-01-24 ENCOUNTER — ANESTHESIA EVENT (OUTPATIENT)
Dept: BEHAVIORAL HEALTH | Facility: CLINIC | Age: 45
End: 2020-01-24

## 2020-01-24 VITALS
HEART RATE: 82 BPM | OXYGEN SATURATION: 96 % | TEMPERATURE: 98.2 F | RESPIRATION RATE: 18 BRPM | DIASTOLIC BLOOD PRESSURE: 80 MMHG | SYSTOLIC BLOOD PRESSURE: 119 MMHG

## 2020-01-24 DIAGNOSIS — F33.2 MAJOR DEPRESSIVE DISORDER, RECURRENT SEVERE WITHOUT PSYCHOTIC FEATURES (H): Primary | ICD-10-CM

## 2020-01-24 PROCEDURE — 25000128 H RX IP 250 OP 636: Performed by: PSYCHIATRY & NEUROLOGY

## 2020-01-24 PROCEDURE — 25000132 ZZH RX MED GY IP 250 OP 250 PS 637: Performed by: PSYCHIATRY & NEUROLOGY

## 2020-01-24 PROCEDURE — 90870 ELECTROCONVULSIVE THERAPY: CPT

## 2020-01-24 PROCEDURE — 25000125 ZZHC RX 250: Performed by: ANESTHESIOLOGY

## 2020-01-24 PROCEDURE — 37000008 ZZH ANESTHESIA TECHNICAL FEE, 1ST 30 MIN

## 2020-01-24 PROCEDURE — 25800030 ZZH RX IP 258 OP 636: Performed by: PSYCHIATRY & NEUROLOGY

## 2020-01-24 RX ORDER — KETOROLAC TROMETHAMINE 30 MG/ML
30 INJECTION, SOLUTION INTRAMUSCULAR; INTRAVENOUS ONCE
Status: COMPLETED | OUTPATIENT
Start: 2020-01-24 | End: 2020-01-24

## 2020-01-24 RX ORDER — ONDANSETRON 2 MG/ML
8 INJECTION INTRAMUSCULAR; INTRAVENOUS ONCE
Status: COMPLETED | OUTPATIENT
Start: 2020-01-24 | End: 2020-01-24

## 2020-01-24 RX ORDER — ACETAMINOPHEN 500 MG
1000 TABLET ORAL
Status: COMPLETED | OUTPATIENT
Start: 2020-01-24 | End: 2020-01-24

## 2020-01-24 RX ORDER — KETOROLAC TROMETHAMINE 30 MG/ML
30 INJECTION, SOLUTION INTRAMUSCULAR; INTRAVENOUS ONCE
Status: CANCELLED
Start: 2020-01-24

## 2020-01-24 RX ORDER — MORPHINE SULFATE 4 MG/ML
6 INJECTION, SOLUTION INTRAMUSCULAR; INTRAVENOUS ONCE
Status: CANCELLED
Start: 2020-01-24

## 2020-01-24 RX ORDER — ONDANSETRON 2 MG/ML
8 INJECTION INTRAMUSCULAR; INTRAVENOUS ONCE
Status: CANCELLED
Start: 2020-01-24

## 2020-01-24 RX ORDER — MORPHINE SULFATE 4 MG/ML
6 INJECTION, SOLUTION INTRAMUSCULAR; INTRAVENOUS ONCE
Status: COMPLETED | OUTPATIENT
Start: 2020-01-24 | End: 2020-01-24

## 2020-01-24 RX ADMIN — METHOHEXITAL SODIUM 80 MG: 500 INJECTION, POWDER, LYOPHILIZED, FOR SOLUTION INTRAMUSCULAR; INTRAVENOUS; RECTAL at 08:41

## 2020-01-24 RX ADMIN — ONDANSETRON 8 MG: 2 INJECTION INTRAMUSCULAR; INTRAVENOUS at 08:33

## 2020-01-24 RX ADMIN — ACETAMINOPHEN 1000 MG: 500 TABLET ORAL at 08:32

## 2020-01-24 RX ADMIN — MORPHINE SULFATE 6 MG: 4 INJECTION INTRAVENOUS at 08:46

## 2020-01-24 RX ADMIN — ROCURONIUM BROMIDE 70 MG: 10 INJECTION INTRAVENOUS at 08:42

## 2020-01-24 RX ADMIN — KETOROLAC TROMETHAMINE 30 MG: 30 INJECTION, SOLUTION INTRAMUSCULAR at 08:33

## 2020-01-24 RX ADMIN — SODIUM CHLORIDE, POTASSIUM CHLORIDE, SODIUM LACTATE AND CALCIUM CHLORIDE 1000 ML: 600; 310; 30; 20 INJECTION, SOLUTION INTRAVENOUS at 08:34

## 2020-01-24 NOTE — IP AVS SNAPSHOT
[facility]  [address] [place] [address]  Phone:  [phone]                                    After Visit Summary   1/24/2020    Mindi Eastman    MRN: [id number]           After Visit Summary Signature Page    I have received my discharge instructions, and my questions have been answered. I have discussed any challenges I see with this plan with the nurse or doctor.    ..........................................................................................................................................  Patient/Patient Representative Signature      ..........................................................................................................................................  Patient Representative Print Name and Relationship to Patient    ..................................................               ................................................  Date                                   Time    ..........................................................................................................................................  Reviewed by Signature/Title    ...................................................              ..............................................  Date                                               Time          22EPIC Rev 08/18

## 2020-01-24 NOTE — DISCHARGE INSTRUCTIONS
ECT Discharge Instructions      During your ECT series:      Do not drive or work heavy equipment until 7 days after your last treatment.    Do not drink alcohol or use street drugs (illicit drugs) while you are having treatments.    Do not make important decisions, including legal decisions.    After your maintenance ECT treatment:      Do not drive for 24 hours after treatment.  If you will be having more than one treatment witihin a week, no driving until 7 days after your last ECT treatment.         After each treatment:      Get plenty of rest. A responsible adult must stay with your for at least 6 hours.    Avoid heavy or risky activities for 24 hours.    If you feel light-headed, sit for a few minutes before standing. Have someone help you get up.    If you have nausea (feel sick to your stomach): Drink only clear liquids such as apple juice, ginger ale, broth or 7UP, Be sure to drink plenty of liquids. Move to a normal diet as you feel able.     If you received Toradol, wait 6 hours before taking ibuprofen.    Call your doctor if:     You have a fever over 100F (37.7 C) (taken under the tongue), or a fever that last more than 24 hours.    Your IV site is red, swollen, very painful or is getting more tender.    You have nausea that gets very bad or does not improve.      If you have any symptoms after ECT, tell our staff before your next treatment.    The ECT Department can be reached at [phone].  The ECT Department is open Mondays, Wednesdays and Fridays from 7:00 AM to 2:00 PM.    To speak to a doctor, call: Dr. Kennedy's office # [phone] Fax # [phone]    Today you received the following medications:       Tylenol 1000mg at 830am.  You may take Tylenol or Tylenol containing products again at 230pm    IV Toradol 30mg today at 830am.  Toradol is an NSAID.  Do not take any NSAID's including: Ibuprofen, Naproxen, Aspirin, Advil, Motrin, Aleve, Josué, etc., until 6 hours after the above time (230p).   If you have any questions, contact your physician or pharmacist.      1L of fluids (lactated ringers) assists with preventing nausea & headache    IV Zofran 8mg at 830am for preventing nausea    IV Morphine 6mg at 845am to prevent headache    Transported by:

## 2020-01-24 NOTE — ANESTHESIA POSTPROCEDURE EVALUATION
Anesthesia POST Procedure Evaluation    Patient: Mindi Eastman   MRN:     [id number] Gender:   female   Age:    44 year old :      1975        Preoperative Diagnosis: * No pre-op diagnosis entered *   * No procedures listed *   Postop Comments: No value filed.       Anesthesia Type:  Not documented  No value filed.    JZ[facility] AN POST EVALUATION    Last Anesthesia Record Vitals:  CRNA VITALS  2020 0817 - 2020 0848      2020             Pulse:  99    Ht Rate:  100    SpO2:  99 %    Resp Rate (set):  8          Last PACU Vitals:  Vitals Value Taken Time   BP     Temp     Pulse     Resp     SpO2     Temp src     NIBP 133/84 2020  8:44 AM   Pulse 99 2020  8:47 AM   SpO2 99 % 2020  8:47 AM   Resp     Temp     Ht Rate 100 2020  8:47 AM   Temp 2           Electronically Signed By: Farzad Britton DO, 2020, 8:48 AM

## 2020-01-24 NOTE — ANESTHESIA PREPROCEDURE EVALUATION
Anesthesia Pre-Procedure Evaluation    Patient: Mindi Eastman   MRN:     [id number] Gender:   female   Age:    44 year old :      1975        Preoperative Diagnosis: * No pre-op diagnosis entered *   * No procedures listed *     Past Medical History:   Diagnosis Date     ADHD      Anxiety      Bipolar disorder (H)      Depressive disorder      OCD (obsessive compulsive disorder)      ROXANNE (obstructive sleep apnea)     On CPAP     Seasonal allergies       Past Surgical History:   Procedure Laterality Date     APPENDECTOMY       CARPAL TUNNEL RELEASE RT/LT Bilateral      CHOLECYSTECTOMY       EXTRACTION(S) DENTAL      [place] Teeth     HC KNEE SCOPE,MED/LAT MENISCUS REPAIR Right     X 2     TONSILLECTOMY                     PHYSICAL EXAM:   Mental Status/Neuro: A/A/O   Airway: Facies: Feasible  Mallampati: II  Mouth/Opening: Full  TM distance: > 6 cm  Neck ROM: Full   Respiratory: Auscultation: CTAB     Resp. Rate: Normal     Resp. Effort: Normal      CV: Rhythm: Regular  Rate: Age appropriate  Heart: Normal Sounds  Edema: None   Comments:      Dental: Normal Dentition                LABS:  CBC:   Lab Results   Component Value Date    WBC 11.2 (H) 2019    WBC 6.3 10/05/2017    HGB 13.5 2019    HGB 11.7 10/05/2017    HCT 40.8 2019    HCT 35.8 10/05/2017     2019     10/05/2017     BMP:   Lab Results   Component Value Date     2019     10/05/2017    POTASSIUM 3.7 2019    POTASSIUM 4.5 10/03/2018    CHLORIDE 103 2019    CHLORIDE 108 10/05/2017    CO2 26 2019    CO2 28 10/05/2017    BUN 9 2019    BUN 8 10/05/2017    CR 0.84 2019    CR 0.84 10/03/2018     (H) 2019     (A) 10/03/2018     COAGS:   Lab Results   Component Value Date    PTT 38 (H) 2011    INR 0.99 2011     POC:   Lab Results   Component Value Date    HCG Negative 2019    HCGS Negative 2011     OTHER:   Lab Results  "  Component Value Date    GALO 8.4 (L) 06/17/2019    ALBUMIN 4.0 06/17/2019    PROTTOTAL 7.4 06/17/2019    ALT 21 06/17/2019    AST 23 06/17/2019    ALKPHOS 86 06/17/2019    BILITOTAL 0.7 06/17/2019    TSH 2.23 06/17/2019        Preop Vitals    BP Readings from Last 3 Encounters:   01/24/20 123/81   01/03/20 (!) 150/83   12/13/19 (!) 151/77    Pulse Readings from Last 3 Encounters:   01/24/20 79   01/03/20 74   10/11/19 62      Resp Readings from Last 3 Encounters:   01/24/20 16   01/03/20 16   12/13/19 16    SpO2 Readings from Last 3 Encounters:   01/03/20 93%   12/13/19 96%   11/22/19 94%      Temp Readings from Last 1 Encounters:   01/24/20 36.3  C (97.4  F) (Tympanic)    Ht Readings from Last 1 Encounters:   06/16/19 1.676 m (5' 6\")      Wt Readings from Last 1 Encounters:   06/20/19 92.8 kg (204 lb 8 oz)    Estimated body mass index is 33.01 kg/m  as calculated from the following:    Height as of 6/16/19: 1.676 m (5' 6\").    Weight as of 6/20/19: 92.8 kg (204 lb 8 oz).     LDA:  Peripheral IV 01/24/20 Right Hand (Active)   Number of days: 0        Assessment:   ASA SCORE: 3            Plan:   Anes. Type:  General   Pre-Medication: None   Induction:  IV (Standard)   Airway: Mask   Access/Monitoring: PIV   Maintenance: N/a     Postop Plan:   Postop Pain: Opioids  Postop Sedation/Airway: Not planned     PONV Management:   Adult Risk Factors: Female, Postop Opioids                   Farzad Britton,   "

## 2020-01-24 NOTE — PROCEDURES
Procedure/Surgery Information   [facility], [place]    Bedside Procedure Note  Date of Service (when I performed the procedure): 01/24/2020    Mindi Eastman is a 42 year old female patient.  1. Major depressive disorder, recurrent severe without psychotic features (H)      Past Medical History:   Diagnosis Date     ADHD      Anxiety      Bipolar disorder (H)      Depressive disorder      OCD (obsessive compulsive disorder)      ROXANNE (obstructive sleep apnea)     On CPAP     Seasonal allergies      Temp: 97.4  F (36.3  C) Temp src: Tympanic BP: 123/81 Pulse: 79   Resp: 16          Procedures     [facility], [place]   ECT Procedure Note   01/24/2020       Mindi Eastman [id number]   42 year old 1975     Patient Status: Outpatient    Is this the first in a series of 12 treatments?  No    History and Physical: Reviewed in medical record    Consent: Informed consent was signed by: patient    Date Consent Signed: 1/3/20    Allergies   Allergen Reactions     Darvocet [Propoxyphene N-Apap]        Weight:  0 lbs 0 oz    Patient Preparations: Glasses/Contacts removed    /81 (BP Location: Left arm)   Pulse 79   Temp 97.4  F (36.3  C) (Tympanic)   Resp 16          Indications for ECT:   Medications ineffective, Psychotherapies ineffective and treatment resistant depression.         Clinical Narrative:   Patient is continuing ECT for depression.  NPO after 2400.  Alert and Oriented x 3.  No problems with the last ECT which was 3 weeks ago.   This interval continues to work well with her.   Mood is good.             Diagnosis:      Major depressive disorder, recurrent severe without psychotic features (F33.2)         Pause for the Cause:     Right patient Yes   Right procedure/laterality settings: Yes          Intra-Procedure Documentation:       ECT #: 7 of 12   Treatment number this series: 31   Total treatment number: 42     Type of ECT:  Right,  unilateral ultrabrief    ECT Medications:    Tylenol:  1000mg po at home (forgot so got in assessment)  Lactated Ringers 1 liter   Toradol: 30mg  Zofran: 8mg   Brevital: 80mg  Succinyl Choline: 70mg  Morphine 6 mg in treatment room for headache       ECT Strip Summary:   Energy Level: 8 percent   Motor Seizure Duration: 36 seconds  EEG Seizure Duration:  42 seconds    Complications: No    Plan:    Take Extra Strength Tylenol 1000mg po at home before coming to ECT.    Next maintenance ECT is in 3 weeks on 2/14/20  Told her to address her HA's on non-ECT days with her primary MD.        Umang Kennedy MD

## 2020-02-14 ENCOUNTER — ANESTHESIA (OUTPATIENT)
Dept: BEHAVIORAL HEALTH | Facility: CLINIC | Age: 45
End: 2020-02-14

## 2020-02-14 ENCOUNTER — ANESTHESIA EVENT (OUTPATIENT)
Dept: BEHAVIORAL HEALTH | Facility: CLINIC | Age: 45
End: 2020-02-14

## 2020-02-14 ENCOUNTER — HOSPITAL ENCOUNTER (OUTPATIENT)
Dept: BEHAVIORAL HEALTH | Facility: CLINIC | Age: 45
Discharge: HOME | End: 2020-02-14
Attending: PSYCHIATRY & NEUROLOGY | Admitting: PSYCHIATRY & NEUROLOGY
Payer: COMMERCIAL

## 2020-02-14 VITALS
OXYGEN SATURATION: 93 % | HEART RATE: 91 BPM | SYSTOLIC BLOOD PRESSURE: 124 MMHG | DIASTOLIC BLOOD PRESSURE: 67 MMHG | RESPIRATION RATE: 19 BRPM | TEMPERATURE: 98.2 F

## 2020-02-14 DIAGNOSIS — F33.2 MAJOR DEPRESSIVE DISORDER, RECURRENT SEVERE WITHOUT PSYCHOTIC FEATURES (H): Primary | ICD-10-CM

## 2020-02-14 PROCEDURE — 25000125 ZZHC RX 250: Performed by: ANESTHESIOLOGY

## 2020-02-14 PROCEDURE — 25000128 H RX IP 250 OP 636: Performed by: ANESTHESIOLOGY

## 2020-02-14 PROCEDURE — 25800030 ZZH RX IP 258 OP 636: Performed by: PSYCHIATRY & NEUROLOGY

## 2020-02-14 PROCEDURE — 25000128 H RX IP 250 OP 636: Performed by: PSYCHIATRY & NEUROLOGY

## 2020-02-14 PROCEDURE — 37000008 ZZH ANESTHESIA TECHNICAL FEE, 1ST 30 MIN

## 2020-02-14 PROCEDURE — 90870 ELECTROCONVULSIVE THERAPY: CPT

## 2020-02-14 RX ORDER — MORPHINE SULFATE 4 MG/ML
6 INJECTION, SOLUTION INTRAMUSCULAR; INTRAVENOUS ONCE
Status: CANCELLED
Start: 2020-02-14

## 2020-02-14 RX ORDER — MEPERIDINE HYDROCHLORIDE 25 MG/ML
12.5 INJECTION INTRAMUSCULAR; INTRAVENOUS; SUBCUTANEOUS
Status: DISCONTINUED | OUTPATIENT
Start: 2020-02-14 | End: 2020-02-15 | Stop reason: HOSPADM

## 2020-02-14 RX ORDER — ONDANSETRON 2 MG/ML
8 INJECTION INTRAMUSCULAR; INTRAVENOUS ONCE
Status: CANCELLED
Start: 2020-02-14

## 2020-02-14 RX ORDER — MORPHINE SULFATE 4 MG/ML
6 INJECTION, SOLUTION INTRAMUSCULAR; INTRAVENOUS ONCE
Status: COMPLETED | OUTPATIENT
Start: 2020-02-14 | End: 2020-02-14

## 2020-02-14 RX ORDER — NALOXONE HYDROCHLORIDE 0.4 MG/ML
.1-.4 INJECTION, SOLUTION INTRAMUSCULAR; INTRAVENOUS; SUBCUTANEOUS
Status: DISCONTINUED | OUTPATIENT
Start: 2020-02-14 | End: 2020-02-15 | Stop reason: HOSPADM

## 2020-02-14 RX ORDER — KETOROLAC TROMETHAMINE 30 MG/ML
30 INJECTION, SOLUTION INTRAMUSCULAR; INTRAVENOUS ONCE
Status: CANCELLED
Start: 2020-02-14

## 2020-02-14 RX ORDER — ONDANSETRON 2 MG/ML
8 INJECTION INTRAMUSCULAR; INTRAVENOUS ONCE
Status: COMPLETED | OUTPATIENT
Start: 2020-02-14 | End: 2020-02-14

## 2020-02-14 RX ORDER — KETOROLAC TROMETHAMINE 30 MG/ML
30 INJECTION, SOLUTION INTRAMUSCULAR; INTRAVENOUS ONCE
Status: COMPLETED | OUTPATIENT
Start: 2020-02-14 | End: 2020-02-14

## 2020-02-14 RX ORDER — ONDANSETRON 4 MG/1
4 TABLET, ORALLY DISINTEGRATING ORAL EVERY 30 MIN PRN
Status: DISCONTINUED | OUTPATIENT
Start: 2020-02-14 | End: 2020-02-15 | Stop reason: HOSPADM

## 2020-02-14 RX ORDER — SODIUM CHLORIDE, SODIUM LACTATE, POTASSIUM CHLORIDE, CALCIUM CHLORIDE 600; 310; 30; 20 MG/100ML; MG/100ML; MG/100ML; MG/100ML
INJECTION, SOLUTION INTRAVENOUS CONTINUOUS
Status: DISCONTINUED | OUTPATIENT
Start: 2020-02-14 | End: 2020-02-15 | Stop reason: HOSPADM

## 2020-02-14 RX ORDER — ONDANSETRON 2 MG/ML
4 INJECTION INTRAMUSCULAR; INTRAVENOUS EVERY 30 MIN PRN
Status: DISCONTINUED | OUTPATIENT
Start: 2020-02-14 | End: 2020-02-15 | Stop reason: HOSPADM

## 2020-02-14 RX ADMIN — METHOHEXITAL SODIUM 80 MG: 500 INJECTION, POWDER, LYOPHILIZED, FOR SOLUTION INTRAMUSCULAR; INTRAVENOUS; RECTAL at 08:13

## 2020-02-14 RX ADMIN — ONDANSETRON 8 MG: 2 INJECTION INTRAMUSCULAR; INTRAVENOUS at 08:03

## 2020-02-14 RX ADMIN — MORPHINE SULFATE 6 MG: 4 INJECTION INTRAVENOUS at 08:15

## 2020-02-14 RX ADMIN — SODIUM CHLORIDE, POTASSIUM CHLORIDE, SODIUM LACTATE AND CALCIUM CHLORIDE 1000 ML: 600; 310; 30; 20 INJECTION, SOLUTION INTRAVENOUS at 08:02

## 2020-02-14 RX ADMIN — Medication 70 MG: at 08:13

## 2020-02-14 RX ADMIN — KETOROLAC TROMETHAMINE 30 MG: 30 INJECTION, SOLUTION INTRAMUSCULAR; INTRAVENOUS at 08:02

## 2020-02-14 NOTE — ANESTHESIA PREPROCEDURE EVALUATION
Anesthesia Pre-Procedure Evaluation    Patient: Mindi Eastman   MRN:     [id number] Gender:   female   Age:    44 year old :      1975        Preoperative Diagnosis: * No pre-op diagnosis entered *   * No procedures listed *     Past Medical History:   Diagnosis Date     ADHD      Anxiety      Bipolar disorder (H)      Depressive disorder      OCD (obsessive compulsive disorder)      ROXANNE (obstructive sleep apnea)     On CPAP     Seasonal allergies       Past Surgical History:   Procedure Laterality Date     APPENDECTOMY       CARPAL TUNNEL RELEASE RT/LT Bilateral      CHOLECYSTECTOMY       EXTRACTION(S) DENTAL      [place] Teeth     HC KNEE SCOPE,MED/LAT MENISCUS REPAIR Right     X 2     TONSILLECTOMY                       PHYSICAL EXAM:   Mental Status/Neuro: A/A/O   Airway: Facies: Feasible  Mallampati: II  Mouth/Opening: Full  TM distance: > 6 cm  Neck ROM: Full   Respiratory: Auscultation: CTAB     Resp. Rate: Normal     Resp. Effort: Normal      CV: Rhythm: Regular  Rate: Age appropriate  Heart: Normal Sounds  Edema: None   Comments:      Dental: Normal Dentition                LABS:  CBC:   Lab Results   Component Value Date    WBC 11.2 (H) 2019    WBC 6.3 10/05/2017    HGB 13.5 2019    HGB 11.7 10/05/2017    HCT 40.8 2019    HCT 35.8 10/05/2017     2019     10/05/2017     BMP:   Lab Results   Component Value Date     2019     10/05/2017    POTASSIUM 3.7 2019    POTASSIUM 4.5 10/03/2018    CHLORIDE 103 2019    CHLORIDE 108 10/05/2017    CO2 26 2019    CO2 28 10/05/2017    BUN 9 2019    BUN 8 10/05/2017    CR 0.84 2019    CR 0.84 10/03/2018     (H) 2019     (A) 10/03/2018     COAGS:   Lab Results   Component Value Date    PTT 38 (H) 2011    INR 0.99 2011     POC:   Lab Results   Component Value Date    HCG Negative 2019    HCGS Negative 2011     OTHER:   Lab Results  "  Component Value Date    GALO 8.4 (L) 06/17/2019    ALBUMIN 4.0 06/17/2019    PROTTOTAL 7.4 06/17/2019    ALT 21 06/17/2019    AST 23 06/17/2019    ALKPHOS 86 06/17/2019    BILITOTAL 0.7 06/17/2019    TSH 2.23 06/17/2019        Preop Vitals    BP Readings from Last 3 Encounters:   01/24/20 119/80   01/03/20 (!) 150/83   12/13/19 (!) 151/77    Pulse Readings from Last 3 Encounters:   01/24/20 82   01/03/20 74   10/11/19 62      Resp Readings from Last 3 Encounters:   01/24/20 18   01/03/20 16   12/13/19 16    SpO2 Readings from Last 3 Encounters:   01/24/20 96%   01/03/20 93%   12/13/19 96%      Temp Readings from Last 1 Encounters:   01/24/20 36.8  C (98.2  F)    Ht Readings from Last 1 Encounters:   06/16/19 1.676 m (5' 6\")      Wt Readings from Last 1 Encounters:   06/20/19 92.8 kg (204 lb 8 oz)    Estimated body mass index is 33.01 kg/m  as calculated from the following:    Height as of 6/16/19: 1.676 m (5' 6\").    Weight as of 6/20/19: 92.8 kg (204 lb 8 oz).     LDA:        Assessment:   ASA SCORE: 3            Plan:   Anes. Type:  General   Pre-Medication: None   Induction:  IV (Standard)   Airway: Mask   Access/Monitoring: PIV   Maintenance: N/a     Postop Plan:   Postop Pain: Opioids  Postop Sedation/Airway: Not planned     PONV Management:   Adult Risk Factors: Female, Postop Opioids                       Cindy Malik MD  "

## 2020-02-14 NOTE — ANESTHESIA POSTPROCEDURE EVALUATION
Anesthesia POST Procedure Evaluation    Patient: Mindi Eastman   MRN:     [id number] Gender:   female   Age:    44 year old :      1975        Preoperative Diagnosis: * No pre-op diagnosis entered *   * No procedures listed *   Postop Comments: No value filed.       Anesthesia Type:  Not documented  General    Reportable Event: NO     PAIN: Uncomplicated   Sign Out status: Comfortable, Well controlled pain     PONV: No PONV   Sign Out status:  No Nausea or Vomiting     Neuro/Psych: Uneventful perioperative course   Sign Out Status: Preoperative baseline; Age appropriate mentation     Airway/Resp.: Uneventful perioperative course   Sign Out Status: Non labored breathing, age appropriate RR; Resp. Status within EXPECTED Parameters     CV: Uneventful perioperative course   Sign Out status: Appropriate BP and perfusion indices; Appropriate HR/Rhythm     Disposition:   Sign Out in:  PACU  Disposition:  Phase II; Home  Recovery Course: Uneventful  Follow-Up: Not required           Last Anesthesia Record Vitals:  CRNA VITALS  2020 0748 - 2020 0848      2020             SpO2:  99 %    Resp Rate (set):  8          Last PACU Vitals:  No vitals data found for the desired time range.        Electronically Signed By: Cindy Malik MD, 2020, 9:14 AM

## 2020-02-14 NOTE — PROGRESS NOTES
Patient meets recovery room discharge criteria. Pt discharged from recovery room via wheelchair to discharge room at  0857.

## 2020-02-14 NOTE — DISCHARGE INSTRUCTIONS
ECT Discharge Instructions      During your ECT series:      Do not drive for 24 hours after treatment. If you will have more than one treatment within a week, no driving until 7 days after your last ECT treatment.     Do not drink alcohol or use street drugs (illicit drugs) while you are having treatments.    Do not make important decisions, including legal decisions.    After each treatment:      Get plenty of rest. A responsible adult must stay with your for at least 6 hours.    Avoid heavy or risky activities for 24 hours.    If you feel light-headed, sit for a few minutes before standing. Have someone help you get up.    If you have nausea (feel sick to your stomach): Drink only clear liquids such as apple juice, ginger ale, broth or 7UP, Be sure to drink plenty of liquids. Move to a normal diet as you feel able.     If you received Toradol, wait 6 hours before taking ibuprofen.    Call your doctor if:     You have a fever over 100F (37.7 C) (taken under the tongue), or a fever that last more than 24 hours.    Your IV site is red, swollen, very painful or is getting more tender.    You have nausea that gets very bad or does not improve.      If you have any symptoms after ECT, tell our staff before your next treatment.    The ECT Department can be reached at [phone].  The ECT Department is open Mondays, Wednesdays and Fridays from 7:00 AM to 2:00 PM.      To speak to a doctor, call: Dr. Kennedy at [phone]    Other: You had Toradol at 8:00 am, which is an NSAID medication. Do not take any Ibuprofen, Excedrin, Motrin, Aleve, Advil, Asprin, or any other NSAID medication until after 2:00pm. Questions, check with your physician or pharmacist. You also had morphine at 8:15 am and zofran for nausea at 8:00 am. Take 1000 mg extra strength Tylenol at home before ECT.

## 2020-02-14 NOTE — PROCEDURES
Procedure/Surgery Information   [facility], [place]    Bedside Procedure Note  Date of Service (when I performed the procedure): 02/14/2020    Mindi Eastman is a 42 year old female patient.  1. Major depressive disorder, recurrent severe without psychotic features (H)      Past Medical History:   Diagnosis Date     ADHD      Anxiety      Bipolar disorder (H)      Depressive disorder      OCD (obsessive compulsive disorder)      ROXANNE (obstructive sleep apnea)     On CPAP     Seasonal allergies                           Procedures     [facility], [place]   ECT Procedure Note   02/14/2020       Mindi Eastman [id number]   42 year old 1975     Patient Status: Outpatient    Is this the first in a series of 12 treatments?  No    History and Physical: Reviewed in medical record    Consent: Informed consent was signed by: patient    Date Consent Signed: 1/3/20    Allergies   Allergen Reactions     Darvocet [Propoxyphene N-Apap]        Weight:  0 lbs 0 oz    Patient Preparations: Glasses/Contacts removed    There were no vitals taken for this visit.         Indications for ECT:   Medications ineffective, Psychotherapies ineffective and treatment resistant depression.         Clinical Narrative:   Patient is continuing ECT for depression.  NPO after 2400.  Alert and Oriented x 3.  No problems with the last ECT which was 3 weeks ago.   This interval continues to work well with her.   Mood is good.    She moved from [place] to [place] to save money.  She's staying in a friend's apt.           Diagnosis:      Major depressive disorder, recurrent severe without psychotic features (F33.2)         Pause for the Cause:     Right patient Yes   Right procedure/laterality settings: Yes          Intra-Procedure Documentation:       ECT #: 8 of 12   Treatment number this series: 32   Total treatment number: 43     Type of ECT:  Right, unilateral ultrabrief    ECT  Medications:    Tylenol:  1000mg po at home  Lactated Ringers 1 liter   Toradol: 30mg  Zofran: 8mg   Brevital: 80mg  Succinyl Choline: 70mg  Morphine 6 mg in treatment room for headache       ECT Strip Summary:   Energy Level: 8 percent   Motor Seizure Duration: 96 seconds  EEG Seizure Duration:  96 seconds    Complications: No    Plan:    Take Extra Strength Tylenol 1000mg po at home before coming to ECT.    Next maintenance ECT is in 3 weeks on 3/6/20      Umang Kennedy MD

## 2020-02-14 NOTE — IP AVS SNAPSHOT
[facility]  [address] [place] [address]  Phone:  [phone]                                    After Visit Summary   2/14/2020    Mindi Eastman    MRN: [id number]           After Visit Summary Signature Page    I have received my discharge instructions, and my questions have been answered. I have discussed any challenges I see with this plan with the nurse or doctor.    ..........................................................................................................................................  Patient/Patient Representative Signature      ..........................................................................................................................................  Patient Representative Print Name and Relationship to Patient    ..................................................               ................................................  Date                                   Time    ..........................................................................................................................................  Reviewed by Signature/Title    ...................................................              ..............................................  Date                                               Time          22EPIC Rev 08/18

## 2022-03-18 ENCOUNTER — APPOINTMENT (OUTPATIENT)
Dept: ULTRASOUND IMAGING | Facility: CLINIC | Age: 47
End: 2022-03-18
Attending: EMERGENCY MEDICINE
Payer: COMMERCIAL

## 2022-03-18 ENCOUNTER — HOSPITAL ENCOUNTER (EMERGENCY)
Facility: CLINIC | Age: 47
Discharge: HOME | End: 2022-03-18
Attending: EMERGENCY MEDICINE | Admitting: EMERGENCY MEDICINE
Payer: COMMERCIAL

## 2022-03-18 ENCOUNTER — APPOINTMENT (OUTPATIENT)
Dept: CT IMAGING | Facility: CLINIC | Age: 47
End: 2022-03-18
Attending: EMERGENCY MEDICINE
Payer: COMMERCIAL

## 2022-03-18 VITALS
OXYGEN SATURATION: 99 % | SYSTOLIC BLOOD PRESSURE: 153 MMHG | DIASTOLIC BLOOD PRESSURE: 99 MMHG | RESPIRATION RATE: 20 BRPM | BODY MASS INDEX: 35.78 KG/M2 | TEMPERATURE: 98.1 F | HEART RATE: 65 BPM | WEIGHT: 215 LBS

## 2022-03-18 DIAGNOSIS — S86.012A STRAIN OF LEFT ACHILLES TENDON, INITIAL ENCOUNTER: ICD-10-CM

## 2022-03-18 LAB
ANION GAP SERPL CALCULATED.3IONS-SCNC: 11 MMOL/L (ref 5–18)
BASOPHILS # BLD AUTO: 0.1 10E3/UL (ref 0–0.2)
BASOPHILS NFR BLD AUTO: 1 %
BUN SERPL-MCNC: 9 MG/DL (ref 8–22)
CALCIUM SERPL-MCNC: 9.6 MG/DL (ref 8.5–10.5)
CHLORIDE BLD-SCNC: 102 MMOL/L (ref 98–107)
CK SERPL-CCNC: 157 U/L (ref 30–190)
CO2 SERPL-SCNC: 26 MMOL/L (ref 22–31)
CREAT SERPL-MCNC: 0.96 MG/DL (ref 0.6–1.1)
EOSINOPHIL # BLD AUTO: 0.3 10E3/UL (ref 0–0.7)
EOSINOPHIL NFR BLD AUTO: 3 %
ERYTHROCYTE [DISTWIDTH] IN BLOOD BY AUTOMATED COUNT: 13.1 % (ref 10–15)
GFR SERPL CREATININE-BSD FRML MDRD: 74 ML/MIN/1.73M2
GLUCOSE BLD-MCNC: 124 MG/DL (ref 70–125)
HCT VFR BLD AUTO: 40.2 % (ref 35–47)
HGB BLD-MCNC: 13.3 G/DL (ref 11.7–15.7)
IMM GRANULOCYTES # BLD: 0 10E3/UL
IMM GRANULOCYTES NFR BLD: 0 %
LYMPHOCYTES # BLD AUTO: 3.4 10E3/UL (ref 0.8–5.3)
LYMPHOCYTES NFR BLD AUTO: 39 %
MCH RBC QN AUTO: 30.4 PG (ref 26.5–33)
MCHC RBC AUTO-ENTMCNC: 33.1 G/DL (ref 31.5–36.5)
MCV RBC AUTO: 92 FL (ref 78–100)
MONOCYTES # BLD AUTO: 0.7 10E3/UL (ref 0–1.3)
MONOCYTES NFR BLD AUTO: 8 %
NEUTROPHILS # BLD AUTO: 4.3 10E3/UL (ref 1.6–8.3)
NEUTROPHILS NFR BLD AUTO: 49 %
NRBC # BLD AUTO: 0 10E3/UL
NRBC BLD AUTO-RTO: 0 /100
PLATELET # BLD AUTO: 409 10E3/UL (ref 150–450)
POTASSIUM BLD-SCNC: 3.8 MMOL/L (ref 3.5–5)
RBC # BLD AUTO: 4.37 10E6/UL (ref 3.8–5.2)
SODIUM SERPL-SCNC: 139 MMOL/L (ref 136–145)
WBC # BLD AUTO: 8.8 10E3/UL (ref 4–11)

## 2022-03-18 PROCEDURE — 258N000003 HC RX IP 258 OP 636: Performed by: EMERGENCY MEDICINE

## 2022-03-18 PROCEDURE — 99285 EMERGENCY DEPT VISIT HI MDM: CPT | Mod: 25

## 2022-03-18 PROCEDURE — 250N000011 HC RX IP 250 OP 636: Performed by: EMERGENCY MEDICINE

## 2022-03-18 PROCEDURE — 82550 ASSAY OF CK (CPK): CPT | Performed by: EMERGENCY MEDICINE

## 2022-03-18 PROCEDURE — 36415 COLL VENOUS BLD VENIPUNCTURE: CPT | Performed by: EMERGENCY MEDICINE

## 2022-03-18 PROCEDURE — 75635 CT ANGIO ABDOMINAL ARTERIES: CPT

## 2022-03-18 PROCEDURE — 85025 COMPLETE CBC W/AUTO DIFF WBC: CPT | Performed by: EMERGENCY MEDICINE

## 2022-03-18 PROCEDURE — 96361 HYDRATE IV INFUSION ADD-ON: CPT

## 2022-03-18 PROCEDURE — 80048 BASIC METABOLIC PNL TOTAL CA: CPT | Performed by: EMERGENCY MEDICINE

## 2022-03-18 PROCEDURE — 96374 THER/PROPH/DIAG INJ IV PUSH: CPT | Mod: 59

## 2022-03-18 PROCEDURE — 93926 LOWER EXTREMITY STUDY: CPT | Mod: LT

## 2022-03-18 PROCEDURE — 250N000013 HC RX MED GY IP 250 OP 250 PS 637: Performed by: EMERGENCY MEDICINE

## 2022-03-18 RX ORDER — SODIUM CHLORIDE 9 MG/ML
INJECTION, SOLUTION INTRAVENOUS CONTINUOUS
Status: DISCONTINUED | OUTPATIENT
Start: 2022-03-18 | End: 2022-03-19 | Stop reason: HOSPADM

## 2022-03-18 RX ORDER — IOPAMIDOL 755 MG/ML
100 INJECTION, SOLUTION INTRAVASCULAR ONCE
Status: COMPLETED | OUTPATIENT
Start: 2022-03-18 | End: 2022-03-18

## 2022-03-18 RX ORDER — OXYCODONE AND ACETAMINOPHEN 5; 325 MG/1; MG/1
1 TABLET ORAL EVERY 6 HOURS PRN
Qty: 12 TABLET | Refills: 0 | Status: SHIPPED | OUTPATIENT
Start: 2022-03-18 | End: 2022-03-21

## 2022-03-18 RX ORDER — KETOROLAC TROMETHAMINE 15 MG/ML
15 INJECTION, SOLUTION INTRAMUSCULAR; INTRAVENOUS ONCE
Status: COMPLETED | OUTPATIENT
Start: 2022-03-18 | End: 2022-03-18

## 2022-03-18 RX ORDER — OXYCODONE AND ACETAMINOPHEN 5; 325 MG/1; MG/1
1 TABLET ORAL ONCE
Status: COMPLETED | OUTPATIENT
Start: 2022-03-18 | End: 2022-03-18

## 2022-03-18 RX ADMIN — KETOROLAC TROMETHAMINE 15 MG: 15 INJECTION, SOLUTION INTRAMUSCULAR; INTRAVENOUS at 18:03

## 2022-03-18 RX ADMIN — IOPAMIDOL 100 ML: 755 INJECTION, SOLUTION INTRAVENOUS at 18:56

## 2022-03-18 RX ADMIN — OXYCODONE HYDROCHLORIDE AND ACETAMINOPHEN 1 TABLET: 5; 325 TABLET ORAL at 20:00

## 2022-03-18 RX ADMIN — SODIUM CHLORIDE 1000 ML: 9 INJECTION, SOLUTION INTRAVENOUS at 18:03

## 2022-03-18 ASSESSMENT — ENCOUNTER SYMPTOMS
ARTHRALGIAS: 1
NUMBNESS: 1
JOINT SWELLING: 1

## 2022-03-18 NOTE — ED NOTES
Spoke with provider, JAYY Garcia regarding pt concerns and concerns from Urgent care. Will need to be evaluated by a provider before imaging. Pt updated on this and verbalized understanding.

## 2022-03-18 NOTE — ED PROVIDER NOTES
EMERGENCY DEPARTMENT ENCOUNTER      NAME: Mindi Eastman  AGE: 46 year old female  YOB: 1975  MRN: [id number]  EVALUATION DATE & TIME: 3/18/2022  3:32 PM    PCP: Robyn Smiley    ED PROVIDER: Femi De La Cruz MD    Chief Complaint   Patient presents with     Foot Pain     FINAL IMPRESSION:  1. Strain of left Achilles tendon, initial encounter      ED COURSE & MEDICAL DECISION MAKING:    Pertinent Labs & Imaging studies reviewed. (See chart for details)  46 year old female presents to the Emergency Department for evaluation of foot and ankle pain and a cold foot.  Patient had an orthopedic injury yesterday 5pm at [facility] with acute onset of pain to the posterior aspect of her distal lower leg in the vicinity of the Achilles tendon.  She was assessed on an outpatient basis today and they reported concern based on  temperature differential left foot compared to right foot with difficulty appreciating distal vasculature pulses and delayed cap refill.  She was sent to the emergency department with considerations for vascular compromise or compartment syndrome.  I assessed the patient and she had pain superior to the calcaneous in the vicinity of the achilles in the distal aspect of the left lower leg.  We were able to Doppler a dorsalis pedis pulse, her capillary refill was delayed and there is significant temperature differential compared to the right side.  I was able to passively flex and extend the toes without eliciting any discomfort the posterior calf and leg was not firm or edematous.  There was tenderness at the Achilles insertion site to roughly the level of the malleolus and perhaps a little bit superior to that but not more proximally in the calf muscle.  There was no neurological deficit appreciated on examination.  It is unlikely that this would represent compartment syndrome based on the clinical examination and also the mechanism would be unlikely to support vascular issue however  there is clear difference comparing the 2 sides and I recommended to the patient that we do a vascular assessment with CT angiogram for additional evaluation.  Given her overall age and health this is more likely to be distal capillary spasm as opposed to true vascular compromise necessitating intervention.  Patient receiving pain management in the emergency department and plan of care for repeat assessment after return of her imaging results.    Overall the work-up was unremarkable for vascular compromise the patient's pain was controlled she was feeling improved.  I discussed all this with the patient I do think she is okay for discharge home were going to immobilize the ankle until follow-up with orthopedics she was placed in a walking boot she already had crutches provided by the previous clinician and we are proceeding with discharge and close follow-up I counseled the patient on appropriate return precautions prior to discharge.      4:16 PM I met with the patient, obtained history, performed an initial exam, and discussed options and plan for diagnostics and treatment here in the ED.  9:27 PM I rechecked and updated the patient. We discussed the plan for discharge and the patient is agreeable. Reviewed supportive cares, symptomatic treatment, outpatient follow up, and reasons to return to the Emergency Department. Patient to be discharged by ED RN.        At the conclusion of the encounter I discussed the results of all of the tests and the disposition. The questions were answered. The patient or family acknowledged understanding and was agreeable with the care plan.     PPE worn: surgical mask, gloves    MEDICATIONS GIVEN IN THE EMERGENCY:  Medications   0.9% sodium chloride BOLUS (0 mLs Intravenous Stopped 3/18/22 4408)     Followed by   sodium chloride 0.9% infusion (has no administration in time range)   ketorolac (TORADOL) injection 15 mg (15 mg Intravenous Given 3/18/22 1587)   iopamidol (ISOVUE-370)  "solution 100 mL (100 mLs Intravenous Given 3/18/22 1856)   oxyCODONE-acetaminophen (PERCOCET) 5-325 MG per tablet 1 tablet (1 tablet Oral Given 3/18/22 2000)       NEW PRESCRIPTIONS STARTED AT TODAY'S ER VISIT  New Prescriptions    OXYCODONE-ACETAMINOPHEN (PERCOCET) 5-325 MG TABLET    Take 1 tablet by mouth every 6 hours as needed for pain          =================================================================    HPI    Patient information was obtained from: Patient    Use of : N/A      Mindi Eastman is a 46 year old female with a pertinent history of fibromyalgia who presents to this ED via private car for evaluation of left lower leg and ankle pain.     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 She immediately noticed that she could not bend her ankle. She adds that she has since had increased swelling to her left ankle as well as intermittent cramping in the left lower leg/ankle. The patient reports associated numbness in her left foot, which she describes as the sensation of \"pins and needles,\" and notes that her foot feels cold. She took ibuprofen at 7:00 AM this morning before going to urgent care around 11 AM; she states that at urgent care they were unable to find a pulse on her foot and her capillary refill was 5 seconds, so she was sent to the ED for further evaluation. No other reported injuries or concerns at this time.       REVIEW OF SYSTEMS   Review of Systems   Musculoskeletal: Positive for arthralgias (left lower leg/ankle) and joint swelling (left lower leg/ankle).        Positive for decreased ROM of left ankle   Skin:        Positive for coolness in left foot   Neurological: Positive for numbness (left foot).   All other systems reviewed and are negative.       PAST MEDICAL HISTORY:  Past Medical History:   Diagnosis Date "     ADHD      Anxiety      Bipolar disorder (H)      Depressive disorder      OCD (obsessive compulsive disorder)      ROXANNE (obstructive sleep apnea)     On CPAP     Seasonal allergies        PAST SURGICAL HISTORY:  Past Surgical History:   Procedure Laterality Date     APPENDECTOMY       CARPAL TUNNEL RELEASE RT/LT Bilateral      CHOLECYSTECTOMY       EXTRACTION(S) DENTAL      Bethany Teeth     HC KNEE SCOPE,MED/LAT MENISCUS REPAIR Right     X 2     TONSILLECTOMY             CURRENT MEDICATIONS:    oxyCODONE-acetaminophen (PERCOCET) 5-325 MG tablet  acetaminophen (TYLENOL) 500 MG tablet  atomoxetine (STRATTERA) 80 MG capsule  cloNIDine (CATAPRES) 0.1 MG tablet  FLUoxetine (PROZAC) 40 MG capsule  LORazepam (ATIVAN) 1 MG tablet  ondansetron (ZOFRAN) 4 MG tablet  QUEtiapine (SEROQUEL) 100 MG tablet  traZODone (DESYREL) 50 MG tablet        ALLERGIES:  Allergies   Allergen Reactions     Darvocet [Propoxyphene N-Apap]        FAMILY HISTORY:  History reviewed. No pertinent family history.    SOCIAL HISTORY:   Social History     Socioeconomic History     Marital status:      Spouse name: Not on file     Number of children: Not on file     Years of education: Not on file     Highest education level: Not on file   Occupational History     Not on file   Tobacco Use     Smoking status: Not on file     Smokeless tobacco: Not on file     Tobacco comment: e cig    Substance and Sexual Activity     Alcohol use: Yes     Comment: occ     Drug use: No     Sexual activity: Yes     Partners: Female   Other Topics Concern     Parent/sibling w/ CABG, MI or angioplasty before 65F 55M? Not Asked   Social History Narrative     Not on file     Social Determinants of Health     Financial Resource Strain: Not on file   Food Insecurity: Not on file   Transportation Needs: Not on file   Physical Activity: Not on file   Stress: Not on file   Social Connections: Not on file   Intimate Partner Violence: Not on file   Housing Stability: Not on  file       VITALS:  BP (!) 148/78   Pulse 59   Temp 98.1  F (36.7  C)   Resp 20   Wt 97.5 kg (215 lb)   SpO2 98%   BMI 35.78 kg/m      PHYSICAL EXAM    PHYSICAL EXAM    Constitutional: Well developed, Well nourished, NAD  HENT: Normocephalic, Atraumatic, Bilateral external ears normal, Oropharynx normal, mucous membranes moist, Nose normal. Neck-  Normal range of motion, No tenderness, Supple, No stridor.   Eyes: PERRL, EOMI, Conjunctiva normal, No discharge.   Respiratory: Normal breath sounds, No respiratory distress, No wheezing, Speaks full sentences easily. No cough.   Cardiovascular: Normal heart rate, Regular rhythm, No murmurs Chest wall nontender.    GI:  Soft, No tenderness, No masses, No flank tenderness. No rebound or guarding.  : No cva tenderness    Musculoskeletal: Patient with tenderness to palpation jAchilles tendon insertion site to an area just above the level of the malleoli again midline the distal aspect of the left lower leg no appreciable edema.  The soft tissues of the calf and thigh are nontender otherwise to palpation and not firm or swollen or edematous.  Capillary refill is delayed to the foot there is temperature differential left compared to right side dorsalis pedis pulse not initially palpable however it was Dopplerable.  Integument: There is pallor to the left foot  Neurologic: Alert & oriented x 3, Normal motor function, Normal sensory function, No focal deficits noted.   Psychiatric: Affect normal, Judgment normal, Mood normal. Cooperative.      LAB:  All pertinent labs reviewed and interpreted.  Results for orders placed or performed during the hospital encounter of 03/18/22   CTA Abdomen Pelvis & Lt Leg Runoff w Contrast    Impression    IMPRESSION:  1.  ABDOMINAL AORTA: Nonaneurysmal with widely patent major branches.  2.  RIGHT LOWER EXTREMITY: Widely patent to the level of the knee. Below the knee the peroneal artery is widely patent to the foot. The anterior tibial  artery is patent to at least the distal calf and then unopacified. There is minimal atheromatous   disease and this may be due to a running of the contrast bolus. The posterior tibial artery is poorly opacified throughout the calf and may be occluded.  3.  LEFT LOWER EXTREMITY: Widely patent to the level of the knee. Below the knee the peroneal artery is widely patent to the foot. The anterior tibial artery is patent to at least the distal calf and then unopacified. There is minimal atheromatous   disease and this may be due to a running of the contrast bolus. The posterior tibial artery is poorly opacified throughout the calf and may be occluded.  4.  Probable uterine fibroid.      If clinically indicated a duplex ultrasound could be performed to evaluate the waveforms in the anterior and posterior tibial arteries to better determine patency.   US Lower Extremity Arterial Duplex Left    Impression    IMPRESSION:  1.  LEFT LOWER EXTREMITY: Normal brisk triphasic arterial waveforms throughout the left lower extremity arteries without evidence of a hemodynamically significant lesion.   Basic metabolic panel   Result Value Ref Range    Sodium 139 136 - 145 mmol/L    Potassium 3.8 3.5 - 5.0 mmol/L    Chloride 102 98 - 107 mmol/L    Carbon Dioxide (CO2) 26 22 - 31 mmol/L    Anion Gap 11 5 - 18 mmol/L    Urea Nitrogen 9 8 - 22 mg/dL    Creatinine 0.96 0.60 - 1.10 mg/dL    Calcium 9.6 8.5 - 10.5 mg/dL    Glucose 124 70 - 125 mg/dL    GFR Estimate 74 >60 mL/min/1.73m2   Result Value Ref Range     30 - 190 U/L   CBC with platelets and differential   Result Value Ref Range    WBC Count 8.8 4.0 - 11.0 10e3/uL    RBC Count 4.37 3.80 - 5.20 10e6/uL    Hemoglobin 13.3 11.7 - 15.7 g/dL    Hematocrit 40.2 35.0 - 47.0 %    MCV 92 78 - 100 fL    MCH 30.4 26.5 - 33.0 pg    MCHC 33.1 31.5 - 36.5 g/dL    RDW 13.1 10.0 - 15.0 %    Platelet Count 409 150 - 450 10e3/uL    % Neutrophils 49 %    % Lymphocytes 39 %    % Monocytes 8 %     % Eosinophils 3 %    % Basophils 1 %    % Immature Granulocytes 0 %    NRBCs per 100 WBC 0 <1 /100    Absolute Neutrophils 4.3 1.6 - 8.3 10e3/uL    Absolute Lymphocytes 3.4 0.8 - 5.3 10e3/uL    Absolute Monocytes 0.7 0.0 - 1.3 10e3/uL    Absolute Eosinophils 0.3 0.0 - 0.7 10e3/uL    Absolute Basophils 0.1 0.0 - 0.2 10e3/uL    Absolute Immature Granulocytes 0.0 <=0.4 10e3/uL    Absolute NRBCs 0.0 10e3/uL       RADIOLOGY:  Reviewed all pertinent imaging. Please see official radiology report.  US Lower Extremity Arterial Duplex Left   Final Result   IMPRESSION:   1.  LEFT LOWER EXTREMITY: Normal brisk triphasic arterial waveforms throughout the left lower extremity arteries without evidence of a hemodynamically significant lesion.      CTA Abdomen Pelvis & Lt Leg Runoff w Contrast   Final Result   IMPRESSION:   1.  ABDOMINAL AORTA: Nonaneurysmal with widely patent major branches.   2.  RIGHT LOWER EXTREMITY: Widely patent to the level of the knee. Below the knee the peroneal artery is widely patent to the foot. The anterior tibial artery is patent to at least the distal calf and then unopacified. There is minimal atheromatous    disease and this may be due to a running of the contrast bolus. The posterior tibial artery is poorly opacified throughout the calf and may be occluded.   3.  LEFT LOWER EXTREMITY: Widely patent to the level of the knee. Below the knee the peroneal artery is widely patent to the foot. The anterior tibial artery is patent to at least the distal calf and then unopacified. There is minimal atheromatous    disease and this may be due to a running of the contrast bolus. The posterior tibial artery is poorly opacified throughout the calf and may be occluded.   4.  Probable uterine fibroid.         If clinically indicated a duplex ultrasound could be performed to evaluate the waveforms in the anterior and posterior tibial arteries to better determine patency.        IEsteban, am serving as a  scribe to document services personally performed by Dr. De La Cruz based on my observation and the provider's statements to me. I, Femi De La Cruz MD, attest that Esteban Simon is acting in a scribe capacity, has observed my performance of the services and has documented them in accordance with my direction.    Femi De La Cruz MD  Emergency Medicine  [facility] EMERGENCY ROOM  [facility] [facility] [id number] [phone]     Femi De La Cruz MD  03/18/22 1312

## 2022-03-18 NOTE — ED TRIAGE NOTES
Pt here after injuring her left foot while running last evening. Went to  who sent her here for possible tendon rupture or compartment syndrome. Pt took ibuprofen at 0700. Post tibial pulse felt easily, left pedal pulse heard with doppler, left leg slightly swollen.

## 2022-03-18 NOTE — Clinical Note
Mindi Eastman was seen and treated in our emergency department on 3/18/2022.  She may return to work on 03/23/2022.       If you have any questions or concerns, please don't hesitate to call.      Femi De La Cruz MD

## 2022-03-19 NOTE — DISCHARGE INSTRUCTIONS
Your examination is most concerning for a injury to your Achilles tendon.  Recommend walking boot and crutches with nonweightbearing and immobilization.  Please follow-up with [place] [facility] early next week.  Pain medication as prescribed.  If escalation your symptoms develop increasing swelling pain additional issue please return to your nearest emergency department repeat assessment.

## 2022-08-15 ENCOUNTER — OFFICE VISIT (OUTPATIENT)
Dept: FAMILY MEDICINE | Facility: CLINIC | Age: 47
End: 2022-08-15
Payer: COMMERCIAL

## 2022-08-15 VITALS
RESPIRATION RATE: 16 BRPM | HEART RATE: 73 BPM | WEIGHT: 221 LBS | HEIGHT: 66 IN | SYSTOLIC BLOOD PRESSURE: 134 MMHG | DIASTOLIC BLOOD PRESSURE: 86 MMHG | OXYGEN SATURATION: 98 % | TEMPERATURE: 98 F | BODY MASS INDEX: 35.52 KG/M2

## 2022-08-15 DIAGNOSIS — E66.01 MORBID OBESITY (H): ICD-10-CM

## 2022-08-15 DIAGNOSIS — G47.33 OSA (OBSTRUCTIVE SLEEP APNEA): ICD-10-CM

## 2022-08-15 DIAGNOSIS — L02.411 ABSCESS OF AXILLA, RIGHT: ICD-10-CM

## 2022-08-15 DIAGNOSIS — M79.7 FIBROMYALGIA: ICD-10-CM

## 2022-08-15 DIAGNOSIS — F90.9 ATTENTION DEFICIT HYPERACTIVITY DISORDER (ADHD), UNSPECIFIED ADHD TYPE: ICD-10-CM

## 2022-08-15 DIAGNOSIS — Z87.410 HISTORY OF CERVICAL DYSPLASIA: ICD-10-CM

## 2022-08-15 DIAGNOSIS — Z12.4 CERVICAL CANCER SCREENING: ICD-10-CM

## 2022-08-15 DIAGNOSIS — Z11.4 SCREENING FOR HIV (HUMAN IMMUNODEFICIENCY VIRUS): ICD-10-CM

## 2022-08-15 DIAGNOSIS — N94.6 DYSMENORRHEA: ICD-10-CM

## 2022-08-15 DIAGNOSIS — Z11.59 NEED FOR HEPATITIS C SCREENING TEST: ICD-10-CM

## 2022-08-15 DIAGNOSIS — Z00.00 ROUTINE PHYSICAL EXAMINATION: Primary | ICD-10-CM

## 2022-08-15 DIAGNOSIS — M54.9 UPPER BACK PAIN ON RIGHT SIDE: ICD-10-CM

## 2022-08-15 DIAGNOSIS — Z87.19 HX OF PANCREATITIS: ICD-10-CM

## 2022-08-15 DIAGNOSIS — F31.30 BIPOLAR I DISORDER, MOST RECENT EPISODE DEPRESSED (H): ICD-10-CM

## 2022-08-15 DIAGNOSIS — F41.9 ANXIETY: ICD-10-CM

## 2022-08-15 DIAGNOSIS — E03.9 HYPOTHYROIDISM, UNSPECIFIED TYPE: ICD-10-CM

## 2022-08-15 DIAGNOSIS — Z12.11 SCREEN FOR COLON CANCER: ICD-10-CM

## 2022-08-15 PROCEDURE — 99214 OFFICE O/P EST MOD 30 MIN: CPT | Mod: 25 | Performed by: FAMILY MEDICINE

## 2022-08-15 PROCEDURE — 87624 HPV HI-RISK TYP POOLED RSLT: CPT | Performed by: FAMILY MEDICINE

## 2022-08-15 PROCEDURE — G0145 SCR C/V CYTO,THINLAYER,RESCR: HCPCS | Performed by: FAMILY MEDICINE

## 2022-08-15 PROCEDURE — 99386 PREV VISIT NEW AGE 40-64: CPT | Performed by: FAMILY MEDICINE

## 2022-08-15 RX ORDER — DEXTROAMPHETAMINE SACCHARATE, AMPHETAMINE ASPARTATE, DEXTROAMPHETAMINE SULFATE AND AMPHETAMINE SULFATE 5; 5; 5; 5 MG/1; MG/1; MG/1; MG/1
TABLET ORAL
COMMUNITY
Start: 2022-08-11 | End: 2022-11-03

## 2022-08-15 RX ORDER — PROPRANOLOL HYDROCHLORIDE 10 MG/1
TABLET ORAL
COMMUNITY
Start: 2022-02-11 | End: 2022-11-03 | Stop reason: ALTCHOICE

## 2022-08-15 RX ORDER — LORAZEPAM 1 MG/1
1 TABLET ORAL
COMMUNITY
Start: 2021-06-28 | End: 2022-11-03

## 2022-08-15 RX ORDER — ESCITALOPRAM OXALATE 20 MG/1
20 TABLET ORAL DAILY
COMMUNITY
Start: 2022-07-26 | End: 2022-11-03

## 2022-08-15 RX ORDER — ZOLPIDEM TARTRATE 10 MG/1
1 TABLET ORAL
COMMUNITY
Start: 2020-10-15 | End: 2022-11-03

## 2022-08-15 ASSESSMENT — ENCOUNTER SYMPTOMS
SHORTNESS OF BREATH: 0
COUGH: 0
MYALGIAS: 0
EYE PAIN: 0
HEARTBURN: 0
DIZZINESS: 0
ABDOMINAL PAIN: 0
BREAST MASS: 0
WEAKNESS: 0
HEADACHES: 0
HEMATOCHEZIA: 0
NERVOUS/ANXIOUS: 0
FREQUENCY: 0
DYSURIA: 0
ARTHRALGIAS: 1
PALPITATIONS: 0
SORE THROAT: 0
DIARRHEA: 0
HEMATURIA: 0
PARESTHESIAS: 0
FEVER: 0
CONSTIPATION: 0
CHILLS: 0
JOINT SWELLING: 0
NAUSEA: 0

## 2022-08-15 ASSESSMENT — PATIENT HEALTH QUESTIONNAIRE - PHQ9
10. IF YOU CHECKED OFF ANY PROBLEMS, HOW DIFFICULT HAVE THESE PROBLEMS MADE IT FOR YOU TO DO YOUR WORK, TAKE CARE OF THINGS AT HOME, OR GET ALONG WITH OTHER PEOPLE: NOT DIFFICULT AT ALL
SUM OF ALL RESPONSES TO PHQ QUESTIONS 1-9: 0
SUM OF ALL RESPONSES TO PHQ QUESTIONS 1-9: 0

## 2022-08-15 NOTE — PATIENT INSTRUCTIONS
Take ibuprofen 600 mg every 6 hours or 800 mg every 8 hours as soon as you feel your menses coming on, even before bleeding.  If this is inadequate in controlling your symptoms, you could consider hormonal treatments with birth control pills or you could consider seeing a gynecologist to discuss options.    Schedule a future lab only visit, fasting, to obtain routine labs.

## 2022-08-18 LAB
BKR LAB AP GYN ADEQUACY: NORMAL
BKR LAB AP GYN INTERPRETATION: NORMAL
BKR LAB AP HPV REFLEX: NORMAL
BKR LAB AP PREVIOUS ABNORMAL: NORMAL
PATH REPORT.COMMENTS IMP SPEC: NORMAL
PATH REPORT.COMMENTS IMP SPEC: NORMAL
PATH REPORT.RELEVANT HX SPEC: NORMAL

## 2022-08-22 ENCOUNTER — PATIENT OUTREACH (OUTPATIENT)
Dept: FAMILY MEDICINE | Facility: CLINIC | Age: 47
End: 2022-08-22

## 2022-08-22 LAB
HUMAN PAPILLOMA VIRUS 16 DNA: NEGATIVE
HUMAN PAPILLOMA VIRUS 18 DNA: NEGATIVE
HUMAN PAPILLOMA VIRUS FINAL DIAGNOSIS: NORMAL
HUMAN PAPILLOMA VIRUS OTHER HR: NEGATIVE

## 2022-08-23 PROBLEM — M79.7 FIBROMYALGIA: Status: ACTIVE | Noted: 2022-08-23

## 2022-08-23 PROBLEM — E66.01 MORBID OBESITY (H): Status: ACTIVE | Noted: 2022-08-23

## 2022-08-23 PROBLEM — F90.9 ATTENTION DEFICIT HYPERACTIVITY DISORDER (ADHD), UNSPECIFIED ADHD TYPE: Status: ACTIVE | Noted: 2022-08-23

## 2022-08-23 PROBLEM — Z87.410 HISTORY OF CERVICAL DYSPLASIA: Status: ACTIVE | Noted: 2022-08-23

## 2022-08-23 PROBLEM — F41.9 ANXIETY: Status: ACTIVE | Noted: 2022-08-23

## 2022-08-23 PROBLEM — G47.33 OSA (OBSTRUCTIVE SLEEP APNEA): Status: ACTIVE | Noted: 2022-08-23

## 2022-08-23 PROBLEM — Z87.19 HX OF PANCREATITIS: Status: ACTIVE | Noted: 2022-08-23

## 2022-08-23 PROBLEM — E03.9 HYPOTHYROIDISM, UNSPECIFIED TYPE: Status: ACTIVE | Noted: 2022-08-23

## 2022-08-23 NOTE — PROGRESS NOTES
Assessment/Plan:     Routine physical examination  Encouraged healthy lifestyle habits including regular exercise, healthy eating habits, and adequate calcium and vitamin D intake.  Immunizations up-to-date.  Will be screening Pap smear results.  Check fasting lipids in future glucose screen for dyslipidemia and diabetes respectively.  Will await screening Pap smear results.  Up-to-date with mammogram screening.  Order placed for screening colonoscopy.  Information provided regarding [facility] directives.  Records requested from previous clinic.  - Lipid panel reflex to direct LDL Fasting; Future  - Glucose; Future    Screening for HIV (human immunodeficiency virus)  She declines HIV screening and is low risk.  - HIV Antigen Antibody Combo; Future    Need for hepatitis C screening test  She declines hepatitis C screening and is low risk.  - Hepatitis C Screen Reflex to HCV RNA Quant and Genotype; Future    Cervical cancer screening  Will await screening Pap smear results, noting previous history of LEEP x2.  - Pap Screen with HPV - recommended age 30 - 65 years  - HPV Hold (Lab Only)  - HPV High Risk Types DNA Cervical    Screen for colon cancer  Order placed for screening colonoscopy.  - Colonoscopy Screening  Referral; Future    Upper back pain on right side  Myofascial in nature.  Reassurance provided.  Advised stretches.  Avoid aggravating activities.  Ice or heat as needed.  Referral placed physical therapy.  - Physical Therapy Referral; Future    Abscess of axilla, right  Recurrent.  She may have hidradenitis suppurativa given recurrent cyst in axilla and pubic region.  No active infection.  Referral placed dermatology.  - Adult Dermatology Referral; Future    Dysmenorrhea  May treat with NSAIDs.  If inadequate control, neck step would be to see OB gynecology.    ROXANNE (obstructive sleep apnea)  She has CPAP available.  Encouraged compliance.    Bipolar I disorder, most recent episode depressed  (H)  Anxiety  Attention deficit hyperactivity disorder (ADHD), unspecified ADHD type  Given multiple medications and previous history of electroconvulsive therapy and treatment time I am most comfortable with psychiatry assessing patient to establish care, to ensure stability, and then I would be comfortable in managing her care when controlled.  - Adult Mental Health  Referral; Future    Fibromyalgia  Encourage regular activity, adequate sleep.    History of cervical dysplasia  Will await follow-up Pap smear today.    Morbid obesity (H)  Encourage efforts at healthy lifestyle habits.    Hx of pancreatitis  Noted.  Avoid medications that increased recently because of this.    Hypothyroidism, unspecified type  Clinically euthyroid.  Check TSH reflex at future lab only visit.  - TSH with free T4 reflex; Future    Tobacco use, vaping  Advised cessation.  She declines assistance or referral.       Patient Instructions   Take ibuprofen 600 mg every 6 hours or 800 mg every 8 hours as soon as you feel your menses coming on, even before bleeding.  If this is inadequate in controlling your symptoms, you could consider hormonal treatments with birth control pills or you could consider seeing a gynecologist to discuss options.    Schedule a future lab only visit, fasting, to obtain routine labs.       No follow-ups on file.         Subjective:     Mindi Eastman is a 46 year old female who presents for an annual exam.     She is here today along with her significant other, Abby, to establish care and for preventive care visit.  Reports history of LEEP procedure x2 in the past.  Last Pap smear was about 6 years ago.  She due for mammogram.  She reports colonoscopy, is uncertain why it was done, it was over 10 years ago.  She is not fasting today but is agreeable to future fasting labs.  She is not interested in HIV or hepatitis C screening.  She is exercising for 1 hour 4 to 5 days/week.  She is in a long-term  relationship with significant other Abby who accompanies her today.  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  Reports vaping regularly, does not smoke cigarettes, is not interested in cessation options    History of bipolar disorder, anxiety, and ADHD.  Hospitalized last about 2 years ago.  Has received electroconvulsive therapy and treatment of this in the past.  Currently receiving care from Dr. Carter of [facility], but requesting psychiatry referral to establish care.  No recent flares.  History of fibromyalgia with significant joint and muscle pain, using ice or heat, ibuprofen or Tylenol and management of this.  Exercising regularly as above.  More recently has had pain in the right upper back region radiating to her right shoulder.  Feels like there is a knot under her scapula.  Describes it as similar to tennis ball.  Worse with humidity.  History of obstructive sleep apnea, has CPAP but reports poor compliance.  History of esophageal reflux but no recent symptoms and no medications to treat this.  History of hypothyroidism, it has not required treatment.  History of significant dysmenorrhea, notes this occurs for about a week prior to menses associated with nausea.  Menses have been heavier for about 5 days in duration, goes through 6 pads a day on the heaviest days.  Using Pamprin which is somewhat helpful.  Has seen gynecology in the past, not interested in hormone treatments, has not reviewed other interventions recently.  Reports lump in her right axilla that she like me to look at, has drained on occasion in the past.  Has also had similar lesions in the pubic region.  Seems to worsen with stress.    Healthy Habits:     Getting at least 3 servings of Calcium per day:  Yes    Bi-annual eye exam:  Yes    Dental care twice a year:  NO    Sleep apnea or symptoms of sleep apnea:  Sleep apnea    Diet:  Regular (no restrictions)    Frequency of exercise:  4-5 days/week     Duration of exercise:  45-60 minutes    Taking medications regularly:  Yes    Medication side effects:  None    PHQ-2 Total Score: 0    Additional concerns today:  No      Healthy Habits:   Healthy Diet: Yes  Regular Exercise: Yes  Sunscreen Use: Yes  Dental Visits Regularly: Yes  Seat Belt: Yes  Domestic abuse:   No    Health Maintenance reviewed:  Lipid Profile: Due, not fasting  Glucose Screen: Due, not fasting  Colonoscopy: Over 10 years ago, near due  Mammogram: No previous    Gynecologic History  No LMP recorded.  Contraception: same sex partner  Last Pap: Approximately 6 years ago. Results were: Previous LEEP x2.  Uncertain if she has had Pap since her last week.        Immunization History   Administered Date(s) Administered     COVID-19,PF,Moderna 01/29/2021, 02/26/2021     COVID-19,PF,Pfizer (12+ Yrs) 11/21/2021     Influenza (IIV3) PF 10/26/2010, 11/29/2013, 09/27/2021     Influenza Vaccine IM > 6 months Valent IIV4 (Alfuria,Fluzone) 12/10/2016, 10/12/2017, 10/03/2018, 10/10/2019     Td (Adult), Adsorbed 06/20/2005     Tdap (Adacel,Boostrix) 02/03/2014     Immunization status: Up-to-date.    Current Outpatient Medications   Medication Sig Dispense Refill     acetaminophen (TYLENOL) 500 MG tablet Take 1,000 mg by mouth as needed (headache) Prior to ECT       amphetamine-dextroamphetamine (ADDERALL) 20 MG tablet TAKE 1 TABLET BY MOUTH TWICE A DAY       atomoxetine (STRATTERA) 80 MG capsule Take 80 mg by mouth daily       cloNIDine (CATAPRES) 0.1 MG tablet Take 0.1 mg by mouth 2 times daily       escitalopram (LEXAPRO) 20 MG tablet Take 20 mg by mouth daily       FLUoxetine (PROZAC) 40 MG capsule Take 1 capsule (40 mg) by mouth daily 30 capsule 0     LORazepam (ATIVAN) 1 MG tablet Take 1 tablet by mouth       LORazepam (ATIVAN) 1 MG tablet Take 1 mg by mouth 2 times daily as needed for anxiety  60 tablet 0     ondansetron (ZOFRAN) 4 MG tablet Take 1 tablet (4 mg) by mouth every 8 hours as needed for nausea  30 tablet 0     propranolol (INDERAL) 10 MG tablet TAKE 1 TABLET BY MOUTH TWICE A DAY AS NEEDED FOR ANXIETY       QUEtiapine (SEROQUEL) 100 MG tablet Take 1 tablet (100 mg) by mouth At Bedtime 30 tablet 1     traZODone (DESYREL) 50 MG tablet Take  mg by mouth At Bedtime       zolpidem (AMBIEN) 10 MG tablet Take 1 tablet by mouth       Past Medical History:   Diagnosis Date     ADHD      Anxiety      Bipolar disorder (H)      Depressive disorder      OCD (obsessive compulsive disorder)      ROXANNE (obstructive sleep apnea)     On CPAP     Seasonal allergies      Past Surgical History:   Procedure Laterality Date     APPENDECTOMY       CARPAL TUNNEL RELEASE RT/LT Bilateral      CHOLECYSTECTOMY       EXTRACTION(S) DENTAL      [place] Teeth     HC KNEE SCOPE,MED/LAT MENISCUS REPAIR Right     X 2     LEEP TX, CERVICAL  2005    ERIC II     TONSILLECTOMY       Darvocet [propoxyphene n-apap]  No family history on file.  Social History     Socioeconomic History     Marital status:      Spouse name: Not on file     Number of children: Not on file     Years of education: Not on file     Highest education level: Not on file   Occupational History     Not on file   Tobacco Use     Smoking status: Current Every Day Smoker     Types: Cigarettes     Last attempt to quit: 2010     Years since quittin.5     Smokeless tobacco: Never Used     Tobacco comment: e cig    Substance and Sexual Activity     Alcohol use: Yes     Comment: occ     Drug use: No     Sexual activity: Yes     Partners: Female   Other Topics Concern     Parent/sibling w/ CABG, MI or angioplasty before 65F 55M? Not Asked   Social History Narrative     Not on file     Social Determinants of Health     Financial Resource Strain: Not on file   Food Insecurity: Not on file   Transportation Needs: Not on file   Physical Activity: Not on file   Stress: Not on file   Social Connections: Not on file   Intimate Partner Violence: Not on file   Housing  "Stability: Not on file       Review of Systems  General:  Denies problem  Eyes: Denies problem  Ears/Nose/Throat: Denies problem  Cardiovascular: Denies problem  Respiratory:  Denies problem  Gastrointestinal:  Denies problem   Genitourinary: Denies problem  Musculoskeletal:  Denies problem       Objective:     /86   Pulse 73   Temp 98  F (36.7  C)   Resp 16   Ht 1.664 m (5' 5.5\")   Wt 100.2 kg (221 lb)   SpO2 98%   BMI 36.22 kg/m     Body mass index is 36.22 kg/m .     Physical Examination: General appearance - alert, well appearing, and in no distress, oriented to person, place, and time and weight appearing consistent with BMI  Mental status - alert, oriented to person, place, and time, normal mood, behavior, speech, dress, motor activity, and thought processes  Eyes - pupils equal and reactive, extraocular eye movements intact  Ears - bilateral TM's and external ear canals normal  Nose - normal and patent, no erythema, discharge or polyps  Mouth - mucous membranes moist, pharynx normal without lesions  Neck - supple, no significant adenopathy  Lymphatics - no palpable lymphadenopathy, no hepatosplenomegaly  Chest - clear to auscultation, no wheezes, rales or rhonchi, symmetric air entry  Heart - normal rate, regular rhythm, normal S1, S2, no murmurs, rubs, clicks or gallops  Abdomen - soft, nontender, nondistended, no masses or organomegaly  Breasts - breasts appear normal, no suspicious masses, no skin or nipple changes or axillary nodes  Neurological - alert, oriented, normal speech, no focal findings or movement disorder noted  Musculoskeletal - no joint tenderness, deformity or swelling; limited motion right shoulder.  Muscle tension and palpable spasm of the paraspinous muscle of the upper back and periscapular region.  Right axilla is with a resolving abscess, no punctum, no erythema.  Extremities - peripheral pulses normal, no pedal edema, no clubbing or cyanosis  Skin - normal coloration and " turgor, no rashes, no suspicious skin lesions noted        Office Visit on 08/15/2022   Component Date Value Ref Range Status     Interpretation 08/15/2022 Negative for Intraepithelial Lesion or Malignancy (NILM)    Final     Comment 08/15/2022    Final                    Value:This result contains rich text formatting which cannot be displayed here.     Specimen Adequacy 08/15/2022 Satisfactory for evaluation, endocervical/transformation zone component absent   Final     Clinical Information 08/15/2022    Final                    Value:This result contains rich text formatting which cannot be displayed here.     Reflex Testing 08/15/2022 Yes regardless of result   Final     Previous Abnormal? 08/15/2022    Final                    Value:This result contains rich text formatting which cannot be displayed here.     Performing Labs 08/15/2022    Final                    Value:This result contains rich text formatting which cannot be displayed here.     Other HR HPV 08/15/2022 Negative  Negative Final     HPV16 DNA 08/15/2022 Negative  Negative Final     HPV18 DNA 08/15/2022 Negative  Negative Final     FINAL DIAGNOSIS 08/15/2022    Final                    Value:This result contains rich text formatting which cannot be displayed here.       Answers for HPI/ROS submitted by the patient on 8/15/2022  If you checked off any problems, how difficult have these problems made it for you to do your work, take care of things at home, or get along with other people?: Not difficult at all  PHQ9 TOTAL SCORE: 0  abdominal pain: No  Blood in stool: No  Blood in urine: No  chest pain: No  chills: No  congestion: No  constipation: No  cough: No  diarrhea: No  dizziness: No  ear pain: No  eye pain: No  nervous/anxious: No  fever: No  frequency: No  genital sores: No  headaches: No  hearing loss: No  heartburn: No  arthralgias: Yes  joint swelling: No  peripheral edema: No  mood changes: No  myalgias: No  nausea: No  dysuria:  No  palpitations: No  Skin sensation changes: No  sore throat: No  urgency: No  rash: No  shortness of breath: No  visual disturbance: No  weakness: No  pelvic pain: No  vaginal bleeding: No  vaginal discharge: No  tenderness: No  breast mass: No  breast discharge: No

## 2022-08-30 ENCOUNTER — LAB (OUTPATIENT)
Dept: LAB | Facility: CLINIC | Age: 47
End: 2022-08-30
Payer: COMMERCIAL

## 2022-08-30 DIAGNOSIS — Z00.00 ROUTINE PHYSICAL EXAMINATION: ICD-10-CM

## 2022-08-30 DIAGNOSIS — E03.9 HYPOTHYROIDISM, UNSPECIFIED TYPE: ICD-10-CM

## 2022-08-30 LAB
CHOLEST SERPL-MCNC: 220 MG/DL
FASTING STATUS PATIENT QL REPORTED: YES
GLUCOSE SERPL-MCNC: 117 MG/DL (ref 70–99)
HDLC SERPL-MCNC: 67 MG/DL
LDLC SERPL CALC-MCNC: 123 MG/DL
NONHDLC SERPL-MCNC: 153 MG/DL
TRIGL SERPL-MCNC: 152 MG/DL
TSH SERPL DL<=0.005 MIU/L-ACNC: 3.14 UIU/ML (ref 0.3–4.2)

## 2022-08-30 PROCEDURE — 82947 ASSAY GLUCOSE BLOOD QUANT: CPT

## 2022-08-30 PROCEDURE — 84443 ASSAY THYROID STIM HORMONE: CPT

## 2022-08-30 PROCEDURE — 80061 LIPID PANEL: CPT

## 2022-08-30 PROCEDURE — 36415 COLL VENOUS BLD VENIPUNCTURE: CPT

## 2022-08-30 NOTE — LETTER
September 5, 2022      Mindi Eastman  [address]  [facility] [address]        Dear ,    We are writing to inform you of your test results.    Your cholesterol is mildly elevated.  The breakdown of the cholesterol components looks OK overall.  Regular exercise,healthy eating (lots of fresh veggies and fruits, more lean meats and protein, and fewer sweets, baked goods, and bread products), and maintenance of a healthy weight can improve your cholesterol and reduce your risk ofdeveloping heart disease or stroke in the future.  We should repeat this test again in a year.    Your fasting blood sugar is elevated intothe prediabetes range.  This means that your body is beginning to have difficulty in managing sugar, but you do not yet have diabetes.  Regular exercise, healthy eating (lots of fresh veggies and fruits, more lean meats andprotein, and fewer sweets, baked goods, and bread products), and maintenance of a healthy weight can improve your blood sugar and reduce your risk of developing diabetes in the future.  We will follow these tests yearly.     Normal thyroid balance.       Resulted Orders   Lipid panel reflex to direct LDL Fasting   Result Value Ref Range    Cholesterol 220 (H) <200 mg/dL    Triglycerides 152 (H) <150 mg/dL    Direct Measure HDL 67 >=50 mg/dL    LDL Cholesterol Calculated 123 (H) <=100 mg/dL    Non HDL Cholesterol 153 (H) <130 mg/dL    Narrative    Cholesterol  Desirable:  <200 mg/dL    Triglycerides  Normal:  Less than 150 mg/dL  Borderline High:  150-199 mg/dL  High:  200-499 mg/dL  Very High:  Greater than or equal to 500 mg/dL    Direct Measure HDL  Female:  Greater than or equal to 50 mg/dL   Male:  Greater than or equal to 40 mg/dL    LDL Cholesterol  Desirable:  <100mg/dL  Above Desirable:  100-129 mg/dL   Borderline High:  130-159 mg/dL   High:  160-189 mg/dL   Very High:  >= 190 mg/dL    Non HDL Cholesterol  Desirable:  130 mg/dL  Above Desirable:  130-159  mg/dL  Borderline High:  160-189 mg/dL  High:  190-219 mg/dL  Very High:  Greater than or equal to 220 mg/dL   Glucose   Result Value Ref Range    Glucose 117 (H) 70 - 99 mg/dL    Patient Fasting > 8hrs? Yes    TSH with free T4 reflex   Result Value Ref Range    TSH 3.14 0.30 - 4.20 uIU/mL       If you have any questions or concerns, please call the clinic at the number listed above.       Sincerely,      Eula Gaming MD

## 2022-08-31 ENCOUNTER — HOSPITAL ENCOUNTER (OUTPATIENT)
Dept: PHYSICAL THERAPY | Facility: REHABILITATION | Age: 47
Discharge: HOME | End: 2022-08-31
Attending: FAMILY MEDICINE
Payer: COMMERCIAL

## 2022-08-31 DIAGNOSIS — M54.9 UPPER BACK PAIN ON RIGHT SIDE: ICD-10-CM

## 2022-08-31 PROCEDURE — 97161 PT EVAL LOW COMPLEX 20 MIN: CPT | Mod: GP

## 2022-08-31 PROCEDURE — 97110 THERAPEUTIC EXERCISES: CPT | Mod: GP

## 2022-09-06 ENCOUNTER — HOSPITAL ENCOUNTER (OUTPATIENT)
Dept: PHYSICAL THERAPY | Facility: REHABILITATION | Age: 47
Discharge: HOME | End: 2022-09-06
Payer: COMMERCIAL

## 2022-09-06 DIAGNOSIS — M54.9 UPPER BACK PAIN ON RIGHT SIDE: Primary | ICD-10-CM

## 2022-09-06 PROCEDURE — 97110 THERAPEUTIC EXERCISES: CPT | Mod: GP | Performed by: PHYSICAL THERAPIST

## 2022-09-06 PROCEDURE — 97140 MANUAL THERAPY 1/> REGIONS: CPT | Mod: GP | Performed by: PHYSICAL THERAPIST

## 2022-09-26 ENCOUNTER — HOSPITAL ENCOUNTER (OUTPATIENT)
Dept: PHYSICAL THERAPY | Facility: REHABILITATION | Age: 47
Discharge: HOME | End: 2022-09-26
Payer: COMMERCIAL

## 2022-09-26 DIAGNOSIS — M54.9 UPPER BACK PAIN ON RIGHT SIDE: Primary | ICD-10-CM

## 2022-09-26 PROCEDURE — 97110 THERAPEUTIC EXERCISES: CPT | Mod: GP | Performed by: PHYSICAL THERAPIST

## 2022-09-26 PROCEDURE — 97140 MANUAL THERAPY 1/> REGIONS: CPT | Mod: GP | Performed by: PHYSICAL THERAPIST

## 2022-10-10 ENCOUNTER — HOSPITAL ENCOUNTER (OUTPATIENT)
Dept: PHYSICAL THERAPY | Facility: REHABILITATION | Age: 47
Discharge: HOME | End: 2022-10-10
Payer: COMMERCIAL

## 2022-10-10 DIAGNOSIS — M54.9 UPPER BACK PAIN ON RIGHT SIDE: Primary | ICD-10-CM

## 2022-10-10 PROCEDURE — 97110 THERAPEUTIC EXERCISES: CPT | Mod: GP

## 2022-10-10 PROCEDURE — 97140 MANUAL THERAPY 1/> REGIONS: CPT | Mod: GP

## 2022-10-18 NOTE — ADDENDUM NOTE
Encounter addended by: Tatiana Bacon, PT on: 10/18/2022 3:00 PM   Actions taken: Clinical Note Signed, Flowsheet accepted, Document created, Document edited

## 2022-10-18 NOTE — PROGRESS NOTES
[facility]    OUTPATIENT PHYSICAL THERAPY ORTHOPEDIC EVALUATION  PLAN OF TREATMENT FOR OUTPATIENT REHABILITATION  (COMPLETE FOR INITIAL CLAIMS ONLY)  Patient's Last Name, First Name, M.I.  YOB: 1975  Mindi Eastman    Provider s Name:  [facility]   Medical Record No.  [id number]   Start of Care Date:  08/31/22   Onset Date:  03/01/22   Type:     _X__PT   ___OT   ___SLP Medical Diagnosis:        PT Diagnosis:  cervical radiculopathy, myofascial restrictions   Visits from SOC:  1      _________________________________________________________________________________  Plan of Treatment/Functional Goals:  joint mobilization, manual therapy, neuromuscular re-education, ROM, strengthening, stretching           Goals  Goal Identifier: HEP  Goal Description: pt will demonstrate independence and report compliance with HEP to facilitate improved independent symptom mgmt.  Target Date: 11/23/22    Goal Identifier: sleep  Goal Description: pt will report ability to sleep throughout the night without waking due to pain to facilitate improved QOL.  Target Date: 11/23/22    Goal Identifier: mobility  Goal Description: pt will report ability to perform overhead household chores or work tasks for greater than or equal to 5 minutes without onset of right shoulder/scapular pain.  Target Date: 11/23/22                                                           Therapy Frequency:  1 time/week  Predicted Duration of Therapy Intervention:  8-10 visits over 12 weeks    Tatiana Bacon PT                 I CERTIFY THE NEED FOR THESE SERVICES FURNISHED UNDER        THIS PLAN OF TREATMENT AND WHILE UNDER MY CARE     (Physician co-signature of this document indicates review and certification of the therapy plan).                     Certification Date From:      Certification Date To:    "    Referring Provider:  Eula Gaming    Initial Assessment        See Epic Evaluation Start of Care Date: 08/31/22 08/31/22 1400   General Information   Type of Visit Initial OP Ortho PT Evaluation   Start of Care Date 08/31/22   Referring Physician Eula Gaming   Patient/Family Goals Statement reduce pain   Certification Required? No   Medical Diagnosis upper back pain on right side   Surgical/Medical history reviewed Yes   Body Part(s)   Body Part(s) Cervical Spine;Shoulder   Presentation and Etiology   Pertinent history of current problem (include personal factors and/or comorbidities that impact the POC) Pt reports 6 month hx of insidious onset of right shoulder/posterior scapular pain. Feels \"golfball\" sized space under her shoulder blade radiating to the front of her shoulder and even ellbow. Elbow pain and tingling is always accompanied by shoulder/scapular pain. Pt is having difficulty sleeping due to pain- wakes up in middle in the night. Symptoms brought on by repetitive overhead motions (typically >3 minutes). Plays softball, kickboxing 5 days per week. Pt is right-handed.   Impairments A. Pain;D. Decreased ROM;F. Decreased strength and endurance;E. Decreased flexibility   Functional Limitations perform activities of daily living;perform required work activities;perform desired leisure / sports activities   Symptom Location right superior and posterior scapula   Onset date of current episode/exacerbation 03/01/22   Chronicity Chronic   Pain rating (0-10 point scale) Best (/10);Worst (/10);Other   Worst (/10) 10/10   Pain rating comment current: 6/10   Frequency of pain/symptoms A. Constant   Pain/symptoms are: Worse during the night   Pain/symptoms exacerbated by H. Overhead reach;L. Work tasks;K. Home tasks   Pain/symptoms eased by I. OTC medication(s);H. Cold  (lying on tennis ball)   Progression of symptoms since onset: Worsened   Prior Level of Function   Prior Level of " Function-Mobility able to work and play softball without pain   Current Level of Function   Patient role/employment history A. 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 No   Have you fallen and had an injury in the past year? No   Is patient a fall risk? No   Abuse Screen (yes response referral indicated)   Feels Unsafe at Home or Work/School no   Feels Threatened by Someone no   Does Anyone Try to Keep You From Having Contact with Others or Doing Things Outside Your Home? no   Physical Signs of Abuse Present no   Patient needs abuse support services and resources No   System Outcome Measures   Outcome Measures   (NDI: 15/20)   Cervical Spine   Cervical Left Side Bending ROM 40, pain along R side of neck   Cervical Right Rotation ROM WNL, pain along right superior shoulder   Cervical Left Rotation ROM WNL, pain along right superior shoulder   Cervical Flexion ROM WNL   Cervical Extension ROM WNL   Cervical Right Side Bending ROM 48   Thoracic Flexion ROM WNL   Thoracic Extension ROM WNL, tightness throughout thoracic spine   Thoracic Right Side Bending ROM WNL   Thoracic Left Sidebending ROM  WNL   Thoracic Right Rotation WNL   Thoracic Left Rotation WNL   Shoulder AROM Screen full flexion, abduction, ER and IR elliot; slight increase in discomfort along right posterior scapula at end range flexion and abduction and most with IR   Cervical/Thoracic/Shoulder ROM Comments repeated cervical protractionss: pinching along R superior scapula, repeated cervical retractions: tightness along posterior neck   Shoulder Shrug (C2-C4) Strength 5/5   Shoulder Abd (C5) Strength 5/5   Shoulder ER (C5, C6) Strength 5/5   Shoulder IR (C5, C6) Strength 5/5   Elbow Flexion (C5, C6) Strength 5/5   Elbow Extension (C7) Strength 5/5   Wrist Extension (C6) Strength 5/5   Wrist Flexion (C7) Strength 5/5   Thumb Abd  (C8) Strength 5/5   5th Finger Add (T1) Strength 5/5   Upper Trapezius Flexibility min loss R>L   Levator Scapula Flexibility min loss R>L   Scalene Flexibility WNL   Pectoralis Minor Flexibility min loss elliot   Spurling Test negative   Cervical Distraction Test positive   Neck Flexor Endurance Test (normal 39 sec) able to maintain 30 seconds   Neer Impingement Test positive on the right   Albert Impingement Test positive on the right   Segmental Mobility-Cervical hypomobile   Segmental Mobility-Thoracic hypomobile   Palpation increased tenderness throughout right upper trapezius, rhomboids and levator scapula   Dermatome/Sensory Testing intact   UE Neural Tension UE Neural Tension Tests   Posture FHRS   ULTT II (Median and Musculocutaneous and Axillary) Positive   Shoulder Objective Findings   Scapulothoracic Rhythm repeated shoulder flexion and abduction with 3lb weights: WNL   Accessory Motion/Joint Mobility GHJ mobility WNL   Planned Therapy Interventions   Planned Therapy Interventions joint mobilization;manual therapy;neuromuscular re-education;ROM;strengthening;stretching   Clinical Impression   Criteria for Skilled Therapeutic Interventions Met yes, treatment indicated   PT Diagnosis cervical radiculopathy, myofascial restrictions   Influenced by the following impairments posture, ergonomics, increased guarding   Functional limitations due to impairments play softball, perform overhead ADL's and job duties   Clinical Presentation Stable/Uncomplicated   Clinical Decision Making (Complexity) Low complexity   Therapy Frequency 1 time/week   Predicted Duration of Therapy Intervention (days/wks) 8-10 visits over 12 weeks   Risk & Benefits of therapy have been explained Yes   Patient, Family & other staff in agreement with plan of care Yes   Clinical Impression Comments Pt presents with 6 month history of progressively worsening right shoulder/scapular pain. Signs and symptoms are consistent with cervical  radiculopathy with increased neural tension. Myofascial restrictions throughout the shoulder and scapula are likely secondary to guarding due to pain. Pt would benefit from skilled physical therapy to address limitations to faciliate maximal return to PLOF.   ORTHO GOALS   PT Ortho Eval Goals 1;2;3   Ortho Goal 1   Goal Identifier HEP   Goal Description pt will demonstrate independence and report compliance with HEP to facilitate improved independent symptom mgmt.   Target Date 11/23/22   Ortho Goal 2   Goal Identifier sleep   Goal Description pt will report ability to sleep throughout the night without waking due to pain to facilitate improved QOL.   Target Date 11/23/22   Ortho Goal 3   Goal Identifier mobility   Goal Description pt will report ability to perform overhead household chores or work tasks for greater than or equal to 5 minutes without onset of right shoulder/scapular pain.   Target Date 11/23/22   Total Evaluation Time   PT Eval, Low Complexity Minutes (31349) 40     Tatiana Bacon, PT

## 2022-10-19 ENCOUNTER — ANESTHESIA EVENT (OUTPATIENT)
Dept: SURGERY | Facility: AMBULATORY SURGERY CENTER | Age: 47
End: 2022-10-19
Payer: COMMERCIAL

## 2022-10-20 ENCOUNTER — ANESTHESIA (OUTPATIENT)
Dept: SURGERY | Facility: AMBULATORY SURGERY CENTER | Age: 47
End: 2022-10-20
Payer: COMMERCIAL

## 2022-10-20 ENCOUNTER — HOSPITAL ENCOUNTER (OUTPATIENT)
Facility: AMBULATORY SURGERY CENTER | Age: 47
Discharge: HOME | End: 2022-10-20
Attending: FAMILY MEDICINE
Payer: COMMERCIAL

## 2022-10-20 VITALS
DIASTOLIC BLOOD PRESSURE: 60 MMHG | BODY MASS INDEX: 35.52 KG/M2 | TEMPERATURE: 97.3 F | RESPIRATION RATE: 15 BRPM | WEIGHT: 221 LBS | SYSTOLIC BLOOD PRESSURE: 135 MMHG | OXYGEN SATURATION: 100 % | HEIGHT: 66 IN | HEART RATE: 78 BPM

## 2022-10-20 PROCEDURE — 45378 DIAGNOSTIC COLONOSCOPY: CPT | Performed by: FAMILY MEDICINE

## 2022-10-20 RX ORDER — ONDANSETRON 4 MG/1
4 TABLET, ORALLY DISINTEGRATING ORAL EVERY 30 MIN PRN
Status: DISCONTINUED | OUTPATIENT
Start: 2022-10-20 | End: 2022-10-21 | Stop reason: HOSPADM

## 2022-10-20 RX ORDER — FENTANYL CITRATE 0.05 MG/ML
25 INJECTION, SOLUTION INTRAMUSCULAR; INTRAVENOUS
Status: DISCONTINUED | OUTPATIENT
Start: 2022-10-20 | End: 2022-10-21 | Stop reason: HOSPADM

## 2022-10-20 RX ORDER — MEPERIDINE HYDROCHLORIDE 25 MG/ML
12.5 INJECTION INTRAMUSCULAR; INTRAVENOUS; SUBCUTANEOUS
Status: DISCONTINUED | OUTPATIENT
Start: 2022-10-20 | End: 2022-10-21 | Stop reason: HOSPADM

## 2022-10-20 RX ORDER — SODIUM CHLORIDE, SODIUM LACTATE, POTASSIUM CHLORIDE, CALCIUM CHLORIDE 600; 310; 30; 20 MG/100ML; MG/100ML; MG/100ML; MG/100ML
INJECTION, SOLUTION INTRAVENOUS CONTINUOUS
Status: DISCONTINUED | OUTPATIENT
Start: 2022-10-20 | End: 2022-10-21 | Stop reason: HOSPADM

## 2022-10-20 RX ORDER — ONDANSETRON 2 MG/ML
4 INJECTION INTRAMUSCULAR; INTRAVENOUS EVERY 30 MIN PRN
Status: DISCONTINUED | OUTPATIENT
Start: 2022-10-20 | End: 2022-10-21 | Stop reason: HOSPADM

## 2022-10-20 RX ORDER — PROPOFOL 10 MG/ML
INJECTION, EMULSION INTRAVENOUS PRN
Status: DISCONTINUED | OUTPATIENT
Start: 2022-10-20 | End: 2022-10-20

## 2022-10-20 RX ORDER — FENTANYL CITRATE 0.05 MG/ML
25 INJECTION, SOLUTION INTRAMUSCULAR; INTRAVENOUS EVERY 5 MIN PRN
Status: DISCONTINUED | OUTPATIENT
Start: 2022-10-20 | End: 2022-10-21 | Stop reason: HOSPADM

## 2022-10-20 RX ORDER — ONDANSETRON 2 MG/ML
INJECTION INTRAMUSCULAR; INTRAVENOUS PRN
Status: DISCONTINUED | OUTPATIENT
Start: 2022-10-20 | End: 2022-10-20

## 2022-10-20 RX ORDER — LIDOCAINE HYDROCHLORIDE 20 MG/ML
INJECTION, SOLUTION INFILTRATION; PERINEURAL PRN
Status: DISCONTINUED | OUTPATIENT
Start: 2022-10-20 | End: 2022-10-20

## 2022-10-20 RX ORDER — PROPOFOL 10 MG/ML
INJECTION, EMULSION INTRAVENOUS CONTINUOUS PRN
Status: DISCONTINUED | OUTPATIENT
Start: 2022-10-20 | End: 2022-10-20

## 2022-10-20 RX ORDER — LIDOCAINE 40 MG/G
CREAM TOPICAL
Status: DISCONTINUED | OUTPATIENT
Start: 2022-10-20 | End: 2022-10-21 | Stop reason: HOSPADM

## 2022-10-20 RX ORDER — GLYCOPYRROLATE 0.2 MG/ML
INJECTION, SOLUTION INTRAMUSCULAR; INTRAVENOUS PRN
Status: DISCONTINUED | OUTPATIENT
Start: 2022-10-20 | End: 2022-10-20

## 2022-10-20 RX ADMIN — PROPOFOL 40 MG: 10 INJECTION, EMULSION INTRAVENOUS at 12:18

## 2022-10-20 RX ADMIN — PROPOFOL 30 MG: 10 INJECTION, EMULSION INTRAVENOUS at 12:26

## 2022-10-20 RX ADMIN — PROPOFOL 10 MG: 10 INJECTION, EMULSION INTRAVENOUS at 12:21

## 2022-10-20 RX ADMIN — LIDOCAINE HYDROCHLORIDE 3 ML: 20 INJECTION, SOLUTION INFILTRATION; PERINEURAL at 12:18

## 2022-10-20 RX ADMIN — PROPOFOL 20 MG: 10 INJECTION, EMULSION INTRAVENOUS at 12:20

## 2022-10-20 RX ADMIN — PROPOFOL 200 MCG/KG/MIN: 10 INJECTION, EMULSION INTRAVENOUS at 12:18

## 2022-10-20 RX ADMIN — SODIUM CHLORIDE, SODIUM LACTATE, POTASSIUM CHLORIDE, CALCIUM CHLORIDE: 600; 310; 30; 20 INJECTION, SOLUTION INTRAVENOUS at 12:10

## 2022-10-20 RX ADMIN — ONDANSETRON 4 MG: 2 INJECTION INTRAMUSCULAR; INTRAVENOUS at 12:23

## 2022-10-20 RX ADMIN — GLYCOPYRROLATE 0.2 MG: 0.2 INJECTION, SOLUTION INTRAMUSCULAR; INTRAVENOUS at 12:18

## 2022-10-20 RX ADMIN — PROPOFOL 20 MG: 10 INJECTION, EMULSION INTRAVENOUS at 12:23

## 2022-10-20 NOTE — OP NOTE
COLONOSCOPY OPERATION REPORT     OPERATION PERFORMED:  Colonoscopy with MAC  DATE OF OPERATION: 20 October 2022  SURGEON(S): Pierce Pablo III, MD, FAAFP    ASSISTANT(S): Aaron Yu MD    Preoperative Diagnosis: Colorectal Cancer Screen  (Z12.11), Family history of polyps    Postoperative Diagnosis: Normal Colon, internal hemorrhoids     History:    47yof here for colonoscopy. Had a colonoscopy 10+ years ago for pain (we think) that was reported to be normal. Report not available. Denies symptoms. Mother had large polyps removed from her colon.        Shortly Before Procedure  Time out was completed. Correct patient ID, procedure verified, positioning verified, implants/equipment available, studies available as needed. Consents signed and on the chart. Emergency resuscitation equipment available if needed. TYPE OF ANESTHESIA:  MAC. Patient monitored in the usual fashion with pulse ox, NIBP, and EKG. See flowsheet for full details.      DESCRIPTION OF OPERATION:  Assuring patient comfort, a rectal exam revealed normal sphincter tone, no masses, no stool in the rectal vault. The video colonoscope was passed from anus to cecum under direct visualization with expected amount of difficulty. The cecum was identified by the ileocecal valve, appendiceal orifice, and crows foot. Mucosa was inspected > 6 minute scope withdrawal.         Findings   Prep:    Excellent   EBL:                     < 5 mL   Terminal Ileum: Normal appearance   Cecum:                     Normal appearance.   Ascending Colon:    Normal appearance.       Transverse Colon:   Normal appearance.        Desc. Colon: Normal appearance.       Sigmoid Colon:        Normal appearance.        Rectum:                    Moderate internal hemorrhoids       Complications: None.     Polyps submitted:                     0   Diverticulosis:    None   Internal Hemorrhoids: Moderate   External Hemorrhoids: None       TIMES time  min   PROCEDURE START  1227 TOTAL PROCEDURE TIME 20   CECUM REACHED 1231 TIME TO CECUM 4   PROCEDURE STOP 1247 WITHDRAWAL TIME 16       DISPOSITION   Follow up/Repeat Colonoscopy:                     Repeat colonoscopy in 5 years for family history.             Pierce Pablo III, MD, FAAFP

## 2022-10-20 NOTE — LETTER
10/20/22      Mindi Eastman  [address]  [place] [address]    : 1975    Mindi Eastman,    The results from your colonoscopy showed a normal colon with some internal hemorrhoids. Because of your mother's history of large polyps, current guidelines recommend repeat exam in 5 years for screening colonoscopy (in the year ).    Thank you coming to see me for your colonoscopy. Please let me know if there is any further information that you need.    Dr. Bev Pablo III, MD, FAAFP  Faculty Physician, [facility]    Department of Family Medicine and Community Health  [facility] [employer]    Office: [phone]

## 2022-10-20 NOTE — DISCHARGE INSTRUCTIONS
Colonoscopy Discharge Instructions  When You Leave Today:  The medications you received today may affect your short-term memory, balance/coordination, and reflexes. It may cause drowsiness, slow reflexes, and impaired thoughts. We recommend that you go home and rest for the remainder of the day. Do NOT DRIVE, go back to work or do anything that requires a clear thought process. Examples include but are not limited to: Do NOT drive, operate any machines, make important personal or work decisions, or sign legal documentation for 24 hours.      You may feel bloated because of the air that was introduced during the exam. Flatulence and belching is expected. If you feel like the air isn't coming out easily you can try laying on your right side to help the air move in the correct direction. Holding in the air can cause abdominal pain, cramping, and in some cases nausea. Let the gas pass!!    You may eat and drink what you can tolerate after your exam.  We recommend that you start with something light and progress as tolerated. You will be gassy, so avoiding things that create more gas is beneficial. This includes things like beans, carbonated beverages, and whole vegetables like broccoli/brussel sprouts/cabbage.     AVOID alcohol for 24 hours. This could have an adverse reaction mixed with the sedative.       You may resume your normal home medications unless told otherwise. Do not double up on any missed doses.        Avoid Aspirin and Aspirin containing products for 3 days if biopsies were taken. This will allow the areas to heal and will decrease your risk of bleeding.      Watch the area where your IV was inserted. If there is any swelling, severe pain or the area feels hot, place a warm, wet cloth over the area for 10-20 minutes. A small bruise is normal. If you continue to have discomfort, contact your doctor.      If you use a CPAP at home, please use it for the next 24-48 hours.    If any of the following  occur, please go to your CLOSEST EMERGENCY ROOM.    Increasing abdominal pain (if you are unable to pass gas or doubled over in pain)  Fever of 101.5 degrees or higher  Bleeding (a small amount of bleeding is normal when wiping if you have hemorrhoids or we remove tissue samples. However if you are filling the toilet with bright red blood, this is an emergency)    Call the [facility] [phone] if you have any questions or concerns regarding your procedure.  The [facility] is open from 8am to 5pm Monday through Friday.  DO not come to the clinic for severe post procedure complications, go to your closest ER.     Colonoscopy:     Biopsies Taken    Normal- Follow up 5 years due to family history of polyps (unless condition changes or treatment guidelines change)    Diverticulosis   X Hemorrhoids X Internal  External   X High Fiber Diet           Other _______________________________________________________________    If biopsies were taken:  A letter will be mailed to you with your biopsy results and further recommendations in approximately   1-2 weeks.  If you have not received a letter within 3 weeks, please call the [facility] [phone].

## 2022-10-20 NOTE — ANESTHESIA POSTPROCEDURE EVALUATION
Patient: Mindi Eastman    Procedure: Procedure(s):  COLONOSCOPY       Anesthesia Type:  MAC    Note:  Disposition: Outpatient   Postop Pain Control: Uneventful            Sign Out: Well controlled pain   PONV: No   Neuro/Psych: Uneventful            Sign Out: Acceptable/Baseline neuro status   Airway/Respiratory: Uneventful            Sign Out: Acceptable/Baseline resp. status   CV/Hemodynamics: Uneventful            Sign Out: Acceptable CV status; No obvious hypovolemia; No obvious fluid overload   Other NRE: NONE   DID A NON-ROUTINE EVENT OCCUR?            Last vitals:  Vitals Value Taken Time   /60 10/20/22 1300   Temp 97.3  F (36.3  C) 10/20/22 1251   Pulse 73 10/20/22 1312   Resp 15 10/20/22 1251   SpO2 99 % 10/20/22 1312   Vitals shown include unvalidated device data.    Electronically Signed By: Xochilt Orourke MD  October 20, 2022  1:45 PM

## 2022-10-20 NOTE — PROGRESS NOTES
10/20/22    RE: MINDI EASTMAN, : 1975    Eula Gaming    I have performed the colonoscopy on Mindi Eastman and found moderate internal hemorrhoids and an otherwise normal colon. With her mother's history of large polyps, current guidelines recommend repeat exam in 5 years for screening colonoscopy (in the year ).    Your patient has been informed of the results and recommended follow up plan.    Thank you for the referral. Please let me know if there is any further information that you need.    Very Respectfully,    Yoav Pablo III, MD, FAAFP  Faculty Physician, [facility] Medicine [facility]    Department of Family Medicine and Community Health  [employer]    Office: [phone]

## 2022-10-20 NOTE — ANESTHESIA CARE TRANSFER NOTE
Patient: Mindi Eastman    Procedure: Procedure(s):  COLONOSCOPY       Diagnosis: Colon cancer screening [Z12.11]  Diagnosis Additional Information: No value filed.    Anesthesia Type:   MAC     Note:    Oropharynx: oropharynx clear of all foreign objects and spontaneously breathing  Level of Consciousness: awake  Oxygen Supplementation: room air    Independent Airway: airway patency satisfactory and stable  Dentition: dentition unchanged  Vital Signs Stable: post-procedure vital signs reviewed and stable  Report to RN Given: handoff report given  Patient transferred to: Phase II    Handoff Report: Identifed the Patient, Identified the Reponsible Provider, Reviewed the pertinent medical history, Discussed the surgical course, Reviewed Intra-OP anesthesia mangement and issues during anesthesia, Set expectations for post-procedure period and Allowed opportunity for questions and acknowledgement of understanding      Vitals:  Vitals Value Taken Time   /69 10/20/22 1251   Temp 97.3  F (36.3  C) 10/20/22 1251   Pulse 74 10/20/22 1251   Resp 15 10/20/22 1251   SpO2 100 % 10/20/22 1251       Electronically Signed By: BEULAH Dumont CRNA  October 20, 2022  12:54 PM

## 2022-10-20 NOTE — ANESTHESIA PREPROCEDURE EVALUATION
Anesthesia Pre-Procedure Evaluation    Patient: Mindi Eastman   MRN: [id number] : 1975        Procedure : Procedure(s):  COLONOSCOPY          Past Medical History:   Diagnosis Date     ADHD      Anxiety      Bipolar disorder (H)      Depressive disorder      OCD (obsessive compulsive disorder)      ROXANNE (obstructive sleep apnea)     On CPAP     Seasonal allergies       Past Surgical History:   Procedure Laterality Date     APPENDECTOMY       CARPAL TUNNEL RELEASE RT/LT Bilateral      CHOLECYSTECTOMY       EXTRACTION(S) DENTAL      [place] Teeth     HC KNEE SCOPE,MED/LAT MENISCUS REPAIR Right     X 2     LEEP TX, CERVICAL  2005    ERIC II     TONSILLECTOMY        Allergies   Allergen Reactions     Darvocet [Propoxyphene N-Apap]       Social History     Tobacco Use     Smoking status: Every Day     Types: Cigarettes     Last attempt to quit: 2010     Years since quittin.6     Smokeless tobacco: Never     Tobacco comments:     e cig    Substance Use Topics     Alcohol use: Yes     Comment: occ      Wt Readings from Last 1 Encounters:   08/15/22 100.2 kg (221 lb)        Anesthesia Evaluation   Pt has had prior anesthetic.     No history of anesthetic complications       ROS/MED HX  ENT/Pulmonary:     (+) sleep apnea, uses CPAP,     Neurologic:  - neg neurologic ROS     Cardiovascular:  - neg cardiovascular ROS  (-) hypertension   METS/Exercise Tolerance:     Hematologic:  - neg hematologic  ROS     Musculoskeletal:       GI/Hepatic: Comment: S/f screening colonoscopy    (+) bowel prep,     Renal/Genitourinary:  - neg Renal ROS     Endo:     (+) thyroid problem, hypothyroidism, Obesity (BMI 36),     Psychiatric/Substance Use:     (+) psychiatric history anxiety, depression and bipolar     Infectious Disease:  - neg infectious disease ROS     Malignancy:  - neg malignancy ROS     Other:      (+) , H/O Chronic Pain (fibromyalgia),        Physical Exam    Airway        Mallampati: II   TM distance:  > 3 FB   Neck ROM: full   Mouth opening: < 3 cm    Respiratory Devices and Support         Dental  no notable dental history         Cardiovascular   cardiovascular exam normal          Pulmonary   pulmonary exam normal                OUTSIDE LABS:  CBC:   Lab Results   Component Value Date    WBC 8.8 03/18/2022    WBC 11.1 (H) 02/25/2020    HGB 13.3 03/18/2022    HGB 12.6 02/25/2020    HCT 40.2 03/18/2022    HCT 38.5 02/25/2020     03/18/2022     (H) 02/25/2020     BMP:   Lab Results   Component Value Date     03/18/2022     02/25/2020    POTASSIUM 3.8 03/18/2022    POTASSIUM 4.1 02/25/2020    CHLORIDE 102 03/18/2022    CHLORIDE 103 02/25/2020    CO2 26 03/18/2022    CO2 20 (L) 02/25/2020    BUN 9 03/18/2022    BUN 9 02/25/2020    CR 0.96 03/18/2022    CR 0.79 02/25/2020     (H) 08/30/2022     03/18/2022     COAGS:   Lab Results   Component Value Date    PTT 38 (H) 06/06/2011    INR 0.99 06/06/2011     POC:   Lab Results   Component Value Date    HCG Negative 06/16/2019    HCGS Negative 06/06/2011     HEPATIC:   Lab Results   Component Value Date    ALBUMIN 4.0 06/17/2019    PROTTOTAL 7.4 06/17/2019    ALT 21 06/17/2019    AST 23 06/17/2019    ALKPHOS 86 06/17/2019    BILITOTAL 0.7 06/17/2019     OTHER:   Lab Results   Component Value Date    GALO 9.6 03/18/2022    TSH 3.14 08/30/2022       Anesthesia Plan    ASA Status:  2   NPO Status:  NPO Appropriate    Anesthesia Type: MAC.     - Reason for MAC: straight local not clinically adequate   Induction: Intravenous, Propofol.   Maintenance: TIVA.        Consents    Anesthesia Plan(s) and associated risks, benefits, and realistic alternatives discussed. Questions answered and patient/representative(s) expressed understanding.    - Discussed:     - Discussed with:  Patient         Postoperative Care       PONV prophylaxis: Ondansetron (or other 5HT-3), Background Propofol Infusion, Dexamethasone or Solumedrol      Comments:    Other Comments: Plan for propofol gtt  Discussed potential need to convert to GA w/ ETT vs LMA if not tolerating.  Explained that it is possible to remember certain aspects of the OR experience during MAC dependent on the depth of sedation and patient understands this.  Decadron and zofran for PONV ppx.            Xochilt Orourke MD   No

## 2022-10-21 ENCOUNTER — HOSPITAL ENCOUNTER (OUTPATIENT)
Dept: PHYSICAL THERAPY | Facility: REHABILITATION | Age: 47
Discharge: HOME | End: 2022-10-21
Payer: COMMERCIAL

## 2022-10-21 DIAGNOSIS — M54.9 UPPER BACK PAIN ON RIGHT SIDE: Primary | ICD-10-CM

## 2022-10-21 PROCEDURE — 97140 MANUAL THERAPY 1/> REGIONS: CPT | Mod: GP

## 2022-10-21 PROCEDURE — 97110 THERAPEUTIC EXERCISES: CPT | Mod: GP

## 2022-10-28 ENCOUNTER — TELEPHONE (OUTPATIENT)
Dept: PHYSICAL THERAPY | Facility: REHABILITATION | Age: 47
End: 2022-10-28

## 2022-10-28 NOTE — TELEPHONE ENCOUNTER
I called Mindi and left a message regarding her missed appointment today and reminding her of her next scheduled appointment (11/10/22 at 2:45 p.m.).

## 2022-11-02 NOTE — PROGRESS NOTES
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 time of assessment.   Information in this assessment was obtained from the medical record and provided by patient who is a good historian. Patient will have open access to their mental health medical record.        Provider Name/ Credentials:  Vida SCOTT [facility]  November 3, 2022

## 2022-11-03 ENCOUNTER — VIRTUAL VISIT (OUTPATIENT)
Dept: PSYCHIATRY | Facility: CLINIC | Age: 47
End: 2022-11-03
Attending: FAMILY MEDICINE
Payer: COMMERCIAL

## 2022-11-03 ENCOUNTER — VIRTUAL VISIT (OUTPATIENT)
Dept: BEHAVIORAL HEALTH | Facility: CLINIC | Age: 47
End: 2022-11-03
Payer: COMMERCIAL

## 2022-11-03 DIAGNOSIS — F41.1 GAD (GENERALIZED ANXIETY DISORDER): ICD-10-CM

## 2022-11-03 DIAGNOSIS — F90.9 ATTENTION DEFICIT HYPERACTIVITY DISORDER (ADHD), UNSPECIFIED ADHD TYPE: ICD-10-CM

## 2022-11-03 DIAGNOSIS — F41.1 GENERALIZED ANXIETY DISORDER: ICD-10-CM

## 2022-11-03 DIAGNOSIS — F31.9 BIPOLAR I DISORDER (H): Primary | ICD-10-CM

## 2022-11-03 DIAGNOSIS — F43.10 PTSD (POST-TRAUMATIC STRESS DISORDER): ICD-10-CM

## 2022-11-03 DIAGNOSIS — G47.00 INSOMNIA, UNSPECIFIED TYPE: ICD-10-CM

## 2022-11-03 DIAGNOSIS — F31.9 BIPOLAR 1 DISORDER (H): Primary | ICD-10-CM

## 2022-11-03 DIAGNOSIS — F90.2 ATTENTION DEFICIT HYPERACTIVITY DISORDER (ADHD), COMBINED TYPE: ICD-10-CM

## 2022-11-03 PROCEDURE — 90791 PSYCH DIAGNOSTIC EVALUATION: CPT | Mod: 95 | Performed by: SOCIAL WORKER

## 2022-11-03 PROCEDURE — 99204 OFFICE O/P NEW MOD 45 MIN: CPT | Mod: 95 | Performed by: PSYCHIATRY & NEUROLOGY

## 2022-11-03 RX ORDER — ZOLPIDEM TARTRATE 10 MG/1
10 TABLET ORAL
Qty: 30 TABLET | Refills: 1 | Status: SHIPPED | OUTPATIENT
Start: 2022-11-26 | End: 2022-11-08

## 2022-11-03 RX ORDER — DEXTROAMPHETAMINE SACCHARATE, AMPHETAMINE ASPARTATE, DEXTROAMPHETAMINE SULFATE AND AMPHETAMINE SULFATE 7.5; 7.5; 7.5; 7.5 MG/1; MG/1; MG/1; MG/1
30 TABLET ORAL 2 TIMES DAILY
Qty: 60 TABLET | Refills: 0 | Status: SHIPPED | OUTPATIENT
Start: 2022-12-01 | End: 2022-11-29

## 2022-11-03 RX ORDER — LORAZEPAM 1 MG/1
1 TABLET ORAL 2 TIMES DAILY PRN
Qty: 30 TABLET | Refills: 1 | Status: SHIPPED | OUTPATIENT
Start: 2022-11-26 | End: 2023-05-08

## 2022-11-03 RX ORDER — DEXTROAMPHETAMINE SACCHARATE, AMPHETAMINE ASPARTATE, DEXTROAMPHETAMINE SULFATE AND AMPHETAMINE SULFATE 5; 5; 5; 5 MG/1; MG/1; MG/1; MG/1
TABLET ORAL
Qty: 27 TABLET | Refills: 0 | Status: SHIPPED | OUTPATIENT
Start: 2022-11-03 | End: 2022-11-08

## 2022-11-03 RX ORDER — DEXTROAMPHETAMINE SACCHARATE, AMPHETAMINE ASPARTATE, DEXTROAMPHETAMINE SULFATE AND AMPHETAMINE SULFATE 7.5; 7.5; 7.5; 7.5 MG/1; MG/1; MG/1; MG/1
30 TABLET ORAL 2 TIMES DAILY
Qty: 60 TABLET | Refills: 0 | Status: SHIPPED | OUTPATIENT
Start: 2022-12-31 | End: 2022-12-28

## 2022-11-03 RX ORDER — ESCITALOPRAM OXALATE 20 MG/1
40 TABLET ORAL DAILY
Qty: 180 TABLET | Refills: 0 | Status: SHIPPED | OUTPATIENT
Start: 2022-11-03 | End: 2023-01-30

## 2022-11-03 RX ORDER — QUETIAPINE FUMARATE 100 MG/1
100 TABLET, FILM COATED ORAL AT BEDTIME
Qty: 90 TABLET | Refills: 0 | Status: SHIPPED | OUTPATIENT
Start: 2022-11-03 | End: 2023-01-30

## 2022-11-03 RX ORDER — CLONIDINE HYDROCHLORIDE 0.1 MG/1
.05-.1 TABLET ORAL 2 TIMES DAILY
Qty: 60 TABLET | Refills: 1 | Status: SHIPPED | OUTPATIENT
Start: 2022-11-03 | End: 2022-12-26

## 2022-11-03 NOTE — Clinical Note
Just FYI. No action needed.   Jenelle Holbrook, DO [facility]

## 2022-11-03 NOTE — PROGRESS NOTES
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    National Suicide Prevention Lifeline: 473.692.8942 (TTY: 414.279.4837). Call anytime for help.  (www.suicidepreventionlifeline.org)  National [place] on Mental Illness (www.petra.org): 592.860.6550 or 745-780-3074.   Mental Health Association (www.mentalhealth.org): 624.434.7568 or 462-601-7369.  [place] Crisis Text [place]: Text [place] to 291434  Suicide LifeLine Chat: suicideSpinomix.org/chat    Administrative Billing:   Phone Call/Video Duration: 32 Minutes  Start: 3:38p  Stop: 4:10p    Patient Status:  Patient will continue to be seen for ongoing consultation and stabilization.    Signed:   Jenelle Holbrook DO  [facility]    Disclaimer: This note consists of symbols derived from keyboarding, dictation and/or voice recognition software. As a result, there may be errors in the script that have gone undetected. Please consider this when interpreting information found in this chart.  "

## 2022-11-04 NOTE — PATIENT INSTRUCTIONS
Treatment Plan:  Continue Ambien/zolpidem 10 mg at bedtime as needed for sleep.  Do not mix with alcohol or your lorazepam.  Make sure you have at least 8-10 hours to devote to sleep before driving and operating heavy machinery the next morning.  Continue quetiapine/Seroquel 100 mg at bedtime for bipolar disorder diagnosis.  Continue lorazepam/Ativan 1 mg up to twice daily for severe anxiety/panic attacks.  30 tablets to last at least 30 days.  Increase Adderall back to your original dose of 30 mg twice daily for ADHD.  Increase Lexapro/escitalopram back to your original dose of 40 mg daily for anxiety and mood.  Start clonidine 0.0 5- 0.1 mg twice daily as needed for anxiety and trauma related symptoms.  Use this to try and lessen your reliance on lorazepam and Ambien.  I strongly recommend individual psychotherapy.  Also discussed acceptance and commitment therapy, DBT, and accelerated resolution therapy or EMDR as additional therapy options.  Continue all other cares per primary care provider.   Continue all other medications as reviewed per electronic medical record today.   Safety plan reviewed. To the Emergency Department as needed or call after hours crisis line at [phone] or [phone]. [place] Crisis Text [place]: Text [place] to 312904  or  Suicide LifeLine Chat: suicidepreventionlifeline.org/chat  Schedule an appointment with me in 6 weeks or sooner as needed.  Call [facility] at [phone] to schedule.  Follow up with primary care provider as planned or sooner if needed for acute medical concerns.  Call the psychiatric nurse line with medication questions or concerns at [phone].  The [employer] may be used to communicate with your provider, but this is not intended to be used for emergencies.    Patient Education:  Discussed risks of stimulant medication including, but not limited to, decreased appetite, risk of tics (and that they may be lasting), trouble sleeping, cardiac risks such  as increased heart rate and blood pressure, and rare risk of sudden cardiac death.  Also risk of addiction/tolerance/dependence.     Risks of benzodiazepine (Ativan, Xanax, Klonopin, Valium, etc) use including, but not limited to, sedation, tolerance, risk for addiction/dependence. Do not drink alcohol while taking benzodiazepines due to risk of trouble breathing and potential death. Do not drive or operate heavy machinery until it is known how the drug affects you. Discuss with physician or pharmacist before ever taking a benzodiazepine with a narcotic/opioid pain medication.      Serotonin syndrome (SS) has occurred with serotonergic antidepressants (eg, SSRIs, SNRIs), particularly when used in combination with other serotonergic agents (eg, triptans, TCAs, fentanyl, lithium, tramadol, buspirone, Shira's wort, tryptophan) or agents that impair metabolism of serotonin (eg, MAO inhibitors intended to treat psychiatric disorders, other MAO inhibitors [ie, linezolid and intravenous methylene blue]). Antipsychotic Agents may also enhance the serotonergic effect of Serotonin Modulators.Signs and symptoms include: agitation or restlessness, confusion, rapid heart rate and high blood pressure, dilated pupils, loss of muscle coordination or twitching muscles, heavy sweating, diarrhea, headaches, shivering and/or goose bumps.  Patient was educated on signs and symptoms of serotonin syndrome.  Symptoms typically occur within several hours of taking a new drug or increasing the dose.  Patient was notified to report symptoms immediately.     Get Out of Your Mind & Into Your Life   By Edenilson Merlos    The Happiness Trap: How to Stop Struggling and Start Living  By Leonides Rojas    The Reality Slap: Finding Peace & Fulfillment When Life Hurts  By Leonides Rojas    Things Might Go Terribly, Horribly Wrong: A Guide to Life Liberated from Anxiety  By Kaylie Orourke, PhD    Stress Less, Live More: How Acceptance and Commitment Therapy  "Can Help You Live a Busy Yet Balanced Life  By Chemo Miner    Finding Life Beyond Trauma: Using Acceptance and Commitment Therapy to Heal From Post-Traumatic Stress and Trauma-Related Problems  By Hyacinth Nicole and Zakiya Weems    Other ACT Skills References     Leonides Rojas on YouTube - Demonstrates several different skills to deal with difficult thoughts and feelings     Book:  \"A Liberated Mind: How to Pivot Toward What Matters\" by Edenilson Merlos     Psychological flexibility: How love turns pain into purpose  Tedx Talk by Dr. Merlos discussing his struggle with anxiety and panic disorder which motivated him to develop ACT therapy (Acceptance and Commitment Therapy)     You Are Not Your Thoughts    Community Resources:    National Suicide Prevention Lifeline: 168.933.4669 (TTY: 646.460.7342). Call anytime for help.  (www.suicidepreventionlifeline.org)  National [place] on Mental Illness (www.petra.org): 535.240.2152 or 951-334-4088.   Mental Health Association (www.mentalhealth.org): 502.157.6564 or 936-463-5407.  [place] Crisis [place]: Text [place] to 377412  Suicide LifeLine Chat: suicidepreSmashburgerline.org/chat  "

## 2022-11-07 ENCOUNTER — TELEPHONE (OUTPATIENT)
Dept: PSYCHIATRY | Facility: CLINIC | Age: 47
End: 2022-11-07

## 2022-11-07 DIAGNOSIS — F90.9 ATTENTION DEFICIT HYPERACTIVITY DISORDER (ADHD), UNSPECIFIED ADHD TYPE: ICD-10-CM

## 2022-11-07 ASSESSMENT — COLUMBIA-SUICIDE SEVERITY RATING SCALE - C-SSRS
TOTAL  NUMBER OF ABORTED OR SELF INTERRUPTED ATTEMPTS LIFETIME: NO
ATTEMPT LIFETIME: NO
REASONS FOR IDEATION LIFETIME: MOSTLY TO END OR STOP THE PAIN (YOU COULDN'T GO ON LIVING WITH THE PAIN OR HOW YOU WERE FEELING)
2. HAVE YOU ACTUALLY HAD ANY THOUGHTS OF KILLING YOURSELF?: NO
1. HAVE YOU WISHED YOU WERE DEAD OR WISHED YOU COULD GO TO SLEEP AND NOT WAKE UP?: YES
6. HAVE YOU EVER DONE ANYTHING, STARTED TO DO ANYTHING, OR PREPARED TO DO ANYTHING TO END YOUR LIFE?: NO
TOTAL  NUMBER OF INTERRUPTED ATTEMPTS LIFETIME: NO
REASONS FOR IDEATION PAST MONTH: MOSTLY TO END OR STOP THE PAIN (YOU COULDN'T GO ON LIVING WITH THE PAIN OR HOW YOU WERE FEELING)
1. IN THE PAST MONTH, HAVE YOU WISHED YOU WERE DEAD OR WISHED YOU COULD GO TO SLEEP AND NOT WAKE UP?: NO

## 2022-11-07 NOTE — TELEPHONE ENCOUNTER
Pt already has 20 mg twice daily supply at home. Pt is to add 10 mg twice daily (half-tab) to each of her existing 20 mg twice daily doses for total dose of 30 mg twice daily. The Rx appears correct (and pt already has the 20 mg supply at home. The next Rxs to follow are written for the 30 mg twice daily.     I don't know if prior auth has been sent or not yet for Ambien. Can RN check on this please? Thanks!

## 2022-11-07 NOTE — CONFIDENTIAL NOTE
"Returned call to pharmacy. Pharmacist needs clarification on the Adderall order. Provider sent a prescription for 20mg tablets and the sig is \"Take 10 mg twice daily WITH the 20 mg twice daily tabs for total dose of 30 mg twice daily.\" Pharmacist asked that a prescription for a 10mg tablet be sent also; or the sig be amended. Pharmacist said that the provider sent a prescription to be filled on 12/1/22 for a 30mg tablet and wanted to know if the provider would just like a 30mg tablet to be dispensed for this month.     Provider to provide clarification or new order.     Asked pharmacist to resend the PA request.   "

## 2022-11-07 NOTE — TELEPHONE ENCOUNTER
11/7/22: Reason for call:  Medication   If this is a refill request, has the caller requested the refill from the pharmacy already? N/A  Will the patient be using a [facility]? Yes  Name of the pharmacy and phone number for the current request: [facility] [phone]    Name of the medication requested: Clarification needed on Adderall medication and confirmation that prior auth was received for Zolpidem.     Other request: Pharmacy stated that pt has been on 20 mg of Adderall, and they think the intent was to send over an rx for an additional 10 mg to increase dosage to 30 mg, but instead they just received rx for 10 mg. Asking to clarify intended dosage for pt.     Pt's insurance only covers a certain amount of the Zolpidem monthly, asking to confirm that pre-auth request was received for this medication.    Phone number to reach patient:  Other phone number:  [facility] [phone]    Best Time:  ASAP    Can we leave a detailed message on this number?  YES    Travel screening: Not Applicable

## 2022-11-08 ENCOUNTER — TELEPHONE (OUTPATIENT)
Dept: PSYCHIATRY | Facility: CLINIC | Age: 47
End: 2022-11-08

## 2022-11-08 RX ORDER — DEXTROAMPHETAMINE SACCHARATE, AMPHETAMINE ASPARTATE, DEXTROAMPHETAMINE SULFATE AND AMPHETAMINE SULFATE 2.5; 2.5; 2.5; 2.5 MG/1; MG/1; MG/1; MG/1
TABLET ORAL
Qty: 46 TABLET | Refills: 0 | Status: SHIPPED | OUTPATIENT
Start: 2022-11-08 | End: 2022-12-15

## 2022-11-08 NOTE — TELEPHONE ENCOUNTER
RN spoke with Emmy to confirm Adderall 30 mg directions. RN read directions verbatim. Emmy verbalized acknowledgement.     Patient picked up Adderall 20 mg (QTY: 60 tabs) on 11/1 so insurance will not cover another Adderall 20 mg supply to get her to Adderall 30 mg.     Another issue they are running into is the script directions do not reflect Adderall 30 mg.     Emmy stated this may go through with Adderall 10 mg if provider would like to try to send Adderall 10 mg script.     Emmy reports PA for Zolpidem went out already. RN requested resend to fax. Emmy stated she will do that.     RN routing to provider for further follow up.

## 2022-11-08 NOTE — TELEPHONE ENCOUNTER
Fill out prescriber response form. Fax back to [phone]. Also fax to [employer] [address] scanning [phone]. Email to provider. Original in provider mail drawer.

## 2022-11-08 NOTE — TELEPHONE ENCOUNTER
"RN returned call to Emmy (pharmacist) at pharmacy. She reports Adderall 10 mg script went through and they will be filling for the patient.     RN also canceled Script below:     Disp Refills Start End INDIGO    amphetamine-dextroamphetamine (ADDERALL) 20 MG tablet (Discontinued) 27 tablet 0 11/3/2022 11/8/2022 --   Sig: Take 10 mg twice daily WITH the 20 mg twice daily tabs for total dose of 30 mg twice daily.     -----------------------------------------------------------------------    RN attempted to reach patient with updated script. Unsuccessful. RN referred to [employer] sent.     No PHI or Name of medication left on voicemail     Martina Forte RN on 11/8/2022 at 9:05 AM      [facility] sent to follow up:     Good morning Mindi -      We wanted to let you know that we have been in contact with your pharmacy this morning and we have sent a script for Adderall 10 mg to your pharmacy.   I spoke with Emmy who confirmed that your insurance will cover this dose. Please contact them for .      Please be aware the Dr. Holbrook would like you to combine your Adderall 20 mg with this new Adderall 10 mg (for a total daily dose of Adderall 30 mg twice daily)      (Dr Holbrook's last visit note on 11/3/22 states:    \"Increase Adderall back to your original dose of 30 mg twice daily for ADHD.\")     If you have further follow up questions in regards to these directions, please feel free to reply to this [employer] message or call us at [phone]     We hope you have a great week   HARLEY Mack    "

## 2022-11-10 NOTE — TELEPHONE ENCOUNTER
Previous psychiatry provider providing 15 Ambien every 25 days days per insurance limits and I have sent same Rx. Can discuss further with patient at their follow-up.

## 2022-11-20 ENCOUNTER — HEALTH MAINTENANCE LETTER (OUTPATIENT)
Age: 47
End: 2022-11-20

## 2022-11-29 ENCOUNTER — TELEPHONE (OUTPATIENT)
Dept: PSYCHIATRY | Facility: CLINIC | Age: 47
End: 2022-11-29

## 2022-11-29 ENCOUNTER — MYC MEDICAL ADVICE (OUTPATIENT)
Dept: DERMATOLOGY | Facility: CLINIC | Age: 47
End: 2022-11-29

## 2022-11-29 ENCOUNTER — MYC REFILL (OUTPATIENT)
Dept: PSYCHIATRY | Facility: CLINIC | Age: 47
End: 2022-11-29

## 2022-11-29 DIAGNOSIS — F90.9 ATTENTION DEFICIT HYPERACTIVITY DISORDER (ADHD), UNSPECIFIED ADHD TYPE: ICD-10-CM

## 2022-11-29 RX ORDER — DEXTROAMPHETAMINE SACCHARATE, AMPHETAMINE ASPARTATE, DEXTROAMPHETAMINE SULFATE AND AMPHETAMINE SULFATE 2.5; 2.5; 2.5; 2.5 MG/1; MG/1; MG/1; MG/1
TABLET ORAL
Qty: 46 TABLET | Refills: 0 | Status: CANCELLED | OUTPATIENT
Start: 2022-11-29

## 2022-11-29 RX ORDER — DEXTROAMPHETAMINE SACCHARATE, AMPHETAMINE ASPARTATE, DEXTROAMPHETAMINE SULFATE AND AMPHETAMINE SULFATE 7.5; 7.5; 7.5; 7.5 MG/1; MG/1; MG/1; MG/1
30 TABLET ORAL 2 TIMES DAILY
Qty: 60 TABLET | Refills: 0 | Status: SHIPPED | OUTPATIENT
Start: 2022-12-01 | End: 2023-01-30

## 2022-11-29 NOTE — TELEPHONE ENCOUNTER
[facility] pharmacy calling back about Adderall 30 mg prescription. Insurance only allowing 30 pills. Will need over florinda to get full 60 pill prescription.

## 2022-11-29 NOTE — TELEPHONE ENCOUNTER
Can RN please call and let pt know we are waiting on her insurance to process/approve prior authorization for this new dose. Thanks!

## 2022-11-29 NOTE — TELEPHONE ENCOUNTER
Reason for call:  Other     Patient called regarding (reason for call): prescription    Additional comments: Pt increased her Adderall as directed to 60mg/day and is now out of her 20mg capsules. Requesting to be allowed to  her new script early instead of on 12/1. Please advise.    Phone number to reach patient:  Cell number on file:    Telephone Information:   Mobile [phone]       Best Time:  Any    Can we leave a detailed message on this number?  YES

## 2022-11-29 NOTE — TELEPHONE ENCOUNTER
Received a faxed refill request for clonidine. Last script sent:  cloNIDine (CATAPRES) 0.1 MG tablet 60 tablet 1 11/3/2022  --   Sig - Route: Take 0.5-1 tablets (0.05-0.1 mg) by mouth 2 times daily - Oral     RN faxed denied back to pharmacy and requested they use existing refill.     Meredith López RN on 11/29/2022 at 10:35 AM

## 2022-11-29 NOTE — TELEPHONE ENCOUNTER
Patient returned call and RN noted provider's directives. She will call her insurance and pharmacy and coordinate if possible.    Patient called back noting that the pharmacy won't let her pay out of pocket-they want an approval from provider for that.     Spoke with pharmacist Avril who noted that their [facility] location have a policy noting the need for provider verbal order / approval for any patient / reason for a patient choosing not to use insurance / pay for control substance prescription out of pocket. It is a policy at their location across the board for all of their customers as there are providers who do not want patient to pay out of pocket for.

## 2022-11-30 ENCOUNTER — MYC MEDICAL ADVICE (OUTPATIENT)
Dept: PSYCHIATRY | Facility: CLINIC | Age: 47
End: 2022-11-30

## 2022-11-30 NOTE — TELEPHONE ENCOUNTER
CCPS RN received a Response Requested Alternative Requested notification from [facility] [place] for this patients Adderall 30 mg table PO Bid.      1.  This notification indicated that  rolling max of 60 tabs/30 days needs to be overridden with insurance.      2.  CCPS RN noted that the Prior Authorization team is aware of this and has made a notification in Epic encounter 11/29/22. Prior Auth remains in process.       3.  CCPS RN phoned [facility] in [place] and spoke to the Pharmacist who verified that the prescription  Adderall 30 mg table PO Bid needs to be overridden with insurance.      4.  CCPS RN attempted to contact this patient to determine what quantity of medication this patient has at home while PA team is reviewing this case.  There was no answer.  RN LVM for the patient to return a call to [phone].  Awaiting call back.      5.  CCPS RN sent this patient a [employer] message.  Awaiting response.

## 2022-11-30 NOTE — TELEPHONE ENCOUNTER
Prior Authorization Approval    Authorization Effective Date: 11/30/2022  Authorization Expiration Date: 11/30/2023  Medication: amphetamine-dextroamphetamine (ADDERALL) 30 MG tablet  Approved Dose/Quantity:   Reference #: [id number]   Insurance Company: [employer] - Phone [phone] Fax [phone]   Which Pharmacy is filling the prescription (Not needed for infusion/clinic administered): [facility] [address]  Pharmacy Notified: Yes  Patient Notified: Yes

## 2022-12-01 NOTE — TELEPHONE ENCOUNTER
Per chart, prior authorization for Adderall 30mg twice daily was approved the afternoon of  11/30/22. Pharmacy informed. Patient already paid out of pocket for rx on 11/29/22, per pharmacy. [employer] message sent to patient re: prior authorization approval.    Crys Cardenas RN on 12/1/2022 at 10:22 AM

## 2022-12-01 NOTE — TELEPHONE ENCOUNTER
Adderall rx is currently 30mg, twice daily. Prior authorization was approved 11/30/22. Pharmacy notified. Per pharmacy, patient paid out of pocket for Adderall 30mg twice daily rx yesterday; approximate cost $42. No rx for Adderall 10mg needed (previous rx was 20mg tab +10 mg tab for total of 30mg).    Crys Cardenas RN on 12/1/2022 at 10:18 AM

## 2022-12-07 ENCOUNTER — ANCILLARY PROCEDURE (OUTPATIENT)
Dept: MAMMOGRAPHY | Facility: CLINIC | Age: 47
End: 2022-12-07
Attending: FAMILY MEDICINE
Payer: COMMERCIAL

## 2022-12-07 DIAGNOSIS — Z12.31 VISIT FOR SCREENING MAMMOGRAM: ICD-10-CM

## 2022-12-07 PROCEDURE — 77067 SCR MAMMO BI INCL CAD: CPT

## 2022-12-13 NOTE — PROGRESS NOTES
[facility]  December 15, 2022      Behavioral Health Clinician Progress Note    Patient Name: Mindi Eastman           Service Type:  Individual      Service Location:   [facility] / Email (patient reached)     Session Start Time: 10:00am  Session End Time: 10:17am      Session Length: 16 - 37      Attendees: Patient     Service Modality:  Video Visit:      Provider verified identity through the following two step process.  Patient provided:  Patient is known previously to provider    Telemedicine Visit: The patient's condition can be safely assessed and treated via synchronous audio and visual telemedicine encounter.      Reason for Telemedicine Visit: Services only offered telehealth    Originating Site (Patient Location): Patient's home    Distant Site (Provider Location): [facility]    Consent:  The patient/guardian has verbally consented to: the potential risks and benefits of telemedicine (video visit) versus in person care; bill my insurance or make self-payment for services provided; and responsibility for payment of non-covered services.     Patient would like the video invitation sent by:  My Chart    Mode of Communication:  Video Conference via [facility]    Distant Location (Provider):  On-site    As the provider I attest to compliance with applicable laws and regulations related to telemedicine.    Visit Activities (Refresh list every visit): [facility] Only    Diagnostic Assessment Date: 11/3/22  Treatment Plan Review Date: 12/15/22  See Flowsheets for today's PHQ-9 and SHANICE-7 results  Previous PHQ-9:   PHQ-9 SCORE 8/15/2022   PHQ-9 Total Score [facility] 0   PHQ-9 Total Score 0     Previous SHANICE-7: No flowsheet data found.    AMNA LEVEL:  No flowsheet data found.    DATA  Extended Session (60+ minutes): No  Interactive Complexity: No  Crisis: No  State mental health facility Patient: No    Treatment Objective(s) Addressed in This Session:  Target Behavior(s):  "increase in irritability    Mood Instability: will develop better understanding of triggers and coping strategies to stabilize mood    Current Stressors / Issues:  Met with pt this morning for follow up. Pt reports things have been going really well. States she feels the medications have really helped anxiety. States since starting Clonidine, she has noticed her mental health improve drastically. She reports irritability has drastically decreased. Denies any depressive sx. Reports high stress with family but has been better able to manage it with her mental health being more stable.      11/3/22 DA  Met with pt today for initial assessment. Reports her psychiatrist retired and is needing to find someone who will work on doses that have been working for her in the past. She has been struggling with irritability and feeling a \"cloud of depression is coming\". Starting to feel a bit more depressed. Reports a hx of Bipolar, PTSD and ADHD. She has taken medication, seen therapists and done ECT. She has been ECT free for 3 years. States sleep is hit or miss for her. 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 She has found work to be more difficult with her irritability. Said she has almost quit 3 times just this week with frustration. Feels she has become less tolerant as of late.      Progress on Treatment Objective(s) / Homework:  Satisfactory progress - PRECONTEMPLATION (Not seeing need for change); Intervened by educating the patient about the effects of current behavior on health.  Evoked information about reasons to continue behavior, express concern / recommendations, and explored any change talk    Motivational Interviewing    MI Intervention: Expressed Empathy/Understanding, Supported Autonomy, Collaboration, Evocation, Permission to raise concern or advise, Open-ended questions, Reflections: simple and complex, Change talk (evoked) and Reframe     Change Talk Expressed by the Patient: Desire to change " Ability to change Reasons to change Need to change    Provider Response to Change Talk: E - Evoked more info from patient about behavior change, A - Affirmed patient's thoughts, decisions, or attempts at behavior change, R - Reflected patient's change talk and S - Summarized patient's change talk statements      Care Plan review completed: Yes    Medication Review:  No changes to current psychiatric medication(s)    Medication Compliance:  Yes    Changes in Health Issues:   None reported    Chemical Use Review:   Substance Use: Chemical use reviewed, no active concerns identified      Tobacco Use: No change in amount of tobacco use since last session.  Pre-contemplation    Assessment: Current Emotional / Mental Status (status of significant symptoms):  Risk status (Self / Other harm or suicidal ideation)  Patient denies a history of suicidal ideation, suicide attempts, self-injurious behavior, homicidal ideation, homicidal behavior and and other safety concerns  Patient denies current fears or concerns for personal safety.  Patient denies current or recent suicidal ideation or behaviors.  Patient denies current or recent homicidal ideation or behaviors.  Patient denies current or recent self injurious behavior or ideation.  Patient denies other safety concerns.  A safety and risk management plan has not been developed at this time, however patient was encouraged to call [place] [phone] should there be a change in any of these risk factors.    Appearance:   Appropriate   Eye Contact:   Good   Psychomotor Behavior: Normal   Attitude:   Cooperative   Orientation:   All  Speech   Rate / Production: Normal/ Responsive Normal    Volume:  Normal   Mood:    Depressed   Affect:    Appropriate   Thought Content:  Clear   Thought Form:  Coherent  Logical   Insight:    Good     Diagnoses:  1. Bipolar 1 disorder (H)    2. Generalized anxiety disorder    3. Attention deficit hyperactivity disorder (ADHD), combined type    4.  PTSD (post-traumatic stress disorder)        Collateral Reports Completed:  Communicated with: Dr. Holbrook     Plan: (Homework, other):  Patient was given information about behavioral services and encouraged to schedule a follow up appointment with the clinic [facility] TBD.  She was also given information about mental health symptoms and treatment options .  CD Recommendations: No indications of CD issues.  Vida Cruz MSW LICSW      ______________________________________________________________________    Integrated Primary Care Behavioral Health Treatment Plan    Patient's Name: Mindi Eastman  YOB: 1975    Date of Creation: 12/15/22  Date Treatment Plan Last Reviewed/Revised: 12/15/22    DSM5 Diagnoses: Attention-Deficit/Hyperactivity Disorder  314.01 (F90.2) Combined presentation, 296.51 Bipolar I Disorder Current or Most Recent Episode Depressed, Mild, 300.02 (F41.1) Generalized Anxiety Disorder or 309.81 (F43.10) Posttraumatic Stress Disorder (includes Posttraumatic Stress Disorder for Children 6 Years and Younger)  Without dissociative symptoms  Psychosocial / Contextual Factors:  Individual Factors feeling more depressed and struggles with overall mental health  PROMIS (reviewed every 90 days):     Referral / Collaboration:  Referral to another professional/service is not indicated at this time..    Anticipated number of session for this episode of care: 4-6 sessions  Anticipation frequency of session: TBD  Anticipated Duration of each session: 16-37 minutes  Treatment plan will be reviewed in 90 days or when goals have been changed.       MeasurableTreatment Goal(s) related to diagnosis / functional impairment(s)  Goal 1: Patient will work with providers to manage symptoms    I will know I've met my goal when feeling more emotionally stable.      Objective #A (Patient Action)    Patient will attend all appointments, take medication as prescribed.  Status: New - Date: 12/15/22      Intervention(s)  Therapist will Monitor and assist in overcoming barriers to treatment adherence.    Objective #B  Patient will consider all recommendations offered.  Status: New - Date: 12/15/22     Intervention(s)  Therapist will educate patient on treatment options, clarify concerns, work with pt to overcome any resistance to compliance.      Patient has reviewed and agreed to the above plan.      LAUREN Acevedo  December 15, 2022

## 2022-12-15 ENCOUNTER — MYC REFILL (OUTPATIENT)
Dept: PSYCHIATRY | Facility: CLINIC | Age: 47
End: 2022-12-15

## 2022-12-15 ENCOUNTER — VIRTUAL VISIT (OUTPATIENT)
Dept: BEHAVIORAL HEALTH | Facility: CLINIC | Age: 47
End: 2022-12-15
Payer: COMMERCIAL

## 2022-12-15 ENCOUNTER — VIRTUAL VISIT (OUTPATIENT)
Dept: PSYCHIATRY | Facility: CLINIC | Age: 47
End: 2022-12-15
Payer: COMMERCIAL

## 2022-12-15 DIAGNOSIS — F90.2 ATTENTION DEFICIT HYPERACTIVITY DISORDER (ADHD), COMBINED TYPE: ICD-10-CM

## 2022-12-15 DIAGNOSIS — F90.9 ATTENTION DEFICIT HYPERACTIVITY DISORDER (ADHD), UNSPECIFIED ADHD TYPE: Primary | ICD-10-CM

## 2022-12-15 DIAGNOSIS — G47.00 INSOMNIA, UNSPECIFIED TYPE: ICD-10-CM

## 2022-12-15 DIAGNOSIS — F43.10 PTSD (POST-TRAUMATIC STRESS DISORDER): ICD-10-CM

## 2022-12-15 DIAGNOSIS — F31.9 BIPOLAR I DISORDER (H): ICD-10-CM

## 2022-12-15 DIAGNOSIS — F31.9 BIPOLAR 1 DISORDER (H): Primary | ICD-10-CM

## 2022-12-15 DIAGNOSIS — F41.1 GAD (GENERALIZED ANXIETY DISORDER): ICD-10-CM

## 2022-12-15 DIAGNOSIS — F41.1 GENERALIZED ANXIETY DISORDER: ICD-10-CM

## 2022-12-15 PROCEDURE — 99214 OFFICE O/P EST MOD 30 MIN: CPT | Mod: 95 | Performed by: PSYCHIATRY & NEUROLOGY

## 2022-12-15 PROCEDURE — 90832 PSYTX W PT 30 MINUTES: CPT | Mod: 95 | Performed by: SOCIAL WORKER

## 2022-12-15 NOTE — PROGRESS NOTES
"Telemedicine Visit: The patient's condition can be safely assessed and treated via synchronous audio and visual telemedicine encounter.      Reason for Telemedicine Visit: Patient has requested telehealth visit    Originating Site (Patient Location): Patient's home    Distant Location (provider location):  Off-site    Consent:  The patient/guardian has verbally consented to: the potential risks and benefits of telemedicine (video visit) versus in person care; bill my insurance or make self-payment for services provided; and responsibility for payment of non-covered services.     Mode of Communication:  Video Conference via AppFog    As the provider I attest to compliance with applicable laws and regulations related to telemedicine.         Outpatient Psychiatric Progress Note    Name: Mindi Eastman   : 1975                    Primary Care Provider: Eula Gaming MD   Therapist: None     PHQ-9 scores:  PHQ-9 SCORE 8/15/2022   PHQ-9 Total Score MyChart 0   PHQ-9 Total Score 0       SHANICE-7 scores:  No flowsheet data found.    Patient Identification:  Patient is a 47 year old, partnered / significant other  White Not  or  female  who presents for return visit with me.  Patient is currently employed full time. Patient attended the phone/video session alone. Patient prefers to be called: \"Angely\".    Interim History:  I last saw Mindi Eastman for outpatient psychiatry Consultation on 2022. During that appointment, we:      Continue Ambien/zolpidem 10 mg at bedtime as needed for sleep.  Do not mix with alcohol or your lorazepam.  Make sure you have at least 8-10 hours to devote to sleep before driving and operating heavy machinery the next morning.    Continue quetiapine/Seroquel 100 mg at bedtime for bipolar disorder diagnosis.    Continue lorazepam/Ativan 1 mg up to twice daily for severe anxiety/panic attacks.  30 tablets to last at least 30 days.    Increase Adderall back to " your original dose of 30 mg twice daily for ADHD.    Increase Lexapro/escitalopram back to your original dose of 40 mg daily for anxiety and mood.    Start clonidine 0.0 5- 0.1 mg twice daily as needed for anxiety and trauma related symptoms.  Use this to try and lessen your reliance on lorazepam and Ambien.    I strongly recommend individual psychotherapy.  Also discussed acceptance and commitment therapy, DBT, and accelerated resolution therapy or EMDR as additional therapy options.    Continue all other cares per primary care provider.     12/15: Overall doing really well.  Clonidine addition has been very beneficial.  Patient has been able to lessen reliance on lorazepam.  Mood and anxiety symptoms well controlled.  ADHD symptoms well controlled.  Much less irritable and agitated.  Anxiety less as well.  Sleeping well.  Has no major questions or concerns for today.  No negative side effects from the medication.  Has been taking 0.1 mg twice a day pretty much daily.  No acute safety concerns.  No SI.  No problematic drug or alcohol use.    Per [facility], during today's team-based visit:  Met with pt this morning for follow up. Pt reports things have been going really well. States she feels the medications have really helped anxiety. States since starting Clonidine, she has noticed her mental health improve drastically. She reports irritability has drastically decreased. Denies any depressive sx. Reports high stress with family but has been better able to manage it with her mental health being more stable.      Past Psychiatric Med Trials:  Psych Meds at Intake:  Seroquel 100 mg daily  Lorazepam 1 mg daily prn (does not use daily)  Propranolol 10 mg daily - doesn't feel it has been very helpful  ambien 10 mg at bedtime (nightly)  adderall 40 mg daily (had been on 60 mg of IR)  lexapro 20 mg daily (had been on 40 mg)     Past Psych Meds:  zoloft - maybe one of first meds trialed  Wellbutrin XL - one  of first meds  ECT - 3 years a few times a week, was starting to effect pt's temperament and so discontinued, was really helpful  seroquel - 15-20 years  Lexapro - a good 20 years  latuda - seemed to be ok but made pt really sick     Doesn't think has been on lamotrigine, lithium, or depakote    Psychiatric ROS:  Mindi Eastman reports mood has been: Improved  Anxiety has been: Improved  Sleep has been: Improved  Elisha sxs: None  Psychosis sxs: None  ADHD/ADD sxs: Improved  PTSD sxs: Stable  PHQ9 and GAD7 scores were reviewed today if completed.   Medication side effects: Denies  Current stressors include: Symptoms and See HPI above  Coping mechanisms and supports include: Therapy, Family, Hobbies and Friends    Current medications include:   Current Outpatient Medications   Medication Sig     acetaminophen (TYLENOL) 500 MG tablet Take 1,000 mg by mouth as needed (headache) Prior to ECT     amphetamine-dextroamphetamine (ADDERALL) 10 MG tablet Take 10 mg twice daily WITH the 20 mg twice daily tabs for total dose of 30 mg twice daily.     amphetamine-dextroamphetamine (ADDERALL) 30 MG tablet Take 1 tablet (30 mg) by mouth 2 times daily     [START ON 12/31/2022] amphetamine-dextroamphetamine (ADDERALL) 30 MG tablet Take 1 tablet (30 mg) by mouth 2 times daily for 30 days     cloNIDine (CATAPRES) 0.1 MG tablet Take 0.5-1 tablets (0.05-0.1 mg) by mouth 2 times daily     escitalopram (LEXAPRO) 20 MG tablet Take 2 tablets (40 mg) by mouth daily     LORazepam (ATIVAN) 1 MG tablet Take 1 tablet (1 mg) by mouth 2 times daily as needed for anxiety 30 tabs to last 30 days     QUEtiapine (SEROQUEL) 100 MG tablet Take 1 tablet (100 mg) by mouth At Bedtime     zolpidem (AMBIEN) 10 MG tablet Take 1 tablet (10 mg) by mouth nightly as needed for sleep Do NOT take with Ativan. 15 tabs to last 25 days     No current facility-administered medications for this visit.       The [place] Prescription Monitoring Program has been  reviewed and there are no concerns about diversionary activity for controlled substances at this time.   11/29/2022 11/29/2022 11/29/2022 2   Dextroamp-Amphetamin 30 Mg Tab  60.00 30 [place]au [id number] Gra (8070) 0/0  Private Pay [place]  11/23/2022 08/24/2022 11/23/2022 1   Lorazepam 1 Mg Tablet  30.00 30 [place] [id number] Gra (8070) 2/2 1.00 L[place]  11/21/2022 11/09/2022 11/23/2022 2   Zolpidem Tartrate 10 Mg Tablet  15.00 25 [place] [id number] Gra (8070) 0/2 0.30 [place]  11/08/2022 11/08/2022 11/09/2022 2   Dextroamp-Amphetamin 10 Mg Tab  46.00 23 Al Bau [id number] Gra (8070) 0/0  [place]  11/01/2022 10/14/2022 11/01/2022 2   Dextroamp-Amphetamin 20 Mg Tab  60.00 30 [place] [id number] Gra (8070) 0/0  [place]  10/27/2022 09/30/2022 10/27/2022 1   Zolpidem Tartrate 10 Mg Tablet  15.00 25 [place] [id number] Gra (8070) 1/0 0.30 [place]  10/23/2022 08/24/2022 10/27/2022 1   Lorazepam 1 Mg Tablet  30.00 30 [place] [id number] Gra (8070) 1/2 1.00 [place]    Past Medical/Surgical History:  Past Medical History:   Diagnosis Date     ADHD      Anxiety      Bipolar disorder (H)      Depressive disorder      OCD (obsessive compulsive disorder)      ROXANNE (obstructive sleep apnea)     On CPAP     Seasonal allergies       has a past medical history of ADHD, Anxiety, Bipolar disorder (H), Depressive disorder, OCD (obsessive compulsive disorder), ROXANNE (obstructive sleep apnea), and Seasonal allergies.    She has no past medical history of Anemia, Antiplatelet or antithrombotic long-term use, Arrhythmia, Arthritis, Cerebral artery occlusion with cerebral infarction (H), Chronic infection, Coagulation disorder (H), Congenital heart disease, Congestive heart failure (H), COPD (chronic obstructive pulmonary disease) (H), Coronary artery disease, Diabetes (H), Dialysis patient (H), Difficulty walking, Dyspnea on exertion, Gastroesophageal reflux disease, Heart attack (H), Heart murmur, Hepatitis, Hiatal hernia, History of  angina, History of blood transfusion, Hypertension, Irregular heart beat, Liver disease, Malignant hyperthermia, Multiple sclerosis (H), Muscular dystrophy (H), Noninfectious ileitis, Orthopnea, Other chronic pain, Oxygen dependent, Pacemaker, Pancreatic disease, Parkinsons disease (H), Pneumonia, PONV (postoperative nausea and vomiting), Pulmonary hypertension (H), Renal disease, Seizures (H), Stented coronary artery, Thrombosis, Thyroid disease, Tracheostomy in place (H), Uncomplicated asthma, or Walking troubles.    Social History:  Reviewed. No changes to social history except as noted above in HPI.    Vital Signs:   None. This is phone/video visit.     Labs:  Most recent laboratory results reviewed and no new labs.     Review of Systems:  10 systems (general, cardiovascular, respiratory, eyes, ENT, endocrine, GI, , M/S, neurological) were reviewed. Most pertinent finding(s) is/are: denies fever, cough, headaches, shortness of breath, chest pain, N/V, constipation/diarrhea, trouble urinating, aches and pains. The remaining systems are all unremarkable.    Mental Status Examination (limited as this is by phone/video):  Appearance: Awake, alert, appears stated age, no acute distress, well-groomed   Attitude:  cooperative, pleasant   Motor: No gross abnormalities observed via video, not formally tested   Oriented to:  person, place, time, and situation  Attention Span and Concentration:  normal  Speech:  clear, coherent, regular rate, rhythm, and volume  Language: intact  Mood:  better  Affect:  appropriate and in normal range and mood congruent  Associations:  no loose associations  Thought Process:  logical, linear and goal oriented  Thought Content:  no evidence of suicidal ideation or homicidal ideation, no evidence of psychotic thought, no auditory hallucinations present and no visual hallucinations present  Recent and Remote Memory:  Intact to interview. Not formally assessed. No amnesia.  Fund of  Knowledge: appropriate  Insight:  good  Judgment:  intact, adequate for safety  Impulse Control:  intact    Suicide Risk Assessment:  Today Mindi Eastman reports no suicidal ideation. Based on all available evidence including the factors cited above, Mindi Eastman does not appear to be at imminent risk for self-harm, does not meet criteria for a 72-hr hold, and therefore remains appropriate for ongoing outpatient level of care.  A thorough assessment of risk factors related to suicide and self-harm have been reviewed and are noted above. The patient convincingly denies suicidality on several occasions. Local community safety resources reviewed for patient to use if needed. There was no deceit detected, and the patient presented in a manner that was believable.     DSM5 Diagnosis:  Bipolar I Disorder  Generalized anxiety disorder  ADHD  PTSD  Insomnia, unspecified    Medical comorbidities include:   Patient Active Problem List    Diagnosis Date Noted     Attention deficit hyperactivity disorder (ADHD), unspecified ADHD type 08/23/2022     Priority: Medium     Anxiety 08/23/2022     Priority: Medium     Fibromyalgia 08/23/2022     Priority: Medium     ROXANNE (obstructive sleep apnea) 08/23/2022     Priority: Medium     Morbid obesity (H) 08/23/2022     Priority: Medium     History of cervical dysplasia 08/23/2022     Priority: Medium     Hx of pancreatitis 08/23/2022     Priority: Medium     Hypothyroidism, unspecified type 08/23/2022     Priority: Medium     Moderate dysplasia of cervix (ERIC II) 08/22/2022     Priority: Medium     06/20/05 LSIL Pap  08/08/05 Corinne ECC bx ERIC I to II   09/13/05 LEEP  ERIC II or high grade UBALDO, margins neg   03/1/06 NIL Pap  02/3/14 NIL Pap  12/10/16 NIL Pap, Neg HR HPV   08/15/22 NIL Pap, Neg HR HPV Plan cotest in 1 year due 08/15/23       Suicidal ideation 06/16/2019     Priority: Medium     Acute tension-type headache 10/13/2017     Priority: Medium     SHANICE (generalized anxiety  disorder) 10/13/2017     Priority: Medium     Tobacco use 10/13/2017     Priority: Medium     Insomnia due to other mental disorder 10/13/2017     Priority: Medium     Major depressive disorder, recurrent severe without psychotic features (H) 10/10/2017     Priority: Medium     Bipolar I disorder, most recent episode depressed (H) 10/05/2017     Priority: Medium     Mental health disorder 10/04/2017     Priority: Medium     Bursitis of shoulder 03/26/2009     Priority: Medium       Psychosocial & Contextual Factors: see HPI above    Assessment:  From Intake, 11/03/2022:  Mindi Eastman is a 47-year-old female with past psychiatric history including bipolar disorder, ADHD, PTSD, anxiety who presents today for psychiatric evaluation.  Patient with long history of mental health struggles.  Symptoms have waxed and waned throughout the years.  She has been on many psychiatric medication trials and also had 3 years of ECT therapy.  Patient had been working with a psychiatric prescriber most recently who was not agreeable to prescribing her medications at doses that had been effective and helped her maintain her stability over many years.  We made adjustments to her medications, see treatment plan below, to align with her previous regimen.  We discussed continuing to minimize her reliance on lorazepam since she is also taking zolpidem and Adderall.  Patient agreeable to a trial with clonidine to see if that is helpful for some anxiety and sleep symptoms.  Discussed benefits and risks of her medications.  Not currently engaged in therapy.  Discussed strong recommendation for therapy including considerations for acceptance and commitment therapy, DBT, EMDR/ART.  Patient denies suicidality.  No acute safety concerns.  Denies problematic drug or alcohol use.    12/15/2022:  And overall doing quite well.  Clonidine has been helpful and patient tolerating well with no negative side effects.  Clonidine is lessen reliance on  lorazepam as hoped.  Patient encouraged to continue to use lorazepam for severe anxiety only.  Patient sleeping well.  Feeling much closer to baseline.  No acute safety concerns.  No SI.  No problematic drug or alcohol use.  Patient will follow up 1 more visit, and if symptoms remain stable care will be returned back to primary care provider for ongoing management.  Previous EKG with a QTC of 431 in 2017-reports had been on 40 mg dose when EKG was completed.    Medication side effects and alternatives were reviewed. Health promotion activities recommended and reviewed today. All questions addressed. Education and counseling completed regarding risks and benefits of medications and psychotherapy options. Recommend therapy for additional support.     Treatment Plan:    Continue Ambien/zolpidem 10 mg at bedtime as needed for sleep.  Do not mix with alcohol or your lorazepam.  Make sure you have at least 8-10 hours to devote to sleep before driving and operating heavy machinery the next morning.  Have previously discussed max recommended dose for women is 5 mg.  Benefit of current dose outweighs risks and has been tolerated well.    Continue quetiapine/Seroquel 100 mg at bedtime for bipolar disorder diagnosis.    Continue lorazepam/Ativan 1 mg up to twice daily for severe anxiety/panic attacks.  30 tablets to last at least 30 days.  Continue to use sparingly since now on clonidine and finding helpful.    Continue Adderall 30 mg twice daily for ADHD.    Continue Lexapro/escitalopram 40 mg daily for anxiety and mood.  Have previously discussed dose is higher than max recommended dose.  Benefit outweighs risk and have tolerated well in the past.    Continue clonidine 0.05- 0.1 mg twice daily as needed for anxiety and trauma related symptoms. Continue to use this to try and lessen your reliance on lorazepam and Ambien.    Recommend individual psychotherapy.  Also discussed acceptance and commitment therapy, DBT, and  accelerated resolution therapy or EMDR as additional therapy options.    Continue all other cares per primary care provider.     Continue all other medications as reviewed per electronic medical record today.     Safety plan reviewed. To the Emergency Department as needed or call after hours crisis line at [phone] or [phone]. [place] Crisis Text Line. Text [place] to 571947 or Suicide LifeLine Chat: suicideprecuaQea.org/chat    Schedule an appointment with me in 6 weeks or sooner as needed. Call [facility] at [phone] to schedule.    Follow up with primary care provider as planned or for acute medical concerns.    Call the psychiatric nurse line with medication questions or concerns at [phone].    [employer] may be used to communicate with your provider, but this is not intended to be used for emergencies.    Have previously discussed risks of stimulant medication including, but not limited to, decreased appetite, risk of tics (and that they may be lasting), trouble sleeping, cardiac risks such as increased heart rate and blood pressure, and rare risk of sudden cardiac death.  Also risk of addiction/tolerance/dependence.    Have previously discussed risks of benzodiazepine (Ativan, Xanax, Klonopin, Valium, etc) use including, but not limited to, sedation, tolerance, risk for addiction/dependence. Do not drink alcohol while taking benzodiazepines due to risk of trouble breathing and potential death. Do not drive or operate heavy machinery until it is known how the drug affects you. Discuss with physician or pharmacist before ever taking a benzodiazepine with a narcotic/opioid pain medication.     Administrative Billing:   Phone Call/Video Duration: 9 Minutes  Start: 10:27a  Stop: 10:36a      Patient Status:  Patient will continue to be seen for ongoing consultation and stabilization.    Signed:   Jenelle Holbrook DO  [facility] Psychiat[facility]    Disclaimer: This note consists of symbols  derived from keyboarding, dictation and/or voice recognition software. As a result, there may be errors in the script that have gone undetected. Please consider this when interpreting information found in this chart.

## 2022-12-15 NOTE — TELEPHONE ENCOUNTER
Received refill request for clonidine hcl 0.1mg. Sent back to [facility] due to refill too soon. Last filled 11/29/22 for 60 tablets.     Keely Jessica RN on 12/15/2022 at 11:22 AM

## 2022-12-16 NOTE — PATIENT INSTRUCTIONS
Treatment Plan:  Continue Ambien/zolpidem 10 mg at bedtime as needed for sleep.  Do not mix with alcohol or your lorazepam.  Make sure you have at least 8-10 hours to devote to sleep before driving and operating heavy machinery the next morning.  Have previously discussed max recommended dose for women is 5 mg.  Benefit of current dose outweighs risks and has been tolerated well.  Continue quetiapine/Seroquel 100 mg at bedtime for bipolar disorder diagnosis.  Continue lorazepam/Ativan 1 mg up to twice daily for severe anxiety/panic attacks.  30 tablets to last at least 30 days.  Continue to use sparingly since now on clonidine and finding helpful.  Continue Adderall 30 mg twice daily for ADHD.  Continue Lexapro/escitalopram 40 mg daily for anxiety and mood.  Have previously discussed dose is higher than max recommended dose.  Benefit outweighs risk and have tolerated well in the past.  Continue clonidine 0.05- 0.1 mg twice daily as needed for anxiety and trauma related symptoms. Continue to use this to try and lessen your reliance on lorazepam and Ambien.  Recommend individual psychotherapy.  Also discussed acceptance and commitment therapy, DBT, and accelerated resolution therapy or EMDR as additional therapy options.  Continue all other cares per primary care provider.   Continue all other medications as reviewed per electronic medical record today.   Safety plan reviewed. To the Emergency Department as needed or call after hours crisis line at [phone] or [phone]. [place] Crisis Text Line. Text [place] to 679902 or Suicide LifeLine Chat: suicidepreventionlifeline.org/chat  Schedule an appointment with me in 6 weeks or sooner as needed. Call [facility] at [phone] to schedule.  Follow up with primary care provider as planned or for acute medical concerns.  Call the psychiatric nurse line with medication questions or concerns at [phone].  [employer] may be used to communicate with your  provider, but this is not intended to be used for emergencies.    Have previously discussed risks of stimulant medication including, but not limited to, decreased appetite, risk of tics (and that they may be lasting), trouble sleeping, cardiac risks such as increased heart rate and blood pressure, and rare risk of sudden cardiac death.  Also risk of addiction/tolerance/dependence.    Have previously discussed risks of benzodiazepine (Ativan, Xanax, Klonopin, Valium, etc) use including, but not limited to, sedation, tolerance, risk for addiction/dependence. Do not drink alcohol while taking benzodiazepines due to risk of trouble breathing and potential death. Do not drive or operate heavy machinery until it is known how the drug affects you. Discuss with physician or pharmacist before ever taking a benzodiazepine with a narcotic/opioid pain medication.

## 2022-12-26 DIAGNOSIS — F41.1 GAD (GENERALIZED ANXIETY DISORDER): ICD-10-CM

## 2022-12-26 DIAGNOSIS — F90.9 ATTENTION DEFICIT HYPERACTIVITY DISORDER (ADHD), UNSPECIFIED ADHD TYPE: ICD-10-CM

## 2022-12-26 RX ORDER — CLONIDINE HYDROCHLORIDE 0.1 MG/1
.05-.1 TABLET ORAL 2 TIMES DAILY
Qty: 60 TABLET | Refills: 0 | Status: SHIPPED | OUTPATIENT
Start: 2022-12-26 | End: 2023-01-20

## 2022-12-26 NOTE — TELEPHONE ENCOUNTER
Behavioral Access, please schedule this patient for a future visit with VENKATA Holbrook . Patient is requesting a refill.     Date of Last Office Visit: 12/15/22. RTN in 6 weeks  Date of Next Office Visit: None. Scheduling attempting to contact pt  No shows since last visit: 0  Cancellations since last visit: 0    Medication requestedcloNIDine (CATAPRES) 0.1 MG tablet:  Date last ordered: 11/3/22 Qty: 60 Refills: 1     cloNIDine (CATAPRES) 0.1 MG tablet 60 tablet 1 11/3/2022  --   Sig - Route: Take 0.5-1 tablets (0.05-0.1 mg) by mouth 2 times daily - Oral   Sent to pharmacy as: cloNIDine HCl 0.1 MG Oral Tablet (CATAPRES)   Class: E-Prescribe           Review of [place] ?: NA      Lapse in medication adherence greater than 5 days?: No  If yes, call patient and gather details: NA  Medication refill request verified as identical to current order?: yes  Result of Last DAM, VPA, Li+ Level, CBC, or Carbamazepine Level (at or since last visit): N/A    Last visit treatment plan:    Return in about 6 weeks (around 1/26/2023).  Treatment Plan:    Continue Ambien/zolpidem 10 mg at bedtime as needed for sleep.  Do not mix with alcohol or your lorazepam.  Make sure you have at least 8-10 hours to devote to sleep before driving and operating heavy machinery the next morning.  Have previously discussed max recommended dose for women is 5 mg.  Benefit of current dose outweighs risks and has been tolerated well.    Continue quetiapine/Seroquel 100 mg at bedtime for bipolar disorder diagnosis.    Continue lorazepam/Ativan 1 mg up to twice daily for severe anxiety/panic attacks.  30 tablets to last at least 30 days.  Continue to use sparingly since now on clonidine and finding helpful.    Continue Adderall 30 mg twice daily for ADHD.    Continue Lexapro/escitalopram 40 mg daily for anxiety and mood.  Have previously discussed dose is higher than max recommended dose.  Benefit outweighs risk and have tolerated well in the past.    Continue  clonidine 0.05- 0.1 mg twice daily as needed for anxiety and trauma related symptoms. Continue to use this to try and lessen your reliance on lorazepam and Ambien.    Recommend individual psychotherapy.  Also discussed acceptance and commitment therapy, DBT, and accelerated resolution therapy or EMDR as additional therapy options.    Continue all other cares per primary care provider.     Continue all other medications as reviewed per electronic medical record today.     Safety plan reviewed. To the Emergency Department as needed or call after hours crisis line at [phone] or [phone]. [place] Crisis Text Line. Text [place] to 062517 or Suicide LifeLine Chat: suicidepreventionVisuMotionline.org/chat    Schedule an appointment with me in 6 weeks or sooner as needed. Call [facility] at [phone] to schedule.    Follow up with primary care provider as planned or for acute medical concerns.    Call the psychiatric nurse line with medication questions or concerns at [phone].    MyChart may be used to communicate with your provider, but this is not intended to be used for emergencies.               []Medication refilled per  Medication Refill in Ambulatory Care  policy.  [x]Medication unable to be refilled by RN due to criteria not met as indicated below:    []Eligibility - not seen in the last year   [x]Supervision - no future appointment   []Compliance - no shows, cancellations or lapse in therapy   []Verification - order discrepancy   []Controlled medication   [x]Medication not included in policy   []90-day supply request   [x]Other:LPN is processing request

## 2022-12-28 ENCOUNTER — MYC MEDICAL ADVICE (OUTPATIENT)
Dept: DERMATOLOGY | Facility: CLINIC | Age: 47
End: 2022-12-28

## 2022-12-28 ENCOUNTER — MYC MEDICAL ADVICE (OUTPATIENT)
Dept: PSYCHIATRY | Facility: CLINIC | Age: 47
End: 2022-12-28

## 2022-12-28 DIAGNOSIS — F90.9 ATTENTION DEFICIT HYPERACTIVITY DISORDER (ADHD), UNSPECIFIED ADHD TYPE: ICD-10-CM

## 2022-12-28 RX ORDER — DEXTROAMPHETAMINE SACCHARATE, AMPHETAMINE ASPARTATE, DEXTROAMPHETAMINE SULFATE AND AMPHETAMINE SULFATE 7.5; 7.5; 7.5; 7.5 MG/1; MG/1; MG/1; MG/1
30 TABLET ORAL 2 TIMES DAILY
Qty: 60 TABLET | Refills: 0 | Status: SHIPPED | OUTPATIENT
Start: 2022-12-31 | End: 2023-01-30

## 2022-12-28 NOTE — TELEPHONE ENCOUNTER
Patient's partner is calling checking on a status of prescription.  Patient will be out today.  The correct pharmacy is attached.    Cheryle Holley RN  [facility]

## 2022-12-28 NOTE — TELEPHONE ENCOUNTER
RN called original pharmacy [facility]. They confirmed they are out of this medication. RN cancelled this script .     RN reviewed chart and patient suggested [facility]. A call was also received from patients partner that [facility] also has the medication.     RN attempted to call patient to clarify [facility] would [facility] but there was no answer. LVM to call the clinic back at [phone] or check [employer] message.    RN did call [facility] and they confirm they have about #100 of the tablets. RN notified patient of chosen pharmacy via [employer].     Routing to Dr. Holbrook high priority with new pharmacy in place.     Meredith López RN on 12/28/2022 at 3:06 PM

## 2022-12-28 NOTE — TELEPHONE ENCOUNTER
Patient partner calling to give info regarding refill of Adderall.    The [facility] target: [address] [place] has the medication.    [phone]

## 2022-12-29 NOTE — TELEPHONE ENCOUNTER
Jenelle Holrbook,   Psych Rn - [facility] 20 hours ago (3:34 PM)     AB  Just filled. Sorry for the delay, this provider was on STO 1:00p-3:30p today.

## 2023-01-01 ENCOUNTER — MYC REFILL (OUTPATIENT)
Dept: FAMILY MEDICINE | Facility: CLINIC | Age: 48
End: 2023-01-01
Payer: COMMERCIAL

## 2023-01-01 ENCOUNTER — HEALTH MAINTENANCE LETTER (OUTPATIENT)
Age: 48
End: 2023-01-01

## 2023-01-01 ENCOUNTER — PATIENT OUTREACH (OUTPATIENT)
Dept: CARE COORDINATION | Facility: CLINIC | Age: 48
End: 2023-01-01
Payer: COMMERCIAL

## 2023-01-01 ENCOUNTER — HOSPITAL ENCOUNTER (OUTPATIENT)
Dept: MRI IMAGING | Facility: HOSPITAL | Age: 48
Discharge: HOME | End: 2023-09-25
Admitting: FAMILY MEDICINE
Payer: COMMERCIAL

## 2023-01-01 ENCOUNTER — TELEPHONE (OUTPATIENT)
Dept: PSYCHIATRY | Facility: CLINIC | Age: 48
End: 2023-01-01
Payer: COMMERCIAL

## 2023-01-01 ENCOUNTER — OFFICE VISIT (OUTPATIENT)
Dept: FAMILY MEDICINE | Facility: CLINIC | Age: 48
End: 2023-01-01
Payer: COMMERCIAL

## 2023-01-01 ENCOUNTER — MEDICAL CORRESPONDENCE (OUTPATIENT)
Dept: HEALTH INFORMATION MANAGEMENT | Facility: CLINIC | Age: 48
End: 2023-01-01
Payer: COMMERCIAL

## 2023-01-01 ENCOUNTER — TELEPHONE (OUTPATIENT)
Dept: FAMILY MEDICINE | Facility: CLINIC | Age: 48
End: 2023-01-01
Payer: COMMERCIAL

## 2023-01-01 VITALS
OXYGEN SATURATION: 99 % | HEIGHT: 65 IN | RESPIRATION RATE: 16 BRPM | TEMPERATURE: 98.3 F | BODY MASS INDEX: 35.84 KG/M2 | DIASTOLIC BLOOD PRESSURE: 70 MMHG | HEART RATE: 82 BPM | SYSTOLIC BLOOD PRESSURE: 122 MMHG | WEIGHT: 215.1 LBS

## 2023-01-01 DIAGNOSIS — F41.1 GAD (GENERALIZED ANXIETY DISORDER): ICD-10-CM

## 2023-01-01 DIAGNOSIS — Z12.31 VISIT FOR SCREENING MAMMOGRAM: ICD-10-CM

## 2023-01-01 DIAGNOSIS — F90.9 ATTENTION DEFICIT HYPERACTIVITY DISORDER (ADHD), UNSPECIFIED ADHD TYPE: ICD-10-CM

## 2023-01-01 DIAGNOSIS — G47.00 INSOMNIA, UNSPECIFIED TYPE: ICD-10-CM

## 2023-01-01 DIAGNOSIS — R41.0 ACUTE CONFUSION: ICD-10-CM

## 2023-01-01 DIAGNOSIS — G89.29 CHRONIC NONINTRACTABLE HEADACHE, UNSPECIFIED HEADACHE TYPE: Primary | ICD-10-CM

## 2023-01-01 DIAGNOSIS — E03.9 HYPOTHYROIDISM, UNSPECIFIED TYPE: ICD-10-CM

## 2023-01-01 DIAGNOSIS — E55.9 VITAMIN D DEFICIENCY: ICD-10-CM

## 2023-01-01 DIAGNOSIS — M25.512 PAIN IN LEFT SHOULDER: ICD-10-CM

## 2023-01-01 DIAGNOSIS — Z12.4 CERVICAL CANCER SCREENING: ICD-10-CM

## 2023-01-01 DIAGNOSIS — F31.9 BIPOLAR I DISORDER (H): ICD-10-CM

## 2023-01-01 DIAGNOSIS — R51.9 CHRONIC NONINTRACTABLE HEADACHE, UNSPECIFIED HEADACHE TYPE: Primary | ICD-10-CM

## 2023-01-01 DIAGNOSIS — M25.512 PAIN IN JOINT OF LEFT SHOULDER: ICD-10-CM

## 2023-01-01 DIAGNOSIS — R41.0 ACUTE CONFUSION: Primary | ICD-10-CM

## 2023-01-01 DIAGNOSIS — B36.0 TINEA VERSICOLOR: ICD-10-CM

## 2023-01-01 LAB
CHOLEST SERPL-MCNC: 234 MG/DL
FASTING STATUS PATIENT QL REPORTED: NO
HBA1C MFR BLD: 5.8 % (ref 0–5.6)
HDLC SERPL-MCNC: 116 MG/DL
LDLC SERPL CALC-MCNC: 104 MG/DL
NONHDLC SERPL-MCNC: 118 MG/DL
TRIGL SERPL-MCNC: 70 MG/DL
TSH SERPL DL<=0.005 MIU/L-ACNC: 1.93 UIU/ML (ref 0.3–4.2)
VIT D+METAB SERPL-MCNC: 36 NG/ML (ref 20–50)

## 2023-01-01 PROCEDURE — 82306 VITAMIN D 25 HYDROXY: CPT | Performed by: FAMILY MEDICINE

## 2023-01-01 PROCEDURE — 83036 HEMOGLOBIN GLYCOSYLATED A1C: CPT | Performed by: FAMILY MEDICINE

## 2023-01-01 PROCEDURE — 73221 MRI JOINT UPR EXTREM W/O DYE: CPT | Mod: LT

## 2023-01-01 PROCEDURE — 90480 ADMN SARSCOV2 VAC 1/ONLY CMP: CPT | Performed by: FAMILY MEDICINE

## 2023-01-01 PROCEDURE — 91320 SARSCV2 VAC 30MCG TRS-SUC IM: CPT | Performed by: FAMILY MEDICINE

## 2023-01-01 PROCEDURE — 36415 COLL VENOUS BLD VENIPUNCTURE: CPT | Performed by: FAMILY MEDICINE

## 2023-01-01 PROCEDURE — 90686 IIV4 VACC NO PRSV 0.5 ML IM: CPT | Performed by: FAMILY MEDICINE

## 2023-01-01 PROCEDURE — 80061 LIPID PANEL: CPT | Performed by: FAMILY MEDICINE

## 2023-01-01 PROCEDURE — 84443 ASSAY THYROID STIM HORMONE: CPT | Performed by: FAMILY MEDICINE

## 2023-01-01 PROCEDURE — 99214 OFFICE O/P EST MOD 30 MIN: CPT | Mod: 25 | Performed by: FAMILY MEDICINE

## 2023-01-01 PROCEDURE — 90471 IMMUNIZATION ADMIN: CPT | Performed by: FAMILY MEDICINE

## 2023-01-01 RX ORDER — ZOLPIDEM TARTRATE 10 MG/1
TABLET ORAL
Qty: 15 TABLET | Refills: 0 | Status: SHIPPED | OUTPATIENT
Start: 2023-01-01 | End: 2023-01-01

## 2023-01-01 RX ORDER — ESCITALOPRAM OXALATE 20 MG/1
40 TABLET ORAL DAILY
Qty: 180 TABLET | Refills: 4 | Status: SHIPPED | OUTPATIENT
Start: 2023-01-01 | End: 2024-01-01

## 2023-01-01 RX ORDER — QUETIAPINE FUMARATE 100 MG/1
100 TABLET, FILM COATED ORAL AT BEDTIME
Qty: 90 TABLET | Refills: 4 | Status: SHIPPED | OUTPATIENT
Start: 2023-01-01

## 2023-01-01 RX ORDER — KETOCONAZOLE 20 MG/G
CREAM TOPICAL DAILY
Qty: 60 G | Refills: 11 | Status: SHIPPED | OUTPATIENT
Start: 2023-01-01

## 2023-01-01 RX ORDER — QUETIAPINE FUMARATE 100 MG/1
100 TABLET, FILM COATED ORAL AT BEDTIME
Qty: 90 TABLET | Refills: 1 | Status: SHIPPED | OUTPATIENT
Start: 2023-01-01 | End: 2023-01-01

## 2023-01-01 RX ORDER — ZOLPIDEM TARTRATE 10 MG/1
TABLET ORAL
Qty: 15 TABLET | Refills: 0 | OUTPATIENT
Start: 2023-01-01

## 2023-01-01 RX ORDER — DEXTROAMPHETAMINE SACCHARATE, AMPHETAMINE ASPARTATE, DEXTROAMPHETAMINE SULFATE AND AMPHETAMINE SULFATE 7.5; 7.5; 7.5; 7.5 MG/1; MG/1; MG/1; MG/1
30 TABLET ORAL 2 TIMES DAILY
Qty: 60 TABLET | Refills: 0 | Status: SHIPPED | OUTPATIENT
Start: 2023-01-01 | End: 2023-01-01

## 2023-01-01 RX ORDER — ESCITALOPRAM OXALATE 20 MG/1
40 TABLET ORAL DAILY
Qty: 180 TABLET | Refills: 2 | Status: SHIPPED | OUTPATIENT
Start: 2023-01-01 | End: 2023-01-01

## 2023-01-01 RX ORDER — AMITRIPTYLINE HYDROCHLORIDE 10 MG/1
10 TABLET ORAL AT BEDTIME
Qty: 90 TABLET | Refills: 4 | Status: SHIPPED | OUTPATIENT
Start: 2023-01-01 | End: 2024-01-01

## 2023-01-01 RX ORDER — CLONIDINE HYDROCHLORIDE 0.1 MG/1
.05-.1 TABLET ORAL 2 TIMES DAILY PRN
Qty: 180 TABLET | Refills: 4 | Status: SHIPPED | OUTPATIENT
Start: 2023-01-01 | End: 2024-01-01

## 2023-01-01 RX ORDER — DEXTROAMPHETAMINE SACCHARATE, AMPHETAMINE ASPARTATE, DEXTROAMPHETAMINE SULFATE AND AMPHETAMINE SULFATE 7.5; 7.5; 7.5; 7.5 MG/1; MG/1; MG/1; MG/1
30 TABLET ORAL 2 TIMES DAILY
Qty: 60 TABLET | Refills: 0 | Status: SHIPPED | OUTPATIENT
Start: 2023-01-01 | End: 2024-01-01

## 2023-01-01 RX ORDER — ZOLPIDEM TARTRATE 10 MG/1
TABLET ORAL
Qty: 15 TABLET | Refills: 0 | Status: SHIPPED | OUTPATIENT
Start: 2023-01-01 | End: 2024-01-01

## 2023-01-01 RX ORDER — QUETIAPINE FUMARATE 25 MG/1
50 TABLET, FILM COATED ORAL 2 TIMES DAILY PRN
Qty: 20 TABLET | Refills: 4 | Status: SHIPPED | OUTPATIENT
Start: 2023-01-01

## 2023-01-01 RX ORDER — DEXTROAMPHETAMINE SACCHARATE, AMPHETAMINE ASPARTATE, DEXTROAMPHETAMINE SULFATE AND AMPHETAMINE SULFATE 7.5; 7.5; 7.5; 7.5 MG/1; MG/1; MG/1; MG/1
30 TABLET ORAL 2 TIMES DAILY
Qty: 60 TABLET | Refills: 0 | Status: CANCELLED | OUTPATIENT
Start: 2023-01-01

## 2023-01-01 RX ORDER — LORAZEPAM 1 MG/1
TABLET ORAL
Qty: 30 TABLET | Refills: 1 | Status: SHIPPED | OUTPATIENT
Start: 2023-01-01 | End: 2023-01-01

## 2023-01-01 RX ORDER — LORAZEPAM 1 MG/1
TABLET ORAL
Qty: 30 TABLET | Refills: 3 | Status: SHIPPED | OUTPATIENT
Start: 2023-01-01 | End: 2024-01-01

## 2023-01-01 RX ORDER — QUETIAPINE FUMARATE 25 MG/1
50 TABLET, FILM COATED ORAL 2 TIMES DAILY PRN
Qty: 20 TABLET | Refills: 1 | Status: SHIPPED | OUTPATIENT
Start: 2023-01-01 | End: 2023-01-01

## 2023-01-01 ASSESSMENT — PAIN SCALES - GENERAL: PAINLEVEL: NO PAIN (0)

## 2023-01-05 NOTE — ADDENDUM NOTE
Encounter addended by: Edenilson Ellington, PT on: 1/5/2023 2:32 PM   Actions taken: Clinical Note Signed, Episode resolved

## 2023-01-05 NOTE — PROGRESS NOTES
"[facility]    Outpatient Physical Therapy Discharge Note  Patient: Mindi Eastman  : 1975    Beginning/End Dates of Reporting Period:  22 to 10/21/22    Referring Provider: Dr. Eula Gaming MD    Therapy Diagnosis: cervical radiculopathy, myofascial restrictions     Client Self Report: Mindi reports that her pain has been lower since last visit, and she feels like the new exercises from last visit have really helped \"release something.\"    Objective Measurements:  Objective Measure: Current Pain  Details: 3/10      Goals:  Goal Identifier HEP   Goal Description pt will demonstrate independence and report compliance with HEP to facilitate improved independent symptom mgmt.   Target Date 22   Date Met      Progress (detail required for progress note): In progress     Goal Identifier sleep   Goal Description pt will report ability to sleep throughout the night without waking due to pain to facilitate improved QOL.   Target Date 22   Date Met      Progress (detail required for progress note): In progress     Goal Identifier mobility   Goal Description pt will report ability to perform overhead household chores or work tasks for greater than or equal to 5 minutes without onset of right shoulder/scapular pain.   Target Date 22   Date Met      Progress (detail required for progress note): In progress         Plan:  Discharge from therapy.    Discharge:    Reason for Discharge: Patient chooses to discontinue therapy and cancelled her remaining appointments.    Equipment Issued: NA    Discharge Plan: Patient to continue home program.  "

## 2023-01-20 DIAGNOSIS — F90.9 ATTENTION DEFICIT HYPERACTIVITY DISORDER (ADHD), UNSPECIFIED ADHD TYPE: ICD-10-CM

## 2023-01-20 DIAGNOSIS — F41.1 GAD (GENERALIZED ANXIETY DISORDER): ICD-10-CM

## 2023-01-20 RX ORDER — CLONIDINE HYDROCHLORIDE 0.1 MG/1
.05-.1 TABLET ORAL 2 TIMES DAILY
Qty: 30 TABLET | Refills: 0 | Status: SHIPPED | OUTPATIENT
Start: 2023-01-20 | End: 2023-01-30

## 2023-01-20 NOTE — TELEPHONE ENCOUNTER
Date of Last Office Visit: 12/15/22  Date of Next Office Visit: 1/30/23  No shows since last visit: 1/19/23  Cancellations since last visit: 0    Medication requested: cloNIDine (CATAPRES) 0.1 MG tablet Date last ordered: 12/26/22 Qty: 60 Refills: 0     cloNIDine (CATAPRES) 0.1 MG tablet 60 tablet 0 12/26/2022  No   Sig - Route: Take 0.5-1 tablets (0.05-0.1 mg) by mouth 2 times daily Need appt for refill. - Oral   Sent to pharmacy as: cloNIDine HCl 0.1 MG Oral Tablet (CATAPRES)   Class: E-Prescribe         Lapse in medication adherence greater than 5 days?: no  If yes, call patient and gather details: NA  Medication refill request verified as identical to current order?: yes  Result of Last DAM, VPA, Li+ Level, CBC, or Carbamazepine Level (at or since last visit): N/A    Last visit treatment plan:    Return in about 6 weeks (around 1/26/2023    Treatment Plan:    Continue Ambien/zolpidem 10 mg at bedtime as needed for sleep.  Do not mix with alcohol or your lorazepam.  Make sure you have at least 8-10 hours to devote to sleep before driving and operating heavy machinery the next morning.  Have previously discussed max recommended dose for women is 5 mg.  Benefit of current dose outweighs risks and has been tolerated well.    Continue quetiapine/Seroquel 100 mg at bedtime for bipolar disorder diagnosis.    Continue lorazepam/Ativan 1 mg up to twice daily for severe anxiety/panic attacks.  30 tablets to last at least 30 days.  Continue to use sparingly since now on clonidine and finding helpful.    Continue Adderall 30 mg twice daily for ADHD.    Continue Lexapro/escitalopram 40 mg daily for anxiety and mood.  Have previously discussed dose is higher than max recommended dose.  Benefit outweighs risk and have tolerated well in the past.    Continue clonidine 0.05- 0.1 mg twice daily as needed for anxiety and trauma related symptoms. Continue to use this to try and lessen your reliance on lorazepam and  Ambien.    Recommend individual psychotherapy.  Also discussed acceptance and commitment therapy, DBT, and accelerated resolution therapy or EMDR as additional therapy options.    Continue all other cares per primary care provider.     Continue all other medications as reviewed per electronic medical record today.     Safety plan reviewed. To the Emergency Department as needed or call after hours crisis line at [phone] or [phone]. [place] Crisis Text Line. Text [place] to 596452 or Suicide LifeLine Chat: suicidepreventioniFulfillmentline.org/chat    Schedule an appointment with me in 6 weeks or sooner as needed. Call [facility] at [phone] to schedule.    Follow up with primary care provider as planned or for acute medical concerns.    Call the psychiatric nurse line with medication questions or concerns at [phone].    [employer] may be used to communicate with your provider, but this is not intended to be used for emergencies.    []Medication refilled per  Medication Refill in Ambulatory Care  policy.  [x]Medication unable to be refilled by RN due to criteria not met as indicated below:    []Eligibility - not seen in the last year   []Supervision - no future appointment   [x]Compliance - no shows, cancellations or lapse in therapy   []Verification - order discrepancy   []Controlled medication   [x]Medication not included in policy   []90-day supply request   [x]Other : LPN is processing request

## 2023-01-25 ENCOUNTER — OFFICE VISIT (OUTPATIENT)
Dept: DERMATOLOGY | Facility: CLINIC | Age: 48
End: 2023-01-25
Attending: FAMILY MEDICINE
Payer: COMMERCIAL

## 2023-01-25 DIAGNOSIS — L72.9 CYST OF SKIN: ICD-10-CM

## 2023-01-25 DIAGNOSIS — L82.1 SEBORRHEIC KERATOSIS: ICD-10-CM

## 2023-01-25 DIAGNOSIS — L81.4 SOLAR LENTIGO: ICD-10-CM

## 2023-01-25 DIAGNOSIS — Z12.83 SKIN CANCER SCREENING: ICD-10-CM

## 2023-01-25 DIAGNOSIS — D18.01 CHERRY ANGIOMA: ICD-10-CM

## 2023-01-25 DIAGNOSIS — D22.9 MULTIPLE BENIGN NEVI: ICD-10-CM

## 2023-01-25 DIAGNOSIS — B36.0 TINEA VERSICOLOR: Primary | ICD-10-CM

## 2023-01-25 PROCEDURE — 99203 OFFICE O/P NEW LOW 30 MIN: CPT | Performed by: PHYSICIAN ASSISTANT

## 2023-01-25 RX ORDER — KETOCONAZOLE 20 MG/G
CREAM TOPICAL DAILY
Qty: 60 G | Refills: 11 | Status: SHIPPED | OUTPATIENT
Start: 2023-01-25 | End: 2023-01-01

## 2023-01-25 ASSESSMENT — PAIN SCALES - GENERAL: PAINLEVEL: NO PAIN (0)

## 2023-01-25 NOTE — PATIENT INSTRUCTIONS
Patient Education     Checking for Skin Cancer  You can find cancer early by checking your skin each month. There are 3 kinds of skin cancer. They are melanoma, basal cell carcinoma, and squamous cell carcinoma. Doing monthly skin checks is the best way to find new marks or skin changes. Follow the instructions below for checking your skin.   The ABCDEs of checking moles for melanoma   Check your moles or growths for signs of melanoma using ABCDE:   Asymmetry: the sides of the mole or growth don t match  Border: the edges are ragged, notched, or blurred  Color: the color within the mole or growth varies  Diameter: the mole or growth is larger than 6 mm (size of a pencil eraser)  Evolving: the size, shape, or color of the mole or growth is changing (evolving is not shown in the images below)    Checking for other types of skin cancer  Basal cell carcinoma or squamous cell carcinoma have symptoms such as:     A spot or mole that looks different from all other marks on your skin  Changes in how an area feels, such as itching, tenderness, or pain  Changes in the skin's surface, such as oozing, bleeding, or scaliness  A sore that does not heal  New swelling or redness beyond the border of a mole    Who s at risk?  Anyone can get skin cancer. But you are at greater risk if you have:   Fair skin, light-colored hair, or light-colored eyes  Many moles or abnormal moles on your skin  A history of sunburns from sunlight or tanning beds  A family history of skin cancer  A history of exposure to radiation or chemicals  A weakened immune system  If you have had skin cancer in the past, you are at risk for recurring skin cancer.   How to check your skin  Do your monthly skin checkups in front of a full-length mirror. Check all parts of your body, including your:   Head (ears, face, neck, and scalp)  Torso (front, back, and sides)  Arms (tops, undersides, upper, and lower armpits)  Hands (palms, backs, and fingers, including  under the nails)  Buttocks and genitals  Legs (front, back, and sides)  Feet (tops, soles, toes, including under the nails, and between toes)  If you have a lot of moles, take digital photos of them each month. Make sure to take photos both up close and from a distance. These can help you see if any moles change over time.   Most skin changes are not cancer. But if you see any changes in your skin, call your doctor right away. Only he or she can diagnose a problem. If you have skin cancer, seeing your doctor can be the first step toward getting the treatment that could save your life.   [employer] last reviewed this educational content on 4/1/2019 2000-2020 [employer]. [address] [place] [address], [place] [address]. All rights reserved. This information is not intended as a substitute for professional medical care. Always follow your healthcare professional's instructions.       When should I call my doctor?  If you are worsening or not improving, please, contact us or seek urgent care as noted below.     Who should I call with questions (adults)?  [facility] (adult and pediatric): [phone]  [facility] (adult): [phone]  For urgent needs outside of business hours call the [facility] at [phone] and ask for the dermatology resident on call to be paged  If this is a medical emergency and you are unable to reach an ER, Call [phone]    Who should I call with questions (pediatric)?  [facility]- Pediatric Dermatology  Dr. Carolina Black, Dr. Alessandro Feldman, Dr. Shanika Fontana, STEVE Weinberg, Dr. Alina Zeng, Dr. Sanjana Stephens & Dr. Reese Canales  Non-urgent nurse triage line; [phone]- Susan and Elsa SOUZA Care Coordinatorloco Billingsley (/Complex ) [phone]    If you need a prescription refill, please contact your pharmacy. Refills are approved or denied by our  Physicians during normal business hours, Monday through Fridays  Per office policy, refills will not be granted if you have not been seen within the past year (or sooner depending on your child's condition)    Scheduling Information:  Pediatric Appointment Scheduling and Call Center [phone]  Radiology Scheduling- [phone]  Sedation Unit Scheduling- [phone]  [place] Scheduling- General [phone]; Pediatric Dermatology [phone]  Main  Services: [phone]  Upper sorbian: [phone]  Andorran: [phone]  Hmong/Togolese/Kiswahili: [phone]  Preadmission Nursing Department Fax Number: [phone] (Fax all pre-operative paperwork to this number)    For urgent matters arising during evenings, weekends, or holidays that cannot wait for normal business hours please call [phone] and ask for the dermatology resident on call to be paged.

## 2023-01-25 NOTE — LETTER
1/25/2023       RE: Mindi Eastman  [address]  [place] [address]     Dear Colleague,    Thank you for referring your patient, Mindi Eastman, to the [facility] DERMATOLOG[facility] [place] at [facility]. Please see a copy of my visit note below.    [facility] Dermatology Note  Encounter Date: Jan 25, 2023  Office Visit     Dermatology Problem List:  1. Tinea versicolor  - Current tx: ketoconazole 2% cream  - Previous tx: OTC Selsun Blue  2. EIC, R superior axilla  ____________________________________________    Assessment & Plan:    # Tinea versicolor  - Start ketoconazole 2% cream to affected areas on the back daily after showering for 2-3 weeks until clear, then PRN thereafter.    # EIC, R superior axilla  - Referral to dermatological surgery provided today for removal.    # Cherry angiomas  # Seborrheic keratoses  Discussed the natural history and benign nature of this lesion. Reassurance provided that no additional treatment is necessary.     # Solar lentigines  # Multiple benign nevi  - No concerning lesions today  - Monitor for ABCDEs of melanoma   - Continue sun protection - recommend SPF 30 or higher with frequent application   - Return sooner if noticing changing or symptomatic lesions     Procedures Performed:   None    Follow-up: 1 year(s) in-person, or earlier for new or changing lesions    Staff and Scribe:     Scribe Disclosure:  I, Josh Grissom, am serving as a scribe to document services personally performed by Carrol Gbison PA-C based on data collection and the provider's statements to me.     Provider Disclosure:   The documentation recorded by the scribe accurately reflects the services I personally performed and the decisions made by me.    All risks, benefits and alternatives were discussed with patient.  Patient is in agreement and understands the assessment and plan.  All questions were  answered.  Sun Screen Education was given.   Return to Clinic annually or sooner as needed.   Carrol Gibson PA-C   [facility]   ____________________________________________    CC: Derm Problem (Tinea versicolor, enlarged cysts on skin, raised moles and mole fell off)    HPI:  Ms. Mindi Eastman is a(n) 47 year old female who presents today as a new patient for a FBSE. Patient endorses a spot in her armpit appearing about 7 years ago that has fluctuated in size and drained from time to time. She has an indirect family history of melanoma (grandmother). She enjoys spending time outdoors (fishing, softball) but diligently uses sun screen. She denies tanning bed use. Additionally, she mentions some mole-like spots on her chest that crust up and fall off. She further endorses tinea versicolor presenting about 2 years ago, for which she was recommended Selsun Blue (which did not help).    Patient is otherwise feeling well, without additional skin concerns.    Labs Reviewed:  N/A    Physical Exam:  Vitals: There were no vitals taken for this visit.  SKIN: Total skin excluding the undergarment areas was performed. The exam included the head/face, neck, both arms, chest, back, abdomen, both legs, digits and/or nails.   - There is a raised dome shaped non tender 2 cm nodule with a central punctum located on R superior axilla.   - Faint sand-colored scaly plaques on the central back.  - There are dome shaped bright red papules on the trunk and extremities.   - Multiple regular brown pigmented macules and papules are identified on the trunk and extremities.   - Scattered brown macules on sun exposed areas.  - There are waxy stuck on tan to brown papules on the trunk and extremities.   - There are skin colored pedunculated papules on the chest.   - No other lesions of concern on areas examined.     Medications:  Current Outpatient Medications   Medication     acetaminophen (TYLENOL) 500 MG  tablet     amphetamine-dextroamphetamine (ADDERALL) 30 MG tablet     amphetamine-dextroamphetamine (ADDERALL) 30 MG tablet     cloNIDine (CATAPRES) 0.1 MG tablet     escitalopram (LEXAPRO) 20 MG tablet     LORazepam (ATIVAN) 1 MG tablet     QUEtiapine (SEROQUEL) 100 MG tablet     zolpidem (AMBIEN) 10 MG tablet     No current facility-administered medications for this visit.      Past Medical History:   Patient Active Problem List   Diagnosis     Bursitis of shoulder     Mental health disorder     Bipolar I disorder, most recent episode depressed (H)     Major depressive disorder, recurrent severe without psychotic features (H)     Acute tension-type headache     SHANICE (generalized anxiety disorder)     Tobacco use     Insomnia due to other mental disorder     Suicidal ideation     Moderate dysplasia of cervix (ERIC II)     Attention deficit hyperactivity disorder (ADHD), unspecified ADHD type     Anxiety     Fibromyalgia     ROXANNE (obstructive sleep apnea)     Morbid obesity (H)     History of cervical dysplasia     Hx of pancreatitis     Hypothyroidism, unspecified type     Past Medical History:   Diagnosis Date     ADHD      Anxiety      Bipolar disorder (H)      Depressive disorder      OCD (obsessive compulsive disorder)      ROXANNE (obstructive sleep apnea)     On CPAP     Seasonal allergies         CC Eula Gaming MD  [address] [address] on close of this encounter.

## 2023-01-25 NOTE — NURSING NOTE
Dermatology Rooming Note    Mindi Eastman's goals for this visit include:   Chief Complaint   Patient presents with     Derm Problem     Tinea versicolor, enlarged cysts on skin, raised moles and mole fell off     Thomas Francis, Visit Facilitator

## 2023-01-25 NOTE — PROGRESS NOTES
[facility] Dermatology Note  Encounter Date: Jan 25, 2023  Office Visit     Dermatology Problem List:  1. Tinea versicolor  - Current tx: ketoconazole 2% cream  - Previous tx: OTC Selsun Blue  2. EIC, R superior axilla  ____________________________________________    Assessment & Plan:    # Tinea versicolor  - Start ketoconazole 2% cream to affected areas on the back daily after showering for 2-3 weeks until clear, then PRN thereafter.    # EIC, R superior axilla  - Referral to dermatological surgery provided today for removal.    # Cherry angiomas  # Seborrheic keratoses  Discussed the natural history and benign nature of this lesion. Reassurance provided that no additional treatment is necessary.     # Solar lentigines  # Multiple benign nevi  - No concerning lesions today  - Monitor for ABCDEs of melanoma   - Continue sun protection - recommend SPF 30 or higher with frequent application   - Return sooner if noticing changing or symptomatic lesions     Procedures Performed:   None    Follow-up: 1 year(s) in-person, or earlier for new or changing lesions    Staff and Scribe:     Scribe Disclosure:  I, Josh Grissom, am serving as a scribe to document services personally performed by Carrol Gibson PA-C based on data collection and the provider's statements to me.     Provider Disclosure:   The documentation recorded by the scribe accurately reflects the services I personally performed and the decisions made by me.    All risks, benefits and alternatives were discussed with patient.  Patient is in agreement and understands the assessment and plan.  All questions were answered.  Sun Screen Education was given.   Return to Clinic annually or sooner as needed.   Carrol Gibson PA-C   [facility]   ____________________________________________    CC: Derm Problem (Tinea versicolor, enlarged cysts on skin, raised moles and mole fell off)    HPI:  Ms. Mindi LIZ  Raiza is a(n) 47 year old female who presents today as a new patient for a FBSE. Patient endorses a spot in her armpit appearing about 7 years ago that has fluctuated in size and drained from time to time. She has an indirect family history of melanoma (grandmother). She enjoys spending time outdoors (fishing, softball) but diligently uses sun screen. She denies tanning bed use. Additionally, she mentions some mole-like spots on her chest that crust up and fall off. She further endorses tinea versicolor presenting about 2 years ago, for which she was recommended Selsun Blue (which did not help).    Patient is otherwise feeling well, without additional skin concerns.    Labs Reviewed:  N/A    Physical Exam:  Vitals: There were no vitals taken for this visit.  SKIN: Total skin excluding the undergarment areas was performed. The exam included the head/face, neck, both arms, chest, back, abdomen, both legs, digits and/or nails.   - There is a raised dome shaped non tender 2 cm nodule with a central punctum located on R superior axilla.   - Faint sand-colored scaly plaques on the central back.  - There are dome shaped bright red papules on the trunk and extremities.   - Multiple regular brown pigmented macules and papules are identified on the trunk and extremities.   - Scattered brown macules on sun exposed areas.  - There are waxy stuck on tan to brown papules on the trunk and extremities.   - There are skin colored pedunculated papules on the chest.   - No other lesions of concern on areas examined.     Medications:  Current Outpatient Medications   Medication     acetaminophen (TYLENOL) 500 MG tablet     amphetamine-dextroamphetamine (ADDERALL) 30 MG tablet     amphetamine-dextroamphetamine (ADDERALL) 30 MG tablet     cloNIDine (CATAPRES) 0.1 MG tablet     escitalopram (LEXAPRO) 20 MG tablet     LORazepam (ATIVAN) 1 MG tablet     QUEtiapine (SEROQUEL) 100 MG tablet     zolpidem (AMBIEN) 10 MG tablet     No current  facility-administered medications for this visit.      Past Medical History:   Patient Active Problem List   Diagnosis     Bursitis of shoulder     Mental health disorder     Bipolar I disorder, most recent episode depressed (H)     Major depressive disorder, recurrent severe without psychotic features (H)     Acute tension-type headache     SHANICE (generalized anxiety disorder)     Tobacco use     Insomnia due to other mental disorder     Suicidal ideation     Moderate dysplasia of cervix (ERIC II)     Attention deficit hyperactivity disorder (ADHD), unspecified ADHD type     Anxiety     Fibromyalgia     ROXANNE (obstructive sleep apnea)     Morbid obesity (H)     History of cervical dysplasia     Hx of pancreatitis     Hypothyroidism, unspecified type     Past Medical History:   Diagnosis Date     ADHD      Anxiety      Bipolar disorder (H)      Depressive disorder      OCD (obsessive compulsive disorder)      ROXANNE (obstructive sleep apnea)     On CPAP     Seasonal allergies         CC Eula Gaming MD  [address] [id number] on close of this encounter.

## 2023-01-30 ENCOUNTER — VIRTUAL VISIT (OUTPATIENT)
Dept: BEHAVIORAL HEALTH | Facility: CLINIC | Age: 48
End: 2023-01-30
Payer: COMMERCIAL

## 2023-01-30 ENCOUNTER — VIRTUAL VISIT (OUTPATIENT)
Dept: PSYCHIATRY | Facility: CLINIC | Age: 48
End: 2023-01-30
Payer: COMMERCIAL

## 2023-01-30 DIAGNOSIS — G47.00 INSOMNIA, UNSPECIFIED TYPE: ICD-10-CM

## 2023-01-30 DIAGNOSIS — F31.9 BIPOLAR I DISORDER (H): Primary | ICD-10-CM

## 2023-01-30 DIAGNOSIS — F41.1 GAD (GENERALIZED ANXIETY DISORDER): ICD-10-CM

## 2023-01-30 DIAGNOSIS — F43.10 PTSD (POST-TRAUMATIC STRESS DISORDER): ICD-10-CM

## 2023-01-30 DIAGNOSIS — F41.1 GENERALIZED ANXIETY DISORDER: ICD-10-CM

## 2023-01-30 DIAGNOSIS — F90.9 ATTENTION DEFICIT HYPERACTIVITY DISORDER (ADHD), UNSPECIFIED ADHD TYPE: ICD-10-CM

## 2023-01-30 DIAGNOSIS — F90.2 ATTENTION DEFICIT HYPERACTIVITY DISORDER (ADHD), COMBINED TYPE: Primary | ICD-10-CM

## 2023-01-30 DIAGNOSIS — E66.01 MORBID OBESITY (H): ICD-10-CM

## 2023-01-30 DIAGNOSIS — F31.9 BIPOLAR 1 DISORDER (H): ICD-10-CM

## 2023-01-30 PROCEDURE — 90832 PSYTX W PT 30 MINUTES: CPT | Mod: 95 | Performed by: PSYCHOLOGIST

## 2023-01-30 PROCEDURE — 99214 OFFICE O/P EST MOD 30 MIN: CPT | Mod: 95 | Performed by: PSYCHIATRY & NEUROLOGY

## 2023-01-30 RX ORDER — ZOLPIDEM TARTRATE 10 MG/1
10 TABLET ORAL
Qty: 15 TABLET | Refills: 2 | Status: SHIPPED | OUTPATIENT
Start: 2023-01-30 | End: 2023-05-05

## 2023-01-30 RX ORDER — QUETIAPINE FUMARATE 100 MG/1
100 TABLET, FILM COATED ORAL AT BEDTIME
Qty: 90 TABLET | Refills: 0 | Status: SHIPPED | OUTPATIENT
Start: 2023-01-30 | End: 2023-04-03

## 2023-01-30 RX ORDER — DEXTROAMPHETAMINE SACCHARATE, AMPHETAMINE ASPARTATE, DEXTROAMPHETAMINE SULFATE AND AMPHETAMINE SULFATE 7.5; 7.5; 7.5; 7.5 MG/1; MG/1; MG/1; MG/1
30 TABLET ORAL 2 TIMES DAILY
Qty: 60 TABLET | Refills: 0 | Status: SHIPPED | OUTPATIENT
Start: 2023-03-27 | End: 2023-03-27

## 2023-01-30 RX ORDER — ESCITALOPRAM OXALATE 20 MG/1
40 TABLET ORAL DAILY
Qty: 180 TABLET | Refills: 0 | Status: SHIPPED | OUTPATIENT
Start: 2023-01-30 | End: 2023-05-08

## 2023-01-30 RX ORDER — CLONIDINE HYDROCHLORIDE 0.1 MG/1
.05-.1 TABLET ORAL 2 TIMES DAILY PRN
Qty: 180 TABLET | Refills: 0 | Status: SHIPPED | OUTPATIENT
Start: 2023-01-30 | End: 2023-04-04

## 2023-01-30 RX ORDER — DEXTROAMPHETAMINE SACCHARATE, AMPHETAMINE ASPARTATE, DEXTROAMPHETAMINE SULFATE AND AMPHETAMINE SULFATE 7.5; 7.5; 7.5; 7.5 MG/1; MG/1; MG/1; MG/1
30 TABLET ORAL 2 TIMES DAILY
Qty: 60 TABLET | Refills: 0 | Status: SHIPPED | OUTPATIENT
Start: 2023-02-25 | End: 2023-05-08

## 2023-01-30 ASSESSMENT — PATIENT HEALTH QUESTIONNAIRE - PHQ9
SUM OF ALL RESPONSES TO PHQ QUESTIONS 1-9: 0
SUM OF ALL RESPONSES TO PHQ QUESTIONS 1-9: 0
10. IF YOU CHECKED OFF ANY PROBLEMS, HOW DIFFICULT HAVE THESE PROBLEMS MADE IT FOR YOU TO DO YOUR WORK, TAKE CARE OF THINGS AT HOME, OR GET ALONG WITH OTHER PEOPLE: NOT DIFFICULT AT ALL

## 2023-01-30 NOTE — Clinical Note
Psychiatry Update/Consult. I am returning Mindi Eastman's psychiatric care back to you for ongoing management and medication prescribing.  Patient has graduated from [facility] due to ongoing stability and readiness to return to primary care provider. Future medication refills will come from you (I gave 3 months today). I recommended that the patient follow up with you in about 2-3 months for a mental health visit. More details about treatment plan are in my note. If you have any questions or concerns, please don't hesitate to reach out.   Thanks for the referral! It was a pleasure working with Mindi Eastman.   Jenelle Holbrook, DO [facility]

## 2023-01-30 NOTE — Clinical Note
Pt has been dismissed from [facility] due to completion of services.  Pt to return to PCP.  PCP will need to place new referral for [facility] services if needed.  OP intake: please dimiss pt from [facility] at this time.

## 2023-01-30 NOTE — PROGRESS NOTES
[facility] Psychiatry Services [facility]  January 30, 2023      Behavioral Health Clinician Progress Note    Patient Name: Mindi Eastman           Service Type:  Individual      Service Location:   [facility] / Email (patient reached)     Session Start Time: 07:30 am  Session End Time: 07:50 am      Session Length: 16 - 37      Attendees: Patient     Service Modality:  Video Visit:      Provider verified identity through the following two step process.  Patient provided:  Patient is known previously to provider    Telemedicine Visit: The patient's condition can be safely assessed and treated via synchronous audio and visual telemedicine encounter.      Reason for Telemedicine Visit: Services only offered telehealth    Originating Site (Patient Location): Patient's home    Distant Site (Provider Location): Provider Remote Setting- Home Office    Consent:  The patient/guardian has verbally consented to: the potential risks and benefits of telemedicine (video visit) versus in person care; bill my insurance or make self-payment for services provided; and responsibility for payment of non-covered services.     Patient would like the video invitation sent by:  My Chart    Mode of Communication:  Video Conference via [facility]    Distant Location (Provider):  Off-site    As the provider I attest to compliance with applicable laws and regulations related to telemedicine.    Visit Activities (Refresh list every visit): [facility] Only    Diagnostic Assessment Date: 11/03/2022 by LAUREN Cid  Treatment Plan Review Date: 12/15/2022  See Flowsheets for today's PHQ-9 and SHANICE-7 results  Previous PHQ-9:   PHQ-9 SCORE 8/15/2022 1/30/2023   PHQ-9 Total Score [facility] 0 0   PHQ-9 Total Score 0 0       DATA  Extended Session (60+ minutes): No  Interactive Complexity: No  Crisis: No  [facility] Patient: No    Treatment Objective(s) Addressed in This Session:  Target Behavior(s): increase in irritability    Mood  "Instability: will develop better understanding of triggers and coping strategies to stabilize mood    Current Stressors / Issues:  Met with pt this morning for follow up. Pt reports things have been going really well. States she feels the medications have really helped anxiety. States since starting Clonidine, she has noticed her mental health improve drastically. She reports irritability has drastically decreased. Denies any depressive sx. Reports high stress with family but has been better able to manage it with her mental health being more stable.      11/3/22 DA  Met with pt today for initial assessment. Reports her psychiatrist retired and is needing to find someone who will work on doses that have been working for her in the past. She has been struggling with irritability and feeling a \"cloud of depression is coming\". Starting to feel a bit more depressed. Reports a hx of Bipolar, PTSD and ADHD. She has taken medication, seen therapists and done ECT. She has been ECT free for 3 years. States sleep is hit or miss for her. 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 She has found work to be more difficult with her irritability. Said she has almost quit 3 times just this week with frustration. Feels she has become less tolerant as of late.      Progress on Treatment Objective(s) / Homework:  Satisfactory progress - PRECONTEMPLATION (Not seeing need for change); Intervened by educating the patient about the effects of current behavior on health.  Evoked information about reasons to continue behavior, express concern / recommendations, and explored any change talk    Motivational Interviewing    MI Intervention: Expressed Empathy/Understanding, Supported Autonomy, Collaboration, Evocation, Permission to raise concern or advise, Open-ended questions, Reflections: simple and complex, Change talk (evoked) and Reframe     Change Talk Expressed by the Patient: Desire to change Ability to change Reasons to change " Need to change    Provider Response to Change Talk: E - Evoked more info from patient about behavior change, A - Affirmed patient's thoughts, decisions, or attempts at behavior change, R - Reflected patient's change talk and S - Summarized patient's change talk statements      Care Plan review completed: Yes    Medication Review:  No changes to current psychiatric medication(s)    Medication Compliance:  Yes    Changes in Health Issues:   None reported    Chemical Use Review:   Substance Use: Chemical use reviewed, no active concerns identified      Tobacco Use: No change in amount of tobacco use since last session.  Pre-contemplation    Assessment: Current Emotional / Mental Status (status of significant symptoms):  Risk status (Self / Other harm or suicidal ideation)  Patient denies a history of suicidal ideation, suicide attempts, self-injurious behavior, homicidal ideation, homicidal behavior and and other safety concerns  Patient denies current fears or concerns for personal safety.  Patient denies current or recent suicidal ideation or behaviors.  Patient denies current or recent homicidal ideation or behaviors.  Patient denies current or recent self injurious behavior or ideation.  Patient denies other safety concerns.  A safety and risk management plan has not been developed at this time, however patient was encouraged to call [place] [phone] should there be a change in any of these risk factors.    Appearance:   Appropriate   Eye Contact:   Good   Psychomotor Behavior: Normal   Attitude:   Cooperative   Orientation:   All  Speech   Rate / Production: Normal/ Responsive Normal    Volume:  Normal   Mood:    Normal  Affect:    Appropriate   Thought Content:  Clear   Thought Form:  Coherent  Logical   Insight:    Good     Diagnoses:  1. Attention deficit hyperactivity disorder (ADHD), combined type    2. Bipolar 1 disorder (H)    3. Generalized anxiety disorder    4. PTSD (post-traumatic stress disorder)     5. Morbid obesity (H)        Collateral Reports Completed:  Communicated with: Dr. Holbrook     Plan: (Homework, other):  Patient was given information about behavioral services and encouraged to schedule a follow up appointment with the clinic [facility] TBD.  She was also given information about mental health symptoms and treatment options .  CD Recommendations: No indications of CD issues.  Bryce Ring, PsyD, LP      ______________________________________________________________________    [facility]iatry Services - [place]: Treatment Plan    Patient's Name: Mindi Eastman  YOB: 1975    Date of Creation: December 15, 2022  Date Treatment Plan Last Reviewed/Revised: December 15, 2022    DSM5 Diagnoses: Attention-Deficit/Hyperactivity Disorder  314.01 (F90.2) Combined presentation, 296.51 Bipolar I Disorder Current or Most Recent Episode Depressed, Mild, 300.02 (F41.1) Generalized Anxiety Disorder or 309.81 (F43.10) Posttraumatic Stress Disorder (includes Posttraumatic Stress Disorder for Children 6 Years and Younger)  Without dissociative symptoms  Psychosocial / Contextual Factors:  Individual Factors feeling more depressed and struggles with overall mental health  PROMIS (reviewed every 90 days):   PROMIS 10-Global Health (only subscores and total score):  PROMIS-10 Scores Only 1/30/2023   Global Mental Health Score 15   Global Physical Health Score 19   PROMIS TOTAL - SUBSCORES 34       Referral / Collaboration:  Referral to another professional/service is not indicated at this time..    Anticipated number of session for this episode of care: 4-6 sessions  Anticipation frequency of session: TBD  Anticipated Duration of each session: 16-37 minutes  Treatment plan will be reviewed in 90 days or when goals have been changed.       MeasurableTreatment Goal(s) related to diagnosis / functional impairment(s)  Goal 1: Patient will work with providers to manage symptoms    I will  know I've met my goal when feeling more emotionally stable.      Objective #A (Patient Action)    Patient will attend all appointments, take medication as prescribed.  Status: New - Date: 12/15/22     Intervention(s)  Therapist will Monitor and assist in overcoming barriers to treatment adherence.    Objective #B  Patient will consider all recommendations offered.  Status: New - Date: 12/15/22     Intervention(s)  Therapist will educate patient on treatment options, clarify concerns, work with pt to overcome any resistance to compliance.      Patient has reviewed and agreed to the above plan.      Miguel Ring PsyD  01/30/2023

## 2023-01-30 NOTE — PROCEDURES
[facility] Psychiatry Services [facility]  January 30, 2023      Behavioral Health Clinician Progress Note    Patient Name: Mindi Eastman           Service Type:  Individual      Service Location:   [facility] / Email (patient reached)     Session Start Time: 07:30 am  Session End Time: 07:50 am      Session Length: 16 - 37      Attendees: Patient     Service Modality:  Video Visit:      Provider verified identity through the following two step process.  Patient provided:  Patient is known previously to provider    Telemedicine Visit: The patient's condition can be safely assessed and treated via synchronous audio and visual telemedicine encounter.      Reason for Telemedicine Visit: Services only offered telehealth    Originating Site (Patient Location): Patient's home    Distant Site (Provider Location): Provider Remote Setting- Home Office    Consent:  The patient/guardian has verbally consented to: the potential risks and benefits of telemedicine (video visit) versus in person care; bill my insurance or make self-payment for services provided; and responsibility for payment of non-covered services.     Patient would like the video invitation sent by:  My Chart    Mode of Communication:  Video Conference via [facility]    Distant Location (Provider):  Off-site    As the provider I attest to compliance with applicable laws and regulations related to telemedicine.    Visit Activities (Refresh list every visit): [facility] Only    Diagnostic Assessment Date: 11/03/2022 by LAUREN Cid  Treatment Plan Review Date: 12/15/2022  See Flowsheets for today's PHQ-9 and SHANICE-7 results  Previous PHQ-9:   PHQ-9 SCORE 8/15/2022 1/30/2023   PHQ-9 Total Score [facility] 0 0   PHQ-9 Total Score 0 0       DATA  Extended Session (60+ minutes): No  Interactive Complexity: No  Crisis: No  [facility] Patient: No    Treatment Objective(s) Addressed in This Session:  Target Behavior(s): increase in irritability    Mood  "Instability: will develop better understanding of triggers and coping strategies to stabilize mood    Current Stressors / Issues:  Met with pt this morning for follow up. Pt reports things have been going really well. States she feels the medications have really helped anxiety. States since starting Clonidine, she has noticed her mental health improve drastically. She reports irritability has drastically decreased. Denies any depressive sx. Reports high stress with family but has been better able to manage it with her mental health being more stable.      11/3/22 DA  Met with pt today for initial assessment. Reports her psychiatrist retired and is needing to find someone who will work on doses that have been working for her in the past. She has been struggling with irritability and feeling a \"cloud of depression is coming\". Starting to feel a bit more depressed. Reports a hx of Bipolar, PTSD and ADHD. She has taken medication, seen therapists and done ECT. She has been ECT free for 3 years. States sleep is hit or miss for her. 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 She has found work to be more difficult with her irritability. Said she has almost quit 3 times just this week with frustration. Feels she has become less tolerant as of late.      Progress on Treatment Objective(s) / Homework:  Satisfactory progress - PRECONTEMPLATION (Not seeing need for change); Intervened by educating the patient about the effects of current behavior on health.  Evoked information about reasons to continue behavior, express concern / recommendations, and explored any change talk    Motivational Interviewing    MI Intervention: Expressed Empathy/Understanding, Supported Autonomy, Collaboration, Evocation, Permission to raise concern or advise, Open-ended questions, Reflections: simple and complex, Change talk (evoked) and Reframe     Change Talk Expressed by the Patient: Desire to change Ability to change Reasons to change " Need to change    Provider Response to Change Talk: E - Evoked more info from patient about behavior change, A - Affirmed patient's thoughts, decisions, or attempts at behavior change, R - Reflected patient's change talk and S - Summarized patient's change talk statements      Care Plan review completed: Yes    Medication Review:  No changes to current psychiatric medication(s)    Medication Compliance:  Yes    Changes in Health Issues:   None reported    Chemical Use Review:   Substance Use: Chemical use reviewed, no active concerns identified      Tobacco Use: No change in amount of tobacco use since last session.  Pre-contemplation    Assessment: Current Emotional / Mental Status (status of significant symptoms):  Risk status (Self / Other harm or suicidal ideation)  Patient denies a history of suicidal ideation, suicide attempts, self-injurious behavior, homicidal ideation, homicidal behavior and and other safety concerns  Patient denies current fears or concerns for personal safety.  Patient denies current or recent suicidal ideation or behaviors.  Patient denies current or recent homicidal ideation or behaviors.  Patient denies current or recent self injurious behavior or ideation.  Patient denies other safety concerns.  A safety and risk management plan has not been developed at this time, however patient was encouraged to call [place] [phone] should there be a change in any of these risk factors.    Appearance:   Appropriate   Eye Contact:   Good   Psychomotor Behavior: Normal   Attitude:   Cooperative   Orientation:   All  Speech   Rate / Production: Normal/ Responsive Normal    Volume:  Normal   Mood:    Normal  Affect:    Appropriate   Thought Content:  Clear   Thought Form:  Coherent  Logical   Insight:    Good     Diagnoses:  1. Attention deficit hyperactivity disorder (ADHD), combined type    2. Bipolar 1 disorder (H)    3. Generalized anxiety disorder    4. PTSD (post-traumatic stress disorder)     5. Morbid obesity (H)        Collateral Reports Completed:  Communicated with: Dr. Holbrook     Plan: (Homework, other):  Patient was given information about behavioral services and encouraged to schedule a follow up appointment with the clinic [facility] TBD.  She was also given information about mental health symptoms and treatment options .  CD Recommendations: No indications of CD issues.  Bryce Ring, PsyD, LP      ______________________________________________________________________    [facility] Psychiat[facility] Services - [place]: Treatment Plan    Patient's Name: Mindi Eastman  YOB: 1975    Date of Creation: December 15, 2022  Date Treatment Plan Last Reviewed/Revised: December 15, 2022    DSM5 Diagnoses: Attention-Deficit/Hyperactivity Disorder  314.01 (F90.2) Combined presentation, 296.51 Bipolar I Disorder Current or Most Recent Episode Depressed, Mild, 300.02 (F41.1) Generalized Anxiety Disorder or 309.81 (F43.10) Posttraumatic Stress Disorder (includes Posttraumatic Stress Disorder for Children 6 Years and Younger)  Without dissociative symptoms  Psychosocial / Contextual Factors:  Individual Factors feeling more depressed and struggles with overall mental health  PROMIS (reviewed every 90 days):   PROMIS 10-Global Health (only subscores and total score):  PROMIS-10 Scores Only 1/30/2023   Global Mental Health Score 15   Global Physical Health Score 19   PROMIS TOTAL - SUBSCORES 34       Referral / Collaboration:  Referral to another professional/service is not indicated at this time..    Anticipated number of session for this episode of care: 4-6 sessions  Anticipation frequency of session: TBD  Anticipated Duration of each session: 16-37 minutes  Treatment plan will be reviewed in 90 days or when goals have been changed.       MeasurableTreatment Goal(s) related to diagnosis / functional impairment(s)  Goal 1: Patient will work with providers to manage symptoms    I will  know I've met my goal when feeling more emotionally stable.      Objective #A (Patient Action)    Patient will attend all appointments, take medication as prescribed.  Status: New - Date: 12/15/22     Intervention(s)  Therapist will Monitor and assist in overcoming barriers to treatment adherence.    Objective #B  Patient will consider all recommendations offered.  Status: New - Date: 12/15/22     Intervention(s)  Therapist will educate patient on treatment options, clarify concerns, work with pt to overcome any resistance to compliance.      Patient has reviewed and agreed to the above plan.      Miguel Ring PsyD  01/30/2023   Wheelchair/Stroller

## 2023-01-30 NOTE — PROGRESS NOTES
"Telemedicine Visit: The patient's condition can be safely assessed and treated via synchronous audio and visual telemedicine encounter.      Reason for Telemedicine Visit: Patient has requested telehealth visit    Originating Site (Patient Location): Patient's home    Distant Location (provider location):  Off-site    Consent:  The patient/guardian has verbally consented to: the potential risks and benefits of telemedicine (video visit) versus in person care; bill my insurance or make self-payment for services provided; and responsibility for payment of non-covered services.     Mode of Communication:  Video Conference via [employer]    As the provider I attest to compliance with applicable laws and regulations related to telemedicine.         Outpatient Psychiatric Progress Note    Name: Mindi Eastman   : 1975                    Primary Care Provider: Eula Gaming MD   Therapist: None     PHQ-9 scores:  PHQ-9 SCORE 8/15/2022 2023   PHQ-9 Total Score MyChart 0 0   PHQ-9 Total Score 0 0       SHANICE-7 scores:  No flowsheet data found.    Patient Identification:  Patient is a 47 year old, partnered / significant other  White Not  or  female  who presents for return visit with me.  Patient is currently employed full time. Patient attended the phone/video session alone. Patient prefers to be called: \"Angely\".    Interim History:  I last saw Mindi Eastman for outpatient psychiatry return visit on 12/15/2022. During that appointment, we:      Continue Ambien/zolpidem 10 mg at bedtime as needed for sleep.  Do not mix with alcohol or your lorazepam.  Make sure you have at least 8-10 hours to devote to sleep before driving and operating heavy machinery the next morning.  Have previously discussed max recommended dose for women is 5 mg.  Benefit of current dose outweighs risks and has been tolerated well.    Continue quetiapine/Seroquel 100 mg at bedtime for bipolar disorder " diagnosis.    Continue lorazepam/Ativan 1 mg up to twice daily for severe anxiety/panic attacks.  30 tablets to last at least 30 days.  Continue to use sparingly since now on clonidine and finding helpful.    Continue Adderall 30 mg twice daily for ADHD.    Continue Lexapro/escitalopram 40 mg daily for anxiety and mood.  Have previously discussed dose is higher than max recommended dose.  Benefit outweighs risk and have tolerated well in the past.    Continue clonidine 0.05- 0.1 mg twice daily as needed for anxiety and trauma related symptoms. Continue to use this to try and lessen your reliance on lorazepam and Ambien.    Recommend individual psychotherapy.  Also discussed acceptance and commitment therapy, DBT, and accelerated resolution therapy or EMDR as additional therapy options.    Continue all other cares per primary care provider.     1/30: An overall doing very well.  Clonidine has continued to be very good addition to medication regimen.  Clonidine has been helpful for irritability and some anxiety symptoms.  Denies any concerns or questions regarding medication regimen.  No acute safety concerns.  Symptoms currently well controlled.  Patient feels ready to discharge back to primary care provider for ongoing management.  No suicidal ideation.  No problematic drug or alcohol use.    Per [facility] [place], Dr. Bryce Ring, during today's team-based visit:  Met with pt this morning for follow up. Pt reports things have been going really well. States she feels the medications have really helped anxiety. States since starting Clonidine, she has noticed her mental health improve drastically. She reports irritability has drastically decreased. Denies any depressive sx. Reports high stress with family but has been better able to manage it with her mental health being more stable.      Past Psychiatric Med Trials:  Psych Meds at Intake:  Seroquel 100 mg daily  Lorazepam 1 mg daily prn (does not use daily)  Propranolol 10 mg daily  - doesn't feel it has been very helpful  ambien 10 mg at bedtime (nightly)  adderall 40 mg daily (had been on 60 mg of IR)  lexapro 20 mg daily (had been on 40 mg)     Past Psych Meds:  zoloft - maybe one of first meds trialed  Wellbutrin XL - one of first meds  ECT - 3 years a few times a week, was starting to effect pt's temperament and so discontinued, was really helpful  seroquel - 15-20 years  Lexapro - a good 20 years  latuda - seemed to be ok but made pt really sick     Doesn't think has been on lamotrigine, lithium, or depakote    Psychiatric ROS:  Mindi Eastman reports mood has been: Improved/stable  Anxiety has been: Improved/stable  Sleep has been: Improved/stable  Elisha sxs: None  Psychosis sxs: None  ADHD/ADD sxs: Improved/stable  PTSD sxs: Stable/stable  PHQ9 and GAD7 scores were reviewed today if completed.   Medication side effects: Denies  Current stressors include: Symptoms and See HPI above  Coping mechanisms and supports include: Therapy, Family, Hobbies and Friends    Current medications include:   Current Outpatient Medications   Medication Sig     acetaminophen (TYLENOL) 500 MG tablet Take 1,000 mg by mouth as needed (headache) Prior to ECT     amphetamine-dextroamphetamine (ADDERALL) 30 MG tablet Take 1 tablet (30 mg) by mouth 2 times daily     amphetamine-dextroamphetamine (ADDERALL) 30 MG tablet Take 1 tablet (30 mg) by mouth 2 times daily     cloNIDine (CATAPRES) 0.1 MG tablet Take 0.5-1 tablets (0.05-0.1 mg) by mouth 2 times daily     escitalopram (LEXAPRO) 20 MG tablet Take 2 tablets (40 mg) by mouth daily     ketoconazole (NIZORAL) 2 % external cream Apply topically daily To the affected areas on the back until rashes are clear.     LORazepam (ATIVAN) 1 MG tablet Take 1 tablet (1 mg) by mouth 2 times daily as needed for anxiety 30 tabs to last 30 days     QUEtiapine (SEROQUEL) 100 MG tablet Take 1 tablet (100 mg) by mouth At Bedtime     zolpidem (AMBIEN) 10 MG tablet Take 1 tablet  (10 mg) by mouth nightly as needed for sleep Do NOT take with Ativan. 15 tabs to last 25 days     No current facility-administered medications for this visit.       The [place] Prescription Monitoring Program has been reviewed and there are no concerns about diversionary activity for controlled substances at this time.   01/26/2023 11/03/2022 01/26/2023 1   Dextroamp-Amphetamin 30 Mg Tab  60.00 30 Al Bau [id number] Gra (8070) 0/0  [place]  01/14/2023 11/09/2022 01/14/2023 1   Zolpidem Tartrate 10 Mg Tablet  15.00 25 Al Bau [id number] Gra (8070) 2/2 0.30 LME [place]  12/29/2022 12/28/2022 12/29/2022 1   Dextroamp-Amphetamin 30 Mg Tab  60.00 30 Al Bau [id number] Gra (1275) 0/0  [place]  12/20/2022 11/03/2022 12/21/2022 1   Lorazepam 1 Mg Tablet  30.00 30 Al Bau [id number] Gra (8070) 0/1 1.00 [place]  12/20/2022 11/09/2022 12/21/2022 1   Zolpidem Tartrate 10 Mg Tablet  15.00 25 Al Bau [id number] Gra (8070) 1/2 0.30 [place]  11/29/2022 11/29/2022 11/29/2022 1   Dextroamp-Amphetamin 30 Mg Tab  60.00 30 Al Bau [id number] Gra (8070) 0/0  [place]    Past Medical/Surgical History:  Past Medical History:   Diagnosis Date     ADHD      Anxiety      Bipolar disorder (H)      Depressive disorder      OCD (obsessive compulsive disorder)      ROXANNE (obstructive sleep apnea)     On CPAP     Seasonal allergies       has a past medical history of ADHD, Anxiety, Bipolar disorder (H), Depressive disorder, OCD (obsessive compulsive disorder), ROXANNE (obstructive sleep apnea), and Seasonal allergies.    She has no past medical history of Anemia, Antiplatelet or antithrombotic long-term use, Arrhythmia, Arthritis, Cerebral artery occlusion with cerebral infarction (H), Chronic infection, Coagulation disorder (H), Congenital heart disease, Congestive heart failure (H), COPD (chronic obstructive pulmonary disease) (H), Coronary artery disease, Diabetes (H), Dialysis patient (H), Difficulty walking, Dyspnea on exertion,  Gastroesophageal reflux disease, Heart attack (H), Heart murmur, Hepatitis, Hiatal hernia, History of angina, History of blood transfusion, Hypertension, Irregular heart beat, Liver disease, Malignant hyperthermia, Multiple sclerosis (H), Muscular dystrophy (H), Noninfectious ileitis, Orthopnea, Other chronic pain, Oxygen dependent, Pacemaker, Pancreatic disease, Parkinsons disease (H), Pneumonia, PONV (postoperative nausea and vomiting), Pulmonary hypertension (H), Renal disease, Seizures (H), Stented coronary artery, Thrombosis, Thyroid disease, Tracheostomy in place (H), Uncomplicated asthma, or Walking troubles.    Social History:  Reviewed. No changes to social history except as noted above in HPI.    Vital Signs:   None. This is phone/video visit.     Labs:  Most recent laboratory results reviewed and no new labs.     Review of Systems:  10 systems (general, cardiovascular, respiratory, eyes, ENT, endocrine, GI, , M/S, neurological) were reviewed. Most pertinent finding(s) is/are: denies fever, cough, headaches, shortness of breath, chest pain, N/V, constipation/diarrhea, trouble urinating, aches and pains. The remaining systems are all unremarkable.    Mental Status Examination (limited as this is by phone/video):  Appearance: Awake, alert, appears stated age, no acute distress, well-groomed   Attitude:  cooperative, pleasant   Motor: No gross abnormalities observed via video, not formally tested   Oriented to:  person, place, time, and situation  Attention Span and Concentration:  normal  Speech:  clear, coherent, regular rate, rhythm, and volume  Language: intact  Mood:  good  Affect:  appropriate and in normal range and mood congruent  Associations:  no loose associations  Thought Process:  logical, linear and goal oriented  Thought Content:  no evidence of suicidal ideation or homicidal ideation, no evidence of psychotic thought, no auditory hallucinations present and no visual hallucinations  present  Recent and Remote Memory:  Intact to interview. Not formally assessed. No amnesia.  Fund of Knowledge: appropriate  Insight:  good  Judgment:  intact, adequate for safety  Impulse Control:  intact    Suicide Risk Assessment:  Today Mindi Eastman reports no suicidal ideation. Based on all available evidence including the factors cited above, Mindi Eastman does not appear to be at imminent risk for self-harm, does not meet criteria for a 72-hr hold, and therefore remains appropriate for ongoing outpatient level of care.  A thorough assessment of risk factors related to suicide and self-harm have been reviewed and are noted above. The patient convincingly denies suicidality on several occasions. Local community safety resources reviewed for patient to use if needed. There was no deceit detected, and the patient presented in a manner that was believable.     DSM5 Diagnosis:  Bipolar I Disorder  Generalized anxiety disorder  ADHD  PTSD  Insomnia, unspecified    Medical comorbidities include:   Patient Active Problem List    Diagnosis Date Noted     Attention deficit hyperactivity disorder (ADHD), unspecified ADHD type 08/23/2022     Priority: Medium     Anxiety 08/23/2022     Priority: Medium     Fibromyalgia 08/23/2022     Priority: Medium     ROXANNE (obstructive sleep apnea) 08/23/2022     Priority: Medium     Morbid obesity (H) 08/23/2022     Priority: Medium     History of cervical dysplasia 08/23/2022     Priority: Medium     Hx of pancreatitis 08/23/2022     Priority: Medium     Hypothyroidism, unspecified type 08/23/2022     Priority: Medium     Moderate dysplasia of cervix (ERIC II) 08/22/2022     Priority: Medium     06/20/05 LSIL Pap  08/08/05 [place] [facility] bx ERIC I to II   09/13/05 LEEP  ERIC II or high grade UBALDO, margins neg   03/1/06 NIL Pap  02/3/14 NIL Pap  12/10/16 NIL Pap, Neg HR HPV   08/15/22 NIL Pap, Neg HR HPV Plan cotest in 1 year due 08/15/23       Suicidal ideation 06/16/2019     Priority:  Medium     Acute tension-type headache 10/13/2017     Priority: Medium     SHANICE (generalized anxiety disorder) 10/13/2017     Priority: Medium     Tobacco use 10/13/2017     Priority: Medium     Insomnia due to other mental disorder 10/13/2017     Priority: Medium     Major depressive disorder, recurrent severe without psychotic features (H) 10/10/2017     Priority: Medium     Bipolar I disorder, most recent episode depressed (H) 10/05/2017     Priority: Medium     Mental health disorder 10/04/2017     Priority: Medium     Bursitis of shoulder 03/26/2009     Priority: Medium       Psychosocial & Contextual Factors: see HPI above    Assessment:  From Intake, 11/03/2022:  Mindi Eastman is a 47-year-old female with past psychiatric history including bipolar disorder, ADHD, PTSD, anxiety who presents today for psychiatric evaluation.  Patient with long history of mental health struggles.  Symptoms have waxed and waned throughout the years.  She has been on many psychiatric medication trials and also had 3 years of ECT therapy.  Patient had been working with a psychiatric prescriber most recently who was not agreeable to prescribing her medications at doses that had been effective and helped her maintain her stability over many years.  We made adjustments to her medications, see treatment plan below, to align with her previous regimen.  We discussed continuing to minimize her reliance on lorazepam since she is also taking zolpidem and Adderall.  Patient agreeable to a trial with clonidine to see if that is helpful for some anxiety and sleep symptoms.  Discussed benefits and risks of her medications.  Not currently engaged in therapy.  Discussed strong recommendation for therapy including considerations for acceptance and commitment therapy, DBT, EMDR/ART.  Patient denies suicidality.  No acute safety concerns.  Denies problematic drug or alcohol use.    12/15/2022:  And overall doing quite well.  Clonidine has been  helpful and patient tolerating well with no negative side effects.  Clonidine is lessen reliance on lorazepam as hoped.  Patient encouraged to continue to use lorazepam for severe anxiety only.  Patient sleeping well.  Feeling much closer to baseline.  No acute safety concerns.  No SI.  No problematic drug or alcohol use.  Patient will follow up 1 more visit, and if symptoms remain stable care will be returned back to primary care provider for ongoing management.  Previous EKG with a QTC of 431 in 2017-reports had been on 40 mg dose when EKG was completed.    1/30/2023:  Patient with ongoing stability of symptoms.  Clonidine remains quite helpful.  No questions or concerns regarding psychiatric medications today.  No med changes.  Patient's care will be returned back to primary care provider for ongoing management.  Has continued to decrease reliance on lorazepam and instead utilize clonidine.  Recommend continuing to decrease frequency of lorazepam use.  Also recommend trying to decrease Ambien use of possible in the future.  Could consider cognitive behavioral therapy for insomnia.  No acute safety concerns.  No SI.  No problematic drug or alcohol use.    Medication side effects and alternatives were reviewed. Health promotion activities recommended and reviewed today. All questions addressed. Education and counseling completed regarding risks and benefits of medications and psychotherapy options. Recommend therapy for additional support.     Treatment Plan:    Continue Ambien/zolpidem 10 mg at bedtime as needed for sleep.  Do not mix with alcohol or your lorazepam.  Make sure you have at least 8-10 hours to devote to sleep before driving and operating heavy machinery the next morning.  Have previously discussed max recommended dose for women is 5 mg.  Benefit of current dose outweighs risks and has been tolerated well.  Could consider cognitive behavioral therapy for insomnia in the future to lessen reliance on  an zolpidem.    Continue quetiapine/Seroquel 100 mg at bedtime for bipolar disorder diagnosis.    Continue lorazepam/Ativan 1 mg up to twice daily for severe anxiety/panic attacks.  30 tablets to last at least 30 days.  Continue to use sparingly since now on clonidine and finding helpful.    Continue Adderall 30 mg twice daily for ADHD.    Continue Lexapro/escitalopram 40 mg daily for anxiety and mood.  Have previously discussed dose is higher than max recommended dose.  Benefit outweighs risk and have tolerated well in the past.    Continue clonidine 0.05- 0.1 mg twice daily as needed for anxiety and trauma related symptoms. Continue to use this to try and lessen your reliance on lorazepam and Ambien.    Your psychiatric care is being returned back to your primary care provider for ongoing management.  Please reach out to your primary care provider with any questions or concerns about your mental health.    Recommend individual psychotherapy.  Also discussed acceptance and commitment therapy, DBT, and accelerated resolution therapy or EMDR as additional therapy options.    Continue all other cares per primary care provider.     Continue all other medications as reviewed per electronic medical record today.     Safety plan reviewed. To the Emergency Department as needed or call after hours crisis line at [phone] or [phone]. [place] Crisis Text Line. Text [place] to 910345 or Suicide LifeLine Chat: suicidepreventionlifeline.org/chat    Follow up with primary care provider as planned or for acute medical concerns.    [employer] may be used to communicate with your provider, but this is not intended to be used for emergencies.    Thank you for our work together in the Psychiatry Collaborative Care Model at [facility]. This is our last visit and I am returning your care back to your Primary Care Provider Eula Gaming MD . If you are not doing well, please contact your Primary Care Provider office.      Have previously discussed risks of stimulant medication including, but not limited to, decreased appetite, risk of tics (and that they may be lasting), trouble sleeping, cardiac risks such as increased heart rate and blood pressure, and rare risk of sudden cardiac death.  Also risk of addiction/tolerance/dependence.    Have previously discussed risks of benzodiazepine (Ativan, Xanax, Klonopin, Valium, etc) use including, but not limited to, sedation, tolerance, risk for addiction/dependence. Do not drink alcohol while taking benzodiazepines due to risk of trouble breathing and potential death. Do not drive or operate heavy machinery until it is known how the drug affects you. Discuss with physician or pharmacist before ever taking a benzodiazepine with a narcotic/opioid pain medication.     Administrative Billing:   Phone Call/Video Duration: 6 Minutes  Start: 8:16a  Stop: 8:22a    Patient Status:  The patient is being returned to the referring provider for ongoing care and medication prescribing.  The patient can be re-referred back to this service for further consultation if needed in the future.    Signed:   Jenelle Holbrook DO  [facility]    Disclaimer: This note consists of symbols derived from keyboarding, dictation and/or voice recognition software. As a result, there may be errors in the script that have gone undetected. Please consider this when interpreting information found in this chart.

## 2023-01-30 NOTE — PATIENT INSTRUCTIONS
Treatment Plan:  Continue Ambien/zolpidem 10 mg at bedtime as needed for sleep.  Do not mix with alcohol or your lorazepam.  Make sure you have at least 8-10 hours to devote to sleep before driving and operating heavy machinery the next morning.  Have previously discussed max recommended dose for women is 5 mg.  Benefit of current dose outweighs risks and has been tolerated well.  Could consider cognitive behavioral therapy for insomnia in the future to lessen reliance on an zolpidem.  Continue quetiapine/Seroquel 100 mg at bedtime for bipolar disorder diagnosis.  Continue lorazepam/Ativan 1 mg up to twice daily for severe anxiety/panic attacks.  30 tablets to last at least 30 days.  Continue to use sparingly since now on clonidine and finding helpful.  Continue Adderall 30 mg twice daily for ADHD.  Continue Lexapro/escitalopram 40 mg daily for anxiety and mood.  Have previously discussed dose is higher than max recommended dose.  Benefit outweighs risk and have tolerated well in the past.  Continue clonidine 0.05- 0.1 mg twice daily as needed for anxiety and trauma related symptoms. Continue to use this to try and lessen your reliance on lorazepam and Ambien.  Your psychiatric care is being returned back to your primary care provider for ongoing management.  Please reach out to your primary care provider with any questions or concerns about your mental health.  Recommend individual psychotherapy.  Also discussed acceptance and commitment therapy, DBT, and accelerated resolution therapy or EMDR as additional therapy options.  Continue all other cares per primary care provider.   Continue all other medications as reviewed per electronic medical record today.   Safety plan reviewed. To the Emergency Department as needed or call after hours crisis line at [phone] or [phone]. [place] Crisis Text Line. Text [place] to 924965 or Suicide LifeLine Chat: suicidepreventionlifeline.org/chat  Follow up with primary care  provider as planned or for acute medical concerns.  MyChart may be used to communicate with your provider, but this is not intended to be used for emergencies.    Thank you for our work together in the Psychiatry Collaborative Care Model at [facility]. This is our last visit and I am returning your care back to your Primary Care Provider Eula Gaming MD . If you are not doing well, please contact your Primary Care Provider office.     Have previously discussed risks of stimulant medication including, but not limited to, decreased appetite, risk of tics (and that they may be lasting), trouble sleeping, cardiac risks such as increased heart rate and blood pressure, and rare risk of sudden cardiac death.  Also risk of addiction/tolerance/dependence.    Have previously discussed risks of benzodiazepine (Ativan, Xanax, Klonopin, Valium, etc) use including, but not limited to, sedation, tolerance, risk for addiction/dependence. Do not drink alcohol while taking benzodiazepines due to risk of trouble breathing and potential death. Do not drive or operate heavy machinery until it is known how the drug affects you. Discuss with physician or pharmacist before ever taking a benzodiazepine with a narcotic/opioid pain medication.

## 2023-02-01 NOTE — TELEPHONE ENCOUNTER
Refill request for Clonidine 0.1 mg tablet denied. Patient has refills at the pharmacy.      Disp Refills Start End INDIGO   cloNIDine (CATAPRES) 0.1 MG tablet 180 tablet 0 1/30/2023  No   Sig - Route: Take 0.5-1 tablets (0.05-0.1 mg) by mouth 2 times daily as needed (anxiety, ADHD) - Oral   Sent to pharmacy as: cloNIDine HCl 0.1 MG Oral Tablet (CATAPRES)   Class: E-Prescribe   Order: [id number]   E-Prescribing Status: Receipt confirmed by pharmacy (1/30/2023 10:57 AM CST)   Renewals    Renewal requests to authorizing provider (Jenelle Holbrook DO) prohibited   Renewal provider: Eula Gaming MD        Printout Tracking    External Result Report     Possibly Related Medication Notes    CLONIDINE HCL (ANTIADRENERGIC ANTIHYPERTENSIVES) medications were reorganized on 5/23/2014. The possibly related notes have not been reviewed. Relevant information could exist in the related notes.     Pharmacy    [facility] [id number] [place] [address]     Associated Diagnoses    SHANICE (generalized anxiety disorder) [F41.1]       Attention deficit hyperactivity disorder (ADHD), unspecified ADHD type [F90.9]         Source Order Set    Order Set Name Order ID    [id number]       Prescribing Provider's NPI: [id number]  Jenelle Holbrook    Warnings Override History    No Interaction Warnings Shown      Encounter    View Encounter            Tracking Links  Cosign Tracking Order Transmittal Tracking

## 2023-03-10 ENCOUNTER — TELEPHONE (OUTPATIENT)
Dept: DERMATOLOGY | Facility: CLINIC | Age: 48
End: 2023-03-10
Payer: COMMERCIAL

## 2023-03-10 NOTE — TELEPHONE ENCOUNTER
Lvm.1st attempt informing patient to call [phone] to schedule a 1 year follow up with Carrol Pimentel in Dermatology.

## 2023-03-27 ENCOUNTER — TELEPHONE (OUTPATIENT)
Dept: NURSING | Facility: CLINIC | Age: 48
End: 2023-03-27
Payer: COMMERCIAL

## 2023-03-27 DIAGNOSIS — F90.9 ATTENTION DEFICIT HYPERACTIVITY DISORDER (ADHD), UNSPECIFIED ADHD TYPE: ICD-10-CM

## 2023-03-27 RX ORDER — DEXTROAMPHETAMINE SACCHARATE, AMPHETAMINE ASPARTATE, DEXTROAMPHETAMINE SULFATE AND AMPHETAMINE SULFATE 5; 5; 5; 5 MG/1; MG/1; MG/1; MG/1
30 TABLET ORAL 2 TIMES DAILY
Qty: 90 TABLET | Refills: 0 | Status: SHIPPED | OUTPATIENT
Start: 2023-03-27 | End: 2023-04-13

## 2023-03-27 NOTE — TELEPHONE ENCOUNTER
Patient's partner, Abby borden (pt gave verbal consent to communicate) about her Adderall prescription.    Says the pharmacy she usually gets her prescriptions at is out of Adderall. They called around yesterday and found a [facility] in [place] that has 20mg tabs of the medication in stock and had 90 of them.    Wondering if her PCP could re-write the Rx to take 1.5 tabs BID of the 20mg tabs and send to the [facility] in [place]. Pt has been out of her meds for 2 days now.    Will route request to PCP/care team.     Haritha Camp, RN, BSN  [facility]   Triage Nurse Advisor

## 2023-04-03 DIAGNOSIS — F31.9 BIPOLAR I DISORDER (H): ICD-10-CM

## 2023-04-03 RX ORDER — QUETIAPINE FUMARATE 100 MG/1
100 TABLET, FILM COATED ORAL AT BEDTIME
Qty: 90 TABLET | Refills: 1 | Status: SHIPPED | OUTPATIENT
Start: 2023-04-03 | End: 2023-05-08

## 2023-04-03 NOTE — TELEPHONE ENCOUNTER
Patients partner is calling to ask for another refill on this medication because patient forgot that the dosage changed and now is out of medication. Please advise and if needed call patient back please and thank you.

## 2023-04-13 ENCOUNTER — OFFICE VISIT (OUTPATIENT)
Dept: SURGERY | Facility: CLINIC | Age: 48
End: 2023-04-13
Payer: COMMERCIAL

## 2023-04-13 ENCOUNTER — OFFICE VISIT (OUTPATIENT)
Dept: FAMILY MEDICINE | Facility: CLINIC | Age: 48
End: 2023-04-13
Payer: COMMERCIAL

## 2023-04-13 VITALS
DIASTOLIC BLOOD PRESSURE: 78 MMHG | HEIGHT: 67 IN | BODY MASS INDEX: 32.02 KG/M2 | SYSTOLIC BLOOD PRESSURE: 132 MMHG | WEIGHT: 204 LBS

## 2023-04-13 VITALS
HEIGHT: 67 IN | HEART RATE: 81 BPM | WEIGHT: 204.7 LBS | SYSTOLIC BLOOD PRESSURE: 132 MMHG | TEMPERATURE: 98 F | OXYGEN SATURATION: 96 % | DIASTOLIC BLOOD PRESSURE: 78 MMHG | BODY MASS INDEX: 32.13 KG/M2 | RESPIRATION RATE: 16 BRPM

## 2023-04-13 DIAGNOSIS — R51.9 NONINTRACTABLE HEADACHE, UNSPECIFIED CHRONICITY PATTERN, UNSPECIFIED HEADACHE TYPE: Primary | ICD-10-CM

## 2023-04-13 DIAGNOSIS — L72.9 CYST OF SKIN: ICD-10-CM

## 2023-04-13 PROCEDURE — 99213 OFFICE O/P EST LOW 20 MIN: CPT | Performed by: FAMILY MEDICINE

## 2023-04-13 PROCEDURE — 99203 OFFICE O/P NEW LOW 30 MIN: CPT | Performed by: SURGERY

## 2023-04-13 RX ORDER — AMITRIPTYLINE HYDROCHLORIDE 10 MG/1
10 TABLET ORAL AT BEDTIME
Qty: 30 TABLET | Refills: 0 | Status: SHIPPED | OUTPATIENT
Start: 2023-04-13 | End: 2023-04-28

## 2023-04-13 ASSESSMENT — PAIN SCALES - GENERAL
PAINLEVEL: MODERATE PAIN (5)
PAINLEVEL: MODERATE PAIN (5)

## 2023-04-13 ASSESSMENT — ENCOUNTER SYMPTOMS: HEADACHES: 1

## 2023-04-13 NOTE — NURSING NOTE
"Chief Complaint   Patient presents with     New Patient     EIC, right axilla       Vitals:    04/13/23 1507   BP: 132/78   Weight: 92.5 kg (204 lb)   Height: 1.689 m (5' 6.5\")       Body mass index is 32.43 kg/m .                          Akira Phillips, EMT    "

## 2023-04-13 NOTE — PROGRESS NOTES
"  Assessment & Plan   Problem List Items Addressed This Visit    None  Visit Diagnoses     Nonintractable headache, unspecified chronicity pattern, unspecified headache type    -  Primary         Trial amitriptyline to see if can break cycle.  Need to watch closely for possible interactions with medications.    Follow-up with us or PCP as needed    MABEL MARTINEZ MD  [facility] DANDY Au is a 47 year old, presenting for the following health issues:  Headache (Headaches with nausea and blurred vision x 2 weeks. Has had headaches and migraines before but never for this long or with blurry vision. Pt has been taking ibuprofen, tylenol and excedrin. Pt has only gotten temporary relief. Pt has also tried ice packs and warm water bottles. Pt declines anything making headache worse. ) and Medication Update (Would like a refill of gabapentin for fibromyalgia. )        4/13/2023    12:32 PM   Additional Questions   Roomed by Analilia Stack     Headache     History of Present Illness       Headaches:   Since the patient's last clinic visit, headaches are: worsened  The patient is getting headaches:  Daily  She is not able to do normal daily activities when she has a migraine.  The patient is taking the following rescue/relief medications:  Ibuprofen (Advil, Motrin), Tylenol and Excedrin   Patient states \"I get only a small amount of relief\" from the rescue/relief medications.   The patient is taking the following medications to prevent migraines:  No medications to prevent migraines  In the past 4 weeks, the patient has gone to an Urgent Care or Emergency Room 0 times times due to headaches.    She eats 2-3 servings of fruits and vegetables daily.She consumes 0 sweetened beverage(s) daily.She exercises with enough effort to increase her heart rate 30 to 60 minutes per day.  She exercises with enough effort to increase her heart rate 5 days per week.   She is taking medications " "regularly.               Review of Systems   Neurological: Positive for headaches.            Objective    /78 (BP Location: Left arm, Patient Position: Sitting, Cuff Size: Adult Regular)   Pulse 81   Temp 98  F (36.7  C) (Oral)   Resp 16   Ht 1.689 m (5' 6.5\")   Wt 92.9 kg (204 lb 11.2 oz)   LMP 04/03/2023 (Within Days)   SpO2 96%   BMI 32.54 kg/m    Body mass index is 32.54 kg/m .  Physical Exam   GENERAL: healthy, alert and no distress  NECK: no adenopathy, no asymmetry, masses, or scars and thyroid normal to palpation  RESP: lungs clear to auscultation - no rales, rhonchi or wheezes  CV: regular rate and rhythm, normal S1 S2, no S3 or S4, no murmur, click or rub, no peripheral edema and peripheral pulses strong  ABDOMEN: soft, nontender, no hepatosplenomegaly, no masses and bowel sounds normal  MS: no gross musculoskeletal defects noted, no edema  NEURO: Normal strength and tone, sensory exam grossly normal and mentation intact                    "

## 2023-04-13 NOTE — LETTER
4/13/2023       RE: Mindi Eastman  [address]  [place] [address]       Dear Colleague,    Thank you for referring your patient, Mindi Eastman, to the [facility] [place] [facility]. Please see a copy of my visit note below.    Patient here for assessment of cyst in right axilla. This got infected several years ago to the point that she needed an I+d. Did well after that, but this cyst has been slowly getting bigger over the past year or so.  Not on any anticoagulation medications.  Has had surgeries before, such as knee and appendectomy, which all went well.   Allergies - darvocet.  On exam, she has a thickening in her skin with underlying nodule located in anterior part of right axilla.   A/p - enlarging nodule in right axilla, likely cyst. This is in setting of this cyst having required I+d in the past. Discussed risks/benefits of surgery including bleeding, ifnection, damage to surrounding organs, as well as potential for recurrence. Questions answered. She is understanding of discussion and agreeable to proceed.  This would be excision of subcutaneous nodule under local anesthesia.      Again, thank you for allowing me to participate in the care of your patient.      Sincerely,    Austin Hernandez MD

## 2023-04-13 NOTE — PROGRESS NOTES
Patient here for assessment of cyst in right axilla. This got infected several years ago to the point that she needed an I+d. Did well after that, but this cyst has been slowly getting bigger over the past year or so.  Not on any anticoagulation medications.  Has had surgeries before, such as knee and appendectomy, which all went well.   Allergies - darvocet.  On exam, she has a thickening in her skin with underlying nodule located in anterior part of right axilla.   A/p - enlarging nodule in right axilla, likely cyst. This is in setting of this cyst having required I+d in the past. Discussed risks/benefits of surgery including bleeding, ifnection, damage to surrounding organs, as well as potential for recurrence. Questions answered. She is understanding of discussion and agreeable to proceed.  This would be excision of subcutaneous nodule under local anesthesia.

## 2023-04-13 NOTE — PATIENT INSTRUCTIONS
You met with Dr. Austin Hernandez.      Today's visit instructions:    Reminder: Surgery Requirements  Your surgery will be at the [facility]- 5th Floor  You will need to arrive 1 hour early based on the location of your surgery.  You will need a Pre-op physical before your procedure- your surgeon will do this the day of surgery.   Stop any blood thinners, vitamins, minerals, or herbal supplements 5 days before surgery.  If you are taking a prescribed blood thinner please let us know for specific instructions.  You may eat/drink/drive without restrictions/limitations.  Wash with the soap (Antibacterial, Dial Complete Foam, Hibiclense, or soap given/mailed from the clinic) the night before and morning of surgery. See instructions in the Surgery Packet.  If you would like a procedure estimate please call Cost of Care at [phone].    If you have questions please contact Kerry RN or Della RN during regular clinic hours, Monday through Friday 7:30 AM - 4:00 PM, or you can contact us via [employer] at anytime.       If you have urgent needs after-hours, weekends, or holidays please call the hospital at [phone] and ask to speak with our on-call General Surgery Team.    Appointment [phone]  Nurse Advice (Kerry or Della): [phone]   Surgery Scheduler (Digna): [phone]  Fax: [phone]    Mass/Lump Removal       Before the procedure:  Do not shave the area where the lump/mass is located.     Incision care   You may take a shower the day after surgery. Carefully wash your incision with soap and water. Do not submerge yourself in water (bath, whirlpool, hot tub, pool, lake) for 14 days after surgery.   Remove the gauze bandage 24 hours after surgery, but leave the medical tape (Steri-Strips) or glue in place. These will loosen and fall off on their own 1-2 weeks after surgery.     Always wash your hands before touching your incisions or removing bandages.    It is not unusual to form a collection of fluid or blood under your incision that may feel firm or squishy- it can take several weeks to months for your body to reabsorb it.  At times, it may even drain.  If that should happen keep the area clean with soap, water,  and cover with a clean gauze dressing. You can change this daily or as needed.     Other medicines   Wait to start aspirin or blood thinners until the day after surgery. You can continue your regular medicines at your normal time the day after surgery.   Your pain medicine may cause constipation (hard, dry stools). To help with this, take the stool softener your doctor gave you or an over-the-counter stool softener or laxative. You can stop taking this when you are no longer taking pain medicine and your bowel movements are back to normal.      For pain or discomfort   Take the narcotic pain medicine your doctor gave you as needed and as instructed on the bottle. If you prefer to use over-the-counter medication, use acetaminophen (Tylenol) or ibuprofen (Advil, Motrin) as instructed on the box. Do not take Tylenol if it is in your narcotic pain medication.    Use an ice pack on your surgical cut (incision) for 20 minutes at a time as needed for the first 24 hours. Be sure to protect your skin by putting a cloth between the ice pack and your skin.      Activities   No driving until you feel it s safe to do so. Don t drive while taking narcotic pain medicine.      Diet   You can eat your regular meals after surgery.      Results   You should know any test results about 5 to 7 business days after surgery       When to call the doctor   Call your doctor if you have:   A fever above 101 F (38.3 C) (taken under the tongue), or a fever or chills lasting more than a day.   Redness at the incision site.   Any fluid or blood draining from the incision, especially if it smells bad.    Severe pain that doesn t improve with pain medicine.      We will call you 2 to 4  days after surgery to review this handout, answer questions and help arrange after-surgery care. If you have questions or concerns, please call [phone] during regular office hours. If you need to call after business hours, call [phone] and ask to page the surgeon on-call.

## 2023-04-13 NOTE — NURSING NOTE
Pre and Post op Patient Education/Teaching Flowsheet  Relevant Diagnosis:  Sebaceous Cyst (L72.3)  Teaching Topic:  Pre and post op teaching  Person(s) Involved in teaching:  Patient and Significant Other     Motivation Level:  Asks Questions:  Yes  Eager to Learn:  Yes  Cooperative:  Yes  Receptive (willing/able to accept information):  Yes  Any cultural factors/Jain beliefs that may influence understanding or compliance?  No    Patient/caregiver/family demonstrates understanding of the following:  Reason for the appointment, diagnosis, and treatment plan:  Yes  Patient demonstrates understanding of the following:  Pre-op bowel prep:  N/A  Post-op pain management recommendations (medications, ice compress, binder/athletic supporter (if applicable), etc.:  Yes  Inguinal hernia patients:  Post-op urinary retention- discussed signs/symptoms and visit to ER for Rogers catheter placement and to stay in place for at least 48 hours:  NA  Restrictions:  Yes  Medications to take the day of surgery:  Per PCP  Blood thinner medications discussed and when to stop (if applicable):  Yes  Wound care:  Yes  Diabetes medication management (if applicable):  Per PCP  Which situations necessitate calling provider and whom to contact:  Discussed how to contact the hospital, nurse, and clinic scheduling staff if necessary      Date and time of surgery:  TBD  Location of surgery: [facility] [place]- 5th Floor  History and Physical and any other testing necessary prior to surgery:  Yes  Required time line for completion of History and Physical and any pre-op testing:  Yes  Discuss need for someone to drive patient home and stay with them for 24 hours:  NA  Pre-op showering/scrub information with Surgical Scrub:  Yes  NPO Guidelines:  No dietary restrictions    Infection Prevention: Patient demonstrates understanding of the following:  Patient instructed on hand hygiene:  Yes  Surgical procedure site care  will be taught and will be reviewed at the time of discharge  Signs and symptoms of infection taught:  Yes  Wound care reviewed and will be taught at the time of discharge  Central venous catheter care will be taught at the time of discharge (if applicable)    Post-op follow-up:  Instructional materials used/given/mailed:  Surgical logistics, post op teaching sheet, and surgical scrub

## 2023-04-17 ENCOUNTER — TELEPHONE (OUTPATIENT)
Dept: SURGERY | Facility: CLINIC | Age: 48
End: 2023-04-17
Payer: COMMERCIAL

## 2023-04-28 ENCOUNTER — MYC REFILL (OUTPATIENT)
Dept: PSYCHIATRY | Facility: CLINIC | Age: 48
End: 2023-04-28
Payer: COMMERCIAL

## 2023-04-28 DIAGNOSIS — F90.9 ATTENTION DEFICIT HYPERACTIVITY DISORDER (ADHD), UNSPECIFIED ADHD TYPE: ICD-10-CM

## 2023-04-28 NOTE — TELEPHONE ENCOUNTER
As per last treatment plan - returning care to PCP         Thank you for our work together in the Psychiatry Collaborative Care Model at [facility]. This is our last visit and I am returning your care back to your Primary Care Provider Eula Gaming MD . If you are not doing well, please contact your Primary Care Provider office.

## 2023-05-01 ENCOUNTER — TELEPHONE (OUTPATIENT)
Dept: SURGERY | Facility: CLINIC | Age: 48
End: 2023-05-01
Payer: COMMERCIAL

## 2023-05-01 PROBLEM — L72.9 CYST OF SKIN: Status: ACTIVE | Noted: 2023-05-01

## 2023-05-01 NOTE — TELEPHONE ENCOUNTER
Patient is scheduled for surgery with Dr. Hernandez    Spoke with: Abby patient's partner    Date of Surgery: 6/26/2023    Location: [facility]    Informed patient they will need an adult  Yes    Pre op with Provider n/a    H&P: Scheduled with n/a - Local anesthesia    Additional imaging/appointments: n/a    Surgery packet: To be sent in the mail and NICEt     Additional comments: n/a        Digna Reynolds on 5/1/2023 at 10:27 AM

## 2023-05-02 RX ORDER — DEXTROAMPHETAMINE SACCHARATE, AMPHETAMINE ASPARTATE, DEXTROAMPHETAMINE SULFATE AND AMPHETAMINE SULFATE 7.5; 7.5; 7.5; 7.5 MG/1; MG/1; MG/1; MG/1
30 TABLET ORAL 2 TIMES DAILY
Qty: 60 TABLET | Refills: 0 | OUTPATIENT
Start: 2023-05-02

## 2023-05-02 NOTE — TELEPHONE ENCOUNTER
"RN reviewed chart and concur with LPN.     RN denied refill request, \"No longer under provider care - Please use Eula Gaming MD\".     Meredith López RN on 5/2/2023 at 10:51 AM       "

## 2023-05-04 ENCOUNTER — MYC REFILL (OUTPATIENT)
Dept: PSYCHIATRY | Facility: CLINIC | Age: 48
End: 2023-05-04
Payer: COMMERCIAL

## 2023-05-04 DIAGNOSIS — F90.9 ATTENTION DEFICIT HYPERACTIVITY DISORDER (ADHD), UNSPECIFIED ADHD TYPE: ICD-10-CM

## 2023-05-05 DIAGNOSIS — G47.00 INSOMNIA, UNSPECIFIED TYPE: ICD-10-CM

## 2023-05-05 RX ORDER — DEXTROAMPHETAMINE SACCHARATE, AMPHETAMINE ASPARTATE, DEXTROAMPHETAMINE SULFATE AND AMPHETAMINE SULFATE 7.5; 7.5; 7.5; 7.5 MG/1; MG/1; MG/1; MG/1
30 TABLET ORAL 2 TIMES DAILY
Qty: 60 TABLET | Refills: 0 | OUTPATIENT
Start: 2023-05-05

## 2023-05-05 RX ORDER — ZOLPIDEM TARTRATE 10 MG/1
10 TABLET ORAL
Qty: 15 TABLET | Refills: 0 | Status: SHIPPED | OUTPATIENT
Start: 2023-05-05 | End: 2023-05-30

## 2023-05-05 NOTE — TELEPHONE ENCOUNTER
Patient no longer under providers care, has been referred back to PCP.     Keely Jessica RN on 5/5/2023 at 3:50 PM

## 2023-05-05 NOTE — TELEPHONE ENCOUNTER
Medication last filled:01/30/2023    Last clinic visit: 04/13/2023     Clinic visit frequency required: Q 6  months    Next clinic visit: 05/08/2023    Controlled substance agreement on file: Yes:  Date 01/30/2023.    Urine Drug Screen on file:   no

## 2023-05-08 ENCOUNTER — OFFICE VISIT (OUTPATIENT)
Dept: FAMILY MEDICINE | Facility: CLINIC | Age: 48
End: 2023-05-08
Payer: COMMERCIAL

## 2023-05-08 VITALS
HEART RATE: 67 BPM | WEIGHT: 210 LBS | SYSTOLIC BLOOD PRESSURE: 132 MMHG | BODY MASS INDEX: 32.96 KG/M2 | TEMPERATURE: 97.9 F | RESPIRATION RATE: 16 BRPM | HEIGHT: 67 IN | DIASTOLIC BLOOD PRESSURE: 70 MMHG | OXYGEN SATURATION: 98 %

## 2023-05-08 DIAGNOSIS — G89.29 CHRONIC NONINTRACTABLE HEADACHE, UNSPECIFIED HEADACHE TYPE: Primary | ICD-10-CM

## 2023-05-08 DIAGNOSIS — R51.9 CHRONIC NONINTRACTABLE HEADACHE, UNSPECIFIED HEADACHE TYPE: Primary | ICD-10-CM

## 2023-05-08 DIAGNOSIS — F31.9 BIPOLAR I DISORDER (H): ICD-10-CM

## 2023-05-08 DIAGNOSIS — F41.1 GAD (GENERALIZED ANXIETY DISORDER): ICD-10-CM

## 2023-05-08 DIAGNOSIS — G47.00 INSOMNIA, UNSPECIFIED TYPE: ICD-10-CM

## 2023-05-08 DIAGNOSIS — F90.9 ATTENTION DEFICIT HYPERACTIVITY DISORDER (ADHD), UNSPECIFIED ADHD TYPE: ICD-10-CM

## 2023-05-08 PROCEDURE — 99214 OFFICE O/P EST MOD 30 MIN: CPT | Performed by: FAMILY MEDICINE

## 2023-05-08 RX ORDER — ESCITALOPRAM OXALATE 20 MG/1
40 TABLET ORAL DAILY
Qty: 180 TABLET | Refills: 0 | Status: SHIPPED | OUTPATIENT
Start: 2023-05-08 | End: 2023-01-01

## 2023-05-08 RX ORDER — CLONIDINE HYDROCHLORIDE 0.1 MG/1
.05-.1 TABLET ORAL 2 TIMES DAILY PRN
Qty: 180 TABLET | Refills: 3 | Status: SHIPPED | OUTPATIENT
Start: 2023-05-08 | End: 2023-01-01

## 2023-05-08 RX ORDER — QUETIAPINE FUMARATE 100 MG/1
100 TABLET, FILM COATED ORAL AT BEDTIME
Qty: 90 TABLET | Refills: 1 | Status: SHIPPED | OUTPATIENT
Start: 2023-05-08 | End: 2023-01-01

## 2023-05-08 RX ORDER — LORAZEPAM 1 MG/1
1 TABLET ORAL 2 TIMES DAILY PRN
Qty: 30 TABLET | Refills: 1 | Status: SHIPPED | OUTPATIENT
Start: 2023-05-08 | End: 2023-01-01

## 2023-05-08 RX ORDER — ZOLPIDEM TARTRATE 10 MG/1
10 TABLET ORAL
Qty: 15 TABLET | Refills: 0 | Status: CANCELLED | OUTPATIENT
Start: 2023-05-08

## 2023-05-08 RX ORDER — AMITRIPTYLINE HYDROCHLORIDE 10 MG/1
10 TABLET ORAL AT BEDTIME
Qty: 90 TABLET | Refills: 1 | Status: SHIPPED | OUTPATIENT
Start: 2023-05-08 | End: 2023-01-01

## 2023-05-08 RX ORDER — DEXTROAMPHETAMINE SACCHARATE, AMPHETAMINE ASPARTATE, DEXTROAMPHETAMINE SULFATE AND AMPHETAMINE SULFATE 7.5; 7.5; 7.5; 7.5 MG/1; MG/1; MG/1; MG/1
30 TABLET ORAL 2 TIMES DAILY
Qty: 60 TABLET | Refills: 0 | Status: SHIPPED | OUTPATIENT
Start: 2023-05-08 | End: 2023-05-14

## 2023-05-08 ASSESSMENT — ENCOUNTER SYMPTOMS: HEADACHES: 1

## 2023-05-08 ASSESSMENT — PAIN SCALES - GENERAL: PAINLEVEL: NO PAIN (0)

## 2023-05-08 NOTE — LETTER
[facility]  05/08/23  Patient: Mindi Eastman  YOB: 1975  Medical Record Number: [id number]                                                                                  Non-Opioid Controlled Substance Agreement    This is an agreement between you and your provider regarding safe and appropriate use of controlled substances prescribed by your care team. Controlled substances are?medicines that can cause physical and mental dependence (abuse).     There are strict laws about having and using these medicines. We here at [facility] are  committed to working with you in your efforts to get better. To support you in this work, we'll help you schedule regular office appointments for medicine refills. If we must cancel or change your appointment for any reason, we'll make sure you have enough medicine to last until your next appointment.     As a Provider, I will:   Listen carefully to your concerns while treating you with respect.   Recommend a treatment plan that I believe is in your best interest and may involve therapies other than medicine.    Talk with you often about the possible benefits and the risk of harm of any medicine that we prescribe for you.  Assess the safety of this medicine and check how well it works.    Provide a plan on how to taper (discontinue or go off) using this medicine if the decision is made to stop its use.      ::  As a Patient, I understand controlled substances:     Are prescribed by my care provider to help me function or work and manage my condition(s).?  Are strong medicines and can cause serious side effects.     Need to be taken exactly as prescribed.?Combining controlled substances with certain medicines or chemicals (such as illegal drugs, alcohol, sedatives, sleeping pills, and benzodiazepines) can be dangerous or even fatal.? If I stop taking my medicines suddenly, I may have severe withdrawal symptoms.     The risks, benefits,  and side effects of these medicine(s) were explained to me. I agree that:    I will take part in other treatments as advised by my care team. This may be psychiatry or counseling, physical therapy, behavioral therapy, group treatment or a referral to specialist.    I will keep all my appointments and understand this is part of the monitoring of controlled substances.?My care team may require an office visit for EVERY controlled substance refill. If I miss appointments or don t follow instructions, my care team may stop my medicine    I will take my medicines as prescribed. I will not change the dose or schedule unless my care team tells me to. There will be no refills if I run out early.      I may be asked to come to the clinic and complete a urine drug test or complete a pill count. If I don t give a urine sample or participate in a pill count, the care team may stop my medicine.    I will only receive controlled substance prescriptions from this clinic. If I am treated by another provider, I will tell them that I am taking controlled substances and that I have a treatment agreement with this provider. I will inform my [facility] care team within one business day if I am given a prescription for any controlled substance by another healthcare provider. My [facility] care team can contact other providers and pharmacists about my use of any medicines.    It is up to me to make sure that I don't run out of my medicines on weekends or holidays.?If my care team is willing to refill my prescription without a visit, I must request refills only during office hours. Refills may take up to 3 business days to process. I will use one pharmacy to fill all my controlled substance prescriptions. I will notify the clinic about any changes to my insurance or medicine availability.    I am responsible for my prescriptions. If the medicine/prescription is lost, stolen or destroyed, it will not be replaced.?I also agree  not to share controlled substance medicines with anyone.     I am aware I should not use any illegal or recreational drugs. I agree not to drink alcohol unless my care team says I can.     If I enroll in the [place] Medical Cannabis program, I will tell my care team before my next refill.    I will tell my care team right away if I become pregnant, have a new medical problem treated outside of my regular clinic, or have a change in my medicines.     I understand that this medicine can affect my thinking, judgment and reaction time.? Alcohol and drugs affect the brain and body, which can affect the safety of my driving. Being under the influence of alcohol or drugs can affect my decision-making, behaviors, personal safety and the safety of others. Driving while impaired (DWI) can occur if a person is driving, operating or in physical control of a car, motorcycle, boat, snowmobile, ATV, motorbike, off-road vehicle or any other motor vehicle ([place] Statute 169[place].20). I understand the risk if I choose to drive or operate any vehicle or machinery.    I understand that if I do not follow any of the conditions above, my prescriptions or treatment may be stopped or changed.   I agree that my provider, clinic care team and pharmacy may work with any city, state or federal law enforcement agency that investigates the misuse, sale or other diversion of my controlled medicine. I will allow my provider to discuss my care with, or share a copy of, this agreement with any other treating provider, pharmacy or emergency room where I receive care.     I have read this agreement and have asked questions about anything I did not understand.    ________________________________________________________  Patient Signature - Mindi Eastman     ___________________                   Date     ________________________________________________________  Provider Signature - Eula Gaming MD       ___________________                   Date      ________________________________________________________  Witness Signature (required if provider not present while patient signing)          ___________________                   Date

## 2023-05-08 NOTE — PROGRESS NOTES
Assessment & Plan     Chronic nonintractable headache, unspecified headache type  We will see if we can break headache cycle with Medrol Dosepak.  Continue amitriptyline for now.  She will update me later this week, based on response consider discontinuation of amitriptyline due to potential interactions with other medications and potential to disrupt her bipolar and anxiety stability.  She will be in touch with me.  - amitriptyline (ELAVIL) 10 MG tablet; Take 1 tablet (10 mg) by mouth At Bedtime    Bipolar I disorder (H)  SHANICE (generalized anxiety disorder)  Stable.  I am taking over her care and will be maintaining provided that all remains stable.  She will let me know if having side effects.  Controlled substance agreement completed today.  - QUEtiapine (SEROQUEL) 100 MG tablet; Take 1 tablet (100 mg) by mouth At Bedtime  - cloNIDine (CATAPRES) 0.1 MG tablet; Take 0.5-1 tablets (0.05-0.1 mg) by mouth 2 times daily as needed (anxiety, ADHD)  - LORazepam (ATIVAN) 1 MG tablet; Take 1 tablet (1 mg) by mouth 2 times daily as needed for anxiety 30 tabs to last 30 days  - escitalopram (LEXAPRO) 20 MG tablet; Take 2 tablets (40 mg) by mouth daily    Attention deficit hyperactivity disorder (ADHD), unspecified ADHD type  Controlled substance agreement completed today for Adderall.  PDMP reviewed and does not identify any fills of medications.  Urine toxicology screen ordered today, unable to urinate but will be coming back later today to complete.  - cloNIDine (CATAPRES) 0.1 MG tablet; Take 0.5-1 tablets (0.05-0.1 mg) by mouth 2 times daily as needed (anxiety, ADHD)  - amphetamine-dextroamphetamine (ADDERALL) 30 MG tablet; Take 1 tablet (30 mg) by mouth 2 times daily  - Drug Confirmation Panel Urine with Creat - lab collect; Future    Insomnia, unspecified type  Stable on current regimen of zolpidem.  Continue.               BMI:   Estimated body mass index is 33.39 kg/m  as calculated from the following:    Height as  "of this encounter: 1.689 m (5' 6.5\").    Weight as of this encounter: 95.3 kg (210 lb).           Eula Gaming MD  [facility] LINUS Au is a 47 year old, presenting for the following health issues:  Headache (Was seen a month ago and given Amitriptyline 10mg, not noticing any difference.) and Restart a old medication (Would like to re start Gabapentin- )        4/13/2023    12:32 PM   Additional Questions   Roomed by Analilia Stack     Here to discuss ongoing headache present for the past approximately 6 to 7 weeks without any inciting incident.  Describes it as starting unilaterally in 1 temple and then shooting towards the occiput, sometimes starting in her occiput and shooting anteriorly.  present daily other than perhaps a few days since onset.  No nausea or light sensitivity.  Initially at onset she had some double vision for a few minutes, that is since resolved.  She is sleeping well.  Following a healthy diet.  Exercising regularly at base camp daily and softball.  Staying hydrated.  No caffeine intake.  She has some chronic neck tension that has been stable, no current neck tension.    Previously managed by psychiatry for bipolar disorder, anxiety, and ADHD, has been stable on combination of clonidine, quetiapine, Lexapro, lorazepam, and Adderall.  She is ready to transition her care back here.  Previous history of ECT, has not required.    Headache                  Review of Systems   Neurological: Positive for headaches.            Objective    /70   Pulse 67   Temp 97.9  F (36.6  C) (Oral)   Resp 16   Ht 1.689 m (5' 6.5\")   Wt 95.3 kg (210 lb)   LMP 04/03/2023 (Within Days)   SpO2 98%   BMI 33.39 kg/m    Body mass index is 33.39 kg/m .  Physical Exam   Alert very pleasant.  Face moves symmetrically.  Extraocular movements intact.  5-5 strength in all 4 extremities.  Coordination intact.  No clonus.                    "

## 2023-05-11 ENCOUNTER — TELEPHONE (OUTPATIENT)
Dept: FAMILY MEDICINE | Facility: CLINIC | Age: 48
End: 2023-05-11
Payer: COMMERCIAL

## 2023-05-11 DIAGNOSIS — R51.9 CHRONIC NONINTRACTABLE HEADACHE, UNSPECIFIED HEADACHE TYPE: Primary | ICD-10-CM

## 2023-05-11 DIAGNOSIS — G89.29 CHRONIC NONINTRACTABLE HEADACHE, UNSPECIFIED HEADACHE TYPE: Primary | ICD-10-CM

## 2023-05-11 RX ORDER — METHYLPREDNISOLONE 4 MG
TABLET, DOSE PACK ORAL
Qty: 21 TABLET | Refills: 0 | Status: SHIPPED | OUTPATIENT
Start: 2023-05-11 | End: 2023-01-01

## 2023-05-11 NOTE — TELEPHONE ENCOUNTER
I am so sorry, it looks like the prescription did not go through.  I have resent the prescription to the pharmacy.  I am glad she called.  GERIJ

## 2023-05-11 NOTE — TELEPHONE ENCOUNTER
Reason for call:  Other   Patient called regarding (reason for call):  call back  Additional comments: Patient calling and asking for a medication that was prescribed called medrol dose geovani.    Phone number to reach patient:  Cell number on file:    Telephone Information:   Mobile [phone]       Best Time:  any    Can we leave a detailed message on this number?  YES

## 2023-05-12 DIAGNOSIS — F90.9 ATTENTION DEFICIT HYPERACTIVITY DISORDER (ADHD), UNSPECIFIED ADHD TYPE: ICD-10-CM

## 2023-05-12 RX ORDER — DEXTROAMPHETAMINE SACCHARATE, AMPHETAMINE ASPARTATE, DEXTROAMPHETAMINE SULFATE AND AMPHETAMINE SULFATE 7.5; 7.5; 7.5; 7.5 MG/1; MG/1; MG/1; MG/1
30 TABLET ORAL 2 TIMES DAILY
Qty: 60 TABLET | Refills: 0 | Status: CANCELLED | OUTPATIENT
Start: 2023-05-12

## 2023-05-12 NOTE — TELEPHONE ENCOUNTER
Medication last filled:05/08/2023    Last clinic visit: 05/08/2023     Clinic visit frequency required: Q 6  months    Next clinic visit: none    Controlled substance agreement on file: Yes:  Date 05/05/2023.    Urine Drug Screen on file:  Yes 05/05/2023

## 2023-05-12 NOTE — TELEPHONE ENCOUNTER
Pt calling to check status of this refill. The pharmacy attached to this refill only had 60 tabs in stock as of this morning and pt is concerned they may run out.   Concern for alcohol withdrawal syndrome with delirium at time of transfer from [facility] to [facility] ICU and treated with lorazepam and phenobarbital initially in MICU.  - Currently A&Ox3, no tremor/tongue fasciculation, or hallucination  - on Ativan taper for EtOH withdrawal; CIWA score 0; will d/c Ativan today   - Per hepatology should not have long-acting benzodiazepine given acute liver injury.  - last drink reported 7/4  -  pending Phosphatidylethanol (Peth) assessing chronic EtOH use per Hepatology. - Denies EtOH dependence and reports alcohol use limited to 1-2d weekly 4-6 drink on occasion. abstinent since 7/4  monitor  sw

## 2023-05-13 ENCOUNTER — NURSE TRIAGE (OUTPATIENT)
Dept: NURSING | Facility: CLINIC | Age: 48
End: 2023-05-13
Payer: COMMERCIAL

## 2023-05-13 ENCOUNTER — HOSPITAL ENCOUNTER (EMERGENCY)
Facility: HOSPITAL | Age: 48
Discharge: HOME | End: 2023-05-13
Payer: COMMERCIAL

## 2023-05-13 VITALS
DIASTOLIC BLOOD PRESSURE: 76 MMHG | RESPIRATION RATE: 16 BRPM | SYSTOLIC BLOOD PRESSURE: 154 MMHG | HEIGHT: 65 IN | HEART RATE: 83 BPM | BODY MASS INDEX: 33.99 KG/M2 | OXYGEN SATURATION: 97 % | WEIGHT: 204 LBS | TEMPERATURE: 98.3 F

## 2023-05-13 DIAGNOSIS — F90.9 ATTENTION DEFICIT HYPERACTIVITY DISORDER (ADHD), UNSPECIFIED ADHD TYPE: ICD-10-CM

## 2023-05-13 ASSESSMENT — ACTIVITIES OF DAILY LIVING (ADL): ADLS_ACUITY_SCORE: 33

## 2023-05-13 NOTE — ED TRIAGE NOTES
Patient arrives from home with partner.   Was prescribed Adderall prescription by PCP on 5/8/23 to her regular pharmacy.  Pharmacy states they are out of that medication at this time.   Patient would like a new prescription to a different pharmacy and states her pharmacy currently put med on hold at this time.

## 2023-05-13 NOTE — TELEPHONE ENCOUNTER
Pharmacy does not have in stock, patient came into walk in care today, [facility] provider not able to forward to different pharmacy, will forward as urgent message to Dr. Fitzgerald and Dr. Gaming to hopefully assist asap    There is another duplicate encounter on this refill request

## 2023-05-13 NOTE — TELEPHONE ENCOUNTER
Angely's partner calling. RX Adderall for years. Received a new one last week and it was out of stock at pharmacy. Yesterday requested it be sent to new pharmacy. She's out of the medication. It's the weekend and she doesn't have the med and she really needs it. Thought urgent care can do it. They explained they can't help.  It's possible the ER could help but cost wise it's expensive.   Pharmacy is [facility], has it as of this morning, Saturday.   [address] [facility].  Please call Abby on Monday [phone].  Gudelia Hennessy RN  [facility]       Reason for Disposition    Caller requesting a CONTROLLED substance prescription refill (e.g., narcotics, ADHD medicines)    Additional Information    Negative: New-onset or worsening symptoms, see that guideline (e.g., diarrhea, runny nose, sore throat)    Negative: Medicine question not related to refill or renewal    Negative: Caller (e.g., patient or pharmacist) requesting information about a new medicine    Negative: Caller requesting information unrelated to medicine    Negative: [1] Prescription refill request for ESSENTIAL medicine (i.e., likelihood of harm to patient if not taken) AND [2] triager unable to refill per department policy    Negative: [1] Prescription not at pharmacy AND [2] was prescribed by PCP recently  (Exception: triager has access to EMR and prescription is recorded there. Go to Home Care and confirm for pharmacy.)    Negative: [1] Pharmacy calling with prescription questions AND [2] triager unable to answer question    Negative: Prescription request for new medicine (not a refill)    Protocols used: MEDICATION REFILL AND RENEWAL CALL-A-

## 2023-05-14 RX ORDER — DEXTROAMPHETAMINE SACCHARATE, AMPHETAMINE ASPARTATE, DEXTROAMPHETAMINE SULFATE AND AMPHETAMINE SULFATE 7.5; 7.5; 7.5; 7.5 MG/1; MG/1; MG/1; MG/1
30 TABLET ORAL 2 TIMES DAILY
Qty: 60 TABLET | Refills: 0 | Status: SHIPPED | OUTPATIENT
Start: 2023-05-14 | End: 2023-06-05

## 2023-05-30 ENCOUNTER — MYC REFILL (OUTPATIENT)
Dept: FAMILY MEDICINE | Facility: CLINIC | Age: 48
End: 2023-05-30
Payer: COMMERCIAL

## 2023-05-30 DIAGNOSIS — G47.00 INSOMNIA, UNSPECIFIED TYPE: ICD-10-CM

## 2023-05-30 RX ORDER — ZOLPIDEM TARTRATE 10 MG/1
10 TABLET ORAL
Qty: 15 TABLET | Refills: 0 | Status: SHIPPED | OUTPATIENT
Start: 2023-05-30 | End: 2023-06-23

## 2023-05-31 ENCOUNTER — TELEPHONE (OUTPATIENT)
Dept: SURGERY | Facility: CLINIC | Age: 48
End: 2023-05-31
Payer: COMMERCIAL

## 2023-05-31 NOTE — TELEPHONE ENCOUNTER
This writer received a voicemail from Abby, patients partner, to see if they are able to move up this patient's surgery and let Dr. Hernandez know that there is another cyst to remove. This writer attempted to call Abby to get more information about the new cyst and let them know that the date for surgery Angely has may be the soonest for surgery.

## 2023-06-02 ENCOUNTER — PATIENT OUTREACH (OUTPATIENT)
Dept: SURGERY | Facility: CLINIC | Age: 48
End: 2023-06-02
Payer: COMMERCIAL

## 2023-06-02 NOTE — PROGRESS NOTES
Received message from Surgery Scheduler that patients partner had called in on behalf of the patient to report that she has a new cyst in her axilla that is red, swollen, and painful. They are looking for guidance on this. Called and LVM to return call to discuss.

## 2023-06-05 ENCOUNTER — MYC REFILL (OUTPATIENT)
Dept: FAMILY MEDICINE | Facility: CLINIC | Age: 48
End: 2023-06-05
Payer: COMMERCIAL

## 2023-06-05 DIAGNOSIS — F90.9 ATTENTION DEFICIT HYPERACTIVITY DISORDER (ADHD), UNSPECIFIED ADHD TYPE: ICD-10-CM

## 2023-06-06 RX ORDER — DEXTROAMPHETAMINE SACCHARATE, AMPHETAMINE ASPARTATE, DEXTROAMPHETAMINE SULFATE AND AMPHETAMINE SULFATE 7.5; 7.5; 7.5; 7.5 MG/1; MG/1; MG/1; MG/1
30 TABLET ORAL 2 TIMES DAILY
Qty: 60 TABLET | Refills: 0 | Status: SHIPPED | OUTPATIENT
Start: 2023-06-13 | End: 2023-07-06

## 2023-06-09 DIAGNOSIS — F90.9 ATTENTION DEFICIT HYPERACTIVITY DISORDER (ADHD), UNSPECIFIED ADHD TYPE: ICD-10-CM

## 2023-06-09 RX ORDER — DEXTROAMPHETAMINE SACCHARATE, AMPHETAMINE ASPARTATE, DEXTROAMPHETAMINE SULFATE AND AMPHETAMINE SULFATE 7.5; 7.5; 7.5; 7.5 MG/1; MG/1; MG/1; MG/1
30 TABLET ORAL 2 TIMES DAILY
Qty: 60 TABLET | Refills: 0 | Status: CANCELLED | OUTPATIENT
Start: 2023-06-13

## 2023-06-09 NOTE — TELEPHONE ENCOUNTER
Scheduling team, please disregard prior message as it was intended to be sent to the [facility] Med support pool.  GERIJ

## 2023-06-09 NOTE — TELEPHONE ENCOUNTER
Patient is calling back stating she is out of medication and wondering if she can get it refilled before the 06/13/2023. Patient states please can she get this before the weekend.also is asking if she can this scrip written month to month.

## 2023-06-09 NOTE — TELEPHONE ENCOUNTER
Rx was already sent, can be filled 6/13/23.  Last Rx was to be refilled 5/14/23, so patient should not be out of medication.  She should check with pharmacy.  GERIJ

## 2023-06-09 NOTE — TELEPHONE ENCOUNTER
Pt returned call, message relayed. Stated she doesn't need the meds right now but pharmacy told her that they only have 60 tablets in stock right now and they aren't able to touch her script until the date written. They also don't know when they'll be ordering more. Pt is wondering if it's possible to fill now while pharmacy has them in stock.

## 2023-06-09 NOTE — TELEPHONE ENCOUNTER
I am not able to authorize early refill of controlled substance.  If pharmacy does not have in stock at time fill is needed, we can send Rx to a new pharmacy.  GERIJ

## 2023-06-09 NOTE — TELEPHONE ENCOUNTER
Medication last filled:06/06/2023    Last clinic visit: 05/08/2023     Clinic visit frequency required: Q 6  months    Next clinic visit: none    Controlled substance agreement on file: Yes:  Date 05/05/2023.    Urine Drug Screen on file:  No     Pending Prescriptions:                       Disp   Refills    amphetamine-dextroamphetamine (ADDERALL) *60 tab*0            Sig: Take 1 tablet (30 mg) by mouth 2 times daily

## 2023-06-23 ENCOUNTER — MYC REFILL (OUTPATIENT)
Dept: FAMILY MEDICINE | Facility: CLINIC | Age: 48
End: 2023-06-23
Payer: COMMERCIAL

## 2023-06-23 DIAGNOSIS — G47.00 INSOMNIA, UNSPECIFIED TYPE: ICD-10-CM

## 2023-06-23 RX ORDER — ZOLPIDEM TARTRATE 10 MG/1
TABLET ORAL
Qty: 15 TABLET | Refills: 0 | Status: SHIPPED | OUTPATIENT
Start: 2023-06-25 | End: 2023-07-19

## 2023-06-23 RX ORDER — ZOLPIDEM TARTRATE 10 MG/1
10 TABLET ORAL
Qty: 15 TABLET | Refills: 0 | OUTPATIENT
Start: 2023-06-23

## 2023-06-26 ENCOUNTER — HOSPITAL ENCOUNTER (OUTPATIENT)
Facility: AMBULATORY SURGERY CENTER | Age: 48
Discharge: HOME | End: 2023-06-26
Attending: SURGERY | Admitting: SURGERY
Payer: COMMERCIAL

## 2023-06-26 VITALS
BODY MASS INDEX: 33.99 KG/M2 | OXYGEN SATURATION: 97 % | TEMPERATURE: 97 F | RESPIRATION RATE: 16 BRPM | HEART RATE: 61 BPM | HEIGHT: 65 IN | SYSTOLIC BLOOD PRESSURE: 147 MMHG | DIASTOLIC BLOOD PRESSURE: 76 MMHG | WEIGHT: 204 LBS

## 2023-06-26 DIAGNOSIS — L72.9 CYST OF SKIN: ICD-10-CM

## 2023-06-26 PROCEDURE — 11403 EXC TR-EXT B9+MARG 2.1-3CM: CPT | Performed by: SURGERY

## 2023-06-26 PROCEDURE — 11403 EXC TR-EXT B9+MARG 2.1-3CM: CPT

## 2023-06-26 PROCEDURE — 12032 INTMD RPR S/A/T/EXT 2.6-7.5: CPT | Performed by: SURGERY

## 2023-06-26 PROCEDURE — 88304 TISSUE EXAM BY PATHOLOGIST: CPT | Mod: 26 | Performed by: PATHOLOGY

## 2023-06-26 PROCEDURE — 12032 INTMD RPR S/A/T/EXT 2.6-7.5: CPT

## 2023-06-26 PROCEDURE — 88304 TISSUE EXAM BY PATHOLOGIST: CPT | Mod: TC | Performed by: SURGERY

## 2023-06-26 RX ORDER — BUPIVACAINE HYDROCHLORIDE 2.5 MG/ML
INJECTION, SOLUTION INFILTRATION; PERINEURAL PRN
Status: DISCONTINUED | OUTPATIENT
Start: 2023-06-26 | End: 2023-06-26 | Stop reason: HOSPADM

## 2023-06-26 RX ORDER — ACETAMINOPHEN 325 MG/1
650 TABLET ORAL EVERY 4 HOURS PRN
Qty: 50 TABLET | Refills: 0 | Status: SHIPPED | OUTPATIENT
Start: 2023-06-26 | End: 2023-01-01

## 2023-06-26 RX ORDER — LIDOCAINE HYDROCHLORIDE AND EPINEPHRINE 10; 10 MG/ML; UG/ML
INJECTION, SOLUTION INFILTRATION; PERINEURAL PRN
Status: DISCONTINUED | OUTPATIENT
Start: 2023-06-26 | End: 2023-06-26 | Stop reason: HOSPADM

## 2023-06-26 RX ORDER — MAGNESIUM HYDROXIDE 1200 MG/15ML
LIQUID ORAL PRN
Status: DISCONTINUED | OUTPATIENT
Start: 2023-06-26 | End: 2023-06-26 | Stop reason: HOSPADM

## 2023-06-26 NOTE — OP NOTE
[facility]    Operative Note    Pre-operative diagnosis: Cyst of skin [L72.9]   Post-operative diagnosis Same   Procedure: Procedure(s):  EXCISION, MASS, AXILLARY RIGHT   Surgeon: Surgeon(s) and Role:     * Austin Hernandez MD - Primary     * Delia Plummer MD - Resident - Assisting         Anesthesia:       Local    Estimated blood loss: Less than 10 ml   Drains: None   Specimens: ID Type Source Tests Collected by Time Destination   1 : subcutaneous mass right armpit Tissue Axilla, Right SURGICAL PATHOLOGY EXAM Austin Hernandez MD 6/26/2023  7:24 AM       Findings: 1.5cm mass located in the subcutaneous tissue    Complications: None.   Implants: None.         INDICATIONS FOR PROCEDURE   Mindi Eastman is a 47 year old female with skin thickening of the anterior part of her right axilla with underlying nodule, likely a cyst. This got infected several years ago requiring an incision and drainage. The mass has been slowly enlarging over the past year and patient requests removal.    After understanding the risks and benefits of proceeding with surgery, the patient consented to undergo surgery.       OPERATIVE PROCEDURE:     The patient was brought to the operating room, placed in supine position, and prepared in a routine fashion.  A timeout was performed prior to surgery and documented by the nursing team. The procedure began by infiltrating lidocaine around the mass area. An thin elliptical incision was made around the soft tissue mass with removal of skin containing the punctum of the cyst. A 1.5cm mass was excised from the subcutaneous tissue with a combination of blunt and sharp dissection. The wound was irrigated. Hemostasis was achieved.The incision was closed in layers using 3-0 Vicryl suture to approximate the dermis and 4-0 monocryl to close the skin. Steri strips and a sterile dressing were applied.      Delia Plummer,  DO  PGY-1 General Surgery      Attending addendum:  I was present for entirety of procedure.

## 2023-06-26 NOTE — PROGRESS NOTES
Patient seen in preop. Area marked. Discussed planned procedure. Risks/benefits of surgery including bleeding, infection, damage to surrounding organs all discussed. Potential for recurrence discussed. Questions answered. She is understanding of discussion and agreeable to proceed.

## 2023-06-26 NOTE — BRIEF OP NOTE
[facility]    Brief Operative Note    Pre-operative diagnosis: Cyst of skin [L72.9]  Post-operative diagnosis Same as pre-operative diagnosis    Procedure: Procedure(s):  EXCISION, MASS, AXILLARY RIGHT  Surgeon: Surgeon(s) and Role:     * Austin Hernandez MD - Primary     * Delia Plummer MD - Resident - Assisting  Anesthesia: Local   Estimated Blood Loss: Less than 10 ml    Drains: None  Specimens:   ID Type Source Tests Collected by Time Destination   1 : subcutaneous mass right armpit Tissue Axilla, Right SURGICAL PATHOLOGY EXAM Austin Hernandez MD 6/26/2023  7:24 AM      Findings:   1.5 cm cyst located in the subcutaneous tissue.  Complications: None.  Implants: * No implants in log *

## 2023-06-27 LAB
PATH REPORT.COMMENTS IMP SPEC: NORMAL
PATH REPORT.COMMENTS IMP SPEC: NORMAL
PATH REPORT.FINAL DX SPEC: NORMAL
PATH REPORT.GROSS SPEC: NORMAL
PATH REPORT.MICROSCOPIC SPEC OTHER STN: NORMAL
PATH REPORT.RELEVANT HX SPEC: NORMAL
PHOTO IMAGE: NORMAL

## 2023-06-28 ENCOUNTER — PATIENT OUTREACH (OUTPATIENT)
Dept: SURGERY | Facility: CLINIC | Age: 48
End: 2023-06-28
Payer: COMMERCIAL

## 2023-06-28 NOTE — PROGRESS NOTES
06/28/23    9:40 AM     Mindi Eastman is a patient of Dr. Austin Hernandez that underwent excision of a right axillary mass approximately 2 days ago (6/26).  Attempted to contact patient via telephone for a status update and review post-op  teaching.  LM on VM to call office.  Await return call.      Of note:  Pathology:  Complete  Wound:  Steri-strips  Follow-up:  PRN  Restrictions:  - No activity restrictions  New medications:  Tylenol  Equipment/Supplies:  None    Pathology:  Case Report   Surgical Pathology Report                         Case: [id number]                                   Authorizing Provider:  Austin Hernandez         Collected:           06/26/2023 07:24 AM                                  MD Ludmila                                                                  Ordering Location:     [facility] OR  Received:            06/26/2023 09:06 AM                                  [place]                                                                   Pathologist:           Jameel Tan MD                                                       Specimen:    Axilla, Right, subcutaneous mass right armpit                                              Final Diagnosis   A(1). Subcutaneous mass right armpit:  - Epidermal inclusion cyst - (see description)

## 2023-06-29 NOTE — PROGRESS NOTES
06/29/23    10:36 AM       Attempted to contact patient x 2 for post procedure telephone call/status update.  LM on VM to call office-contact information provided.    [employer] message sent to the patient.

## 2023-07-06 ENCOUNTER — MYC REFILL (OUTPATIENT)
Dept: FAMILY MEDICINE | Facility: CLINIC | Age: 48
End: 2023-07-06
Payer: COMMERCIAL

## 2023-07-06 DIAGNOSIS — F90.9 ATTENTION DEFICIT HYPERACTIVITY DISORDER (ADHD), UNSPECIFIED ADHD TYPE: ICD-10-CM

## 2023-07-06 RX ORDER — DEXTROAMPHETAMINE SACCHARATE, AMPHETAMINE ASPARTATE, DEXTROAMPHETAMINE SULFATE AND AMPHETAMINE SULFATE 7.5; 7.5; 7.5; 7.5 MG/1; MG/1; MG/1; MG/1
30 TABLET ORAL 2 TIMES DAILY
Qty: 60 TABLET | Refills: 0 | Status: SHIPPED | OUTPATIENT
Start: 2023-07-13 | End: 2023-01-01

## 2023-07-15 ENCOUNTER — OFFICE VISIT (OUTPATIENT)
Dept: URGENT CARE | Facility: URGENT CARE | Age: 48
End: 2023-07-15
Payer: COMMERCIAL

## 2023-07-15 VITALS
TEMPERATURE: 98.1 F | WEIGHT: 204 LBS | DIASTOLIC BLOOD PRESSURE: 84 MMHG | BODY MASS INDEX: 33.95 KG/M2 | HEART RATE: 75 BPM | SYSTOLIC BLOOD PRESSURE: 132 MMHG | OXYGEN SATURATION: 99 %

## 2023-07-15 DIAGNOSIS — B02.9 HERPES ZOSTER WITHOUT COMPLICATION: Primary | ICD-10-CM

## 2023-07-15 PROCEDURE — 99213 OFFICE O/P EST LOW 20 MIN: CPT | Performed by: FAMILY MEDICINE

## 2023-07-15 RX ORDER — FAMCICLOVIR 500 MG/1
500 TABLET ORAL 3 TIMES DAILY
Qty: 21 TABLET | Refills: 0 | Status: SHIPPED | OUTPATIENT
Start: 2023-07-15 | End: 2023-01-01

## 2023-07-15 NOTE — PROGRESS NOTES
SUBJECTIVE:   Mindi Eastman is a 47 year old presenting with a chief complaint of a lesion to the right forehead and a stinging/burning feeling from the scalp to the nose on that side.  No eyeball pain or vision changes.  No lesions to the eyelids.  No fevers.  No h/o shingles.    OBJECTIVE  /84   Pulse 75   Temp 98.1  F (36.7  C) (Tympanic)   Wt 92.5 kg (204 lb)   LMP 07/04/2023   SpO2 99%   BMI 33.95 kg/m    GENERAL:  Awake, alert and interactive. No acute distress.  SKIN:  There is a dime sized circular lesion containing multiple tiny vesicles noted to the right forehead.  No other lesions noted on face.  No erythema or swelling of the skin.  Outer corner of the right eye is dry/pink, but no lesion present.    ASSESSMENT/PLAN    ICD-10-CM    1. Herpes zoster without complication  B02.9 famciclovir (FAMVIR) 500 MG tablet        We discussed the expected course of shingles, risk for contagion with open/fluid filled lesions, and symptomatic cares in detail.  We discussed post-herpetic neuralgia and risk for eye lesions/vision loss and close f/u with PCP and eye doctor if any issues with pain develop or the burning/stinging sensation not resolved with resolution of the rash/lesion.   Advised to return to care if symptoms not improving as expected, do not resolve completely, or if any new or worsening symptoms develop.    Patient Instructions   Start the antiviral medication asap.   To ER right away if any eye pain or vision changes or lesions develop on your eyelids.  Otherwise follow up with your eye doctor after the weekend.

## 2023-07-15 NOTE — PATIENT INSTRUCTIONS
Start the antiviral medication asap.   To ER right away if any eye pain or vision changes or lesions develop on your eyelids.  Otherwise follow up with your eye doctor after the weekend.

## 2023-07-17 ENCOUNTER — PATIENT OUTREACH (OUTPATIENT)
Dept: CARE COORDINATION | Facility: CLINIC | Age: 48
End: 2023-07-17
Payer: COMMERCIAL

## 2023-07-19 DIAGNOSIS — G47.00 INSOMNIA, UNSPECIFIED TYPE: ICD-10-CM

## 2023-07-19 RX ORDER — ZOLPIDEM TARTRATE 10 MG/1
TABLET ORAL
Qty: 15 TABLET | Refills: 0 | Status: SHIPPED | OUTPATIENT
Start: 2023-07-19 | End: 2023-01-01

## 2023-07-25 ENCOUNTER — VIRTUAL VISIT (OUTPATIENT)
Dept: FAMILY MEDICINE | Facility: CLINIC | Age: 48
End: 2023-07-25
Payer: COMMERCIAL

## 2023-07-25 DIAGNOSIS — Z86.19 H/O HERPES ZOSTER VIRUS: ICD-10-CM

## 2023-07-25 DIAGNOSIS — Z09 FOLLOW-UP EXAM AFTER TREATMENT: Primary | ICD-10-CM

## 2023-07-25 PROCEDURE — 99213 OFFICE O/P EST LOW 20 MIN: CPT | Mod: VID | Performed by: NURSE PRACTITIONER

## 2023-07-25 NOTE — LETTER
July 25, 2023      Mindi Eastman  [address] [address]        To Whom It May Concern:    Mindi Eastman was seen in our clinic 7/25/2023. She may return to work without restrictions as of 7/25/2023, her symptoms have improved.       Sincerely,        BEULAH Tamez CNP

## 2023-07-25 NOTE — PROGRESS NOTES
"Answers submitted by the patient for this visit:  Patient Health Questionnaire (Submitted on 7/25/2023)  If you checked off any problems, how difficult have these problems made it for you to do your work, take care of things at home, or get along with other people?: Not difficult at all  PHQ9 TOTAL SCORE: 0  General Questionnaire (Submitted on 7/25/2023)  Chief Complaint: Chronic problems general questions HPI Form  How many servings of fruits and vegetables do you eat daily?: 2-3  On average, how many sweetened beverages do you drink each day (Examples: soda, juice, sweet tea, etc.  Do NOT count diet or artificially sweetened beverages)?: 0  How many minutes a day do you exercise enough to make your heart beat faster?: 30 to 60  How many days a week do you exercise enough to make your heart beat faster?: 6  How many days per week do you miss taking your medication?: 0  General Concern (Submitted on 7/25/2023)  Chief Complaint: Chronic problems general questions HPI Form  What is the reason for your visit today?: Follow up from shingles outbreak.  Need return to work note  When did your symptoms begin?: 3-7 days ago  Are your symptoms:: Sharri Au is a 47 year old who is being evaluated via a billable video visit.      How would you like to obtain your AVS? MyChart  If the video visit is dropped, the invitation should be resent by: Text to cell phone: [phone]  Will anyone else be joining your video visit? No          Assessment & Plan     H/O herpes zoster virus  - her symptoms improved, she finished the full course of antiviral medication, work note provided.     Follow-up exam after treatment       BMI:   Estimated body mass index is 33.95 kg/m  as calculated from the following:    Height as of 6/26/23: 1.651 m (5' 5\").    Weight as of 7/15/23: 92.5 kg (204 lb).   Weight management plan: Patient was referred to their PCP to discuss a diet and exercise plan.        BEULAH Tamez [facility] " [facility] JAMES Au is a 47 year old, presenting for the following health issues:  RECHECK    -She was seen on July 15 for an outbreak of shingles and was placed on antiviral medication.  She took the full course of antiviral medication and her symptoms have improved.  She is present today requesting a work note to return to work without restrictions.  She works at a group home.  She denied any new lesions.  She denied any drainage from the areas.  She stated that the area are all dry and flat.  She denied fever or chills.  She denied any vision changes or new headaches.  She denied any neck stiffness.         4/13/2023    12:32 PM   Additional Questions   Roomed by Analilia Stack     HPI             Review of Systems   Constitutional, HEENT, cardiovascular, pulmonary, gi and gu systems are negative, except as otherwise noted.      Objective         Vitals:  No vitals were obtained today due to virtual visit.    Physical Exam   GENERAL: Healthy, alert and no distress  EYES: Eyes grossly normal to inspection.  No discharge or erythema, or obvious scleral/conjunctival abnormalities.  RESP: No audible wheeze, cough, or visible cyanosis.  No visible retractions or increased work of breathing.    SKIN: Visible skin clear. No significant rash, abnormal pigmentation or lesions.  NEURO: Cranial nerves grossly intact.  Mentation and speech appropriate for age.  PSYCH: Mentation appears normal, affect normal/bright, judgement and insight intact, normal speech and appearance well-groomed.    Hospital Outpatient Visit on 06/26/2023   Component Date Value Ref Range Status    Case Report 06/26/2023    Final                    Value:Surgical Pathology Report                         Case: [id number]                                  Authorizing Provider:  Austin Hernandez         Collected:           06/26/2023 07:24 AM                                 MD Ludmila                                                                  Ordering Location:     [facility]  Received:            06/26/2023 09:06 AM                                 [place]                                                                  Pathologist:           Jameel Tan MD                                                      Specimen:    Axilla, Right, subcutaneous mass right armpit                                              Final Diagnosis 06/26/2023    Final                    Value:This result contains rich text formatting which cannot be displayed here.    Clinical Information 06/26/2023    Final                    Value:This result contains rich text formatting which cannot be displayed here.    Gross Description 06/26/2023    Final                    Value:This result contains rich text formatting which cannot be displayed here.    Microscopic Description 06/26/2023    Final                    Value:This result contains rich text formatting which cannot be displayed here.    Performing Labs 06/26/2023    Final                    Value:This result contains rich text formatting which cannot be displayed here.             Video-Visit Details    Type of service:  Video Visit       Originating Location (pt. Location): Other work    Distant Location (provider location):  On-site  Platform used for Video Visit: [place]

## 2023-07-27 ENCOUNTER — PATIENT OUTREACH (OUTPATIENT)
Dept: FAMILY MEDICINE | Facility: CLINIC | Age: 48
End: 2023-07-27
Payer: COMMERCIAL

## 2023-07-31 ENCOUNTER — PATIENT OUTREACH (OUTPATIENT)
Dept: CARE COORDINATION | Facility: CLINIC | Age: 48
End: 2023-07-31
Payer: COMMERCIAL

## 2023-09-26 NOTE — TELEPHONE ENCOUNTER
Appointments in Next Year      Nov 13, 2023  1:50 PM  (Arrive by 1:30 PM)  Provider Visit with Eula Gaming MD  [facility] ([facility]) [phone]           
Please call patient to schedule an ADHD follow-up visit, which is due 11/8/23.  ESTELA  
Admission

## 2023-11-14 NOTE — TELEPHONE ENCOUNTER
Reason for Call: Request for an order or referral:    Order or referral being requested: Psychiatry/Mental Health     Date needed: as soon as possible    Has the patient been seen by the PCP for this problem? YES    Additional comments: Received a call from patients partner Radha on behalf of Angely to inform Dr. Gaming that pt was admitted into [facility]. Please review notes from ED and advise on next steps.     Pts partner radha states that if you can't get a hold of Angely at number on file to call Radha at [phone] but I do not see that there is an official consent to communicate.    Phone number Patient can be reached at:  Cell number on file:    Telephone Information:   Mobile [phone]       Best Time:  anytime     Can we leave a detailed message on this number?  YES    Call taken on 11/14/2023 at 9:43 AM by Ana Yarbrough

## 2023-11-14 NOTE — TELEPHONE ENCOUNTER
Patient previously seen by Dr. Holbrook of [facility]; will copy on this message.  I will place an urgent referral to psychiatry for follow-up.  Current level of care need is outside of my scope/comfort zone in Primary Care.  GERIJ

## 2023-11-14 NOTE — TELEPHONE ENCOUNTER
"S-(situation):   Called patient to gather more information about her current situation    B-(background):   [facility] on 11/10/23    Went to work Thursday morning, resident has recently passed from her work which caused emotional upsetting for her, does not recall drive home. Partner noticed patient was off, hallucinations, not making sense, etc., dx with \"acute confusion episode\"     Therapist mentioned it as psych related, patient thought it was 1996, psych there recommended patient be seen by a psychiatrist for follow up and therapist.     A-(assessment):   Current status: patient states she is ok, still a little bit shaky and edgy, denies suicidal ideation, patient states her partner is a good resource for her.     R-(recommendations):   Previous psychiatrist is outside of [facility], patient would like a referral to referral [facility].   ER psych 25 mg Seroquel PRN 1/2 to 2 tabs PRN for anxiety- only got a few from hospital and is requesting a refill    PCP has no openings until mid december- will see if she can squeeze patient into schedule  Will send resources via [employer] to patient.  Verbal consent to speak to partner radha regarding patient care.   "

## 2023-11-16 NOTE — TELEPHONE ENCOUNTER
"PSYCHIATRY CLINIC PHONE INTAKE     SERVICES REQUESTED / INTERESTED IN          Med Management    Presenting Problem and Brief History                              What would you like to be seen for? (brief description):  Patient states primary concern to be treated is PTSD & traumatic upbringing. Patient was recently hospitalized at [facility] (11/9) for an overnight with the diagnosis 'acute confusion episode' and the psychiatrist there recommended that since patient had a \"mental breakdown\" that she see a new psychiatrist who can review medication management from PCP. Patient is currently taking seroquel, adderall, lexapro, ambien, and lorazepam. Patient has history of inpatient stays with [facility] in [facility] and history of ECT treatments. Patient has diagnosis of Bi-Polar, anxiety, and ADHD. Patient endorses marijuana use.    Have you received a mental health diagnosis? Yes   Which one (s): Bi-polar, anxiety, ADHD  Is there any history of developmental delay?  No   Are you currently seeing a mental health provider?  No            Who / month last seen:  Couple of months ago, not in [facility] system.  Do you have mental health records elsewhere?  Yes  Will you sign a release so we can obtain them?  Yes    Have you ever been hospitalized for psychiatric reasons?  Yes  Describe:  Year ago, [facility]. Inpatient through [facility], multiple.    Do you have current thoughts of self-harm?  No    Do you currently have thoughts of harming others?  No    Do you have any safety concerns? No, really good support.  If yes to these, offer to reach out to a  for follow up.      Substance Use History     Do you have any history of alcohol / illicit drug use?  Yes  Describe:  Marijuana  Have you ever received treatment for this?  No    Describe:  No     Social History     Who is the patient's a guardian?  Yes    Name / number: Self  Have you had an ACT team in last 12 months?  No  Describe: No   OK to leave a detailed " voicemail?  Yes    Would you be interested in learning more about research opportunities for which you or your child may qualify? We can connect you with a team member for more information.  No  If yes, send an inbasket message to Barbara Hendrickson    Medical/ Surgical History                                   Patient Active Problem List   Diagnosis    Bursitis of shoulder    Mental health disorder    Bipolar I disorder, most recent episode depressed (H)    Major depressive disorder, recurrent severe without psychotic features (H)    Acute tension-type headache    SHANICE (generalized anxiety disorder)    Tobacco use    Insomnia due to other mental disorder    Suicidal ideation    Moderate dysplasia of cervix (ERIC II)    Attention deficit hyperactivity disorder (ADHD), unspecified ADHD type    Anxiety    Fibromyalgia    ROXANNE (obstructive sleep apnea)    Morbid obesity (H)    History of cervical dysplasia    Hx of pancreatitis    Hypothyroidism, unspecified type    Cyst of skin          Medications             Have you taken >3 psychiatric medications in your past?  Yes  Do you currently take 5 or more medications, including prescriptions, supplements, and other over the counter products?  Yes    If YES to at least one of these questions:   As part of your evaluation in our clinic, we have specially trained pharmacists as part of your care team. Your provider would like for you to meet with one of our pharmacists to review your current and past medications, ensure your med list is up to date, and queue up any questions or concerns you have about medications. They will review all of your medications, not just for mental health, to help ensure you know what you re taking and that everything is working together.     Please schedule patient in [facility] PSY[facility]IATRY (Abby Red or Ankita Ricardo) for 60m MTM in any green space as virtual (video), telephone, or in person (designated in person days per Epic templates).  -Appt  notes can say  Psych eval on xx/xx   -Route telephone encounter to the pharmacist who will be seeing the patient.  If patient has questions about insurance coverage or billing, please still schedule the visit and refer them to call the [facility] coordinators at [phone].    Current Outpatient Medications   Medication Sig Dispense Refill    acetaminophen (TYLENOL) 325 MG tablet Take 2 tablets (650 mg) by mouth every 4 hours as needed for mild pain (Patient not taking: Reported on 7/25/2023) 50 tablet 0    acetaminophen (TYLENOL) 500 MG tablet Take 1,000 mg by mouth as needed (headache) Prior to ECT (Patient not taking: Reported on 7/25/2023)      amitriptyline (ELAVIL) 10 MG tablet Take 1 tablet (10 mg) by mouth At Bedtime 90 tablet 1    amphetamine-dextroamphetamine (ADDERALL) 30 MG tablet Take 1 tablet (30 mg) by mouth 2 times daily 60 tablet 0    cloNIDine (CATAPRES) 0.1 MG tablet Take 0.5-1 tablets (0.05-0.1 mg) by mouth 2 times daily as needed (anxiety, ADHD) 180 tablet 3    escitalopram (LEXAPRO) 20 MG tablet TAKE 2 TABLETS BY MOUTH EVERY  tablet 2    famciclovir (FAMVIR) 500 MG tablet Take 1 tablet (500 mg) by mouth 3 times daily for 7 days 21 tablet 0    ketoconazole (NIZORAL) 2 % external cream Apply topically daily To the affected areas on the back until rashes are clear. (Patient not taking: Reported on 7/25/2023) 60 g 11    LORazepam (ATIVAN) 1 MG tablet TAKE 1 TABLET BY MOUTH 2 TIMES DAILY AS NEEDED FOR ANXIETY 30 TABS TO LAST 30 DAYS 30 tablet 1    methylPREDNISolone (MEDROL DOSEPAK) 4 MG tablet therapy pack Follow Package Directions (Patient not taking: Reported on 7/25/2023) 21 tablet 0    QUEtiapine (SEROQUEL) 100 MG tablet TAKE 1 TABLET BY MOUTH AT BEDTIME 90 tablet 1    QUEtiapine (SEROQUEL) 25 MG tablet Take 2 tablets (50 mg) by mouth 2 times daily as needed (severe anxiety) 20 tablet 1    zolpidem (AMBIEN) 10 MG tablet TAKE 1 TABLET BY MOUTH NIGHTLY AS NEEDED FOR SLEEP. DO NOT TAKE WITH  ATIVAN, 15 TABS TO LAST 25 DAYS 15 tablet 0         DISPOSITION      Intake phone screen completed on 11/16 and CAT eval scheduled with Dr. Gipson on 1/17. New patient paperwork emailed to address on file.     Sarahy Hickey.

## 2023-12-14 PROBLEM — L72.9 CYST OF SKIN: Status: RESOLVED | Noted: 2023-05-01 | Resolved: 2023-01-01

## 2023-12-14 PROBLEM — R45.851 SUICIDAL IDEATION: Status: RESOLVED | Noted: 2019-06-16 | Resolved: 2023-01-01

## 2023-12-14 PROBLEM — Z87.410 HISTORY OF CERVICAL DYSPLASIA: Status: RESOLVED | Noted: 2022-08-23 | Resolved: 2023-01-01

## 2023-12-14 PROBLEM — F41.9 ANXIETY: Status: RESOLVED | Noted: 2022-08-23 | Resolved: 2023-01-01

## 2023-12-14 NOTE — PROGRESS NOTES
Assessment & Plan     Chronic nonintractable headache, unspecified headache type  Stable and controlled, continue amitriptyline.  - amitriptyline (ELAVIL) 10 MG tablet; Take 1 tablet (10 mg) by mouth at bedtime    Acute confusion  Acute psychosis per psychiatry.  Has a visit to establish care with new psychiatrist in the community in January.  Continue quetiapine twice daily as needed for anxiety.  - QUEtiapine (SEROQUEL) 25 MG tablet; Take 2 tablets (50 mg) by mouth 2 times daily as needed (severe anxiety)    Bipolar I disorder (H)  This is overall been stable on quetiapine.  Continue.  - QUEtiapine (SEROQUEL) 100 MG tablet; Take 1 tablet (100 mg) by mouth at bedtime  - Lipid panel reflex to direct LDL Non-fasting; Future  - Hemoglobin A1c; Future    Insomnia, unspecified type  May continue zolpidem nightly as needed for sleep, avoiding combination of the Ativan.  Stable.  PDMP reviewed and concordant.  - zolpidem (AMBIEN) 10 MG tablet; TAKE 1 TABLET BY MOUTH NIGHTLY AS NEEDED FOR SLEEP. DO NOT TAKE WITH ATIVAN, 15 TABS TO LAST 25 DAYS    Attention deficit hyperactivity disorder (ADHD), unspecified ADHD type  Stable and controlled on Adderall twice daily.  This will likely be transitioning to her psychiatrist.  Continue clonidine twice daily as needed as well.  - amphetamine-dextroamphetamine (ADDERALL) 30 MG tablet; Take 1 tablet (30 mg) by mouth 2 times daily  - cloNIDine (CATAPRES) 0.1 MG tablet; Take 0.5-1 tablets (0.05-0.1 mg) by mouth 2 times daily as needed (anxiety, ADHD)    SHANICE (generalized anxiety disorder)  Recent worsening.  Continue quetiapine, clonidine, Lexapro.  Continue as needed lorazepam and Seroquel.  - cloNIDine (CATAPRES) 0.1 MG tablet; Take 0.5-1 tablets (0.05-0.1 mg) by mouth 2 times daily as needed (anxiety, ADHD)  - escitalopram (LEXAPRO) 20 MG tablet; Take 2 tablets (40 mg) by mouth daily  - LORazepam (ATIVAN) 1 MG tablet; TAKE 1 TABLET BY MOUTH 2 TIMES DAILY AS NEEDED FOR ANXIETY 30  TABS TO LAST 30 DAYS    Vitamin D deficiency  Check vitamin D level today.  - Vitamin D Deficiency; Future    Hypothyroidism, unspecified type  Not requiring supplementation.  Will check TSH today.  - TSH with free T4 reflex; Future    Cervical cancer screening  Pap smear cotest advised today, she declines and will schedule future visit for this.    Tinea versicolor  Refill provided for ketoconazole to use as needed for flares.  - ketoconazole (NIZORAL) 2 % external cream; Apply topically daily To the affected areas on the back until rashes are clear.    Visit for screening mammogram  - MA SCREENING DIGITAL BILAT - Future  (s+30); Future                 Eula Gaming MD  [facility] LINUS Au is a 48 year old, presenting for the following health issues:  medication check (Annual medication check) and Pap?      Here for routine check-in visit.  Unfortunately had an episode of acute psychosis thought to be triggered by work stress, thought it was 1996, was not oriented to place.  Hospitalized at [facility] overnight.  Evaluation there negative for other causes of encephalopathy.  Seroquel 25 mg as needed was added to her regimen.  Tolerating well.  Visit scheduled with community psychiatrist to establish care.  No changes were made in Lexapro, Adderall, zolpidem at bedtime, amitriptyline.  Headaches have been stable.  Due for follow-up of vitamin D deficiency and previous history of hypothyroidism that has not required treatment.    History of Present Illness       Reason for visit:  Medication visit    She eats 2-3 servings of fruits and vegetables daily.She consumes 0 sweetened beverage(s) daily.She exercises with enough effort to increase her heart rate 30 to 60 minutes per day.  She exercises with enough effort to increase her heart rate 5 days per week.   She is taking medications regularly.                 Review of Systems         Objective    /70   Pulse 82    "Temp 98.3  F (36.8  C) (Oral)   Resp 16   Ht 1.651 m (5' 5\")   Wt 97.6 kg (215 lb 1.6 oz)   LMP 12/02/2023 (Exact Date)   SpO2 99%   BMI 35.79 kg/m    Body mass index is 35.79 kg/m .  Physical Exam   Alert and very pleasant.  Appropriate affect.  Mucous membranes moist.  Neck supple without adenopathy.  Heart with regular rate and rhythm.  Lungs clear.  Extremities without edema.  No tremors.                      "

## 2024-01-01 ENCOUNTER — TELEPHONE (OUTPATIENT)
Dept: PLASTIC SURGERY | Facility: CLINIC | Age: 49
End: 2024-01-01
Payer: COMMERCIAL

## 2024-01-01 ENCOUNTER — MYC REFILL (OUTPATIENT)
Dept: FAMILY MEDICINE | Facility: CLINIC | Age: 49
End: 2024-01-01

## 2024-01-01 ENCOUNTER — MYC REFILL (OUTPATIENT)
Dept: FAMILY MEDICINE | Facility: CLINIC | Age: 49
End: 2024-01-01
Payer: COMMERCIAL

## 2024-01-01 ENCOUNTER — TELEPHONE (OUTPATIENT)
Dept: FAMILY MEDICINE | Facility: CLINIC | Age: 49
End: 2024-01-01

## 2024-01-01 DIAGNOSIS — G47.00 INSOMNIA, UNSPECIFIED TYPE: ICD-10-CM

## 2024-01-01 DIAGNOSIS — F90.9 ATTENTION DEFICIT HYPERACTIVITY DISORDER (ADHD), UNSPECIFIED ADHD TYPE: ICD-10-CM

## 2024-01-01 DIAGNOSIS — F41.1 GAD (GENERALIZED ANXIETY DISORDER): ICD-10-CM

## 2024-01-01 DIAGNOSIS — F64.0 GENDER DYSPHORIA IN ADULT: Primary | ICD-10-CM

## 2024-01-01 DIAGNOSIS — G89.29 CHRONIC NONINTRACTABLE HEADACHE, UNSPECIFIED HEADACHE TYPE: ICD-10-CM

## 2024-01-01 DIAGNOSIS — R51.9 CHRONIC NONINTRACTABLE HEADACHE, UNSPECIFIED HEADACHE TYPE: ICD-10-CM

## 2024-01-01 RX ORDER — ZOLPIDEM TARTRATE 10 MG/1
TABLET ORAL
Qty: 15 TABLET | Refills: 0 | Status: SHIPPED | OUTPATIENT
Start: 2024-01-01 | End: 2024-01-01

## 2024-01-01 RX ORDER — DEXTROAMPHETAMINE SACCHARATE, AMPHETAMINE ASPARTATE, DEXTROAMPHETAMINE SULFATE AND AMPHETAMINE SULFATE 7.5; 7.5; 7.5; 7.5 MG/1; MG/1; MG/1; MG/1
30 TABLET ORAL 2 TIMES DAILY
Qty: 60 TABLET | Refills: 0 | Status: SHIPPED | OUTPATIENT
Start: 2024-01-01 | End: 2024-01-01

## 2024-01-01 RX ORDER — ZOLPIDEM TARTRATE 10 MG/1
TABLET ORAL
Qty: 15 TABLET | Refills: 3 | Status: SHIPPED | OUTPATIENT
Start: 2024-01-01 | End: 2024-01-01

## 2024-01-01 RX ORDER — ESCITALOPRAM OXALATE 20 MG/1
40 TABLET ORAL DAILY
Qty: 180 TABLET | Refills: 4 | Status: SHIPPED | OUTPATIENT
Start: 2024-01-01

## 2024-01-01 RX ORDER — DEXTROAMPHETAMINE SACCHARATE, AMPHETAMINE ASPARTATE, DEXTROAMPHETAMINE SULFATE AND AMPHETAMINE SULFATE 7.5; 7.5; 7.5; 7.5 MG/1; MG/1; MG/1; MG/1
30 TABLET ORAL 2 TIMES DAILY
Qty: 60 TABLET | Refills: 0 | Status: SHIPPED | OUTPATIENT
Start: 2024-01-01

## 2024-01-01 RX ORDER — LORAZEPAM 1 MG/1
TABLET ORAL
Qty: 30 TABLET | Refills: 0 | Status: SHIPPED | OUTPATIENT
Start: 2024-01-01

## 2024-01-01 RX ORDER — LORAZEPAM 1 MG/1
TABLET ORAL
Qty: 30 TABLET | Refills: 0 | Status: SHIPPED | OUTPATIENT
Start: 2024-01-01 | End: 2024-01-01

## 2024-01-01 RX ORDER — ZOLPIDEM TARTRATE 10 MG/1
TABLET ORAL
Qty: 15 TABLET | Refills: 3 | Status: SHIPPED | OUTPATIENT
Start: 2024-01-01

## 2024-01-01 RX ORDER — CLONIDINE HYDROCHLORIDE 0.1 MG/1
.05-.1 TABLET ORAL 2 TIMES DAILY PRN
Qty: 180 TABLET | Refills: 4 | Status: SHIPPED | OUTPATIENT
Start: 2024-01-01

## 2024-01-01 RX ORDER — AMITRIPTYLINE HYDROCHLORIDE 10 MG/1
10 TABLET ORAL AT BEDTIME
Qty: 90 TABLET | Refills: 4 | Status: SHIPPED | OUTPATIENT
Start: 2024-01-01

## 2024-01-03 NOTE — CONFIDENTIAL NOTE
Writer KIRA re: follow up on voicemail from pt's partner to schedule top surgery consult. We do not have consent to communicate on file for Abby Vera (partner) so writer called pt back directly and also sent Ringly message.

## 2024-01-08 PROBLEM — N87.1 MODERATE DYSPLASIA OF CERVIX (CIN II): Status: ACTIVE | Noted: 2022-08-22

## 2024-01-08 NOTE — TELEPHONE ENCOUNTER
Dani Gaming,   Patient is lost to pap tracking follow-up. Attempts to contact pt have been made per reminder process and there has been no reply and/or no appt scheduled.     Pap Hx:  06/20/05 [facility] Pap  08/08/05 [place] [facility] bx ERIC I to II   09/13/05 LEEP  ERIC II or high grade UBALDO, margins neg   03/1/06 NIL Pap  02/3/14 NIL Pap  12/10/16 NIL Pap, Neg HR HPV   08/15/22 NIL Pap, Neg HR HPV Plan cotest in 1 year due 08/15/23  07/27/23 Reminder Mychart  08/24/23 Reminder call-phone would  but no one would talk did this multiple times, will try again later  08/25/23 Reminder call-lm  11/13/23 appt--notes added canceled  12/14/23 appt--notes added  pt declined to complete this at the visit  01/8/24 Lost to follow-up for pap tracking, paulo routed to provider

## 2024-01-22 NOTE — CONFIDENTIAL NOTE
[facility] :  Care Coordination Note     SITUATION   Angely Eastman (they/them) is a 48 year old female who is receiving support for:  Care Team  .    BACKGROUND     Pt is scheduled for a gender affirming mastectomy consult with Dr. Ogden on 7/5/4.     Pt's partner Abby made the appt. We didn't have TEVIN on file, but they emailed it to writer. Writer will scan TEVIN into pt's chart tomorrow in clinic.     ASSESSMENT     Surgery              CGC Assessment  [facility] [employer] [facility] (CGC) Enrollment: (P) Enrolled  Patient has a therapist: (P) Yes  Letter of support #1: (P) Requested  Surgery being considered: (P) Yes  Mastectomy: (P) Yes    Pt reports:   Nicotine use via vape. Writer informed pt's partner of requirements for surgery.   PLAN          Nursing Interventions:       Follow-up plan:  1. Obtain LOS - pt has a psychiatrist.        Mindi Whitlock

## 2024-01-24 NOTE — CONFIDENTIAL NOTE
Writer gave TEVIN to Dustin to scan into pt's chart via Epic Susan -- due to new HIM rules about what they accept via scanner.     Dustin scanned on 1/24.

## 2024-03-02 NOTE — PROGRESS NOTES
" Note  The patient participated in an hour long case management led group. The focus of the group was on Developing SMART Goals to aid in mental health recovery. At the beginning of the group, the patient reported that she was feeling \"Decent.\" The patient had a full range affect throughout the group and was appropriate with both writer and peers in the group.  " no

## 2024-03-08 NOTE — IP AVS SNAPSHOT
[facility]    [facility] [address] [address]    Phone:  [phone]                                       After Visit Summary   6/8/2018    Mindi Eastman    MRN: [id number]           After Visit Summary Signature Page     I have received my discharge instructions, and my questions have been answered. I have discussed any challenges I see with this plan with the nurse or doctor.    ..........................................................................................................................................  Patient/Patient Representative Signature      ..........................................................................................................................................  Patient Representative Print Name and Relationship to Patient    ..................................................               ................................................  Date                                            Time    ..........................................................................................................................................  Reviewed by Signature/Title    ...................................................              ..............................................  Date                                                            Time           [FreeTextEntry1] : BP was elevated, follow up 2 weeks for BP check and results review. Have her sign Cologuard.

## 2024-03-25 NOTE — TELEPHONE ENCOUNTER
Pending Prescriptions:                       Disp   Refills    zolpidem (AMBIEN) 10 MG tablet            15 tab*0            Sig: TAKE 1 TABLET BY MOUTH NIGHTLY AS NEEDED FOR           SLEEP. DO NOT TAKE WITH ATIVAN, 15 TABS TO LAST           25 DAYS    amphetamine-dextroamphetamine (ADDERALL) *60 tab*0            Sig: Take 1 tablet (30 mg) by mouth 2 times daily

## 2024-05-05 NOTE — TELEPHONE ENCOUNTER
Medication Question or Refill        What medication are you calling about (include dose and sig)?: amphetamine-dextroamphetamine (ADDERALL) 30 MG table     Preferred Pharmacy:     [facility]/pharmacy #[id number] - [place], [place] - [address]  [address] [address]  Phone: [phone] Fax: [phone]      Controlled Substance Agreement on file:   CSA -- Patient Level:    CSA: None found at the patient level.       Who prescribed the medication?: PCP    Do you need a refill? Yes    When did you use the medication last? 5/5    Patient offered an appointment? No    Do you have any questions or concerns?  Yes: Patient states medication couldn't be filled until 5/8 and is going out of town today and would like a refill sent to pharmacy as soon as possible.      Could we send this information to you in [facility] or would you prefer to receive a phone call?:   Patient would prefer a phone call   Okay to leave a detailed message?: Yes at Cell number on file:    Telephone Information:   Mobile [phone]
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
## 2024-10-14 NOTE — TELEPHONE ENCOUNTER
Adult This writer attempted to call the patient and schedule surgery with Dr. Hernandez, no answer. This writer left a voicemail instructing the patient to call [phone] to schedule surgery.

## (undated) DEVICE — SOL WATER IRRIG 500ML BOTTLE 2F7113

## (undated) DEVICE — PREP CHLORAPREP 26ML TINTED ORANGE  260815

## (undated) DEVICE — BLADE KNIFE SURG 15 371115

## (undated) DEVICE — SU VICRYL 3-0 TIE 12X18" J904T

## (undated) DEVICE — LINEN TOWEL PACK X6 WHITE 5487

## (undated) DEVICE — ESU GROUND PAD ADULT W/CORD E7507

## (undated) DEVICE — PACK MINOR CUSTOM ASC

## (undated) DEVICE — SU VICRYL 4-0 PS-2 18" UND J496H

## (undated) DEVICE — DRSG PRIMAPORE 02X3" 7133

## (undated) DEVICE — CLIP HORIZON SM RED WIDE SLOT 001201

## (undated) DEVICE — GLOVE BIOGEL PI MICRO INDICATOR UNDERGLOVE SZ 8.0 48980

## (undated) DEVICE — LINEN TOWEL PACK X5 5464

## (undated) DEVICE — CLIP HORIZON MED BLUE 002200

## (undated) DEVICE — GLOVE BIOGEL PI MICRO SZ 8.0 48580

## (undated) DEVICE — SU VICRYL 3-0 SH 27" UND J416H

## (undated) DEVICE — DRSG STERI STRIP 1/2X4" R1547

## (undated) DEVICE — SUCTION MANIFOLD NEPTUNE 2 SYS 1 PORT 702-025-000

## (undated) RX ORDER — METHOHEXITAL IN WATER/PF 100MG/10ML
SYRINGE (ML) INTRAVENOUS
Status: DISPENSED
Start: 2017-10-23

## (undated) RX ORDER — MORPHINE SULFATE 4 MG/ML
INJECTION, SOLUTION INTRAMUSCULAR; INTRAVENOUS
Status: DISPENSED
Start: 2018-11-23

## (undated) RX ORDER — KETOROLAC TROMETHAMINE 30 MG/ML
INJECTION, SOLUTION INTRAMUSCULAR; INTRAVENOUS
Status: DISPENSED
Start: 2017-11-22

## (undated) RX ORDER — ONDANSETRON 2 MG/ML
INJECTION INTRAMUSCULAR; INTRAVENOUS
Status: DISPENSED
Start: 2019-11-01

## (undated) RX ORDER — MORPHINE SULFATE 10 MG/ML
INJECTION, SOLUTION INTRAMUSCULAR; INTRAVENOUS
Status: DISPENSED
Start: 2017-10-23

## (undated) RX ORDER — METHOHEXITAL IN WATER/PF 100MG/10ML
SYRINGE (ML) INTRAVENOUS
Status: DISPENSED
Start: 2017-11-10

## (undated) RX ORDER — METHOHEXITAL IN WATER/PF 100MG/10ML
SYRINGE (ML) INTRAVENOUS
Status: DISPENSED
Start: 2018-08-10

## (undated) RX ORDER — MORPHINE SULFATE 4 MG/ML
INJECTION, SOLUTION INTRAMUSCULAR; INTRAVENOUS
Status: DISPENSED
Start: 2018-11-02

## (undated) RX ORDER — METHOHEXITAL IN WATER/PF 100MG/10ML
SYRINGE (ML) INTRAVENOUS
Status: DISPENSED
Start: 2018-11-23

## (undated) RX ORDER — METHOHEXITAL IN WATER/PF 100MG/10ML
SYRINGE (ML) INTRAVENOUS
Status: DISPENSED
Start: 2017-11-17

## (undated) RX ORDER — MORPHINE SULFATE 4 MG/ML
INJECTION, SOLUTION INTRAMUSCULAR; INTRAVENOUS
Status: DISPENSED
Start: 2019-07-19

## (undated) RX ORDER — METHOHEXITAL IN WATER/PF 100MG/10ML
SYRINGE (ML) INTRAVENOUS
Status: DISPENSED
Start: 2017-11-22

## (undated) RX ORDER — MORPHINE SULFATE 4 MG/ML
INJECTION, SOLUTION INTRAMUSCULAR; INTRAVENOUS
Status: DISPENSED
Start: 2018-12-14

## (undated) RX ORDER — MORPHINE SULFATE 10 MG/ML
INJECTION, SOLUTION INTRAMUSCULAR; INTRAVENOUS
Status: DISPENSED
Start: 2017-10-20

## (undated) RX ORDER — ONDANSETRON 2 MG/ML
INJECTION INTRAMUSCULAR; INTRAVENOUS
Status: DISPENSED
Start: 2019-05-17

## (undated) RX ORDER — ONDANSETRON 2 MG/ML
INJECTION INTRAMUSCULAR; INTRAVENOUS
Status: DISPENSED
Start: 2019-06-28

## (undated) RX ORDER — METHOHEXITAL IN WATER/PF 100MG/10ML
SYRINGE (ML) INTRAVENOUS
Status: DISPENSED
Start: 2019-01-04

## (undated) RX ORDER — ACETAMINOPHEN 500 MG
TABLET ORAL
Status: DISPENSED
Start: 2018-06-08

## (undated) RX ORDER — METHOHEXITAL IN WATER/PF 100MG/10ML
SYRINGE (ML) INTRAVENOUS
Status: DISPENSED
Start: 2019-11-22

## (undated) RX ORDER — BUPIVACAINE HYDROCHLORIDE 2.5 MG/ML
INJECTION, SOLUTION INFILTRATION; PERINEURAL
Status: DISPENSED
Start: 2023-06-26

## (undated) RX ORDER — METHOHEXITAL IN WATER/PF 100MG/10ML
SYRINGE (ML) INTRAVENOUS
Status: DISPENSED
Start: 2018-05-18

## (undated) RX ORDER — KETOROLAC TROMETHAMINE 30 MG/ML
INJECTION, SOLUTION INTRAMUSCULAR; INTRAVENOUS
Status: DISPENSED
Start: 2019-05-17

## (undated) RX ORDER — ONDANSETRON 2 MG/ML
INJECTION INTRAMUSCULAR; INTRAVENOUS
Status: DISPENSED
Start: 2018-06-08

## (undated) RX ORDER — METHOHEXITAL IN WATER/PF 100MG/10ML
SYRINGE (ML) INTRAVENOUS
Status: DISPENSED
Start: 2020-01-24

## (undated) RX ORDER — KETOROLAC TROMETHAMINE 30 MG/ML
INJECTION, SOLUTION INTRAMUSCULAR; INTRAVENOUS
Status: DISPENSED
Start: 2018-08-31

## (undated) RX ORDER — METHOHEXITAL IN WATER/PF 100MG/10ML
SYRINGE (ML) INTRAVENOUS
Status: DISPENSED
Start: 2019-08-09

## (undated) RX ORDER — MORPHINE SULFATE 4 MG/ML
INJECTION, SOLUTION INTRAMUSCULAR; INTRAVENOUS
Status: DISPENSED
Start: 2019-01-04

## (undated) RX ORDER — LABETALOL 20 MG/4 ML (5 MG/ML) INTRAVENOUS SYRINGE
Status: DISPENSED
Start: 2019-10-11

## (undated) RX ORDER — MORPHINE SULFATE 10 MG/ML
INJECTION, SOLUTION INTRAMUSCULAR; INTRAVENOUS
Status: DISPENSED
Start: 2017-11-17

## (undated) RX ORDER — KETOROLAC TROMETHAMINE 30 MG/ML
INJECTION, SOLUTION INTRAMUSCULAR; INTRAVENOUS
Status: DISPENSED
Start: 2019-08-09

## (undated) RX ORDER — METHOHEXITAL IN WATER/PF 100MG/10ML
SYRINGE (ML) INTRAVENOUS
Status: DISPENSED
Start: 2017-10-20

## (undated) RX ORDER — MORPHINE SULFATE 4 MG/ML
INJECTION, SOLUTION INTRAMUSCULAR; INTRAVENOUS
Status: DISPENSED
Start: 2020-02-14

## (undated) RX ORDER — MORPHINE SULFATE 4 MG/ML
INJECTION, SOLUTION INTRAMUSCULAR; INTRAVENOUS
Status: DISPENSED
Start: 2019-08-30

## (undated) RX ORDER — METHOHEXITAL IN WATER/PF 100MG/10ML
SYRINGE (ML) INTRAVENOUS
Status: DISPENSED
Start: 2019-03-29

## (undated) RX ORDER — MORPHINE SULFATE 4 MG/ML
INJECTION, SOLUTION INTRAMUSCULAR; INTRAVENOUS
Status: DISPENSED
Start: 2018-08-10

## (undated) RX ORDER — MORPHINE SULFATE 4 MG/ML
INJECTION, SOLUTION INTRAMUSCULAR; INTRAVENOUS
Status: DISPENSED
Start: 2018-05-18

## (undated) RX ORDER — KETOROLAC TROMETHAMINE 30 MG/ML
INJECTION, SOLUTION INTRAMUSCULAR; INTRAVENOUS
Status: DISPENSED
Start: 2019-12-13

## (undated) RX ORDER — ONDANSETRON 2 MG/ML
INJECTION INTRAMUSCULAR; INTRAVENOUS
Status: DISPENSED
Start: 2019-11-22

## (undated) RX ORDER — ONDANSETRON 2 MG/ML
INJECTION INTRAMUSCULAR; INTRAVENOUS
Status: DISPENSED
Start: 2018-08-31

## (undated) RX ORDER — METHOHEXITAL IN WATER/PF 100MG/10ML
SYRINGE (ML) INTRAVENOUS
Status: DISPENSED
Start: 2017-10-11

## (undated) RX ORDER — METHOHEXITAL IN WATER/PF 100MG/10ML
SYRINGE (ML) INTRAVENOUS
Status: DISPENSED
Start: 2017-11-03

## (undated) RX ORDER — METHOHEXITAL IN WATER/PF 100MG/10ML
SYRINGE (ML) INTRAVENOUS
Status: DISPENSED
Start: 2019-02-15

## (undated) RX ORDER — ACETAMINOPHEN 500 MG
TABLET ORAL
Status: DISPENSED
Start: 2020-01-03

## (undated) RX ORDER — ONDANSETRON 2 MG/ML
INJECTION INTRAMUSCULAR; INTRAVENOUS
Status: DISPENSED
Start: 2020-01-03

## (undated) RX ORDER — ONDANSETRON 2 MG/ML
INJECTION INTRAMUSCULAR; INTRAVENOUS
Status: DISPENSED
Start: 2019-08-30

## (undated) RX ORDER — KETOROLAC TROMETHAMINE 30 MG/ML
INJECTION, SOLUTION INTRAMUSCULAR; INTRAVENOUS
Status: DISPENSED
Start: 2018-11-02

## (undated) RX ORDER — KETOROLAC TROMETHAMINE 30 MG/ML
INJECTION, SOLUTION INTRAMUSCULAR; INTRAVENOUS
Status: DISPENSED
Start: 2019-09-20

## (undated) RX ORDER — METHOHEXITAL IN WATER/PF 100MG/10ML
SYRINGE (ML) INTRAVENOUS
Status: DISPENSED
Start: 2019-09-20

## (undated) RX ORDER — MORPHINE SULFATE 4 MG/ML
INJECTION, SOLUTION INTRAMUSCULAR; INTRAVENOUS
Status: DISPENSED
Start: 2018-06-08

## (undated) RX ORDER — KETOROLAC TROMETHAMINE 30 MG/ML
INJECTION, SOLUTION INTRAMUSCULAR; INTRAVENOUS
Status: DISPENSED
Start: 2017-10-13

## (undated) RX ORDER — KETOROLAC TROMETHAMINE 30 MG/ML
INJECTION, SOLUTION INTRAMUSCULAR; INTRAVENOUS
Status: DISPENSED
Start: 2018-06-29

## (undated) RX ORDER — ONDANSETRON 2 MG/ML
INJECTION INTRAMUSCULAR; INTRAVENOUS
Status: DISPENSED
Start: 2018-05-18

## (undated) RX ORDER — METHOHEXITAL IN WATER/PF 100MG/10ML
SYRINGE (ML) INTRAVENOUS
Status: DISPENSED
Start: 2017-10-13

## (undated) RX ORDER — METHOHEXITAL IN WATER/PF 100MG/10ML
SYRINGE (ML) INTRAVENOUS
Status: DISPENSED
Start: 2018-08-31

## (undated) RX ORDER — KETOROLAC TROMETHAMINE 30 MG/ML
INJECTION, SOLUTION INTRAMUSCULAR; INTRAVENOUS
Status: DISPENSED
Start: 2020-01-24

## (undated) RX ORDER — MORPHINE SULFATE 4 MG/ML
INJECTION, SOLUTION INTRAMUSCULAR; INTRAVENOUS
Status: DISPENSED
Start: 2018-09-21

## (undated) RX ORDER — ONDANSETRON 2 MG/ML
INJECTION INTRAMUSCULAR; INTRAVENOUS
Status: DISPENSED
Start: 2019-10-11

## (undated) RX ORDER — KETOROLAC TROMETHAMINE 30 MG/ML
INJECTION, SOLUTION INTRAMUSCULAR; INTRAVENOUS
Status: DISPENSED
Start: 2019-11-22

## (undated) RX ORDER — KETOROLAC TROMETHAMINE 30 MG/ML
INJECTION, SOLUTION INTRAMUSCULAR; INTRAVENOUS
Status: DISPENSED
Start: 2018-10-12

## (undated) RX ORDER — MORPHINE SULFATE 4 MG/ML
INJECTION, SOLUTION INTRAMUSCULAR; INTRAVENOUS
Status: DISPENSED
Start: 2019-08-09

## (undated) RX ORDER — METHOHEXITAL IN WATER/PF 100MG/10ML
SYRINGE (ML) INTRAVENOUS
Status: DISPENSED
Start: 2018-11-02

## (undated) RX ORDER — MORPHINE SULFATE 4 MG/ML
INJECTION, SOLUTION INTRAMUSCULAR; INTRAVENOUS
Status: DISPENSED
Start: 2019-02-15

## (undated) RX ORDER — METHOHEXITAL IN WATER/PF 100MG/10ML
SYRINGE (ML) INTRAVENOUS
Status: DISPENSED
Start: 2018-07-20

## (undated) RX ORDER — ONDANSETRON 2 MG/ML
INJECTION INTRAMUSCULAR; INTRAVENOUS
Status: DISPENSED
Start: 2018-09-21

## (undated) RX ORDER — ONDANSETRON 2 MG/ML
INJECTION INTRAMUSCULAR; INTRAVENOUS
Status: DISPENSED
Start: 2017-11-22

## (undated) RX ORDER — ONDANSETRON 2 MG/ML
INJECTION INTRAMUSCULAR; INTRAVENOUS
Status: DISPENSED
Start: 2020-01-24

## (undated) RX ORDER — ONDANSETRON 2 MG/ML
INJECTION INTRAMUSCULAR; INTRAVENOUS
Status: DISPENSED
Start: 2019-04-26

## (undated) RX ORDER — ONDANSETRON 2 MG/ML
INJECTION INTRAMUSCULAR; INTRAVENOUS
Status: DISPENSED
Start: 2019-08-09

## (undated) RX ORDER — KETOROLAC TROMETHAMINE 30 MG/ML
INJECTION, SOLUTION INTRAMUSCULAR; INTRAVENOUS
Status: DISPENSED
Start: 2019-10-11

## (undated) RX ORDER — ONDANSETRON 2 MG/ML
INJECTION INTRAMUSCULAR; INTRAVENOUS
Status: DISPENSED
Start: 2019-12-13

## (undated) RX ORDER — KETOROLAC TROMETHAMINE 30 MG/ML
INJECTION, SOLUTION INTRAMUSCULAR; INTRAVENOUS
Status: DISPENSED
Start: 2019-08-30

## (undated) RX ORDER — ONDANSETRON 2 MG/ML
INJECTION INTRAMUSCULAR; INTRAVENOUS
Status: DISPENSED
Start: 2019-09-20

## (undated) RX ORDER — MORPHINE SULFATE 4 MG/ML
INJECTION, SOLUTION INTRAMUSCULAR; INTRAVENOUS
Status: DISPENSED
Start: 2019-01-25

## (undated) RX ORDER — KETOROLAC TROMETHAMINE 30 MG/ML
INJECTION, SOLUTION INTRAMUSCULAR; INTRAVENOUS
Status: DISPENSED
Start: 2019-06-28

## (undated) RX ORDER — KETOROLAC TROMETHAMINE 30 MG/ML
INJECTION, SOLUTION INTRAMUSCULAR; INTRAVENOUS
Status: DISPENSED
Start: 2020-02-14

## (undated) RX ORDER — ONDANSETRON 2 MG/ML
INJECTION INTRAMUSCULAR; INTRAVENOUS
Status: DISPENSED
Start: 2017-10-20

## (undated) RX ORDER — ACETAMINOPHEN 500 MG
TABLET ORAL
Status: DISPENSED
Start: 2018-08-10

## (undated) RX ORDER — MORPHINE SULFATE 10 MG/ML
INJECTION, SOLUTION INTRAMUSCULAR; INTRAVENOUS
Status: DISPENSED
Start: 2017-11-03

## (undated) RX ORDER — ONDANSETRON 2 MG/ML
INJECTION INTRAMUSCULAR; INTRAVENOUS
Status: DISPENSED
Start: 2019-03-08

## (undated) RX ORDER — ONDANSETRON 2 MG/ML
INJECTION INTRAMUSCULAR; INTRAVENOUS
Status: DISPENSED
Start: 2018-10-12

## (undated) RX ORDER — ONDANSETRON 2 MG/ML
INJECTION INTRAMUSCULAR; INTRAVENOUS
Status: DISPENSED
Start: 2018-11-02

## (undated) RX ORDER — KETOROLAC TROMETHAMINE 30 MG/ML
INJECTION, SOLUTION INTRAMUSCULAR; INTRAVENOUS
Status: DISPENSED
Start: 2019-06-07

## (undated) RX ORDER — ONDANSETRON 2 MG/ML
INJECTION INTRAMUSCULAR; INTRAVENOUS
Status: DISPENSED
Start: 2018-11-23

## (undated) RX ORDER — MORPHINE SULFATE 4 MG/ML
INJECTION, SOLUTION INTRAMUSCULAR; INTRAVENOUS
Status: DISPENSED
Start: 2020-01-03

## (undated) RX ORDER — KETOROLAC TROMETHAMINE 30 MG/ML
INJECTION, SOLUTION INTRAMUSCULAR; INTRAVENOUS
Status: DISPENSED
Start: 2018-12-14

## (undated) RX ORDER — ONDANSETRON 2 MG/ML
INJECTION INTRAMUSCULAR; INTRAVENOUS
Status: DISPENSED
Start: 2018-12-14

## (undated) RX ORDER — MORPHINE SULFATE 4 MG/ML
INJECTION, SOLUTION INTRAMUSCULAR; INTRAVENOUS
Status: DISPENSED
Start: 2019-10-11

## (undated) RX ORDER — KETOROLAC TROMETHAMINE 30 MG/ML
INJECTION, SOLUTION INTRAMUSCULAR; INTRAVENOUS
Status: DISPENSED
Start: 2019-11-01

## (undated) RX ORDER — MORPHINE SULFATE 4 MG/ML
INJECTION, SOLUTION INTRAMUSCULAR; INTRAVENOUS
Status: DISPENSED
Start: 2018-07-20

## (undated) RX ORDER — KETOROLAC TROMETHAMINE 30 MG/ML
INJECTION, SOLUTION INTRAMUSCULAR; INTRAVENOUS
Status: DISPENSED
Start: 2020-01-03

## (undated) RX ORDER — METHOHEXITAL IN WATER/PF 100MG/10ML
SYRINGE (ML) INTRAVENOUS
Status: DISPENSED
Start: 2018-10-12

## (undated) RX ORDER — MORPHINE SULFATE 4 MG/ML
INJECTION, SOLUTION INTRAMUSCULAR; INTRAVENOUS
Status: DISPENSED
Start: 2018-10-12

## (undated) RX ORDER — ONDANSETRON 2 MG/ML
INJECTION INTRAMUSCULAR; INTRAVENOUS
Status: DISPENSED
Start: 2019-03-29

## (undated) RX ORDER — KETOROLAC TROMETHAMINE 30 MG/ML
INJECTION, SOLUTION INTRAMUSCULAR; INTRAVENOUS
Status: DISPENSED
Start: 2019-01-04

## (undated) RX ORDER — MORPHINE SULFATE 10 MG/ML
INJECTION, SOLUTION INTRAMUSCULAR; INTRAVENOUS
Status: DISPENSED
Start: 2017-11-20

## (undated) RX ORDER — MORPHINE SULFATE 4 MG/ML
INJECTION, SOLUTION INTRAMUSCULAR; INTRAVENOUS
Status: DISPENSED
Start: 2019-04-12

## (undated) RX ORDER — ONDANSETRON 2 MG/ML
INJECTION INTRAMUSCULAR; INTRAVENOUS
Status: DISPENSED
Start: 2017-11-17

## (undated) RX ORDER — KETOROLAC TROMETHAMINE 30 MG/ML
INJECTION, SOLUTION INTRAMUSCULAR; INTRAVENOUS
Status: DISPENSED
Start: 2019-04-26

## (undated) RX ORDER — LIDOCAINE HYDROCHLORIDE 10 MG/ML
INJECTION, SOLUTION EPIDURAL; INFILTRATION; INTRACAUDAL; PERINEURAL
Status: DISPENSED
Start: 2023-06-26

## (undated) RX ORDER — KETOROLAC TROMETHAMINE 30 MG/ML
INJECTION, SOLUTION INTRAMUSCULAR; INTRAVENOUS
Status: DISPENSED
Start: 2019-07-19

## (undated) RX ORDER — METHOHEXITAL IN WATER/PF 100MG/10ML
SYRINGE (ML) INTRAVENOUS
Status: DISPENSED
Start: 2019-04-12

## (undated) RX ORDER — MORPHINE SULFATE 4 MG/ML
INJECTION, SOLUTION INTRAMUSCULAR; INTRAVENOUS
Status: DISPENSED
Start: 2020-01-24

## (undated) RX ORDER — MORPHINE SULFATE 10 MG/ML
INJECTION, SOLUTION INTRAMUSCULAR; INTRAVENOUS
Status: DISPENSED
Start: 2017-10-30

## (undated) RX ORDER — MORPHINE SULFATE 4 MG/ML
INJECTION, SOLUTION INTRAMUSCULAR; INTRAVENOUS
Status: DISPENSED
Start: 2018-08-31

## (undated) RX ORDER — ONDANSETRON 2 MG/ML
INJECTION INTRAMUSCULAR; INTRAVENOUS
Status: DISPENSED
Start: 2019-07-19

## (undated) RX ORDER — KETOROLAC TROMETHAMINE 30 MG/ML
INJECTION, SOLUTION INTRAMUSCULAR; INTRAVENOUS
Status: DISPENSED
Start: 2019-03-08

## (undated) RX ORDER — METHOHEXITAL IN WATER/PF 100MG/10ML
SYRINGE (ML) INTRAVENOUS
Status: DISPENSED
Start: 2020-01-03

## (undated) RX ORDER — METHOHEXITAL IN WATER/PF 100MG/10ML
SYRINGE (ML) INTRAVENOUS
Status: DISPENSED
Start: 2017-10-16

## (undated) RX ORDER — ONDANSETRON 2 MG/ML
INJECTION INTRAMUSCULAR; INTRAVENOUS
Status: DISPENSED
Start: 2018-07-20

## (undated) RX ORDER — KETOROLAC TROMETHAMINE 30 MG/ML
INJECTION, SOLUTION INTRAMUSCULAR; INTRAVENOUS
Status: DISPENSED
Start: 2017-10-18

## (undated) RX ORDER — KETOROLAC TROMETHAMINE 30 MG/ML
INJECTION, SOLUTION INTRAMUSCULAR; INTRAVENOUS
Status: DISPENSED
Start: 2017-10-16

## (undated) RX ORDER — KETOROLAC TROMETHAMINE 30 MG/ML
INJECTION, SOLUTION INTRAMUSCULAR; INTRAVENOUS
Status: DISPENSED
Start: 2018-06-08

## (undated) RX ORDER — MORPHINE SULFATE 4 MG/ML
INJECTION, SOLUTION INTRAMUSCULAR; INTRAVENOUS
Status: DISPENSED
Start: 2019-06-07

## (undated) RX ORDER — KETOROLAC TROMETHAMINE 30 MG/ML
INJECTION, SOLUTION INTRAMUSCULAR; INTRAVENOUS
Status: DISPENSED
Start: 2017-11-03

## (undated) RX ORDER — MORPHINE SULFATE 4 MG/ML
INJECTION, SOLUTION INTRAMUSCULAR; INTRAVENOUS
Status: DISPENSED
Start: 2019-03-08

## (undated) RX ORDER — ONDANSETRON 2 MG/ML
INJECTION INTRAMUSCULAR; INTRAVENOUS
Status: DISPENSED
Start: 2019-06-07

## (undated) RX ORDER — KETOROLAC TROMETHAMINE 30 MG/ML
INJECTION, SOLUTION INTRAMUSCULAR; INTRAVENOUS
Status: DISPENSED
Start: 2018-11-23

## (undated) RX ORDER — MORPHINE SULFATE 4 MG/ML
INJECTION, SOLUTION INTRAMUSCULAR; INTRAVENOUS
Status: DISPENSED
Start: 2019-11-22

## (undated) RX ORDER — MORPHINE SULFATE 10 MG/ML
INJECTION, SOLUTION INTRAMUSCULAR; INTRAVENOUS
Status: DISPENSED
Start: 2017-10-18

## (undated) RX ORDER — ONDANSETRON 2 MG/ML
INJECTION INTRAMUSCULAR; INTRAVENOUS
Status: DISPENSED
Start: 2019-04-12

## (undated) RX ORDER — KETOROLAC TROMETHAMINE 30 MG/ML
INJECTION, SOLUTION INTRAMUSCULAR; INTRAVENOUS
Status: DISPENSED
Start: 2019-01-25

## (undated) RX ORDER — METHOHEXITAL IN WATER/PF 100MG/10ML
SYRINGE (ML) INTRAVENOUS
Status: DISPENSED
Start: 2019-12-13

## (undated) RX ORDER — ONDANSETRON 2 MG/ML
INJECTION INTRAMUSCULAR; INTRAVENOUS
Status: DISPENSED
Start: 2019-02-15

## (undated) RX ORDER — ONDANSETRON 2 MG/ML
INJECTION INTRAMUSCULAR; INTRAVENOUS
Status: DISPENSED
Start: 2019-01-04

## (undated) RX ORDER — MORPHINE SULFATE 4 MG/ML
INJECTION, SOLUTION INTRAMUSCULAR; INTRAVENOUS
Status: DISPENSED
Start: 2019-03-29

## (undated) RX ORDER — LIDOCAINE HYDROCHLORIDE AND EPINEPHRINE 10; 10 MG/ML; UG/ML
INJECTION, SOLUTION INFILTRATION; PERINEURAL
Status: DISPENSED
Start: 2023-06-26

## (undated) RX ORDER — ONDANSETRON 2 MG/ML
INJECTION INTRAMUSCULAR; INTRAVENOUS
Status: DISPENSED
Start: 2018-06-29

## (undated) RX ORDER — METHOHEXITAL IN WATER/PF 100MG/10ML
SYRINGE (ML) INTRAVENOUS
Status: DISPENSED
Start: 2018-09-21

## (undated) RX ORDER — METHOHEXITAL IN WATER/PF 100MG/10ML
SYRINGE (ML) INTRAVENOUS
Status: DISPENSED
Start: 2017-10-18

## (undated) RX ORDER — ACETAMINOPHEN 500 MG
TABLET ORAL
Status: DISPENSED
Start: 2020-01-24

## (undated) RX ORDER — METHOHEXITAL IN WATER/PF 100MG/10ML
SYRINGE (ML) INTRAVENOUS
Status: DISPENSED
Start: 2018-06-29

## (undated) RX ORDER — KETOROLAC TROMETHAMINE 30 MG/ML
INJECTION, SOLUTION INTRAMUSCULAR; INTRAVENOUS
Status: DISPENSED
Start: 2019-03-29

## (undated) RX ORDER — METHOHEXITAL IN WATER/PF 100MG/10ML
SYRINGE (ML) INTRAVENOUS
Status: DISPENSED
Start: 2019-06-28

## (undated) RX ORDER — ONDANSETRON 2 MG/ML
INJECTION INTRAMUSCULAR; INTRAVENOUS
Status: DISPENSED
Start: 2020-02-14

## (undated) RX ORDER — MORPHINE SULFATE 4 MG/ML
INJECTION, SOLUTION INTRAMUSCULAR; INTRAVENOUS
Status: DISPENSED
Start: 2019-04-26

## (undated) RX ORDER — METHOHEXITAL IN WATER/PF 100MG/10ML
SYRINGE (ML) INTRAVENOUS
Status: DISPENSED
Start: 2019-11-01

## (undated) RX ORDER — METHOHEXITAL IN WATER/PF 100MG/10ML
SYRINGE (ML) INTRAVENOUS
Status: DISPENSED
Start: 2019-01-25

## (undated) RX ORDER — MORPHINE SULFATE 4 MG/ML
INJECTION, SOLUTION INTRAMUSCULAR; INTRAVENOUS
Status: DISPENSED
Start: 2019-05-17

## (undated) RX ORDER — METHOHEXITAL IN WATER/PF 100MG/10ML
SYRINGE (ML) INTRAVENOUS
Status: DISPENSED
Start: 2019-04-26

## (undated) RX ORDER — METHOHEXITAL IN WATER/PF 100MG/10ML
SYRINGE (ML) INTRAVENOUS
Status: DISPENSED
Start: 2017-10-30

## (undated) RX ORDER — KETOROLAC TROMETHAMINE 30 MG/ML
INJECTION, SOLUTION INTRAMUSCULAR; INTRAVENOUS
Status: DISPENSED
Start: 2018-07-20

## (undated) RX ORDER — MORPHINE SULFATE 10 MG/ML
INJECTION, SOLUTION INTRAMUSCULAR; INTRAVENOUS
Status: DISPENSED
Start: 2017-11-10

## (undated) RX ORDER — KETOROLAC TROMETHAMINE 30 MG/ML
INJECTION, SOLUTION INTRAMUSCULAR; INTRAVENOUS
Status: DISPENSED
Start: 2017-11-10

## (undated) RX ORDER — KETOROLAC TROMETHAMINE 30 MG/ML
INJECTION, SOLUTION INTRAMUSCULAR; INTRAVENOUS
Status: DISPENSED
Start: 2018-09-21

## (undated) RX ORDER — ONDANSETRON 2 MG/ML
INJECTION INTRAMUSCULAR; INTRAVENOUS
Status: DISPENSED
Start: 2017-10-23

## (undated) RX ORDER — METHOHEXITAL IN WATER/PF 100MG/10ML
SYRINGE (ML) INTRAVENOUS
Status: DISPENSED
Start: 2019-08-30

## (undated) RX ORDER — KETOROLAC TROMETHAMINE 30 MG/ML
INJECTION, SOLUTION INTRAMUSCULAR; INTRAVENOUS
Status: DISPENSED
Start: 2017-10-30

## (undated) RX ORDER — KETOROLAC TROMETHAMINE 30 MG/ML
INJECTION, SOLUTION INTRAMUSCULAR; INTRAVENOUS
Status: DISPENSED
Start: 2019-04-12

## (undated) RX ORDER — MORPHINE SULFATE 4 MG/ML
INJECTION, SOLUTION INTRAMUSCULAR; INTRAVENOUS
Status: DISPENSED
Start: 2018-06-29

## (undated) RX ORDER — METHOHEXITAL IN WATER/PF 100MG/10ML
SYRINGE (ML) INTRAVENOUS
Status: DISPENSED
Start: 2018-12-14

## (undated) RX ORDER — ONDANSETRON 2 MG/ML
INJECTION INTRAMUSCULAR; INTRAVENOUS
Status: DISPENSED
Start: 2017-11-10

## (undated) RX ORDER — MORPHINE SULFATE 10 MG/ML
INJECTION, SOLUTION INTRAMUSCULAR; INTRAVENOUS
Status: DISPENSED
Start: 2017-11-22

## (undated) RX ORDER — ONDANSETRON 2 MG/ML
INJECTION INTRAMUSCULAR; INTRAVENOUS
Status: DISPENSED
Start: 2017-10-30

## (undated) RX ORDER — METHOHEXITAL IN WATER/PF 100MG/10ML
SYRINGE (ML) INTRAVENOUS
Status: DISPENSED
Start: 2018-06-08

## (undated) RX ORDER — KETOROLAC TROMETHAMINE 30 MG/ML
INJECTION, SOLUTION INTRAMUSCULAR; INTRAVENOUS
Status: DISPENSED
Start: 2017-11-17

## (undated) RX ORDER — MORPHINE SULFATE 4 MG/ML
INJECTION, SOLUTION INTRAMUSCULAR; INTRAVENOUS
Status: DISPENSED
Start: 2019-09-20

## (undated) RX ORDER — ONDANSETRON 2 MG/ML
INJECTION INTRAMUSCULAR; INTRAVENOUS
Status: DISPENSED
Start: 2018-08-10

## (undated) RX ORDER — MORPHINE SULFATE 4 MG/ML
INJECTION, SOLUTION INTRAMUSCULAR; INTRAVENOUS
Status: DISPENSED
Start: 2019-11-01

## (undated) RX ORDER — KETOROLAC TROMETHAMINE 30 MG/ML
INJECTION, SOLUTION INTRAMUSCULAR; INTRAVENOUS
Status: DISPENSED
Start: 2018-08-10

## (undated) RX ORDER — METHOHEXITAL IN WATER/PF 100MG/10ML
SYRINGE (ML) INTRAVENOUS
Status: DISPENSED
Start: 2019-06-07

## (undated) RX ORDER — KETOROLAC TROMETHAMINE 30 MG/ML
INJECTION, SOLUTION INTRAMUSCULAR; INTRAVENOUS
Status: DISPENSED
Start: 2017-10-23

## (undated) RX ORDER — METHOHEXITAL IN WATER/PF 100MG/10ML
SYRINGE (ML) INTRAVENOUS
Status: DISPENSED
Start: 2019-10-11

## (undated) RX ORDER — ONDANSETRON 2 MG/ML
INJECTION INTRAMUSCULAR; INTRAVENOUS
Status: DISPENSED
Start: 2019-01-25

## (undated) RX ORDER — ONDANSETRON 2 MG/ML
INJECTION INTRAMUSCULAR; INTRAVENOUS
Status: DISPENSED
Start: 2017-11-03

## (undated) RX ORDER — METHOHEXITAL IN WATER/PF 100MG/10ML
SYRINGE (ML) INTRAVENOUS
Status: DISPENSED
Start: 2019-07-19

## (undated) RX ORDER — MORPHINE SULFATE 4 MG/ML
INJECTION, SOLUTION INTRAMUSCULAR; INTRAVENOUS
Status: DISPENSED
Start: 2019-12-13

## (undated) RX ORDER — KETOROLAC TROMETHAMINE 30 MG/ML
INJECTION, SOLUTION INTRAMUSCULAR; INTRAVENOUS
Status: DISPENSED
Start: 2018-05-18

## (undated) RX ORDER — KETOROLAC TROMETHAMINE 30 MG/ML
INJECTION, SOLUTION INTRAMUSCULAR; INTRAVENOUS
Status: DISPENSED
Start: 2017-10-20